# Patient Record
Sex: MALE | Race: WHITE | Employment: OTHER | URBAN - METROPOLITAN AREA
[De-identification: names, ages, dates, MRNs, and addresses within clinical notes are randomized per-mention and may not be internally consistent; named-entity substitution may affect disease eponyms.]

---

## 2020-09-01 ENCOUNTER — HOSPITAL ENCOUNTER (OUTPATIENT)
Age: 45
Discharge: HOME | DRG: 617 | End: 2020-09-03
Payer: COMMERCIAL

## 2020-09-01 ENCOUNTER — HOSPITAL ENCOUNTER (OUTPATIENT)
Dept: GENERAL RADIOLOGY | Age: 45
Discharge: HOME | DRG: 617 | End: 2020-09-03
Payer: COMMERCIAL

## 2020-09-01 PROCEDURE — 73630 X-RAY EXAM OF FOOT: CPT

## 2020-09-03 ENCOUNTER — APPOINTMENT (OUTPATIENT)
Dept: GENERAL RADIOLOGY | Age: 45
DRG: 617 | End: 2020-09-03
Payer: COMMERCIAL

## 2020-09-03 ENCOUNTER — HOSPITAL ENCOUNTER (INPATIENT)
Age: 45
LOS: 8 days | Discharge: HOME | DRG: 617 | End: 2020-09-11
Attending: EMERGENCY MEDICINE | Admitting: INTERNAL MEDICINE
Payer: COMMERCIAL

## 2020-09-03 PROBLEM — E08.621 DIABETIC FOOT ULCER ASSOCIATED WITH DIABETES MELLITUS DUE TO UNDERLYING CONDITION (HCC): Status: ACTIVE | Noted: 2020-09-03

## 2020-09-03 PROBLEM — L97.509 DIABETIC FOOT ULCER ASSOCIATED WITH DIABETES MELLITUS DUE TO UNDERLYING CONDITION (HCC): Status: ACTIVE | Noted: 2020-09-03

## 2020-09-03 PROBLEM — L03.116 CELLULITIS OF LEFT FOOT: Status: ACTIVE | Noted: 2020-09-03

## 2020-09-03 PROBLEM — I10 ESSENTIAL HYPERTENSION: Status: ACTIVE | Noted: 2020-09-03

## 2020-09-03 PROBLEM — E66.01 MORBID OBESITY WITH BMI OF 45.0-49.9, ADULT (HCC): Status: ACTIVE | Noted: 2020-09-03

## 2020-09-03 LAB
ALBUMIN SERPL-MCNC: 3.9 G/DL (ref 3.5–5.2)
ALP BLD-CCNC: 91 U/L (ref 40–129)
ALT SERPL-CCNC: 30 U/L (ref 0–40)
ANION GAP SERPL CALCULATED.3IONS-SCNC: 11 MMOL/L (ref 7–16)
AST SERPL-CCNC: 20 U/L (ref 0–39)
BASOPHILS ABSOLUTE: 0.13 E9/L (ref 0–0.2)
BASOPHILS RELATIVE PERCENT: 0.8 % (ref 0–2)
BETA-HYDROXYBUTYRATE: 0.61 MMOL/L (ref 0.02–0.27)
BILIRUB SERPL-MCNC: 0.5 MG/DL (ref 0–1.2)
BUN BLDV-MCNC: 13 MG/DL (ref 6–20)
CALCIUM SERPL-MCNC: 9.3 MG/DL (ref 8.6–10.2)
CHLORIDE BLD-SCNC: 99 MMOL/L (ref 98–107)
CO2: 25 MMOL/L (ref 22–29)
CREAT SERPL-MCNC: 0.9 MG/DL (ref 0.7–1.2)
EOSINOPHILS ABSOLUTE: 0.09 E9/L (ref 0.05–0.5)
EOSINOPHILS RELATIVE PERCENT: 0.6 % (ref 0–6)
GFR AFRICAN AMERICAN: >60
GFR NON-AFRICAN AMERICAN: >60 ML/MIN/1.73
GLUCOSE BLD-MCNC: 310 MG/DL (ref 74–99)
HCT VFR BLD CALC: 49 % (ref 37–54)
HEMOGLOBIN: 17 G/DL (ref 12.5–16.5)
IMMATURE GRANULOCYTES #: 0.1 E9/L
IMMATURE GRANULOCYTES %: 0.6 % (ref 0–5)
LACTIC ACID, SEPSIS: 2 MMOL/L (ref 0.5–1.9)
LYMPHOCYTES ABSOLUTE: 1.71 E9/L (ref 1.5–4)
LYMPHOCYTES RELATIVE PERCENT: 11 % (ref 20–42)
MCH RBC QN AUTO: 33.7 PG (ref 26–35)
MCHC RBC AUTO-ENTMCNC: 34.7 % (ref 32–34.5)
MCV RBC AUTO: 97.2 FL (ref 80–99.9)
METER GLUCOSE: 220 MG/DL (ref 74–99)
MONOCYTES ABSOLUTE: 1.24 E9/L (ref 0.1–0.95)
MONOCYTES RELATIVE PERCENT: 8 % (ref 2–12)
NEUTROPHILS ABSOLUTE: 12.26 E9/L (ref 1.8–7.3)
NEUTROPHILS RELATIVE PERCENT: 79 % (ref 43–80)
PDW BLD-RTO: 13.2 FL (ref 11.5–15)
PLATELET # BLD: 227 E9/L (ref 130–450)
PMV BLD AUTO: 11.9 FL (ref 7–12)
POTASSIUM SERPL-SCNC: 4.2 MMOL/L (ref 3.5–5)
RBC # BLD: 5.04 E12/L (ref 3.8–5.8)
SODIUM BLD-SCNC: 135 MMOL/L (ref 132–146)
TOTAL PROTEIN: 7.7 G/DL (ref 6.4–8.3)
WBC # BLD: 15.5 E9/L (ref 4.5–11.5)

## 2020-09-03 PROCEDURE — 73630 X-RAY EXAM OF FOOT: CPT

## 2020-09-03 PROCEDURE — 6360000002 HC RX W HCPCS: Performed by: EMERGENCY MEDICINE

## 2020-09-03 PROCEDURE — 87186 SC STD MICRODIL/AGAR DIL: CPT

## 2020-09-03 PROCEDURE — 87070 CULTURE OTHR SPECIMN AEROBIC: CPT

## 2020-09-03 PROCEDURE — 85025 COMPLETE CBC W/AUTO DIFF WBC: CPT

## 2020-09-03 PROCEDURE — 99223 1ST HOSP IP/OBS HIGH 75: CPT | Performed by: INTERNAL MEDICINE

## 2020-09-03 PROCEDURE — 82962 GLUCOSE BLOOD TEST: CPT

## 2020-09-03 PROCEDURE — 83605 ASSAY OF LACTIC ACID: CPT

## 2020-09-03 PROCEDURE — 1200000000 HC SEMI PRIVATE

## 2020-09-03 PROCEDURE — 6370000000 HC RX 637 (ALT 250 FOR IP): Performed by: INTERNAL MEDICINE

## 2020-09-03 PROCEDURE — 82010 KETONE BODYS QUAN: CPT

## 2020-09-03 PROCEDURE — 2580000003 HC RX 258: Performed by: EMERGENCY MEDICINE

## 2020-09-03 PROCEDURE — 99284 EMERGENCY DEPT VISIT MOD MDM: CPT

## 2020-09-03 PROCEDURE — 80053 COMPREHEN METABOLIC PANEL: CPT

## 2020-09-03 PROCEDURE — 87040 BLOOD CULTURE FOR BACTERIA: CPT

## 2020-09-03 PROCEDURE — 87147 CULTURE TYPE IMMUNOLOGIC: CPT

## 2020-09-03 PROCEDURE — 99283 EMERGENCY DEPT VISIT LOW MDM: CPT

## 2020-09-03 PROCEDURE — 2580000003 HC RX 258: Performed by: INTERNAL MEDICINE

## 2020-09-03 RX ORDER — ONDANSETRON 2 MG/ML
4 INJECTION INTRAMUSCULAR; INTRAVENOUS EVERY 6 HOURS PRN
Status: DISCONTINUED | OUTPATIENT
Start: 2020-09-03 | End: 2020-09-11 | Stop reason: HOSPADM

## 2020-09-03 RX ORDER — CETIRIZINE HYDROCHLORIDE 10 MG/1
10 TABLET ORAL DAILY
Status: DISCONTINUED | OUTPATIENT
Start: 2020-09-04 | End: 2020-09-11 | Stop reason: HOSPADM

## 2020-09-03 RX ORDER — ACETAMINOPHEN 650 MG/1
650 SUPPOSITORY RECTAL EVERY 6 HOURS PRN
Status: DISCONTINUED | OUTPATIENT
Start: 2020-09-03 | End: 2020-09-11 | Stop reason: HOSPADM

## 2020-09-03 RX ORDER — BUPROPION HYDROCHLORIDE 300 MG/1
300 TABLET ORAL EVERY MORNING
COMMUNITY
End: 2020-11-27 | Stop reason: SDUPTHER

## 2020-09-03 RX ORDER — PANTOPRAZOLE SODIUM 40 MG/1
40 TABLET, DELAYED RELEASE ORAL
Status: DISCONTINUED | OUTPATIENT
Start: 2020-09-04 | End: 2020-09-11 | Stop reason: HOSPADM

## 2020-09-03 RX ORDER — ACETAMINOPHEN 325 MG/1
650 TABLET ORAL EVERY 6 HOURS PRN
Status: DISCONTINUED | OUTPATIENT
Start: 2020-09-03 | End: 2020-09-11 | Stop reason: HOSPADM

## 2020-09-03 RX ORDER — ESCITALOPRAM OXALATE 10 MG/1
20 TABLET ORAL DAILY
Status: DISCONTINUED | OUTPATIENT
Start: 2020-09-04 | End: 2020-09-11 | Stop reason: HOSPADM

## 2020-09-03 RX ORDER — PROMETHAZINE HYDROCHLORIDE 25 MG/1
12.5 TABLET ORAL EVERY 6 HOURS PRN
Status: DISCONTINUED | OUTPATIENT
Start: 2020-09-03 | End: 2020-09-11 | Stop reason: HOSPADM

## 2020-09-03 RX ORDER — POLYETHYLENE GLYCOL 3350 17 G/17G
17 POWDER, FOR SOLUTION ORAL DAILY PRN
Status: DISCONTINUED | OUTPATIENT
Start: 2020-09-03 | End: 2020-09-11 | Stop reason: HOSPADM

## 2020-09-03 RX ORDER — ASPIRIN 81 MG/1
81 TABLET ORAL
COMMUNITY

## 2020-09-03 RX ORDER — NICOTINE POLACRILEX 4 MG
15 LOZENGE BUCCAL PRN
Status: DISCONTINUED | OUTPATIENT
Start: 2020-09-03 | End: 2020-09-11 | Stop reason: HOSPADM

## 2020-09-03 RX ORDER — DEXTROSE MONOHYDRATE 50 MG/ML
100 INJECTION, SOLUTION INTRAVENOUS PRN
Status: DISCONTINUED | OUTPATIENT
Start: 2020-09-03 | End: 2020-09-11 | Stop reason: HOSPADM

## 2020-09-03 RX ORDER — VITAMIN B COMPLEX
1 CAPSULE ORAL DAILY
Status: ON HOLD | COMMUNITY
End: 2020-09-11 | Stop reason: HOSPADM

## 2020-09-03 RX ORDER — INSULIN GLARGINE 100 [IU]/ML
15 INJECTION, SOLUTION SUBCUTANEOUS EVERY MORNING
Status: ON HOLD | COMMUNITY
End: 2020-09-11 | Stop reason: HOSPADM

## 2020-09-03 RX ORDER — DEXTROSE MONOHYDRATE 25 G/50ML
12.5 INJECTION, SOLUTION INTRAVENOUS PRN
Status: DISCONTINUED | OUTPATIENT
Start: 2020-09-03 | End: 2020-09-11 | Stop reason: HOSPADM

## 2020-09-03 RX ORDER — LISINOPRIL 10 MG/1
20 TABLET ORAL DAILY
COMMUNITY
End: 2020-10-30

## 2020-09-03 RX ORDER — SODIUM CHLORIDE 0.9 % (FLUSH) 0.9 %
10 SYRINGE (ML) INJECTION EVERY 12 HOURS SCHEDULED
Status: DISCONTINUED | OUTPATIENT
Start: 2020-09-03 | End: 2020-09-11 | Stop reason: HOSPADM

## 2020-09-03 RX ORDER — SODIUM CHLORIDE 0.9 % (FLUSH) 0.9 %
10 SYRINGE (ML) INJECTION PRN
Status: DISCONTINUED | OUTPATIENT
Start: 2020-09-03 | End: 2020-09-11 | Stop reason: HOSPADM

## 2020-09-03 RX ORDER — ASPIRIN 81 MG/1
81 TABLET ORAL 2 TIMES DAILY
Status: DISCONTINUED | OUTPATIENT
Start: 2020-09-03 | End: 2020-09-11 | Stop reason: HOSPADM

## 2020-09-03 RX ORDER — LISINOPRIL 20 MG/1
20 TABLET ORAL DAILY
Status: DISCONTINUED | OUTPATIENT
Start: 2020-09-04 | End: 2020-09-11 | Stop reason: HOSPADM

## 2020-09-03 RX ORDER — BUPROPION HYDROCHLORIDE 300 MG/1
300 TABLET ORAL EVERY MORNING
Status: DISCONTINUED | OUTPATIENT
Start: 2020-09-04 | End: 2020-09-11 | Stop reason: HOSPADM

## 2020-09-03 RX ORDER — 0.9 % SODIUM CHLORIDE 0.9 %
1000 INTRAVENOUS SOLUTION INTRAVENOUS ONCE
Status: COMPLETED | OUTPATIENT
Start: 2020-09-03 | End: 2020-09-03

## 2020-09-03 RX ORDER — OMEPRAZOLE 20 MG/1
20 CAPSULE, DELAYED RELEASE ORAL DAILY
COMMUNITY
End: 2021-10-29 | Stop reason: SDUPTHER

## 2020-09-03 RX ORDER — ERTUGLIFLOZIN 15 MG/1
15 TABLET, FILM COATED ORAL DAILY
Status: ON HOLD | COMMUNITY
End: 2020-09-11 | Stop reason: HOSPADM

## 2020-09-03 RX ORDER — M-VIT,TX,IRON,MINS/CALC/FOLIC 27MG-0.4MG
1 TABLET ORAL DAILY
Status: ON HOLD | COMMUNITY
End: 2020-09-11 | Stop reason: HOSPADM

## 2020-09-03 RX ORDER — ESCITALOPRAM OXALATE 20 MG/1
20 TABLET ORAL DAILY
COMMUNITY
End: 2020-11-27 | Stop reason: SDUPTHER

## 2020-09-03 RX ORDER — CETIRIZINE HYDROCHLORIDE 10 MG/1
10 TABLET ORAL DAILY
COMMUNITY

## 2020-09-03 RX ORDER — ARIPIPRAZOLE 2 MG/1
4 TABLET ORAL DAILY
Status: DISCONTINUED | OUTPATIENT
Start: 2020-09-04 | End: 2020-09-11 | Stop reason: HOSPADM

## 2020-09-03 RX ORDER — PRAVASTATIN SODIUM 40 MG
40 TABLET ORAL NIGHTLY
COMMUNITY
End: 2020-11-27 | Stop reason: SDUPTHER

## 2020-09-03 RX ORDER — ARIPIPRAZOLE 2 MG/1
4 TABLET ORAL DAILY
COMMUNITY
End: 2020-11-27 | Stop reason: SDUPTHER

## 2020-09-03 RX ORDER — PRAVASTATIN SODIUM 20 MG
40 TABLET ORAL NIGHTLY
Status: DISCONTINUED | OUTPATIENT
Start: 2020-09-03 | End: 2020-09-11 | Stop reason: HOSPADM

## 2020-09-03 RX ORDER — HYDROCODONE BITARTRATE AND ACETAMINOPHEN 7.5; 325 MG/1; MG/1
1 TABLET ORAL EVERY 6 HOURS PRN
Status: DISCONTINUED | OUTPATIENT
Start: 2020-09-03 | End: 2020-09-11 | Stop reason: HOSPADM

## 2020-09-03 RX ADMIN — INSULIN LISPRO 2 UNITS: 100 INJECTION, SOLUTION INTRAVENOUS; SUBCUTANEOUS at 23:37

## 2020-09-03 RX ADMIN — SODIUM CHLORIDE, PRESERVATIVE FREE 10 ML: 5 INJECTION INTRAVENOUS at 23:34

## 2020-09-03 RX ADMIN — CEFEPIME HYDROCHLORIDE 2 G: 2 INJECTION, POWDER, FOR SOLUTION INTRAVENOUS at 19:29

## 2020-09-03 RX ADMIN — ASPIRIN 81 MG: 81 TABLET, COATED ORAL at 23:34

## 2020-09-03 RX ADMIN — SODIUM CHLORIDE 1000 ML: 9 INJECTION, SOLUTION INTRAVENOUS at 19:58

## 2020-09-03 RX ADMIN — VANCOMYCIN HYDROCHLORIDE 3000 MG: 1 INJECTION, POWDER, LYOPHILIZED, FOR SOLUTION INTRAVENOUS at 23:36

## 2020-09-03 RX ADMIN — PRAVASTATIN SODIUM 40 MG: 20 TABLET ORAL at 23:38

## 2020-09-03 RX ADMIN — METOPROLOL TARTRATE 25 MG: 25 TABLET, FILM COATED ORAL at 23:34

## 2020-09-03 SDOH — HEALTH STABILITY: MENTAL HEALTH: HOW OFTEN DO YOU HAVE A DRINK CONTAINING ALCOHOL?: NEVER

## 2020-09-03 ASSESSMENT — PAIN SCALES - GENERAL
PAINLEVEL_OUTOF10: 0
PAINLEVEL_OUTOF10: 6

## 2020-09-03 ASSESSMENT — PAIN DESCRIPTION - PAIN TYPE: TYPE: ACUTE PAIN

## 2020-09-03 ASSESSMENT — PAIN DESCRIPTION - FREQUENCY: FREQUENCY: CONTINUOUS

## 2020-09-03 ASSESSMENT — PAIN DESCRIPTION - LOCATION: LOCATION: FOOT

## 2020-09-03 ASSESSMENT — PAIN DESCRIPTION - DESCRIPTORS: DESCRIPTORS: ACHING

## 2020-09-03 ASSESSMENT — PAIN DESCRIPTION - ORIENTATION: ORIENTATION: LEFT

## 2020-09-03 NOTE — ED NOTES
CHAR faxed and received per Freya Krause advised patient ready for transport upstairs.      Gualberto Mercado RN  09/03/20 0460

## 2020-09-03 NOTE — ED PROVIDER NOTES
smoked. He has never used smokeless tobacco. He reports that he does not drink alcohol or use drugs. Family History: family history is not on file. The patients home medications have been reviewed. Allergies: Milk-related compounds        ---------------------------------------------------PHYSICAL EXAM--------------------------------------    Constitutional/General: Alert and oriented x3, well appearing, non toxic in NAD; orbitally obese   head: Normocephalic and atraumatic  Eyes: PERRL, EOMI, conjunctive normal, sclera non icteric  Mouth: Oropharynx clear, handling secretions, no trismus, no asymmetry of the posterior oropharynx or uvular edema  Neck: Supple, full ROM, non tender to palpation in the midline, no stridor, no crepitus, no meningeal signs  Respiratory: Lungs clear to auscultation bilaterally, no wheezes, rales, or rhonchi. Not in respiratory distress  Cardiovascular:  Regular rate. Regular rhythm. No murmurs, gallops, or rubs. 2+ distal pulses  Chest: No chest wall tenderness  GI:  Abdomen Soft, Non tender, Non distended. +BS. No organomegaly, no palpable masses,  No rebound, guarding, or rigidity. Musculoskeletal: Moves all extremities x 4. Warm and well perfused, no clubbing or cyanosis. Patient has a 1.5 x 1.5 cm open diabetic wound to the lateral plantar surface of his left foot. There is surrounding erythema no purulent drainage. Is tender in the foot is moderately swollen throughout. Normal distal pulses. Normal neurovascular exam to the left lower extremity. Integument: skin warm and dry. No rashes. Lymphatic: no lymphadenopathy noted  Neurologic: GCS 15, no focal deficits, symmetric strength 5/5 in the upper and lower extremities bilaterally  Psychiatric: Normal Affect    -------------------------------------------------- RESULTS -------------------------------------------------  I have personally reviewed all laboratory and imaging results for this patient.  Results are listed below. LABS:  Results for orders placed or performed during the hospital encounter of 09/03/20   CBC Auto Differential   Result Value Ref Range    WBC 15.5 (H) 4.5 - 11.5 E9/L    RBC 5.04 3.80 - 5.80 E12/L    Hemoglobin 17.0 (H) 12.5 - 16.5 g/dL    Hematocrit 49.0 37.0 - 54.0 %    MCV 97.2 80.0 - 99.9 fL    MCH 33.7 26.0 - 35.0 pg    MCHC 34.7 (H) 32.0 - 34.5 %    RDW 13.2 11.5 - 15.0 fL    Platelets 334 180 - 136 E9/L    MPV 11.9 7.0 - 12.0 fL    Neutrophils % 79.0 43.0 - 80.0 %    Immature Granulocytes % 0.6 0.0 - 5.0 %    Lymphocytes % 11.0 (L) 20.0 - 42.0 %    Monocytes % 8.0 2.0 - 12.0 %    Eosinophils % 0.6 0.0 - 6.0 %    Basophils % 0.8 0.0 - 2.0 %    Neutrophils Absolute 12.26 (H) 1.80 - 7.30 E9/L    Immature Granulocytes # 0.10 E9/L    Lymphocytes Absolute 1.71 1.50 - 4.00 E9/L    Monocytes Absolute 1.24 (H) 0.10 - 0.95 E9/L    Eosinophils Absolute 0.09 0.05 - 0.50 E9/L    Basophils Absolute 0.13 0.00 - 0.20 E9/L   Lactate, Sepsis   Result Value Ref Range    Lactic Acid, Sepsis 2.0 (H) 0.5 - 1.9 mmol/L   Comprehensive Metabolic Panel   Result Value Ref Range    Sodium 135 132 - 146 mmol/L    Potassium 4.2 3.5 - 5.0 mmol/L    Chloride 99 98 - 107 mmol/L    CO2 25 22 - 29 mmol/L    Anion Gap 11 7 - 16 mmol/L    Glucose 310 (H) 74 - 99 mg/dL    BUN 13 6 - 20 mg/dL    CREATININE 0.9 0.7 - 1.2 mg/dL    GFR Non-African American >60 >=60 mL/min/1.73    GFR African American >60     Calcium 9.3 8.6 - 10.2 mg/dL    Total Protein 7.7 6.4 - 8.3 g/dL    Alb 3.9 3.5 - 5.2 g/dL    Total Bilirubin 0.5 0.0 - 1.2 mg/dL    Alkaline Phosphatase 91 40 - 129 U/L    ALT 30 0 - 40 U/L    AST 20 0 - 39 U/L   Beta-Hydroxybutyrate   Result Value Ref Range    Beta-Hydroxybutyrate 0.61 (H) 0.02 - 0.27 mmol/L       RADIOLOGY:  Interpreted by Radiologist.  XR FOOT LEFT (MIN 3 VIEWS)   Final Result      Nondisplaced fracture of the proximal phalanx of the fifth toe is once   again seen.        Small ulcer adjacent to the distal head of the fifth metatarsal is   once again noted. ------------------------- NURSING NOTES AND VITALS REVIEWED ---------------------------   The nursing notes within the ED encounter and vital signs as below have been reviewed by myself. BP (!) 159/86   Pulse 108   Temp 98.8 °F (37.1 °C) (Oral)   Resp 16   Ht 6' 5\" (1.956 m)   Wt (!) 400 lb (181.4 kg)   SpO2 96%   BMI 47.43 kg/m²   Oxygen Saturation Interpretation: Normal    The patients available past medical records and past encounters were reviewed. ------------------------------ ED COURSE/MEDICAL DECISION MAKING----------------------  Medications   cefepime (MAXIPIME) 2 g IVPB extended (mini-bag) (2 g Intravenous New Bag 9/3/20 1929)   vancomycin (VANCOCIN) 3,000 mg in dextrose 5 % 600 mL IVPB (has no administration in time range)         ED COURSE:       Medical Decision Making:    Patient is a pleasant 54 old gentleman is morbidly obese history of diabetes who presents with nonhealing diabetic foot wound has been there for the past week failed outpatient antibiotics concerns for worsening infection by his podiatrist and sent in for admission today. Patient will have blood cultures as well as wound culture will check basic labs and lactic acid. Of x-ray, as well. Differential diagnosis being diabetic foot wound nonhealing ulcer, abscess, DKA, hyperglycemia, osteomyelitis. This patient has remained hemodynamically stable during their ED course. Patient had blood cultures and wound culture sent here. He had a CBC showed a white count limit of 15.5 lactic was slightly elevated 2.0. Chemistry showed elevated glucose of 310 but no signs of DKA normal anion gap and normal CO2. He was given IV fluids. Hydroxybutyrate is borderline at 0.61. He is not on insulin at home. He can be further monitored for his blood sugars after IV fluids are given to determine whether any sliding scale insulin is necessary.   X-ray of the right foot shows nondisplaced chronic fracture of the proximal phalanx of the fifth toe and there is a small ulcer adjacent to the distal head of the fifth metatarsal.  No signs of osteomyelitis. Given the surrounding cellulitis swelling and failed outpatient therapy, patient was started on IV antibiotics. IV cefepime and Vanco was started. Spoke to Dr. Alveda Nyhan who admit the patient will consult ID in house. Re-Evaluations:             Re-evaluation. Patients symptoms show no change    Re-examination  9/3/20   5:58 PM EDT          Vital Signs:   Vitals:    09/03/20 1704 09/03/20 1819 09/03/20 1937   BP: (!) 157/97 (!) 168/90 (!) 159/86   Pulse: 118 113 108   Resp: 14 14 16   Temp: 98.9 °F (37.2 °C) 98.7 °F (37.1 °C) 98.8 °F (37.1 °C)   TempSrc: Oral Oral Oral   SpO2: 94% 97% 96%   Weight: (!) 400 lb (181.4 kg)     Height: 6' 5\" (1.956 m)             Consultations:           I spoke to Dr. Alveda Nyhan who will admit the patient to the hospitalist service. IV antibiotics will start be started here in the department. Podiatry be consulted for the floor. Counseling: The emergency provider has spoken with the patient and discussed todays results, in addition to providing specific details for the plan of care and counseling regarding the diagnosis and prognosis. Questions are answered at this time and they are agreeable with the plan.       --------------------------------- IMPRESSION AND DISPOSITION ---------------------------------    IMPRESSION  1. Diabetic ulcer of left midfoot associated with diabetes mellitus due to underlying condition, limited to breakdown of skin ([facility] [address]    2. Cellulitis of left foot        DISPOSITION  Disposition: Admit to med/surg floor  Patient condition is stable    NOTE: This report was transcribed using voice recognition software.  Every effort was made to ensure accuracy; however, inadvertent computerized transcription errors may be present        Desiree Rebollar MD  09/03/20 1955

## 2020-09-04 ENCOUNTER — ANESTHESIA EVENT (OUTPATIENT)
Dept: OPERATING ROOM | Age: 45
DRG: 617 | End: 2020-09-04
Payer: COMMERCIAL

## 2020-09-04 ENCOUNTER — ANESTHESIA (OUTPATIENT)
Dept: OPERATING ROOM | Age: 45
DRG: 617 | End: 2020-09-04
Payer: COMMERCIAL

## 2020-09-04 ENCOUNTER — APPOINTMENT (OUTPATIENT)
Dept: GENERAL RADIOLOGY | Age: 45
DRG: 617 | End: 2020-09-04
Payer: COMMERCIAL

## 2020-09-04 VITALS — OXYGEN SATURATION: 96 % | SYSTOLIC BLOOD PRESSURE: 111 MMHG | DIASTOLIC BLOOD PRESSURE: 60 MMHG

## 2020-09-04 PROBLEM — L03.032 CELLULITIS AND ABSCESS OF TOE OF LEFT FOOT: Status: ACTIVE | Noted: 2020-09-04

## 2020-09-04 PROBLEM — L02.612 CELLULITIS AND ABSCESS OF TOE OF LEFT FOOT: Status: ACTIVE | Noted: 2020-09-04

## 2020-09-04 LAB
ALBUMIN SERPL-MCNC: 3.4 G/DL (ref 3.5–5.2)
ALP BLD-CCNC: 77 U/L (ref 40–129)
ALT SERPL-CCNC: 24 U/L (ref 0–40)
ANION GAP SERPL CALCULATED.3IONS-SCNC: 10 MMOL/L (ref 7–16)
ANTISTREPTOLYSIN-O: 64 IU/ML (ref 0–200)
AST SERPL-CCNC: 16 U/L (ref 0–39)
BILIRUB SERPL-MCNC: 0.9 MG/DL (ref 0–1.2)
BUN BLDV-MCNC: 11 MG/DL (ref 6–20)
C-REACTIVE PROTEIN: 2.6 MG/DL (ref 0–0.4)
CALCIUM SERPL-MCNC: 8.8 MG/DL (ref 8.6–10.2)
CHLORIDE BLD-SCNC: 102 MMOL/L (ref 98–107)
CO2: 20 MMOL/L (ref 22–29)
CREAT SERPL-MCNC: 0.6 MG/DL (ref 0.7–1.2)
GFR AFRICAN AMERICAN: >60
GFR NON-AFRICAN AMERICAN: >60 ML/MIN/1.73
GLUCOSE BLD-MCNC: 198 MG/DL (ref 74–99)
HCT VFR BLD CALC: 46.7 % (ref 37–54)
HEMOGLOBIN: 15.9 G/DL (ref 12.5–16.5)
LACTIC ACID: 1.1 MMOL/L (ref 0.5–2.2)
MCH RBC QN AUTO: 33.4 PG (ref 26–35)
MCHC RBC AUTO-ENTMCNC: 34 % (ref 32–34.5)
MCV RBC AUTO: 98.1 FL (ref 80–99.9)
METER GLUCOSE: 168 MG/DL (ref 74–99)
METER GLUCOSE: 194 MG/DL (ref 74–99)
METER GLUCOSE: 249 MG/DL (ref 74–99)
METER GLUCOSE: 302 MG/DL (ref 74–99)
PDW BLD-RTO: 13.2 FL (ref 11.5–15)
PLATELET # BLD: 210 E9/L (ref 130–450)
PMV BLD AUTO: 12 FL (ref 7–12)
POTASSIUM REFLEX MAGNESIUM: 3.9 MMOL/L (ref 3.5–5)
RBC # BLD: 4.76 E12/L (ref 3.8–5.8)
SEDIMENTATION RATE, ERYTHROCYTE: 10 MM/HR (ref 0–15)
SODIUM BLD-SCNC: 132 MMOL/L (ref 132–146)
TOTAL PROTEIN: 7 G/DL (ref 6.4–8.3)
WBC # BLD: 16.4 E9/L (ref 4.5–11.5)

## 2020-09-04 PROCEDURE — 6370000000 HC RX 637 (ALT 250 FOR IP): Performed by: INTERNAL MEDICINE

## 2020-09-04 PROCEDURE — 88311 DECALCIFY TISSUE: CPT

## 2020-09-04 PROCEDURE — 86140 C-REACTIVE PROTEIN: CPT

## 2020-09-04 PROCEDURE — 7100000001 HC PACU RECOVERY - ADDTL 15 MIN: Performed by: PODIATRIST

## 2020-09-04 PROCEDURE — 0Y6N0ZF DETACHMENT AT LEFT FOOT, PARTIAL 5TH RAY, OPEN APPROACH: ICD-10-PCS | Performed by: PODIATRIST

## 2020-09-04 PROCEDURE — 87147 CULTURE TYPE IMMUNOLOGIC: CPT

## 2020-09-04 PROCEDURE — 2580000003 HC RX 258: Performed by: INTERNAL MEDICINE

## 2020-09-04 PROCEDURE — 3700000000 HC ANESTHESIA ATTENDED CARE: Performed by: PODIATRIST

## 2020-09-04 PROCEDURE — 88304 TISSUE EXAM BY PATHOLOGIST: CPT

## 2020-09-04 PROCEDURE — 10061 I&D ABSCESS COMP/MULTIPLE: CPT | Performed by: PODIATRIST

## 2020-09-04 PROCEDURE — 82962 GLUCOSE BLOOD TEST: CPT

## 2020-09-04 PROCEDURE — 99233 SBSQ HOSP IP/OBS HIGH 50: CPT | Performed by: INTERNAL MEDICINE

## 2020-09-04 PROCEDURE — 6360000002 HC RX W HCPCS: Performed by: NURSE ANESTHETIST, CERTIFIED REGISTERED

## 2020-09-04 PROCEDURE — 3600000012 HC SURGERY LEVEL 2 ADDTL 15MIN: Performed by: PODIATRIST

## 2020-09-04 PROCEDURE — 87102 FUNGUS ISOLATION CULTURE: CPT

## 2020-09-04 PROCEDURE — U0003 INFECTIOUS AGENT DETECTION BY NUCLEIC ACID (DNA OR RNA); SEVERE ACUTE RESPIRATORY SYNDROME CORONAVIRUS 2 (SARS-COV-2) (CORONAVIRUS DISEASE [COVID-19]), AMPLIFIED PROBE TECHNIQUE, MAKING USE OF HIGH THROUGHPUT TECHNOLOGIES AS DESCRIBED BY CMS-2020-01-R: HCPCS

## 2020-09-04 PROCEDURE — 85651 RBC SED RATE NONAUTOMATED: CPT

## 2020-09-04 PROCEDURE — 6360000002 HC RX W HCPCS: Performed by: PODIATRIST

## 2020-09-04 PROCEDURE — 87070 CULTURE OTHR SPECIMN AEROBIC: CPT

## 2020-09-04 PROCEDURE — 2500000003 HC RX 250 WO HCPCS: Performed by: NURSE ANESTHETIST, CERTIFIED REGISTERED

## 2020-09-04 PROCEDURE — 3700000001 HC ADD 15 MINUTES (ANESTHESIA): Performed by: PODIATRIST

## 2020-09-04 PROCEDURE — 99223 1ST HOSP IP/OBS HIGH 75: CPT | Performed by: PODIATRIST

## 2020-09-04 PROCEDURE — 28122 PARTIAL REMOVAL OF FOOT BONE: CPT | Performed by: PODIATRIST

## 2020-09-04 PROCEDURE — 87186 SC STD MICRODIL/AGAR DIL: CPT

## 2020-09-04 PROCEDURE — 2580000003 HC RX 258: Performed by: PODIATRIST

## 2020-09-04 PROCEDURE — 3600000002 HC SURGERY LEVEL 2 BASE: Performed by: PODIATRIST

## 2020-09-04 PROCEDURE — 0Q9P0ZX DRAINAGE OF LEFT METATARSAL, OPEN APPROACH, DIAGNOSTIC: ICD-10-PCS | Performed by: PODIATRIST

## 2020-09-04 PROCEDURE — 36415 COLL VENOUS BLD VENIPUNCTURE: CPT

## 2020-09-04 PROCEDURE — 86060 ANTISTREPTOLYSIN O TITER: CPT

## 2020-09-04 PROCEDURE — 2709999900 HC NON-CHARGEABLE SUPPLY: Performed by: PODIATRIST

## 2020-09-04 PROCEDURE — 87206 SMEAR FLUORESCENT/ACID STAI: CPT

## 2020-09-04 PROCEDURE — 6360000002 HC RX W HCPCS: Performed by: INTERNAL MEDICINE

## 2020-09-04 PROCEDURE — 87116 MYCOBACTERIA CULTURE: CPT

## 2020-09-04 PROCEDURE — 87077 CULTURE AEROBIC IDENTIFY: CPT

## 2020-09-04 PROCEDURE — 7100000000 HC PACU RECOVERY - FIRST 15 MIN: Performed by: PODIATRIST

## 2020-09-04 PROCEDURE — 2580000003 HC RX 258: Performed by: NURSE ANESTHETIST, CERTIFIED REGISTERED

## 2020-09-04 PROCEDURE — 3209999900 FLUORO FOR SURGICAL PROCEDURES

## 2020-09-04 PROCEDURE — 87205 SMEAR GRAM STAIN: CPT

## 2020-09-04 PROCEDURE — 87075 CULTR BACTERIA EXCEPT BLOOD: CPT

## 2020-09-04 PROCEDURE — 80053 COMPREHEN METABOLIC PANEL: CPT

## 2020-09-04 PROCEDURE — 1200000000 HC SEMI PRIVATE

## 2020-09-04 PROCEDURE — 83605 ASSAY OF LACTIC ACID: CPT

## 2020-09-04 PROCEDURE — 85027 COMPLETE CBC AUTOMATED: CPT

## 2020-09-04 PROCEDURE — 87015 SPECIMEN INFECT AGNT CONCNTJ: CPT

## 2020-09-04 RX ORDER — SODIUM CHLORIDE 0.9 % (FLUSH) 0.9 %
10 SYRINGE (ML) INJECTION PRN
Status: DISCONTINUED | OUTPATIENT
Start: 2020-09-04 | End: 2020-09-04 | Stop reason: HOSPADM

## 2020-09-04 RX ORDER — MEPERIDINE HYDROCHLORIDE 25 MG/ML
12.5 INJECTION INTRAMUSCULAR; INTRAVENOUS; SUBCUTANEOUS
Status: DISCONTINUED | OUTPATIENT
Start: 2020-09-04 | End: 2020-09-04 | Stop reason: HOSPADM

## 2020-09-04 RX ORDER — INSULIN GLARGINE 100 [IU]/ML
15 INJECTION, SOLUTION SUBCUTANEOUS NIGHTLY
Status: DISCONTINUED | OUTPATIENT
Start: 2020-09-04 | End: 2020-09-08

## 2020-09-04 RX ORDER — SODIUM CHLORIDE 0.9 % (FLUSH) 0.9 %
10 SYRINGE (ML) INJECTION EVERY 12 HOURS SCHEDULED
Status: DISCONTINUED | OUTPATIENT
Start: 2020-09-04 | End: 2020-09-11 | Stop reason: HOSPADM

## 2020-09-04 RX ORDER — PROPOFOL 10 MG/ML
INJECTION, EMULSION INTRAVENOUS PRN
Status: DISCONTINUED | OUTPATIENT
Start: 2020-09-04 | End: 2020-09-04 | Stop reason: SDUPTHER

## 2020-09-04 RX ORDER — LIDOCAINE HYDROCHLORIDE 20 MG/ML
INJECTION, SOLUTION EPIDURAL; INFILTRATION; INTRACAUDAL; PERINEURAL PRN
Status: DISCONTINUED | OUTPATIENT
Start: 2020-09-04 | End: 2020-09-04 | Stop reason: SDUPTHER

## 2020-09-04 RX ORDER — LIDOCAINE HYDROCHLORIDE 10 MG/ML
INJECTION, SOLUTION INFILTRATION; PERINEURAL PRN
Status: DISCONTINUED | OUTPATIENT
Start: 2020-09-04 | End: 2020-09-04 | Stop reason: ALTCHOICE

## 2020-09-04 RX ORDER — ONDANSETRON 2 MG/ML
INJECTION INTRAMUSCULAR; INTRAVENOUS PRN
Status: DISCONTINUED | OUTPATIENT
Start: 2020-09-04 | End: 2020-09-04 | Stop reason: SDUPTHER

## 2020-09-04 RX ORDER — PROMETHAZINE HYDROCHLORIDE 25 MG/ML
6.25 INJECTION, SOLUTION INTRAMUSCULAR; INTRAVENOUS PRN
Status: DISCONTINUED | OUTPATIENT
Start: 2020-09-04 | End: 2020-09-04 | Stop reason: HOSPADM

## 2020-09-04 RX ORDER — SODIUM CHLORIDE 0.9 % (FLUSH) 0.9 %
10 SYRINGE (ML) INJECTION EVERY 12 HOURS SCHEDULED
Status: DISCONTINUED | OUTPATIENT
Start: 2020-09-04 | End: 2020-09-04 | Stop reason: HOSPADM

## 2020-09-04 RX ORDER — GLYCOPYRROLATE 1 MG/5 ML
SYRINGE (ML) INTRAVENOUS PRN
Status: DISCONTINUED | OUTPATIENT
Start: 2020-09-04 | End: 2020-09-04 | Stop reason: SDUPTHER

## 2020-09-04 RX ORDER — DEXAMETHASONE SODIUM PHOSPHATE 4 MG/ML
INJECTION, SOLUTION INTRA-ARTICULAR; INTRALESIONAL; INTRAMUSCULAR; INTRAVENOUS; SOFT TISSUE PRN
Status: DISCONTINUED | OUTPATIENT
Start: 2020-09-04 | End: 2020-09-04 | Stop reason: SDUPTHER

## 2020-09-04 RX ORDER — FENTANYL CITRATE 50 UG/ML
50 INJECTION, SOLUTION INTRAMUSCULAR; INTRAVENOUS EVERY 5 MIN PRN
Status: DISCONTINUED | OUTPATIENT
Start: 2020-09-04 | End: 2020-09-04 | Stop reason: HOSPADM

## 2020-09-04 RX ORDER — SODIUM CHLORIDE 9 MG/ML
INJECTION, SOLUTION INTRAVENOUS EVERY 8 HOURS
Status: DISCONTINUED | OUTPATIENT
Start: 2020-09-04 | End: 2020-09-04

## 2020-09-04 RX ORDER — OXYCODONE HYDROCHLORIDE AND ACETAMINOPHEN 5; 325 MG/1; MG/1
1 TABLET ORAL
Status: DISCONTINUED | OUTPATIENT
Start: 2020-09-04 | End: 2020-09-04 | Stop reason: HOSPADM

## 2020-09-04 RX ORDER — SODIUM CHLORIDE 0.9 % (FLUSH) 0.9 %
10 SYRINGE (ML) INJECTION PRN
Status: DISCONTINUED | OUTPATIENT
Start: 2020-09-04 | End: 2020-09-11 | Stop reason: HOSPADM

## 2020-09-04 RX ORDER — SODIUM CHLORIDE 9 MG/ML
INJECTION, SOLUTION INTRAVENOUS EVERY 8 HOURS
Status: DISCONTINUED | OUTPATIENT
Start: 2020-09-04 | End: 2020-09-07

## 2020-09-04 RX ORDER — MIDAZOLAM HYDROCHLORIDE 1 MG/ML
INJECTION INTRAMUSCULAR; INTRAVENOUS PRN
Status: DISCONTINUED | OUTPATIENT
Start: 2020-09-04 | End: 2020-09-04 | Stop reason: SDUPTHER

## 2020-09-04 RX ORDER — SODIUM CHLORIDE 9 MG/ML
INJECTION, SOLUTION INTRAVENOUS CONTINUOUS PRN
Status: DISCONTINUED | OUTPATIENT
Start: 2020-09-04 | End: 2020-09-04 | Stop reason: SDUPTHER

## 2020-09-04 RX ORDER — FENTANYL CITRATE 50 UG/ML
INJECTION, SOLUTION INTRAMUSCULAR; INTRAVENOUS PRN
Status: DISCONTINUED | OUTPATIENT
Start: 2020-09-04 | End: 2020-09-04 | Stop reason: SDUPTHER

## 2020-09-04 RX ADMIN — ASPIRIN 81 MG: 81 TABLET, COATED ORAL at 08:21

## 2020-09-04 RX ADMIN — PANTOPRAZOLE SODIUM 40 MG: 40 TABLET, DELAYED RELEASE ORAL at 06:16

## 2020-09-04 RX ADMIN — FENTANYL CITRATE 100 MCG: 50 INJECTION, SOLUTION INTRAMUSCULAR; INTRAVENOUS at 18:10

## 2020-09-04 RX ADMIN — BUPROPION HYDROCHLORIDE 300 MG: 300 TABLET, FILM COATED, EXTENDED RELEASE ORAL at 08:21

## 2020-09-04 RX ADMIN — INSULIN LISPRO 2 UNITS: 100 INJECTION, SOLUTION INTRAVENOUS; SUBCUTANEOUS at 07:28

## 2020-09-04 RX ADMIN — PROPOFOL 300 MG: 10 INJECTION, EMULSION INTRAVENOUS at 18:10

## 2020-09-04 RX ADMIN — MIDAZOLAM 2 MG: 1 INJECTION INTRAMUSCULAR; INTRAVENOUS at 18:05

## 2020-09-04 RX ADMIN — CEFEPIME 2 G: 2 INJECTION, POWDER, FOR SOLUTION INTRAMUSCULAR; INTRAVENOUS at 20:14

## 2020-09-04 RX ADMIN — ARIPIPRAZOLE 4 MG: 2 TABLET ORAL at 08:20

## 2020-09-04 RX ADMIN — ASPIRIN 81 MG: 81 TABLET, COATED ORAL at 20:22

## 2020-09-04 RX ADMIN — LISINOPRIL 20 MG: 20 TABLET ORAL at 08:21

## 2020-09-04 RX ADMIN — ESCITALOPRAM OXALATE 20 MG: 10 TABLET ORAL at 08:21

## 2020-09-04 RX ADMIN — SODIUM CHLORIDE: 9 INJECTION, SOLUTION INTRAVENOUS at 18:05

## 2020-09-04 RX ADMIN — DEXAMETHASONE SODIUM PHOSPHATE 10 MG: 4 INJECTION, SOLUTION INTRAMUSCULAR; INTRAVENOUS at 18:15

## 2020-09-04 RX ADMIN — PRAVASTATIN SODIUM 40 MG: 20 TABLET ORAL at 20:22

## 2020-09-04 RX ADMIN — SODIUM CHLORIDE: 9 INJECTION, SOLUTION INTRAVENOUS at 16:03

## 2020-09-04 RX ADMIN — ENOXAPARIN SODIUM 40 MG: 40 INJECTION SUBCUTANEOUS at 20:23

## 2020-09-04 RX ADMIN — LIDOCAINE HYDROCHLORIDE 100 MG: 20 INJECTION, SOLUTION EPIDURAL; INFILTRATION; INTRACAUDAL; PERINEURAL at 18:10

## 2020-09-04 RX ADMIN — SODIUM CHLORIDE, PRESERVATIVE FREE 10 ML: 5 INJECTION INTRAVENOUS at 16:00

## 2020-09-04 RX ADMIN — INSULIN GLARGINE 15 UNITS: 100 INJECTION, SOLUTION SUBCUTANEOUS at 20:24

## 2020-09-04 RX ADMIN — ONDANSETRON 4 MG: 2 INJECTION INTRAMUSCULAR; INTRAVENOUS at 18:15

## 2020-09-04 RX ADMIN — VANCOMYCIN HYDROCHLORIDE 2500 MG: 10 INJECTION, POWDER, LYOPHILIZED, FOR SOLUTION INTRAVENOUS at 16:00

## 2020-09-04 RX ADMIN — FENTANYL CITRATE 50 MCG: 50 INJECTION, SOLUTION INTRAMUSCULAR; INTRAVENOUS at 18:50

## 2020-09-04 RX ADMIN — SODIUM CHLORIDE, PRESERVATIVE FREE 10 ML: 5 INJECTION INTRAVENOUS at 20:31

## 2020-09-04 RX ADMIN — CEFEPIME 2 G: 2 INJECTION, POWDER, FOR SOLUTION INTRAMUSCULAR; INTRAVENOUS at 11:41

## 2020-09-04 RX ADMIN — METOPROLOL TARTRATE 25 MG: 25 TABLET, FILM COATED ORAL at 20:22

## 2020-09-04 RX ADMIN — SODIUM CHLORIDE, PRESERVATIVE FREE 10 ML: 5 INJECTION INTRAVENOUS at 11:41

## 2020-09-04 RX ADMIN — SODIUM CHLORIDE, PRESERVATIVE FREE 10 ML: 5 INJECTION INTRAVENOUS at 08:21

## 2020-09-04 RX ADMIN — INSULIN LISPRO 4 UNITS: 100 INJECTION, SOLUTION INTRAVENOUS; SUBCUTANEOUS at 20:25

## 2020-09-04 RX ADMIN — Medication 0.2 MG: at 18:05

## 2020-09-04 RX ADMIN — METOPROLOL TARTRATE 25 MG: 25 TABLET, FILM COATED ORAL at 08:21

## 2020-09-04 RX ADMIN — SODIUM CHLORIDE, PRESERVATIVE FREE 10 ML: 5 INJECTION INTRAVENOUS at 20:32

## 2020-09-04 RX ADMIN — CETIRIZINE HYDROCHLORIDE 10 MG: 10 TABLET, FILM COATED ORAL at 08:21

## 2020-09-04 RX ADMIN — FENTANYL CITRATE 50 MCG: 50 INJECTION, SOLUTION INTRAMUSCULAR; INTRAVENOUS at 18:43

## 2020-09-04 RX ADMIN — SODIUM CHLORIDE: 9 INJECTION, SOLUTION INTRAVENOUS at 22:16

## 2020-09-04 ASSESSMENT — PULMONARY FUNCTION TESTS
PIF_VALUE: 13
PIF_VALUE: 3
PIF_VALUE: 13
PIF_VALUE: 4
PIF_VALUE: 3
PIF_VALUE: 2
PIF_VALUE: 4
PIF_VALUE: 12
PIF_VALUE: 4
PIF_VALUE: 8
PIF_VALUE: 4
PIF_VALUE: 13
PIF_VALUE: 3
PIF_VALUE: 4
PIF_VALUE: 5
PIF_VALUE: 12
PIF_VALUE: 4
PIF_VALUE: 12
PIF_VALUE: 4
PIF_VALUE: 4
PIF_VALUE: 12
PIF_VALUE: 12
PIF_VALUE: 22
PIF_VALUE: 13
PIF_VALUE: 13
PIF_VALUE: 4
PIF_VALUE: 22
PIF_VALUE: 13
PIF_VALUE: 14
PIF_VALUE: 4
PIF_VALUE: 13
PIF_VALUE: 6
PIF_VALUE: 11
PIF_VALUE: 13
PIF_VALUE: 4
PIF_VALUE: 15
PIF_VALUE: 13
PIF_VALUE: 4

## 2020-09-04 ASSESSMENT — PAIN DESCRIPTION - PAIN TYPE: TYPE: ACUTE PAIN

## 2020-09-04 ASSESSMENT — PAIN DESCRIPTION - LOCATION: LOCATION: FOOT

## 2020-09-04 ASSESSMENT — PAIN SCALES - GENERAL
PAINLEVEL_OUTOF10: 0

## 2020-09-04 ASSESSMENT — PAIN DESCRIPTION - ORIENTATION: ORIENTATION: LEFT

## 2020-09-04 ASSESSMENT — LIFESTYLE VARIABLES: SMOKING_STATUS: 0

## 2020-09-04 NOTE — OP NOTE
Surgeon Calleen Mall DPM  Preoperative Diagnosis #1 cellulitis with abscess left foot #2 osteomyelitis left fifth metatarsal head  Postoperative Diagnosis same  Procedure #1 incision and drainage deep to bone #2 partial resection fifth metatarsal head  Anesthesia General  Antibiotics 2 g Ancef preop  Hemostasis left ankle tourniquet 250 mmHg tourniquet time 25 minutes  Estimated blood loss 10 cc  Materials half-inch iodoform packing, 3-0 nylon  Injectables 10 cc 1% lidocaine plain  Pathology bone left fifth metatarsal head, deep tissue, deep tissue swabs aerobic and anaerobic  Complications none    Indication for procedure:  Patient seen as new referral from my colleague Dr. Geovany Cabral for left foot abscess with exposed bone fifth metatarsal head left foot. Had seen another podiatrist in [place] and had noticed worsening redness with drainage and pain over the past week. Does have history of uncontrolled diabetes. Worsening symptoms and history of redness entire left foot over the past week. Does have history of obesity. Understanding risk and benefits of surgery including not limited to continued infection, loss of limb and loss of life. Due to diabetes and uncontrolled sugar with foot abscess and likely bone infection patient is very high risk for further amputation. He is understanding wishing to pursue with surgical intervention. Description of operation:    Following satisfactory preoperative evaluation the patient was brought back in the operating room placed on the OR table in a supine position. General sedation administered by anesthesia team.  Following sedation well-padded pneumatic tourniquet applied left ankle. Foot prepped and draped in usual sterile and aseptic manner foot lower on the surgical field. Attention first directed dorsal lateral left foot where significant erythema and drainage was noted from the plantar left fifth metatarsal head wound.   Wound measured approximately

## 2020-09-04 NOTE — CONSULTS
Podiatry Consult Note:    HPI:  Pawel Romero is a 54 y.o. male consulted for diabetic ulceration and cellulitis left foot. [place] for his ulceration. Due to increased swelling, erythema, and pain at the ulcerative area he presented through the ER yesterday. Patient was admitted and placed on multiple IV antibiotics. Patient was in no acute distress on today's visit this morning. We did review patient's x-ray studies with him and lab studies. We did recommend surgical incision and drainage abscess left foot. Patient was in agreement with this course of treatment. Dry dressing was then applied to the ulcerative area left lower extremity. Past Medical History:   Past Medical History:   Diagnosis Date    Diabetes mellitus ([address]     Hyperlipidemia     Hypertension     Kidney stone        Past Surgical History: History reviewed. No pertinent surgical history. Family Medical History: History reviewed. No pertinent family history. Scheduled Medications:    ARIPiprazole  4 mg Oral Daily    aspirin  81 mg Oral BID    buPROPion  300 mg Oral QAM    cetirizine  10 mg Oral Daily    escitalopram  20 mg Oral Daily    lisinopril  20 mg Oral Daily    metoprolol tartrate  25 mg Oral BID    pantoprazole  40 mg Oral QAM AC    pravastatin  40 mg Oral Nightly    insulin lispro  0-12 Units Subcutaneous TID WC    insulin lispro  0-6 Units Subcutaneous Nightly    sodium chloride flush  10 mL Intravenous 2 times per day    enoxaparin  40 mg Subcutaneous Daily          PRN Medications:  HYDROcodone-acetaminophen, ondansetron, glucose, dextrose, glucagon (rDNA), dextrose, sodium chloride flush, acetaminophen **OR** acetaminophen, polyethylene glycol, promethazine **OR** ondansetron     Allergies: Milk-related compounds      Family History:   History reviewed. No pertinent family history.       Review of Systems:  See chart as per admitting

## 2020-09-04 NOTE — PATIENT CARE CONFERENCE
[facility] Quality Flow/Interdisciplinary Rounds Progress Note        Quality Flow Rounds held on September 4, 2020    Disciplines Attending:  Bedside Nurse, ,  and Nursing Unit Leadership    Yang Saenz was admitted on 9/3/2020  4:49 PM    Anticipated Discharge Date:  Expected Discharge Date: 09/07/20    Disposition:    Jose Score:  Jose Scale Score: 21    Readmission Risk              Risk of Unplanned Readmission:        11           Discussed patient goal for the day, patient clinical progression, and barriers to discharge.   The following Goal(s) of the Day/Commitment(s) have been identified:  Diagnostics - Report Results      Gwenevere Matter  September 4, 2020

## 2020-09-04 NOTE — PROGRESS NOTES
[facility] Progress Note    Admitting Date and Time: 9/3/2020  4:49 PM  Admit Dx: Cellulitis of left foot [L03.116]  Cellulitis of left foot [L03.116]    Subjective:  Patient is being followed for Cellulitis of left foot [L03.116]  Cellulitis of left foot [L03.116]   Pt feels fine, has no complaints    ROS: denies fever, chills, cp, sob, n/v, HA unless stated above.       insulin glargine  15 Units Subcutaneous Nightly    ARIPiprazole  4 mg Oral Daily    aspirin  81 mg Oral BID    buPROPion  300 mg Oral QAM    cetirizine  10 mg Oral Daily    escitalopram  20 mg Oral Daily    lisinopril  20 mg Oral Daily    metoprolol tartrate  25 mg Oral BID    pantoprazole  40 mg Oral QAM AC    pravastatin  40 mg Oral Nightly    insulin lispro  0-12 Units Subcutaneous TID WC    insulin lispro  0-6 Units Subcutaneous Nightly    sodium chloride flush  10 mL Intravenous 2 times per day    enoxaparin  40 mg Subcutaneous Daily     HYDROcodone-acetaminophen, 1 tablet, Q6H PRN  ondansetron, 4 mg, Q6H PRN  glucose, 15 g, PRN  dextrose, 12.5 g, PRN  glucagon (rDNA), 1 mg, PRN  dextrose, 100 mL/hr, PRN  sodium chloride flush, 10 mL, PRN  acetaminophen, 650 mg, Q6H PRN    Or  acetaminophen, 650 mg, Q6H PRN  polyethylene glycol, 17 g, Daily PRN  promethazine, 12.5 mg, Q6H PRN    Or  ondansetron, 4 mg, Q6H PRN         Objective:    BP (!) 151/77   Pulse 108   Temp 98.2 °F (36.8 °C) (Oral)   Resp 18   Ht 6' 5\" (1.956 m)   Wt (!) 431 lb (195.5 kg)   SpO2 100%   BMI 51.11 kg/m²     General Appearance: alert and oriented to person, place and time and in no acute distress  Skin: warm and dry  Head: normocephalic and atraumatic  Eyes: pupils equal, round, and reactive to light, extraocular eye movements intact, conjunctivae normal  Neck: neck supple and non tender without mass   Pulmonary/Chest: clear to auscultation bilaterally- no wheezes, rales or rhonchi, normal air movement, no respiratory distress  Cardiovascular: normal rate, normal S1 and S2 and no carotid bruits  Abdomen: soft, non-tender, non-distended, normal bowel sounds, no masses or organomegaly  Extremities: no cyanosis, no clubbing and left foot with redness, open wound with no drainage but looks infected on the plantar aspect of the left foot  Neurologic: no cranial nerve deficit and speech normal        Recent Labs     09/03/20  1719 09/04/20  0405    132   K 4.2 3.9   CL 99 102   CO2 25 20*   BUN 13 11   CREATININE 0.9 0.6*   GLUCOSE 310* 198*   CALCIUM 9.3 8.8       Recent Labs     09/03/20  1719 09/04/20  0405   WBC 15.5* 16.4*   RBC 5.04 4.76   HGB 17.0* 15.9   HCT 49.0 46.7   MCV 97.2 98.1   MCH 33.7 33.4   MCHC 34.7* 34.0   RDW 13.2 13.2    210   MPV 11.9 12.0         Assessment:    Principal Problem:    Cellulitis of left foot  Active Problems:    Diabetic foot ulcer associated with diabetes mellitus due to underlying condition ([facility] [address]    Essential hypertension    Morbid obesity with BMI of 45.0-49.9, adult ([facility] [address]  Resolved Problems:    * No resolved hospital problems. *      Plan:  1. Left foot cellulitis in the setting of diabetes, signs of gas producing bacteria, start the patient on Zosyn and vanco, consulted ID and podiatry   2. DM II, with foot ulcer, continue on home insulin, lantus and placed on SSI  3. HTN, continue lisinopril and metoprolol        NOTE: This report was transcribed using voice recognition software. Every effort was made to ensure accuracy; however, inadvertent computerized transcription errors may be present.   Electronically signed by Salomon Canavan, MD on 9/4/2020 at 9:29 AM

## 2020-09-04 NOTE — PROGRESS NOTES
Comprehensive Nutrition Assessment    Type and Reason for Visit:  Initial, Positive Nutrition Screen    Nutrition Recommendations/Plan: ADAT and add Jos/Ensure HP BID when able    Nutrition Assessment:  Pt w/ increased nutrient needs for wound healing of noted diabetic foot ulcer, pending plans for I&D, partial toe amputation. Will add ONS when able and monitor. Malnutrition Assessment:  Malnutrition Status:  No malnutrition    Context:  Acute Illness     Findings of the 6 clinical characteristics of malnutrition:  Energy Intake:  No significant decrease in energy intake  Weight Loss:  No significant weight loss     Body Fat Loss:  No significant body fat loss     Muscle Mass Loss:  No significant muscle mass loss    Fluid Accumulation:  No significant fluid accumulation     Strength:  Not Performed    Estimated Daily Nutrient Needs:  Energy (kcal):  7237-7314; Weight Used for Energy Requirements:  Current     Protein (g):  190-230; Weight Used for Protein Requirements:  Ideal(2-2.5)        Fluid (ml/day):  2000;  Weight Used for Fluid Requirements:  Current      Nutrition Related Findings:  pt alert, active BS, soft abd, +2 edema, fluids WDL      Wounds:  Diabetic Ulcer       Current Nutrition Therapies:    Diet NPO Effective Now Exceptions are: Sips with Meds    Anthropometric Measures:  · Height: 6' 5\" (195.6 cm)  · Current Body Weight: 431 lb (195.5 kg)   · Usual Body Weight: 440 lb (199.6 kg)(12/2019 @ [facility])     · [place] Body Weight: 208 lbs; % Ideal Body Weight 207.2 %   · BMI: 51.1  · BMI Categories: Obese Class 3 (BMI 40.0 or greater)       Nutrition Diagnosis:   · Increased nutrient needs related to increase demand for energy/nutrients as evidenced by wounds    Nutrition Interventions:   Food and/or Nutrient Delivery:  Continue NPO(ADAT and add ONS when medically appropriate)  Nutrition Education/Counseling:  No recommendation at this time   Coordination of Nutrition Care:  Continued Inpatient Monitoring    Goals:  nutrition progression       Nutrition Monitoring and Evaluation:   Food/Nutrient Intake Outcomes:  Diet Advancement/Tolerance  Physical Signs/Symptoms Outcomes:  Biochemical Data, Fluid Status or Edema, Nutrition Focused Physical Findings, Skin, Weight     Discharge Planning:     Too soon to determine     Electronically signed by Perethi Zimmerman MS, RD, LD on 9/4/20 at 1:16 PM EDT    Contact: [phone]

## 2020-09-04 NOTE — PROGRESS NOTES
Pharmacy Consultation Note  (Antibiotic Dosing and Monitoring)    Initial consult date: 2020  Consulting physician: Dr. Hayley Suggs  Drug(s): Vancomycin  Indication: Diabetic ulcer infection left foot with cellulitis and possible osteomyelitis    Ht Readings from Last 1 Encounters:   20 6' 5\" (1.956 m)     Wt Readings from Last 1 Encounters:   20 (!) 431 lb (195.5 kg)       Age/Gender IBW DW  Allergy Information   54 y.o. male 89.1 kg 195.5 kg  Milk-related compounds               Date  WBC BUN/CR Drug/Dose Time   Given Level(s)   (Time) Comments   9/3 15.5 13/0.9 Vancomycin 3 g IV x 1 dose 2336      16.4 11/0.6 Vancomycin 2500 mg IV Q12H                          Estimated Creatinine Clearance: 287 mL/min (A) (based on SCr of 0.6 mg/dL (L)).       Intake/Output Summary (Last 24 hours) at 2020 1357  Last data filed at 2020 1231  Gross per 24 hour   Intake 180 ml   Output --   Net 180 ml       Temp max: Temp (24hrs), Av.9 °F (37.2 °C), Min:98.2 °F (36.8 °C), Max:100 °F (37.8 °C)      Cultures:  available culture and sensitivity results were reviewed in EPIC  9/3 wound cx - Streptococcus agalactiae  9/3 blood cx - pending    Assessment:  · Patient is a 54year old male on vancomycin and cefepime for left foot diabetic ulcer infection with cellulitis and possible osteomyelitis  · Consulted by Dr. Tyler Mendez to dose/monitor vancomycin  · Goal trough level:  15-20 mcg/mL; goal AUC/EDIN: 400-600    Plan:  · Will continue vancomycin 2500 mg IV every 12 hours  · Will check trough level at steady state if vancomycin continues  · Pharmacist will follow and monitor/adjust dosing as necessary    Olena Russell, PharmD, BCCCP  2020  2:01 PM

## 2020-09-04 NOTE — CONSULTS
20  Rico Rough : 1975 Sex: male  Age: 54 y.o. Patient was referred by No primary care provider on file. CC:    Diabetic foot wound with redness left foot    HPI:   This pleasant 58-year-old male patient referred me for my colleague Dr. Shyanne Alberto for surgical opinion diabetic foot wound with redness left foot. Did see a another podiatrist in [place] where he is currently staying with his parents and did have radiographs taken there. States he has had a small wound on the bottom left foot for over 2 weeks has noticed increased redness over the past several days. Denies any fever while at home. Does have tenderness on top of her left foot on the bottom the left foot. Does have history of uncontrolled diabetes. Has been walking in regular shoes. Does work at [employer] where he does sit most of the day. Denies recent injury or trauma. Denies previous history of diabetic wounds. No additional pedal complaints at this time.     ROS:  Const: Denies constitutional symptoms  Musculo: Denies symptoms other than stated above  Skin: Denies symptoms other than stated above       Current Facility-Administered Medications:     ARIPiprazole (ABILIFY) tablet 4 mg, 4 mg, Oral, Daily, Melonie Erazo MD, 4 mg at 20 0820    aspirin EC tablet 81 mg, 81 mg, Oral, BID, Melonie Erazo MD, 81 mg at 20 1755    buPROPion (WELLBUTRIN XL) extended release tablet 300 mg, 300 mg, Oral, QAM, Melonie Erazo MD, 300 mg at 20 3909    cetirizine (ZYRTEC) tablet 10 mg, 10 mg, Oral, Daily, Melonie Erazo MD, 10 mg at 20 5054    escitalopram (LEXAPRO) tablet 20 mg, 20 mg, Oral, Daily, Melonie Erazo MD, 20 mg at 20 4699    lisinopril (PRINIVIL;ZESTRIL) tablet 20 mg, 20 mg, Oral, Daily, Melonie Erazo MD, 20 mg at 20 0236    metoprolol tartrate (LOPRESSOR) tablet 25 mg, 25 mg, Oral, BID, Melonie Erazo MD, 25 mg at 20 0821    pantoprazole (PROTONIX) 09/03/20 2030 09/04/20 0500 09/04/20 0800   BP: (!) 159/86 (!) 173/97  (!) 151/77   Pulse: 108 109  108   Resp: 16 18  18   Temp: 98.8 °F (37.1 °C) 100 °F (37.8 °C)  98.2 °F (36.8 °C)   TempSrc: Oral Oral  Oral   SpO2: 96% 98%  100%   Weight:   (!) 431 lb (195.5 kg)    Height:           Work History/Social History:     Focused Lower Extremity Physical Exam:    Neurovascular examination:    Dorsalis Pedis palpable bilateral.  Posterior tibialis palpable bilateral.    Capillary Refill Time:  Immediate return  Hair growth:  Symmetrical and bilateral   Skin:  Not atrophic  Edema: Edema dorsal left foot  Neurologic:  Light touch intact bilateral.      Musculoskeletal/ Orthopedic examination:    Equinis: Absent bilateral  Dorsiflexion, plantarflexion, inversion, eversion bilateral 5 out of 5 muscle strength  Wiggling toes  Negative Homans  Tenderness to palpation dorsal fifth metatarsal head. Tenderness to palpation plantar left fifth metatarsal head. Dermatology examination:    Erythema and edema dorsal left foot. Open wound plantar fifth metatarsal head left foot measuring approximate 1.2 cm x 1 cm x 0.5 cm. Wound does probe to bone. No tracking noted. Significant edema entire lateral left fifth metatarsal head. Assessment and Plan:  Uncontrolled diabetes mellitus  Diabetes mellitus with foot ulcer  Osteomyelitis left fifth metatarsal head  Cellulitis left foot with abscess        Patient seen referral from my colleague Dr. Jeremiah Olivares for surgical consultation. Radiographs reviewed. Does appear to have soft tissue swelling and soft tissue gas likely. Ulceration noted. Patient is high risk for further lower extremity wounds and amputation. Patient made 9 weightbearing immediately on nothing by mouth. Plan for surgery tonight. The reason for surgery is due to failed conservative treatment and/or conservative treatment is not a viable option.   It was discussed with patient that

## 2020-09-04 NOTE — ANESTHESIA PRE PROCEDURE
Department of Anesthesiology  Preprocedure Note       Name:  Theodore Galvan   Age:  54 y.o.  :  1975                                          MRN:  [id number]         Date:  2020      Surgeon: Ruthy Culver):  Kermit Watson DPM    Procedure: Procedure(s):  INCISION AND DRAINAGE LEFT FOOT  BONE WITH PARTIAL BONE RESECTION    Medications prior to admission:   Prior to Admission medications    Medication Sig Start Date End Date Taking?  Authorizing Provider   Ertugliflozin L-PyroglutamicAc (STEGLATRO) 15 MG TABS Take 15 mg by mouth daily   Yes Historical Provider, MD   aspirin 81 MG EC tablet Take 81 mg by mouth 2 times daily   Yes Historical Provider, MD   buPROPion (WELLBUTRIN XL) 300 MG extended release tablet Take 300 mg by mouth every morning   Yes Historical Provider, MD   ARIPiprazole (ABILIFY) 2 MG tablet Take 4 mg by mouth daily   Yes Historical Provider, MD   insulin glargine (BASAGLAR KWIKPEN) 100 UNIT/ML injection pen Inject 15 Units into the skin every morning   Yes Historical Provider, MD   metFORMIN (GLUCOPHAGE) 1000 MG tablet Take 1,000 mg by mouth 2 times daily (with meals)   Yes Historical Provider, MD   lisinopril (PRINIVIL;ZESTRIL) 10 MG tablet Take 20 mg by mouth daily   Yes Historical Provider, MD   pravastatin (PRAVACHOL) 40 MG tablet Take 40 mg by mouth nightly   Yes Historical Provider, MD   escitalopram (LEXAPRO) 20 MG tablet Take 20 mg by mouth daily   Yes Historical Provider, MD   metoprolol tartrate (LOPRESSOR) 25 MG tablet Take 25 mg by mouth 2 times daily   Yes Historical Provider, MD   Multiple Vitamins-Minerals (THERAPEUTIC MULTIVITAMIN-MINERALS) tablet Take 1 tablet by mouth daily   Yes Historical Provider, MD   cetirizine (ZYRTEC) 10 MG tablet Take 10 mg by mouth daily   Yes Historical Provider, MD   b complex vitamins capsule Take 1 capsule by mouth daily   Yes Historical Provider, MD   omeprazole (PRILOSEC) 20 MG delayed release capsule Take 20 mg by mouth daily   Yes Historical Provider, MD       Current medications:    Current Facility-Administered Medications   Medication Dose Route Frequency Provider Last Rate Last Dose    sodium chloride flush 0.9 % injection 10 mL  10 mL Intravenous 2 times per day Pamella Veronica DPM        sodium chloride flush 0.9 % injection 10 mL  10 mL Intravenous PRN Pamella Veronica DPM   10 mL at 09/04/20 1141    ceFAZolin (ANCEF) 3 g in dextrose 5 % 100 mL IVPB  3 g Intravenous See Admin Instructions Pamella Veronica DPM        insulin glargine (LANTUS) injection vial 15 Units  15 Units Subcutaneous Nightly Quentin Collado MD        cefepime (MAXIPIME) 2 g IVPB extended (mini-bag)  2 g Intravenous Q8H Marianela Kidd DO 12.5 mL/hr at 09/04/20 1141 2 g at 09/04/20 1141    And    0.9 % sodium chloride infusion   Intravenous Q8H Marianela Kidd, DO        vancomycin (VANCOCIN) 2,500 mg in dextrose 5 % 600 mL IVPB  2,500 mg Intravenous Q12H Marianela Kidd, DO        ARIPiprazole (ABILIFY) tablet 4 mg  4 mg Oral Daily Quentin Collado MD   4 mg at 09/04/20 0820    aspirin EC tablet 81 mg  81 mg Oral BID Quentin Collado MD   81 mg at 09/04/20 2827    buPROPion (WELLBUTRIN XL) extended release tablet 300 mg  300 mg Oral QAM Quentin Collado MD   300 mg at 09/04/20 4531    cetirizine (ZYRTEC) tablet 10 mg  10 mg Oral Daily Quentin Collado MD   10 mg at 09/04/20 1258    escitalopram (LEXAPRO) tablet 20 mg  20 mg Oral Daily Quentin Collado MD   20 mg at 09/04/20 8484    lisinopril (PRINIVIL;ZESTRIL) tablet 20 mg  20 mg Oral Daily Quentin Collado MD   20 mg at 09/04/20 9063    metoprolol tartrate (LOPRESSOR) tablet 25 mg  25 mg Oral BID Quentin Collado MD   25 mg at 09/04/20 0812    pantoprazole (PROTONIX) tablet 40 mg  40 mg Oral QAM AC Quentin Collado MD   40 mg at 09/04/20 0616    pravastatin (PRAVACHOL) tablet 40 mg  40 mg Oral Nightly Quentin Collado MD   40 mg at 09/03/20 4565    HYDROcodone-acetaminophen (NORCO) 7.5-325 MG per tablet 1 tablet  1 tablet Oral Q6H PRN Lynette Thacker MD        ondansetron [facility]) injection 4 mg  4 mg Intravenous Q6H PRN Lynette Thacker MD        insulin lispro (HUMALOG) injection vial 0-12 Units  0-12 Units Subcutaneous TID WC Lynette Thacker MD   2 Units at 09/04/20 8587    insulin lispro (HUMALOG) injection vial 0-6 Units  0-6 Units Subcutaneous Nightly Lynette Thacker MD   2 Units at 09/03/20 2337    glucose (GLUTOSE) 40 % oral gel 15 g  15 g Oral PRN Lynette Thacker MD        dextrose 50 % IV solution  12.5 g Intravenous PRN Lynette Thacker MD        glucagon (rDNA) injection 1 mg  1 mg Intramuscular PRN Lynette Thacker MD        dextrose 5 % solution  100 mL/hr Intravenous PRN Lynette Thacker MD        sodium chloride flush 0.9 % injection 10 mL  10 mL Intravenous 2 times per day Lynette Thacker MD   10 mL at 09/04/20 6878    sodium chloride flush 0.9 % injection 10 mL  10 mL Intravenous PRN Lynette Thacker MD        acetaminophen (TYLENOL) tablet 650 mg  650 mg Oral Q6H PRN Lynette Thacker MD        Or   Mahi Fox acetaminophen (TYLENOL) suppository 650 mg  650 mg Rectal Q6H PRN Lynette Thacker MD        polyethylene glycol [facility]) packet 17 g  17 g Oral Daily PRN Lynette Thacker MD        promethazine (PHENERGAN) tablet 12.5 mg  12.5 mg Oral Q6H PRN Lynette Thacker MD        Or    ondansetron [facility]) injection 4 mg  4 mg Intravenous Q6H PRN Lynette Thacker MD        enoxaparin (LOVENOX) injection 40 mg  40 mg Subcutaneous Daily Lynette Thacker MD           Allergies: Allergies   Allergen Reactions    Milk-Related Compounds      Stomach issues-lactose intolerant       Problem List:    Patient Active Problem List   Diagnosis Code    Cellulitis of left foot L03. 80    Diabetic foot ulcer associated with diabetes mellitus due to underlying condition ([facility] [address] R30.004, L97.509    Essential hypertension I10    Morbid obesity with BMI of 45.0-49.9, adult ([facility]) E66.01, Z68.42       Past Medical History:        Diagnosis Date    Diabetes mellitus ([facility] [address]     Hyperlipidemia     Hypertension     Kidney stone        Past Surgical History:  History reviewed. No pertinent surgical history. Social History:    Social History     Tobacco Use    Smoking status: Never Smoker    Smokeless tobacco: Never Used   Substance Use Topics    Alcohol use: Never     Frequency: Never                                Counseling given: Not Answered      Vital Signs (Current):   Vitals:    09/03/20 2030 09/04/20 0500 09/04/20 0800 09/04/20 1309   BP: (!) 173/97  (!) 151/77    Pulse: 109  108    Resp: 18  18    Temp: 100 °F (37.8 °C)  98.2 °F (36.8 °C)    TempSrc: Oral  Oral    SpO2: 98%  100%    Weight:  (!) 431 lb (195.5 kg)     Height:    6' 5\" (1.956 m)                                              BP Readings from Last 3 Encounters:   09/04/20 (!) 151/77       NPO Status: Time of last liquid consumption: 0920                        Time of last solid consumption: 0830                        Date of last liquid consumption: 09/04/20                        Date of last solid food consumption: 09/04/20    BMI:   Wt Readings from Last 3 Encounters:   09/04/20 (!) 431 lb (195.5 kg)     Body mass index is 51.11 kg/m².     CBC:   Lab Results   Component Value Date    WBC 16.4 09/04/2020    RBC 4.76 09/04/2020    HGB 15.9 09/04/2020    HCT 46.7 09/04/2020    MCV 98.1 09/04/2020    RDW 13.2 09/04/2020     09/04/2020       CMP:   Lab Results   Component Value Date     09/04/2020    K 3.9 09/04/2020     09/04/2020    CO2 20 09/04/2020    BUN 11 09/04/2020    CREATININE 0.6 09/04/2020    GFRAA >60 09/04/2020    LABGLOM >60 09/04/2020    GLUCOSE 198 09/04/2020    PROT 7.0 09/04/2020    CALCIUM 8.8 09/04/2020    BILITOT 0.9 09/04/2020    ALKPHOS 77 09/04/2020    AST 16 09/04/2020    ALT 24 09/04/2020       POC Tests: No results for input(s): POCGLU, POCNA, POCK, POCCL, POCBUN, POCHEMO, POCHCT in the last 72

## 2020-09-04 NOTE — CONSULTS
[address] [place] [address] [facility]    Consultation Note     Admit Date: 9/3/2020  4:49 PM    Reason for Consult:      diabetic foot ulcer/cellulitis         Attending Physician:  Helen Ramirez MD     Chief Complaint: Swelling left foot    HISTORY OF PRESENT ILLNESS:     The patient is a 54 y.o.  male not previously known to the Infectious Diseases service. The patient is a type II diabetic for the last 10 years. 2 weeks ago he developed a wound on the plantar surface of his left foot and there is increasing redness of the past several days. He was seen by podiatry and due to progressive swelling and erythema he was referred here for admission and possible operative intervention. Over the last week he noticed increasing swelling erythema and pain in his left foot. Also noted slight drainage from the ulcer. Denies any fever but did have slight chills but no nausea or vomiting. He was initially treated with doxycycline for 1 week by his podiatrist but the wound worsened on antibiotic therapy. Evaluation by podiatry prior to admission indicated concerned about possible soft tissue gas. The emergency room he did have significant leukocytosis and mild lactic acidosis. Past Medical History:          Diagnosis Date    Diabetes mellitus ([address]     Hyperlipidemia     Hypertension     Kidney stone      Past Surgical History:      History reviewed. No pertinent surgical history.   Current Medications:     Scheduled Meds:   sodium chloride flush  10 mL Intravenous 2 times per day    ceFAZolin (ANCEF) IVPB  3 g Intravenous See Admin Instructions    insulin glargine  15 Units Subcutaneous Nightly    piperacillin-tazobactam  3.375 g Intravenous Q8H    ARIPiprazole  4 mg Oral Daily    aspirin  81 mg Oral BID    buPROPion  300 mg Oral QAM    cetirizine  10 mg Oral Daily    escitalopram  20 mg Oral Daily    lisinopril  20 mg Oral Daily    metoprolol tartrate  25 mg Oral BID    pantoprazole 40 mg Oral QAM AC    pravastatin  40 mg Oral Nightly    insulin lispro  0-12 Units Subcutaneous TID WC    insulin lispro  0-6 Units Subcutaneous Nightly    sodium chloride flush  10 mL Intravenous 2 times per day    enoxaparin  40 mg Subcutaneous Daily     Continuous Infusions:   sodium chloride      dextrose       PRN Meds:sodium chloride flush, HYDROcodone-acetaminophen, ondansetron, glucose, dextrose, glucagon (rDNA), dextrose, sodium chloride flush, acetaminophen **OR** acetaminophen, polyethylene glycol, promethazine **OR** ondansetron    Allergies:  Milk-related compounds    Social History:     Social History     Socioeconomic History    Marital status: Single     Spouse name: None    Number of children: None    Years of education: None    Highest education level: None   Occupational History    None   Social Needs    Financial resource strain: None    Food insecurity     Worry: None     Inability: None    Transportation needs     Medical: None     Non-medical: None   Tobacco Use    Smoking status: Never Smoker    Smokeless tobacco: Never Used   Substance and Sexual Activity    Alcohol use: Never     Frequency: Never    Drug use: Never    Sexual activity: Yes     Partners: Female   Lifestyle    Physical activity     Days per week: None     Minutes per session: None    Stress: None   Relationships    Social connections     Talks on phone: None     Gets together: None     Attends [employer] service: None     Active member of club or organization: None     Attends meetings of clubs or organizations: None     Relationship status: None    Intimate partner violence     Fear of current or ex partner: None     Emotionally abused: None     Physically abused: None     Forced sexual activity: None   Other Topics Concern    None   Social History Narrative    None         Family History:     History reviewed. No pertinent family history. . Otherwise non-pertinent to the chief complaint.     REVIEW OF SYSTEMS:    CONSTITUTIONAL: Slight chills, no fevers or night sweats. No loss of weight. EYES:  No double vision or drainage from eyes, ears or throat. HEENT:  No neck stiffness. No dysphagia. No drainage from eyes, ears or throat  RESPIRATORY:  No cough, productive sputum or hemoptysis. CARDIOVASCULAR:  No chest pain, palpitations, orthopnea or dyspnea on exertion. GASTROINTESTINAL:  No nausea, vomiting, diarrhea or constipation or hematochezia   GENITOURINARY:  No frequency burning dysuria or hematuria. INTEGUMENT/BREAST:  No rash or breast masses. HEMATOLOGIC/LYMPHATIC:  No lymphadenopathy or blood dyscrasics. ALLERGIC/IMMUNOLOGIC:  No anaphylaxis. ENDOCRINE:  No polyuria or polydipsia or temperature intolerance. MUSCULOSKELETAL:  No myalgia or arthralgia. Full ROM. See HPI  NEUROLOGICAL:  No focal motor sensory deficit. BEHAVIOR/PSYCH:  No psychosis. PHYSICAL EXAM:      Vitals:    BP (!) 151/77   Pulse 108   Temp 98.2 °F (36.8 °C) (Oral)   Resp 18   Ht 6' 5\" (1.956 m)   Wt (!) 431 lb (195.5 kg)   SpO2 100%   BMI 51.11 kg/m²   Constitutional: The patient is awake, alert, and oriented. Skin: Warm and dry. No rashes were noted. No jaundice. HEENT: Eyes show round, and reactive pupils. Moist mucous membranes, no ulcerations, no thrush. Neck: Supple to movements. No lymphadenopathy. Chest: No use of accessory muscles to breathe. Symmetrical expansion. Auscultation reveals no wheezing, crackles, or rhonchi. Cardiovascular: Regular rate and rhythm. No murmurs appreciated. Abdomen: Positive bowel sounds to auscultation. Benign to palpation. No masses felt. No hepatosplenomegaly. Extremities: Marked erythema and edema of distal left foot with an open wound on the plantar aspect or the fifth metatarsal head proximally 1 x 1 cm. Per podiatry does probe to bone.   Neurological: No focal neurologic deficits        CBC+dif:  Recent Labs     09/03/20  1719 09/04/20  0405   WBC 15.5* 16.4*   HGB 17.0* 15.9   HCT 49.0 46.7   MCV 97.2 98.1    210   NEUTROABS 12.26*  --      Lab Results   Component Value Date    CRP 2.6 (H) 09/04/2020     No results found for: CRPHS  Lab Results   Component Value Date    SEDRATE 10 09/04/2020     Lab Results   Component Value Date    ALT 24 09/04/2020    AST 16 09/04/2020    ALKPHOS 77 09/04/2020    BILITOT 0.9 09/04/2020     Lab Results   Component Value Date     09/04/2020    K 3.9 09/04/2020     09/04/2020    CO2 20 09/04/2020    BUN 11 09/04/2020    CREATININE 0.6 09/04/2020    GFRAA >60 09/04/2020    LABGLOM >60 09/04/2020    GLUCOSE 198 09/04/2020    PROT 7.0 09/04/2020    LABALBU 3.4 09/04/2020    CALCIUM 8.8 09/04/2020    BILITOT 0.9 09/04/2020    ALKPHOS 77 09/04/2020    AST 16 09/04/2020    ALT 24 09/04/2020       No results found for: PROTIME, INR    No results found for: TSH    No results found for: NITRITE, COLORU, PHUR, LABCAST, WBCUA, RBCUA, MUCUS, TRICHOMONAS, YEAST, BACTERIA, CLARITYU, SPECGRAV, LEUKOCYTESUR, UROBILINOGEN, BILIRUBINUR, BLOODU, GLUCOSEU, AMORPHOUS    No results found for: KGU3OID, BEART, Q7HUOYRO, PHART, THGBART, NOB7SDK, PO2ART, BUF8FJH  Radiology:  XR FOOT LEFT (MIN 3 VIEWS)   Final Result      Nondisplaced fracture of the proximal phalanx of the fifth toe is once   again seen. Small ulcer adjacent to the distal head of the fifth metatarsal is   once again noted. MRI FOOT LEFT W CONTRAST    (Results Pending)       Microbiology:  Pending  No results for input(s): BC in the last 72 hours. No results for input(s): ORG in the last 72 hours. No results for input(s): Maremgidio Tho in the last 72 hours. No results for input(s): STREPNEUMAGU in the last 72 hours. No results for input(s): LP1UAG in the last 72 hours. Recent Labs     09/04/20  0405   ASO 64     No results for input(s): CULTRESP in the last 72 hours.     Problem list:    Principal Problem:    Cellulitis of left foot  Active Problems: Diabetic foot ulcer associated with diabetes mellitus due to underlying condition ([facility] [address]    Essential hypertension    Morbid obesity with BMI of 45.0-49.9, adult ([facility] [address]  Resolved Problems:    * No resolved hospital problems. *      Assessment:    · Diabetic ulcer infection left foot with cellulitis and possible osteomyelitis  · Type 2 diabetes mellitus  · Essential hypertension    Plan:      · Cefepime 2 g IV every 8 hours  · Vancomycin-pharmacy to dose  · Await cultures  · Await surgical intervention  · Baseline ESR, CRP noted  · Monitor labs  · Will follow with you    Thank you for having us see this patient in consultation. I will be discussing this case with the treating physicians. Pt seen and examined. I discussed and co-formulated the plan with advanced practice nurse. Labs, cultures, and radiographs reviewed. Face to Face encounter occurred. Changes made as necessary by me.      Stefanie Reagan MD  Infectious Disease          Electronically signed by Melisa Spurling, DO on 9/4/2020 at 10:17 AM

## 2020-09-04 NOTE — DISCHARGE INSTR - COC
Dressing/Treatment Other (comment);ABD; Ace wrap 20 0800   Wound Length (cm) 0.5 cm 20 0049   Wound Width (cm) 2.1 cm 20 0049   Wound Depth (cm) 0 cm 20 004   Wound Surface Area (cm^2) 1.05 cm^2 20 0049   Wound Volume (cm^3) 0 cm^3 20 004   Wound Assessment Red;Pink 20   Drainage Amount None 20   Sandhya-wound Assessment Red;Pink 20   Number of days: 0       Wound 20 Foot Anterior;Left;Plantar; Outer Open, draining (Active)   Dressing Status Clean;Dry; Intact 20   Dressing Changed Changed/New 20   Dressing/Treatment ABD; Ace wrap 20 08   Wound Length (cm) 1.5 cm 20 0049   Wound Width (cm) 1.3 cm 20 0049   Wound Depth (cm) 0.4 cm 20 0049   Wound Surface Area (cm^2) 1.95 cm^2 20 0049   Wound Volume (cm^3) 0.78 cm^3 20 0049   Wound Assessment Red;Pink;Drainage 20   Drainage Amount Small 20   Drainage Description Serosanguinous 20   Sandhya-wound Assessment Dry;Red;Pink 20   Number of days: 0        Elimination:  Continence: Bowel: {YES / ZI:64857}  Bladder: {YES / BV:49552}  Urinary Catheter: {Urinary Catheter:524514042}   Colostomy/Ileostomy/Ileal Conduit: {YES / TZ:97650}       Date of Last BM: ***  No intake or output data in the 24 hours ending 20 1146  No intake/output data recorded.     Safety Concerns:     [address] Safety Concerns:933961437}    Impairments/Disabilities:      [address] Impairments/Disabilities:093747083}    Nutrition Therapy:  Current Nutrition Therapy:   [address] Diet List:552365234}    Routes of Feeding: {CHP DME Other Feedings:386369014}  Liquids: {Slp liquid thickness:72773}  Daily Fluid Restriction: {CHP DME Yes amt example:652313460}  Last Modified Barium Swallow with Video (Video Swallowing Test): {Done Not Done YBG}    Treatments at the Time of Hospital Discharge:   Respiratory Treatments: ***  Oxygen Therapy:  {Therapy; copd oxygen:38980}  Ventilator:    {MH CC Vent OXZQ:505439046}    Rehab Therapies: {THERAPEUTIC INTERVENTION:9452241343}  Weight Bearing Status/Restrictions: 50[facility] CC Weight Bearin}  Other Medical Equipment (for information only, NOT a DME order):  {EQUIPMENT:655121340}  Other Treatments: ***    Patient's personal belongings (please select all that are sent with patient):  {CHP DME Belongings:623773755}    RN SIGNATURE:  {Esignature:367956146}    CASE MANAGEMENT/SOCIAL WORK SECTION    Inpatient Status Date: ***    Readmission Risk Assessment Score:  Readmission Risk              Risk of Unplanned Readmission:        11           Discharging to Facility/ Agency   Name:   Address:  Phone:  Fax:    Dialysis Facility (if applicable)   Name:  Address:  Dialysis Schedule:  Phone:  Fax:    / signature: {Esignature:642158056}    PHYSICIAN SECTION    Prognosis: {Prognosis:6334055548}    Condition at Discharge: [address] [address] [facility] Patient Condition:728645895}    Rehab Potential (if transferring to Rehab): {Prognosis:3355411428}    Recommended Labs or Other Treatments After Discharge: [facility]  ([address] [place] [address])  [address] [place] [address], [address] [place] [address]  Phone [phone]   Fax [phone]        Rhonda Ira. Orquidea Hammer MD, MD Marlen Rosales MD Hardin Dilling, MD Munir P. Vanessa Rosin, MD Santos Fleeting, RN, CNP      Richarda Nyhan, RN, CNS      MD Jose Brennan MD     STANDING ORDERS (ID Protocol)     Visiting nurses are to write the Primary Care Physician and their own call back number on all laboratory requisition forms. Abnormal lab values are called to the physician by the nurse and NOT by the laboratory. Fax all labs to the office in a timely manner, during office hours.  All faxes should include nurses name and call back number. Vascular Access Devices or VADs (TLC, PICC, Midline, etc) will be replaced as necessary. Draw all blood work from VADs, except for drug levels. If unable to access a VAD, insert a peripheral catheter temporarily. Contact the Primary Care Physician or [facility] office for surgical referral.  Use tPA (Александр Page) as per agency protocol to restore patency of VAD. Remove VAD upon completion of IV antibiotics, unless otherwise specified by the ordering physician. If VAD cannot be removed, schedule appointment at office for removal.  Notify ordering physician or office if patient requires admission to the hospital with reason for admission. Discontinue all blood work upon completion of IV antibiotics, unless otherwise specified by ordering physician. Notify ordering physician if the patient does not receive the scheduled antibiotic for 24 hours or more. The Pharmacy and [address] [place] [address] may adjust the timing of the infusion and blood work to accommodate the patients home care conditions. When PICC or VAD is to be removed, documentation of length of inserted PICC. PICC or VAD length is to correlate with inserted length and sent to physician at the time of removal.  Give the patient a list of antibiotics being administered with:  Drug name  Route  Frequency  Start/Stop date      ROUTINE LABS TO BE DRAWN/ORDERED:  Twice weekly (preferably every Monday & Thursday):  BUN Creatinine and liver panel  Complete Blood Count with differential (CBC with dif)  Once weekly:  C-Reactive Protein (not high sensitivity CRP)  Erythrocyte/Westergren Sedimentation Rate (WSR or ESR)  Total CPK for patients on Daptomycin (Cubicin®). Obtain CPK more often if the patient is experiencing muscle weakness or myalgias. Clinical Pharmacist is to adjust Vancomycin and Aminoglycosides unless otherwise indicated.   Draw Vancomycin trough 30 minutes before the third dose  After starting drug, or   After the dosing or interval is changed. If the trough level is between 5 and 20 continue dose as ordered. Draw troughs twice weekly thereafter until troughs are stable (i.e. until trough is between 5 and 20 mcg/ml for two consecutive laboratory values). Once stable check troughs once weekly or every third dose. Please do not call physician unless the trough is < 5 or >20. If the trough is <20 continue dosing as ordered. If the trough is >20 call the office for further orders. Do not hold the dose while waiting for the trough result. Amingoglycosides (e.g. Gentamicin, Tobramycin and Amikacin) peaks and troughs should be drawn twice weekly (preferably on Mondays and Thursdays) or every third dose. Aminoglycoside peaks are not to be drawn if patient on Once-Daily Dosing (ODD). Call physician or office if the trough is:  >1 for gentamicin,   >2 for tobramycin, or   >5 for amikacin  When clinically indicated obtain:  Urine culture. If the patient has a fever with purulent drainage from Pina or suprapubic catheter, or foul smelling urine. Do not irrigate a clogged Pina catheter. Replace it. Blood cultures and Wound Gram stain with culture & sensitivity. If the patient has a fever or increasing drainage or foul odor from a wound. Notify the treating physician in a timely manner  Stool specimen. If diarrhea occurs while on antibiotics, send stools for C. difficile and WBCs. When a drug is discontinued due to a low white blood cell count (WBCs) draw two consecutive CBC with differential and CMP. ALLERGIC OR ADVERSE REACTIONS TO MEDICATIONS  Mild reaction: (itching, with or without rash):  Administer Benadryl 50mg po x 1, then 25mg po q6h prn. Notify office or physician in a timely matter. Moderate reaction (itching with or without rash and/or wheezing, dyspnea, itchy throat):  Administer Benadryl 50 mg IV push x 1. Notify office or physician in a timely manner.   Severe reaction i.e. Anaphylaxis (wheezing or stidor, sudden rash,

## 2020-09-04 NOTE — CARE COORDINATION
Introduced my self and provided explanation of CM role to patient. Patient is awake, alert, and aware of current diagnosis and treatment plan including ID consult, Podiatry consult with plans for OR this PM (I&D, partial toe amp). Patient's home address is in [place], [place]. He is  and his wife is unfortunately ill/immunocompromised. She is staying at home in [place]. The patient as such has been staying with his mom in [place], [place]. He has no primary care physician but is interested in establishing a relationship with one. Information to self schedule will be placed in follow up section of discharge instructions. At baseline, patient is an independent ambulator with no hx of DME utilization nor HHC or SNF. He has been told that he will likely need to maintain a NWB status to his Left foot post op. It is also forecasted that he will have wound care needs along with IV atb needs at time of discharge. Patient does have insurance, [employer]. Per [employer] website provider search, providers for primary care, [facility], SNF, LTAC, etc.  are based in [place]. With lack of PCP and his insurance network, [facility] arrangements in [place] would likely not be a viable option. Inquired with patient if he would consider [place] based short term facility placement post discharge. He indicated he would as he agrees it would serve as solution for care provision as well as allow him to remain in network. Provider search for LTAC includes both [facility] and [facility]. Snf's listed include [facility], [facility], [facility], [facility] and, [facility]. Call placed to Danelle with [facility] who will review and respond after verifying network participation as well as non-necessity for intermediate/ICU rev codes which patient would not have since he is on [facility]. Await continued correspondence with Danelle. Will follow along with  and assist with discharge planning as necessary.    Explained ELOS of 4-5 days; patient voiced understanding and agreement. Ramón Bettencourt.  Fredi, MSN, RN  [facility] Case Management  [phone]

## 2020-09-04 NOTE — H&P
[facility] History and Physical        Chief Complaint:  Diabetic foot wound  History of Present Illness   The patient is a 54 y.o. male    dm2-- NOT taking home insulin  he is staying which is actually [facility]. He states that he is staying at his parents house instead of home with his wife here in [place], HTN,  morbidly obese diabetic. Diabetes uncertain control he doesn't check, rcently taking basaglar + 3 oral DM meds. He states he thinks he just had a superficial cut to the underside of his left foot couple  weeks ago  (he denies major trauma-- he denies fracture that was commented on by xray)    He states he has good circulation and intact sensation to feet- although diabetic 10 years. Over past several days/over past week worsening swelling   +sangenous drainage to the area   Patient denies any fevers but had some chills last night- worsening swelling and erythema in foot noticed   Started on doxy and followed up with a podiatrist near [facility]-- where he is staying. He is been on that for the past 5 days and followed up with a podiatrist again today who saw -- and told him to go to [facility]. Per ER report:  \"adelita did an in office x-ray and his podiatrist called here stating that she thought there was some gas in the tissue and concern for worsening infection. She felt the patient was failing outpatient therapy and required IV antibiotics and admission. \"     - hx taken from the patient  REVIEW OF SYSTEMS:  no fevers, chills, cp, sob, n/v, ha, vision/hearing changes, wt changes, hot/cold flashes, other open skin lesions, diarrhea, constipation, dysuria/hematuria unless noted in HPI. Complete ROS performed with the patient and is otherwise negative.     Past Medical History:      Diagnosis Date    Diabetes mellitus ([address]     Hyperlipidemia     Hypertension     Kidney stone        Past Surgical History: History reviewed. No pertinent surgical history. Home Medications:  Prior to Admission medications    Medication Sig Start Date End Date Taking? Authorizing Provider   Ertugliflozin L-PyroglutamicAc (STEGLATRO) 15 MG TABS Take 15 mg by mouth daily   Yes Historical Provider, MD   aspirin 81 MG EC tablet Take 81 mg by mouth 2 times daily   Yes Historical Provider, MD   buPROPion (WELLBUTRIN XL) 300 MG extended release tablet Take 300 mg by mouth every morning   Yes Historical Provider, MD   ARIPiprazole (ABILIFY) 2 MG tablet Take 4 mg by mouth daily   Yes Historical Provider, MD   insulin glargine (BASAGLAR KWIKPEN) 100 UNIT/ML injection pen Inject 15 Units into the skin every morning   Yes Historical Provider, MD   metFORMIN (GLUCOPHAGE) 1000 MG tablet Take 1,000 mg by mouth 2 times daily (with meals)   Yes Historical Provider, MD   lisinopril (PRINIVIL;ZESTRIL) 10 MG tablet Take 20 mg by mouth daily   Yes Historical Provider, MD   pravastatin (PRAVACHOL) 40 MG tablet Take 40 mg by mouth nightly   Yes Historical Provider, MD   escitalopram (LEXAPRO) 20 MG tablet Take 20 mg by mouth daily   Yes Historical Provider, MD   metoprolol tartrate (LOPRESSOR) 25 MG tablet Take 25 mg by mouth 2 times daily   Yes Historical Provider, MD   Multiple Vitamins-Minerals (THERAPEUTIC MULTIVITAMIN-MINERALS) tablet Take 1 tablet by mouth daily   Yes Historical Provider, MD   cetirizine (ZYRTEC) 10 MG tablet Take 10 mg by mouth daily   Yes Historical Provider, MD   b complex vitamins capsule Take 1 capsule by mouth daily   Yes Historical Provider, MD   omeprazole (PRILOSEC) 20 MG delayed release capsule Take 20 mg by mouth daily   Yes Historical Provider, MD       Allergies:  Milk-related compounds    Social History:   TOBACCO:   reports that he has never smoked. He has never used smokeless tobacco.  ETOH:   reports no history of alcohol use. Family History:   History reviewed. No pertinent family history.    Or deferred/otherwise considered non contributory to current admission  PHYSICAL EXAM:    VS: BP (!) 173/97   Pulse 109   Temp 100 °F (37.8 °C) (Oral)   Resp 18   Ht 6' 5\" (1.956 m)   Wt (!) 400 lb (181.4 kg)   SpO2 98%   BMI 47.43 kg/m²     General Appearance:     no acute distress. Psych:  HEENT:    A.O. As per HPI details  NC/AT, PERRL, no pallor no icterus, lips/ext mucous membrane grossly N    Neck:   Supple, trachea midline, no obvious JVD   Resp:     CTAB, No wheezes, No rhonchi   Chest wall:    No tenderness or deformity   Heart:    Regular rate and rhythm, S1 and S2 normal, no rub or gallop. Abdomen:     Soft, non-tender, bowel sounds active   morbidly obese   Genitalia & Rectal:    Deferred. Extremities x4:   Extremities normal, atraumatic, no cyanosis, no clubbing   Musculoskeletal:      NO active synovitis or swollen b/l wrists, 2-5 MCPs examined   Skin:   R foot dressing/wrapped.  Swollen, ulceration lateral to 5th MTP  1.5cm open +erythma    Lymph nodes:   Cervical nodes grossly normal   Neurologic:  .Grossly symmetric  strength in UEs and LEs with symmetric grossly intact to light touch sensation     LABS:  CBC:   Lab Results   Component Value Date    WBC 15.5 09/03/2020    RBC 5.04 09/03/2020    HGB 17.0 09/03/2020    HCT 49.0 09/03/2020     09/03/2020    MCV 97.2 09/03/2020     BMP:    Lab Results   Component Value Date     09/03/2020    K 4.2 09/03/2020    CL 99 09/03/2020    CO2 25 09/03/2020    BUN 13 09/03/2020    CREATININE 0.9 09/03/2020    GLUCOSE 310 09/03/2020    CALCIUM 9.3 09/03/2020     Hepatic Function Panel:    Lab Results   Component Value Date    ALKPHOS 91 09/03/2020    AST 20 09/03/2020    ALT 30 09/03/2020    PROT 7.7 09/03/2020    LABALBU 3.9 09/03/2020    BILITOT 0.5 09/03/2020     Magnesium:  No results found for: MG    PT/INR:  No results found for: PROTIME, INR  U/A: No results found for: NITRITE, [address], PHUR, [address], [facility], BACTERIA, SPECGRAV, BLOODU, GLUCOSEU  ABG:  No results found for: PHART, TUT4VVY, PO2ART, A1ZDXAHC, BVN3PYV, BEART  TSH:  No results found for: TSH  Cardiac Enzymes: No results found for: CKTOTAL, CKMB, CKMBINDEX, TROPONINI    Radiology: Xr Foot Left (min 3 Views)    Result Date: 9/3/2020  Comparison: September 1, 2020. 3 views of the left foot are obtained. FINDINGS: Nondisplaced fracture of the proximal phalanx of the fifth toe is once again seen. Small ulcer adjacent to the distal head of the fifth metatarsal is once again noted. There is diffuse bone demineralization. There are mild degenerative changes. Nondisplaced fracture of the proximal phalanx of the fifth toe is once again seen. Small ulcer adjacent to the distal head of the fifth metatarsal is once again noted. EKG: pending - will order     Assessment & Plan   ACTIVE hospital problems being addressed/reassessed for this admission:  Principal Problem:    Cellulitis of left foot  Active Problems:    Diabetic foot ulcer associated with diabetes mellitus due to underlying condition ([facility]    Essential hypertension    Morbid obesity with BMI of 45.0-49.9, adult ([facility]  Resolved Problems:    * No resolved hospital problems. *    Code status/DVT prophylaxis and PLAN --see orders   Note extensive time spent coordinating care between ER docs, ER and floor nurses, and transitioning care over to day providers  Plan of care/ clinical impressions/communication specifics detailed below:    54 y.o. male    dm2-- NOT taking home insulin  he is staying which is actually [facility]. He states that he is staying at his parents house instead of home with his wife here in [place], HTN,  morbidly obese diabetic. Diabetes uncertain control he doesn't check, rcently taking basaglar + 3 oral DM meds.    He states he thinks he just had a superficial cut to the underside of his left foot couple  weeks ago  (he denies major trauma-- he denies fracture that was commented on by xray)    He states he has good circulation and intact sensation to feet- although diabetic 10 years. Over past several days/over past week worsening swelling   +sangenous drainage to the area   Patient denies any fevers but had some chills last night- worsening swelling and erythema in foot noticed   Started on doxy and followed up with a podiatrist near [facility]-- where he is staying. He is been on that for the past 5 days and followed up with a podiatrist again today who saw -- and told him to go to [facility]. Per ER report:  \"podiatirst did an in office x-ray and his podiatrist called here stating that she thought there was some gas in the tissue and concern for worsening infection. She felt the patient was failing outpatient therapy and required IV antibiotics and admission. \"     Diabetic foot wound- intact sensation per his report, good cap refil- denies hx of PVD  ? fracture vs osteomyeitis  Xray:  Nondisplaced fracture of the proximal phalanx of the fifth toe is once again seen. Small ulcer adjacent to the distal head of the fifth metatarsal is once again noted. Defer to podiatry input  ID consult  sepsis  Inc white count limit of 15.5   lactic was slightly elevated 2.0.  dm2 NOT in dka-elevated glucose of 310    normal anion gap and normal CO2.    Hydroxybutyrate is borderline at 0.61  Insulin SS  Resume basaglar after good PO - post op    Morbidly obese- he denies hx of ЕКАТЕРИНА  Resume HTN meds  - likely need for IV abx      Merlin Mendenhall MD   Night Officer, overnight admitting doctor at [facility] call day time doctor   for questions after 7:30am    Covering for [address] Service  If Qs please call [phone]  Electronically signed by Kay Morrissey MD on 9/3/2020 at 10:07 PM

## 2020-09-05 LAB
BASOPHILS ABSOLUTE: 0.11 E9/L (ref 0–0.2)
BASOPHILS RELATIVE PERCENT: 0.7 % (ref 0–2)
EOSINOPHILS ABSOLUTE: 0 E9/L (ref 0.05–0.5)
EOSINOPHILS RELATIVE PERCENT: 0 % (ref 0–6)
GRAM STAIN ORDERABLE: NORMAL
GRAM STAIN ORDERABLE: NORMAL
HCT VFR BLD CALC: 49.4 % (ref 37–54)
HEMOGLOBIN: 16.7 G/DL (ref 12.5–16.5)
IMMATURE GRANULOCYTES #: 0.16 E9/L
IMMATURE GRANULOCYTES %: 1 % (ref 0–5)
LYMPHOCYTES ABSOLUTE: 1.16 E9/L (ref 1.5–4)
LYMPHOCYTES RELATIVE PERCENT: 7 % (ref 20–42)
MCH RBC QN AUTO: 33.7 PG (ref 26–35)
MCHC RBC AUTO-ENTMCNC: 33.8 % (ref 32–34.5)
MCV RBC AUTO: 99.8 FL (ref 80–99.9)
METER GLUCOSE: 264 MG/DL (ref 74–99)
METER GLUCOSE: 287 MG/DL (ref 74–99)
METER GLUCOSE: 291 MG/DL (ref 74–99)
METER GLUCOSE: 303 MG/DL (ref 74–99)
MONOCYTES ABSOLUTE: 0.44 E9/L (ref 0.1–0.95)
MONOCYTES RELATIVE PERCENT: 2.7 % (ref 2–12)
NEUTROPHILS ABSOLUTE: 14.61 E9/L (ref 1.8–7.3)
NEUTROPHILS RELATIVE PERCENT: 88.6 % (ref 43–80)
ORGANISM: ABNORMAL
PDW BLD-RTO: 13.2 FL (ref 11.5–15)
PLATELET # BLD: 222 E9/L (ref 130–450)
PMV BLD AUTO: 12 FL (ref 7–12)
RBC # BLD: 4.95 E12/L (ref 3.8–5.8)
SEDIMENTATION RATE, ERYTHROCYTE: 18 MM/HR (ref 0–15)
WBC # BLD: 16.5 E9/L (ref 4.5–11.5)
WOUND/ABSCESS: ABNORMAL

## 2020-09-05 PROCEDURE — 1200000000 HC SEMI PRIVATE

## 2020-09-05 PROCEDURE — 2580000003 HC RX 258: Performed by: INTERNAL MEDICINE

## 2020-09-05 PROCEDURE — 85651 RBC SED RATE NONAUTOMATED: CPT

## 2020-09-05 PROCEDURE — 6370000000 HC RX 637 (ALT 250 FOR IP): Performed by: INTERNAL MEDICINE

## 2020-09-05 PROCEDURE — 2580000003 HC RX 258: Performed by: PODIATRIST

## 2020-09-05 PROCEDURE — 6360000002 HC RX W HCPCS: Performed by: INTERNAL MEDICINE

## 2020-09-05 PROCEDURE — 99024 POSTOP FOLLOW-UP VISIT: CPT | Performed by: PODIATRIST

## 2020-09-05 PROCEDURE — 82962 GLUCOSE BLOOD TEST: CPT

## 2020-09-05 PROCEDURE — 85025 COMPLETE CBC W/AUTO DIFF WBC: CPT

## 2020-09-05 PROCEDURE — 6360000002 HC RX W HCPCS: Performed by: PODIATRIST

## 2020-09-05 PROCEDURE — 99233 SBSQ HOSP IP/OBS HIGH 50: CPT | Performed by: INTERNAL MEDICINE

## 2020-09-05 PROCEDURE — 36415 COLL VENOUS BLD VENIPUNCTURE: CPT

## 2020-09-05 RX ADMIN — CEFEPIME 2 G: 2 INJECTION, POWDER, FOR SOLUTION INTRAMUSCULAR; INTRAVENOUS at 02:46

## 2020-09-05 RX ADMIN — VANCOMYCIN HYDROCHLORIDE 2500 MG: 10 INJECTION, POWDER, LYOPHILIZED, FOR SOLUTION INTRAVENOUS at 04:07

## 2020-09-05 RX ADMIN — METOPROLOL TARTRATE 25 MG: 25 TABLET, FILM COATED ORAL at 21:13

## 2020-09-05 RX ADMIN — PRAVASTATIN SODIUM 40 MG: 20 TABLET ORAL at 21:14

## 2020-09-05 RX ADMIN — BUPROPION HYDROCHLORIDE 300 MG: 300 TABLET, FILM COATED, EXTENDED RELEASE ORAL at 08:10

## 2020-09-05 RX ADMIN — ARIPIPRAZOLE 4 MG: 2 TABLET ORAL at 08:09

## 2020-09-05 RX ADMIN — SODIUM CHLORIDE: 9 INJECTION, SOLUTION INTRAVENOUS at 15:52

## 2020-09-05 RX ADMIN — ENOXAPARIN SODIUM 40 MG: 40 INJECTION SUBCUTANEOUS at 08:10

## 2020-09-05 RX ADMIN — SODIUM CHLORIDE: 9 INJECTION, SOLUTION INTRAVENOUS at 22:45

## 2020-09-05 RX ADMIN — SODIUM CHLORIDE, PRESERVATIVE FREE 10 ML: 5 INJECTION INTRAVENOUS at 21:14

## 2020-09-05 RX ADMIN — INSULIN LISPRO 6 UNITS: 100 INJECTION, SOLUTION INTRAVENOUS; SUBCUTANEOUS at 16:51

## 2020-09-05 RX ADMIN — INSULIN LISPRO 4 UNITS: 100 INJECTION, SOLUTION INTRAVENOUS; SUBCUTANEOUS at 21:29

## 2020-09-05 RX ADMIN — CEFEPIME 2 G: 2 INJECTION, POWDER, FOR SOLUTION INTRAMUSCULAR; INTRAVENOUS at 11:52

## 2020-09-05 RX ADMIN — LISINOPRIL 20 MG: 20 TABLET ORAL at 08:09

## 2020-09-05 RX ADMIN — SODIUM CHLORIDE: 9 INJECTION, SOLUTION INTRAVENOUS at 06:15

## 2020-09-05 RX ADMIN — ASPIRIN 81 MG: 81 TABLET, COATED ORAL at 21:13

## 2020-09-05 RX ADMIN — ASPIRIN 81 MG: 81 TABLET, COATED ORAL at 08:10

## 2020-09-05 RX ADMIN — ACETAMINOPHEN 650 MG: 325 TABLET ORAL at 08:26

## 2020-09-05 RX ADMIN — SODIUM CHLORIDE, PRESERVATIVE FREE 10 ML: 5 INJECTION INTRAVENOUS at 08:14

## 2020-09-05 RX ADMIN — ESCITALOPRAM OXALATE 20 MG: 10 TABLET ORAL at 08:14

## 2020-09-05 RX ADMIN — INSULIN GLARGINE 15 UNITS: 100 INJECTION, SOLUTION SUBCUTANEOUS at 21:28

## 2020-09-05 RX ADMIN — CETIRIZINE HYDROCHLORIDE 10 MG: 10 TABLET, FILM COATED ORAL at 08:09

## 2020-09-05 RX ADMIN — PANTOPRAZOLE SODIUM 40 MG: 40 TABLET, DELAYED RELEASE ORAL at 06:11

## 2020-09-05 RX ADMIN — VANCOMYCIN HYDROCHLORIDE 2500 MG: 10 INJECTION, POWDER, LYOPHILIZED, FOR SOLUTION INTRAVENOUS at 16:50

## 2020-09-05 RX ADMIN — CEFEPIME 2 G: 2 INJECTION, POWDER, FOR SOLUTION INTRAMUSCULAR; INTRAVENOUS at 19:57

## 2020-09-05 RX ADMIN — METOPROLOL TARTRATE 25 MG: 25 TABLET, FILM COATED ORAL at 08:10

## 2020-09-05 RX ADMIN — SODIUM CHLORIDE, PRESERVATIVE FREE 10 ML: 5 INJECTION INTRAVENOUS at 11:52

## 2020-09-05 RX ADMIN — INSULIN LISPRO 6 UNITS: 100 INJECTION, SOLUTION INTRAVENOUS; SUBCUTANEOUS at 06:17

## 2020-09-05 RX ADMIN — INSULIN LISPRO 6 UNITS: 100 INJECTION, SOLUTION INTRAVENOUS; SUBCUTANEOUS at 11:57

## 2020-09-05 ASSESSMENT — PAIN DESCRIPTION - PAIN TYPE: TYPE: CHRONIC PAIN

## 2020-09-05 ASSESSMENT — PAIN SCALES - GENERAL
PAINLEVEL_OUTOF10: 6
PAINLEVEL_OUTOF10: 0
PAINLEVEL_OUTOF10: 2

## 2020-09-05 ASSESSMENT — PAIN DESCRIPTION - LOCATION: LOCATION: KNEE

## 2020-09-05 ASSESSMENT — PAIN DESCRIPTION - ONSET: ONSET: ON-GOING

## 2020-09-05 ASSESSMENT — PAIN DESCRIPTION - PROGRESSION: CLINICAL_PROGRESSION: NOT CHANGED

## 2020-09-05 ASSESSMENT — PAIN DESCRIPTION - DESCRIPTORS: DESCRIPTORS: ACHING;DISCOMFORT

## 2020-09-05 ASSESSMENT — PAIN DESCRIPTION - ORIENTATION: ORIENTATION: RIGHT;LEFT

## 2020-09-05 ASSESSMENT — PAIN DESCRIPTION - FREQUENCY: FREQUENCY: CONTINUOUS

## 2020-09-05 ASSESSMENT — PAIN - FUNCTIONAL ASSESSMENT: PAIN_FUNCTIONAL_ASSESSMENT: PREVENTS OR INTERFERES SOME ACTIVE ACTIVITIES AND ADLS

## 2020-09-05 NOTE — CARE COORDINATION
9/5/2020  Social Work Discharge Planning:DIPAK contacted [facility] with [facility] via text and left a voicemail to inquire if they have accepted Pt. Awaiting response. Electronically signed by PATRICIO Burr on 9/5/2020 at 12:55 PM    9/5/2020 Social Work Discharge Planning: DIPAK received a call from Alyssia with [facility], who informed that they are unable to consider taking Pt until they contact Pts ins. company[employer]- because they have questions regarding Pts ins and precert etc. They are unable to reach the ins. company until beginning of next week due to ins. is closed. DIPAK notified nurse Landy Justice.  Electronically signed by PATRICIO Burr on 9/5/2020 at 1:34 PM

## 2020-09-05 NOTE — PROGRESS NOTES
Social work was contacted per Dr. Alvarez Level request. Electronically signed by Joshua Burrows RN on 9/5/2020 at 12:34 PM

## 2020-09-05 NOTE — PROGRESS NOTES
Received call back to reinforce dressing with ABD and Ace that doctor will be in to see patient in the morning. Patient asked to sign waiver for bed alarm/keep bed low. Patient advised and educated on the risks. Will continue to monitor patient.

## 2020-09-05 NOTE — PROGRESS NOTES
[facility] Progress Note    Admitting Date and Time: 9/3/2020  4:49 PM  Admit Dx: Cellulitis of left foot [L03.116]  Cellulitis of left foot [L03.116]    Subjective:  Patient is being followed for Cellulitis of left foot [L03.116]  Cellulitis of left foot [L03.116]   Pt feels fine, has no complaints    ROS: denies fever, chills, cp, sob, n/v, HA unless stated above.       insulin glargine  15 Units Subcutaneous Nightly    cefepime  2 g Intravenous Q8H    vancomycin  2,500 mg Intravenous Q12H    sodium chloride flush  10 mL Intravenous 2 times per day    enoxaparin  40 mg Subcutaneous Daily    ARIPiprazole  4 mg Oral Daily    aspirin  81 mg Oral BID    buPROPion  300 mg Oral QAM    cetirizine  10 mg Oral Daily    escitalopram  20 mg Oral Daily    lisinopril  20 mg Oral Daily    metoprolol tartrate  25 mg Oral BID    pantoprazole  40 mg Oral QAM AC    pravastatin  40 mg Oral Nightly    insulin lispro  0-12 Units Subcutaneous TID WC    insulin lispro  0-6 Units Subcutaneous Nightly    sodium chloride flush  10 mL Intravenous 2 times per day     sodium chloride flush, 10 mL, PRN  HYDROcodone-acetaminophen, 1 tablet, Q6H PRN  ondansetron, 4 mg, Q6H PRN  glucose, 15 g, PRN  dextrose, 12.5 g, PRN  glucagon (rDNA), 1 mg, PRN  dextrose, 100 mL/hr, PRN  sodium chloride flush, 10 mL, PRN  acetaminophen, 650 mg, Q6H PRN    Or  acetaminophen, 650 mg, Q6H PRN  polyethylene glycol, 17 g, Daily PRN  promethazine, 12.5 mg, Q6H PRN    Or  ondansetron, 4 mg, Q6H PRN         Objective:    /79   Pulse 92   Temp 97.3 °F (36.3 °C) (Oral)   Resp 18   Ht 6' 5\" (1.956 m)   Wt (!) 432 lb 9.6 oz (196.2 kg)   SpO2 97%   BMI 51.30 kg/m²     General Appearance: alert and oriented to person, place and time and in no acute distress  Skin: warm and dry  Head: normocephalic and atraumatic  Eyes: pupils equal, round, and reactive to light, extraocular eye movements intact, conjunctivae normal  Neck: neck supple and non tender without mass   Pulmonary/Chest: clear to auscultation bilaterally- no wheezes, rales or rhonchi, normal air movement, no respiratory distress  Cardiovascular: normal rate, normal S1 and S2 and no carotid bruits  Abdomen: soft, non-tender, non-distended, normal bowel sounds, no masses or organomegaly  Extremities: no cyanosis, no clubbing and left foot with redness,in dressing  Neurologic: no cranial nerve deficit and speech normal        Recent Labs     09/03/20 1719 09/04/20  0405    132   K 4.2 3.9   CL 99 102   CO2 25 20*   BUN 13 11   CREATININE 0.9 0.6*   GLUCOSE 310* 198*   CALCIUM 9.3 8.8       Recent Labs     09/03/20 1719 09/04/20  0405 09/05/20  0311   WBC 15.5* 16.4* 16.5*   RBC 5.04 4.76 4.95   HGB 17.0* 15.9 16.7*   HCT 49.0 46.7 49.4   MCV 97.2 98.1 99.8   MCH 33.7 33.4 33.7   MCHC 34.7* 34.0 33.8   RDW 13.2 13.2 13.2    210 222   MPV 11.9 12.0 12.0         Assessment:    Principal Problem:    Cellulitis of left foot  Active Problems:    Diabetic foot ulcer associated with diabetes mellitus due to underlying condition ([facility] [address]    Essential hypertension    Morbid obesity with BMI of 45.0-49.9, adult (HCC)    Cellulitis and abscess of toe of left foot  Resolved Problems:    * No resolved hospital problems. *      Plan:  1. Left foot cellulitis in the setting of diabetes, signs of gas producing bacteria, start the patient on Zosyn and vanco, consulted ID and podiatry s/p I&D on 9/3/2020, ID switched abx to cefepime and vanc  Wound cultures growing strep agalactiae   2. DM II, with foot ulcer, continue on home insulin, lantus and placed on SSI  3. HTN, continue lisinopril and metoprolol        NOTE: This report was transcribed using voice recognition software. Every effort was made to ensure accuracy; however, inadvertent computerized transcription errors may be present.   Electronically signed by Linette Dunbar MD on 9/5/2020 at 9:50 AM

## 2020-09-05 NOTE — PROGRESS NOTES
thrush. Neck: Supple to movements. Chest: No use of accessory muscles to breathe. Symmetrical expansion. No wheezing, crackles or rhonchi. Cardiovascular: S1 and S2 are rhythmic and regular. No murmurs appreciated. Abdomen: Positive bowel sounds to auscultation. Benign to palpation. Morbid obesity  Extremities: LLE +2 edema. Left foot dressed. Toes pink, warm  Lines: peripheral    Laboratory and Tests Review:  Lab Results   Component Value Date    WBC 16.5 (H) 09/05/2020    WBC 16.4 (H) 09/04/2020    WBC 15.5 (H) 09/03/2020    HGB 16.7 (H) 09/05/2020    HCT 49.4 09/05/2020    MCV 99.8 09/05/2020     09/05/2020     Lab Results   Component Value Date    NEUTROABS 14.61 (H) 09/05/2020    NEUTROABS 12.26 (H) 09/03/2020     No results found for: [facility]  Lab Results   Component Value Date    ALT 24 09/04/2020    AST 16 09/04/2020    ALKPHOS 77 09/04/2020    BILITOT 0.9 09/04/2020     Lab Results   Component Value Date     09/04/2020    K 3.9 09/04/2020     09/04/2020    CO2 20 09/04/2020    BUN 11 09/04/2020    CREATININE 0.6 09/04/2020    CREATININE 0.9 09/03/2020    GFRAA >60 09/04/2020    LABGLOM >60 09/04/2020    GLUCOSE 198 09/04/2020    PROT 7.0 09/04/2020    LABALBU 3.4 09/04/2020    CALCIUM 8.8 09/04/2020    BILITOT 0.9 09/04/2020    ALKPHOS 77 09/04/2020    AST 16 09/04/2020    ALT 24 09/04/2020     Lab Results   Component Value Date    CRP 2.6 (H) 09/04/2020     Lab Results   Component Value Date    SEDRATE 18 (H) 09/05/2020    SEDRATE 10 09/04/2020     Radiology:      Microbiology:   9/3 wound cx Strep agalactiae  9/4 OR cx pending.  Gram stain with GNR, GPC  Blood cx no growth    ASSESSMENT:  · Diabetic ulcer infection left foot with cellulitis and possible osteomyelitis s/p I&D with partial resection 5th met head on 9/4  · Type 2 diabetes mellitus  · Essential hypertension     Plan:       · Cefepime 2 g IV every 8 hours  · Vancomycin-pharmacy dosing  · F/u cx  · Monitor labs  · Will follow with you    April [address]  11:06 AM  9/5/2020     Hopefully vancomycin can be DC'd by tomorrow as no MRSA has grown so far    Pt seen and examined. I discussed and co-formulated the plan with advanced practice nurse. Labs, cultures, and radiographs reviewed. Face to Face encounter occurred. Changes made as necessary by me.      Penny Milan MD  Infectious Disease

## 2020-09-05 NOTE — PROGRESS NOTES
Pharmacy Consultation Note  (Antibiotic Dosing and Monitoring)    Initial consult date: 2020  Consulting physician: Dr. Alma Whalen  Drug(s): Vancomycin  Indication: Diabetic ulcer infection left foot with cellulitis and possible osteomyelitis    Ht Readings from Last 1 Encounters:   20 6' 5\" (1.956 m)     Wt Readings from Last 1 Encounters:   20 (!) 432 lb 9.6 oz (196.2 kg)       Age/Gender IBW DW  Allergy Information   54 y.o. male 89.1 kg 195.5 kg  Milk-related compounds               Date  WBC BUN/CR Drug/Dose Time   Given Level(s)   (Time) Comments   9/3 15.5 13/0.9 Vancomycin 3 g IV x 1 dose 2336      16.4 11/0.6 Vancomycin 2500 mg IV Q12H 1600      16.5 --------- Vancomycin 2500 mg IV Q12H 0407                Estimated Creatinine Clearance: 287 mL/min (A) (based on SCr of 0.6 mg/dL (L)).       Intake/Output Summary (Last 24 hours) at 2020 0636  Last data filed at 2020 1850  Gross per 24 hour   Intake 580 ml   Output 20 ml   Net 560 ml       Temp max: Temp (24hrs), Av.6 °F (37 °C), Min:97.6 °F (36.4 °C), Max:99.2 °F (37.3 °C)      Cultures:  available culture and sensitivity results were reviewed in EPIC  9/3 wound cx - Streptococcus agalactiae  9/3 blood cx - no growth to date   surgical cx - pending    Assessment:  · Patient is a 54year old male on vancomycin and cefepime for left foot diabetic ulcer infection with cellulitis and possible osteomyelitis  · Consulted by Dr. Radha Everett to dose/monitor vancomycin  · Goal trough level:  15-20 mcg/mL; goal AUC/EDIN: 400-600    Plan:  · Will continue vancomycin 2500 mg IV every 12 hours  · Will check trough level at steady state if vancomycin continues  · Pharmacist will follow and monitor/adjust dosing as necessary    Neetu Staples, PharmD, BCCCP  2020  6:36 AM

## 2020-09-05 NOTE — PLAN OF CARE
Problem: Falls - Risk of:  Goal: Will remain free from falls  Description: Will remain free from falls  9/5/2020 0955 by Nicolasa Giang RN  Outcome: Met This Shift  9/5/2020 0112 by Marija Julio RN  Outcome: Met This Shift     Problem: Infection - Surgical Site:  Goal: Will show no infection signs and symptoms  Description: Will show no infection signs and symptoms  Outcome: Met This Shift

## 2020-09-05 NOTE — PROGRESS NOTES
Patient continues to refuse NWB order to left foot. Refusing to use walker or crutches- states hes only putting weight on his heel. Will continue to educate NWB. Patient also signed bed alarm waiver.   Electronically signed by John Mays RN on 9/5/2020 at 5:34 PM

## 2020-09-05 NOTE — PROGRESS NOTES
Crutches in room from [id number] (ER)  Electronically signed by Saundra Hodge RN on 9/5/2020 at 2:46 PM

## 2020-09-05 NOTE — PROGRESS NOTES
Patient ambulated to bathroom after being advised of NWB status to Left lower extremity. Patient did not ask to use restroom he got up on his own and ambulated refusing assistance. Advised patient again to not put weight to lower extremity patient states \"I will be fine\". Offered patient walker for next time and advised to please use call light when needing to use the restroom. Will continue to monitor patient.

## 2020-09-05 NOTE — PROGRESS NOTES
20  Peggy Curtis : 1975 Sex: male  Age: 54 y.o. Patient was referred by No primary care provider on file. CC:    Postop day of surgery 2020 left foot incision and drainage with partial resection fifth metatarsal head    HPI:   Postop day of surgery 2020 left foot incision and drainage with partial resection fifth metatarsal head    States he is pain-free today. Significant relief in pain left foot from yesterday. Tolerating antibiotics well. States he did walk several times to and from the bathroom yesterday and did have some bleeding on the left foot as he was walking with no assistance. Denies calf pain today. Denies any nausea vomiting fever chills shortness of breath no calf pain.     ROS:  Const: Denies constitutional symptoms  Musculo: Denies symptoms other than stated above  Skin: Denies symptoms other than stated above       Current Facility-Administered Medications:     insulin glargine (LANTUS) injection vial 15 Units, 15 Units, Subcutaneous, Nightly, Reymundo Darby MD, 15 Units at 20    cefepime (MAXIPIME) 2 g IVPB extended (mini-bag), 2 g, Intravenous, Q8H, Stopped at 20 0639 **AND** 0.9 % sodium chloride infusion, , Intravenous, Q8H, Titi Olvera DO, Last Rate: 12.5 mL/hr at 20 0615    vancomycin (VANCOCIN) 2,500 mg in dextrose 5 % 600 mL IVPB, 2,500 mg, Intravenous, Q12H, Titi Olvera DO, Last Rate: 200 mL/hr at 20 0407, 2,500 mg at 20 0407    sodium chloride flush 0.9 % injection 10 mL, 10 mL, Intravenous, 2 times per day, Rachel Rios DPM, 10 mL at 20    sodium chloride flush 0.9 % injection 10 mL, 10 mL, Intravenous, PRN, Rachel Rios DPM    enoxaparin (LOVENOX) injection 40 mg, 40 mg, Subcutaneous, Daily, Rachel Rios DPM, 40 mg at 20    ARIPiprazole (ABILIFY) tablet 4 mg, 4 mg, Oral, Daily, Reymundo Darby MD, 4 mg at 20 0820    aspirin EC tablet 81 mg, 81 mg, Oral, BID, Tenzin Lindsay MD, 81 mg at 09/04/20 2022    buPROPion (WELLBUTRIN XL) extended release tablet 300 mg, 300 mg, Oral, QAM, Tenzin Lindsay MD, 300 mg at 09/04/20 7215    cetirizine (ZYRTEC) tablet 10 mg, 10 mg, Oral, Daily, Tenzin Lindsay MD, 10 mg at 09/04/20 5374    escitalopram (LEXAPRO) tablet 20 mg, 20 mg, Oral, Daily, Tenzin Lindsay MD, 20 mg at 09/04/20 8920    lisinopril (PRINIVIL;ZESTRIL) tablet 20 mg, 20 mg, Oral, Daily, Tenzin Lindsay MD, 20 mg at 09/04/20 7799    metoprolol tartrate (LOPRESSOR) tablet 25 mg, 25 mg, Oral, BID, Tenzin Lindsay MD, 25 mg at 09/04/20 2022    pantoprazole (PROTONIX) tablet 40 mg, 40 mg, Oral, QAM AC, Tenzin Lindsay MD, 40 mg at 09/05/20 2570    pravastatin (PRAVACHOL) tablet 40 mg, 40 mg, Oral, Nightly, Tenzin Lindsay MD, 40 mg at 09/04/20 2022    HYDROcodone-acetaminophen (Félix Leyden) 7.5-325 MG per tablet 1 tablet, 1 tablet, Oral, Q6H PRN, Tenzin Lindsay MD    [facility]ron [facility]) injection 4 mg, 4 mg, Intravenous, Q6H PRN, Tenzin Lindsay MD    insulin lispro (HUMALOG) injection vial 0-12 Units, 0-12 Units, Subcutaneous, TID WC, Tenzin Lindsay MD, 6 Units at 09/05/20 0617    insulin lispro (HUMALOG) injection vial 0-6 Units, 0-6 Units, Subcutaneous, Nightly, Tenzin Lindsay MD, 4 Units at 09/04/20 2025    glucose (GLUTOSE) 40 % oral gel 15 g, 15 g, Oral, PRN, Tenzin Lindsay MD    dextrose 50 % IV solution, 12.5 g, Intravenous, PRN, Tenzin Lindsay MD    glucagon (rDNA) injection 1 mg, 1 mg, Intramuscular, PRN, Tenzin Lindsay MD    dextrose 5 % solution, 100 mL/hr, Intravenous, PRN, Tenzin Lindsay MD    sodium chloride flush 0.9 % injection 10 mL, 10 mL, Intravenous, 2 times per day, Tenzin Lindsay MD, 10 mL at 09/04/20 2032    sodium chloride flush 0.9 % injection 10 mL, 10 mL, Intravenous, PRN, Tenzin Lindsay MD    acetaminophen (TYLENOL) tablet 650 mg, 650 mg, Oral, Q6H PRN **OR** acetaminophen (TYLENOL) suppository 650 mg, 650 mg, Rectal, Q6H left foot incision and drainage with partial resection fifth metatarsal head    I did change bandage today. Did walk multiple times last night did have some bleeding to the bandage. No drainage today. Adaptic, multiple layers of ABD padding and Ace bandage with Kerlix applied today. Can bear weight just heel contact only with surgical shoe. Did place an order for crutches. Patient would like to go to rehab facility upon discharge. He does live currently with his family in [place] but would like to have rehab in [facility] as this is where his wife is living. Infectious disease on board with antibiotics appreciate input. Pending intraoperative cultures. We will continue to follow while in-house. Likely removal of packing tomorrow. No follow-ups on file. Seen By:  Lokesh Bernard DPM      Document was created using voice recognition software. Note was reviewed, however may contain grammatical errors.

## 2020-09-05 NOTE — ANESTHESIA POSTPROCEDURE EVALUATION
Department of Anesthesiology  Postprocedure Note    Patient: Corrina Camarillo  MRN: [id number]  YOB: 1975  Date of evaluation: 9/4/2020  Time:  9:54 PM     Procedure Summary     Date:  09/04/20 Room / Location:  [facility] 02 / 106 [facility]    Anesthesia Start:  5980 Anesthesia Stop:  1900    Procedure:  INCISION AND DRAINAGE LEFT FOOT  BONE WITH PARTIAL BONE RESECTION FIFTH METATARSAL (Left ) Diagnosis:  (-)    Surgeon:  Wayne Valencia DPM Responsible Provider:  Chely Adkins MD    Anesthesia Type:  MAC ASA Status:  3          Anesthesia Type: MAC    Mario Alberto Phase I: Mario Alberto Score: 8    Mario Alberto Phase II:      Last vitals: Reviewed and per EMR flowsheets.        Anesthesia Post Evaluation    Patient location during evaluation: PACU  Patient participation: complete - patient participated  Level of consciousness: awake and alert  Airway patency: patent  Nausea & Vomiting: no vomiting and no nausea  Complications: no  Cardiovascular status: blood pressure returned to baseline  Respiratory status: acceptable  Hydration status: euvolemic

## 2020-09-06 LAB
ANION GAP SERPL CALCULATED.3IONS-SCNC: 12 MMOL/L (ref 7–16)
BUN BLDV-MCNC: 21 MG/DL (ref 6–20)
CALCIUM SERPL-MCNC: 8.9 MG/DL (ref 8.6–10.2)
CHLORIDE BLD-SCNC: 105 MMOL/L (ref 98–107)
CO2: 18 MMOL/L (ref 22–29)
CREAT SERPL-MCNC: 0.8 MG/DL (ref 0.7–1.2)
GFR AFRICAN AMERICAN: >60
GFR NON-AFRICAN AMERICAN: >60 ML/MIN/1.73
GLUCOSE BLD-MCNC: 297 MG/DL (ref 74–99)
HBA1C MFR BLD: 9.4 % (ref 4–5.6)
METER GLUCOSE: 292 MG/DL (ref 74–99)
METER GLUCOSE: 307 MG/DL (ref 74–99)
METER GLUCOSE: 331 MG/DL (ref 74–99)
METER GLUCOSE: 340 MG/DL (ref 74–99)
POTASSIUM SERPL-SCNC: 4.2 MMOL/L (ref 3.5–5)
SODIUM BLD-SCNC: 135 MMOL/L (ref 132–146)
VANCOMYCIN TROUGH: 13.5 MCG/ML (ref 5–16)
WBC # BLD: 15.4 E9/L (ref 4.5–11.5)

## 2020-09-06 PROCEDURE — 80202 ASSAY OF VANCOMYCIN: CPT

## 2020-09-06 PROCEDURE — 2580000003 HC RX 258: Performed by: INTERNAL MEDICINE

## 2020-09-06 PROCEDURE — 82962 GLUCOSE BLOOD TEST: CPT

## 2020-09-06 PROCEDURE — 1200000000 HC SEMI PRIVATE

## 2020-09-06 PROCEDURE — 85048 AUTOMATED LEUKOCYTE COUNT: CPT

## 2020-09-06 PROCEDURE — 83036 HEMOGLOBIN GLYCOSYLATED A1C: CPT

## 2020-09-06 PROCEDURE — 6370000000 HC RX 637 (ALT 250 FOR IP): Performed by: INTERNAL MEDICINE

## 2020-09-06 PROCEDURE — 2580000003 HC RX 258: Performed by: PODIATRIST

## 2020-09-06 PROCEDURE — 6360000002 HC RX W HCPCS: Performed by: PODIATRIST

## 2020-09-06 PROCEDURE — 99233 SBSQ HOSP IP/OBS HIGH 50: CPT | Performed by: INTERNAL MEDICINE

## 2020-09-06 PROCEDURE — 36415 COLL VENOUS BLD VENIPUNCTURE: CPT

## 2020-09-06 PROCEDURE — 80048 BASIC METABOLIC PNL TOTAL CA: CPT

## 2020-09-06 PROCEDURE — 6360000002 HC RX W HCPCS: Performed by: INTERNAL MEDICINE

## 2020-09-06 RX ADMIN — SODIUM CHLORIDE: 9 INJECTION, SOLUTION INTRAVENOUS at 07:15

## 2020-09-06 RX ADMIN — INSULIN GLARGINE 15 UNITS: 100 INJECTION, SOLUTION SUBCUTANEOUS at 21:15

## 2020-09-06 RX ADMIN — SODIUM CHLORIDE: 9 INJECTION, SOLUTION INTRAVENOUS at 23:40

## 2020-09-06 RX ADMIN — INSULIN LISPRO 8 UNITS: 100 INJECTION, SOLUTION INTRAVENOUS; SUBCUTANEOUS at 16:14

## 2020-09-06 RX ADMIN — ASPIRIN 81 MG: 81 TABLET, COATED ORAL at 08:49

## 2020-09-06 RX ADMIN — PANTOPRAZOLE SODIUM 40 MG: 40 TABLET, DELAYED RELEASE ORAL at 06:48

## 2020-09-06 RX ADMIN — ACETAMINOPHEN 650 MG: 325 TABLET ORAL at 08:49

## 2020-09-06 RX ADMIN — SODIUM CHLORIDE, PRESERVATIVE FREE 10 ML: 5 INJECTION INTRAVENOUS at 21:21

## 2020-09-06 RX ADMIN — SODIUM CHLORIDE: 9 INJECTION, SOLUTION INTRAVENOUS at 14:52

## 2020-09-06 RX ADMIN — INSULIN LISPRO 6 UNITS: 100 INJECTION, SOLUTION INTRAVENOUS; SUBCUTANEOUS at 06:51

## 2020-09-06 RX ADMIN — INSULIN LISPRO 4 UNITS: 100 INJECTION, SOLUTION INTRAVENOUS; SUBCUTANEOUS at 21:15

## 2020-09-06 RX ADMIN — ACETAMINOPHEN 650 MG: 325 TABLET ORAL at 19:22

## 2020-09-06 RX ADMIN — CETIRIZINE HYDROCHLORIDE 10 MG: 10 TABLET, FILM COATED ORAL at 08:49

## 2020-09-06 RX ADMIN — CEFEPIME 2 G: 2 INJECTION, POWDER, FOR SOLUTION INTRAMUSCULAR; INTRAVENOUS at 19:22

## 2020-09-06 RX ADMIN — PRAVASTATIN SODIUM 40 MG: 20 TABLET ORAL at 21:18

## 2020-09-06 RX ADMIN — ESCITALOPRAM OXALATE 20 MG: 10 TABLET ORAL at 08:49

## 2020-09-06 RX ADMIN — BUPROPION HYDROCHLORIDE 300 MG: 300 TABLET, FILM COATED, EXTENDED RELEASE ORAL at 08:49

## 2020-09-06 RX ADMIN — ENOXAPARIN SODIUM 40 MG: 40 INJECTION SUBCUTANEOUS at 08:49

## 2020-09-06 RX ADMIN — HYDROCODONE BITARTRATE AND ACETAMINOPHEN 1 TABLET: 7.5; 325 TABLET ORAL at 21:24

## 2020-09-06 RX ADMIN — METOPROLOL TARTRATE 25 MG: 25 TABLET, FILM COATED ORAL at 08:49

## 2020-09-06 RX ADMIN — LISINOPRIL 20 MG: 20 TABLET ORAL at 08:49

## 2020-09-06 RX ADMIN — CEFEPIME 2 G: 2 INJECTION, POWDER, FOR SOLUTION INTRAMUSCULAR; INTRAVENOUS at 03:39

## 2020-09-06 RX ADMIN — CEFEPIME 2 G: 2 INJECTION, POWDER, FOR SOLUTION INTRAMUSCULAR; INTRAVENOUS at 11:59

## 2020-09-06 RX ADMIN — ARIPIPRAZOLE 4 MG: 2 TABLET ORAL at 08:49

## 2020-09-06 RX ADMIN — ASPIRIN 81 MG: 81 TABLET, COATED ORAL at 21:18

## 2020-09-06 RX ADMIN — SODIUM CHLORIDE, PRESERVATIVE FREE 10 ML: 5 INJECTION INTRAVENOUS at 08:50

## 2020-09-06 RX ADMIN — VANCOMYCIN HYDROCHLORIDE 2500 MG: 10 INJECTION, POWDER, LYOPHILIZED, FOR SOLUTION INTRAVENOUS at 04:24

## 2020-09-06 RX ADMIN — INSULIN LISPRO 8 UNITS: 100 INJECTION, SOLUTION INTRAVENOUS; SUBCUTANEOUS at 11:59

## 2020-09-06 RX ADMIN — METOPROLOL TARTRATE 25 MG: 25 TABLET, FILM COATED ORAL at 21:18

## 2020-09-06 ASSESSMENT — PAIN SCALES - GENERAL
PAINLEVEL_OUTOF10: 5
PAINLEVEL_OUTOF10: 4
PAINLEVEL_OUTOF10: 4
PAINLEVEL_OUTOF10: 7
PAINLEVEL_OUTOF10: 4
PAINLEVEL_OUTOF10: 0
PAINLEVEL_OUTOF10: 4

## 2020-09-06 ASSESSMENT — PAIN DESCRIPTION - PAIN TYPE
TYPE: SURGICAL PAIN
TYPE: CHRONIC PAIN
TYPE: SURGICAL PAIN

## 2020-09-06 ASSESSMENT — PAIN DESCRIPTION - FREQUENCY: FREQUENCY: INTERMITTENT

## 2020-09-06 ASSESSMENT — PAIN DESCRIPTION - PROGRESSION
CLINICAL_PROGRESSION: NOT CHANGED
CLINICAL_PROGRESSION: NOT CHANGED

## 2020-09-06 ASSESSMENT — PAIN DESCRIPTION - DESCRIPTORS: DESCRIPTORS: ACHING;DISCOMFORT

## 2020-09-06 ASSESSMENT — PAIN DESCRIPTION - ORIENTATION
ORIENTATION: LEFT
ORIENTATION: LEFT;RIGHT

## 2020-09-06 ASSESSMENT — PAIN DESCRIPTION - LOCATION
LOCATION: FOOT
LOCATION: KNEE

## 2020-09-06 ASSESSMENT — PAIN DESCRIPTION - ONSET: ONSET: GRADUAL

## 2020-09-06 NOTE — PLAN OF CARE
Problem: Falls - Risk of:  Goal: Will remain free from falls  Description: Will remain free from falls  Outcome: Ongoing  Goal: Absence of physical injury  Description: Absence of physical injury  Outcome: Ongoing     Problem: Infection - Surgical Site:  Goal: Will show no infection signs and symptoms  Description: Will show no infection signs and symptoms  Outcome: Ongoing     Problem: Serum Glucose Level - Abnormal:  Goal: Ability to maintain appropriate glucose levels will improve  Description: Ability to maintain appropriate glucose levels will improve  Outcome: Ongoing     Problem: Sensory Perception - Impaired:  Goal: Ability to maintain a stable neurologic state will improve  Description: Ability to maintain a stable neurologic state will improve  Outcome: Ongoing

## 2020-09-06 NOTE — PROGRESS NOTES
[address] [place] [address] [facility]  [facility]  Progress Note    SUBJECTIVE:  Chief Complaint   Patient presents with    Other     left foot wound per poditrist needs admitted      Patient is tolerating medications. No reported adverse drug reactions. No nausea, vomiting, diarrhea. No fevers overnight. Occasional pain left foot    Review of systems:  As stated above in the chief complaint, otherwise negative. Medications:  Scheduled Meds:   insulin glargine  15 Units Subcutaneous Nightly    cefepime  2 g Intravenous Q8H    vancomycin  2,500 mg Intravenous Q12H    sodium chloride flush  10 mL Intravenous 2 times per day    enoxaparin  40 mg Subcutaneous Daily    ARIPiprazole  4 mg Oral Daily    aspirin  81 mg Oral BID    buPROPion  300 mg Oral QAM    cetirizine  10 mg Oral Daily    escitalopram  20 mg Oral Daily    lisinopril  20 mg Oral Daily    metoprolol tartrate  25 mg Oral BID    pantoprazole  40 mg Oral QAM AC    pravastatin  40 mg Oral Nightly    insulin lispro  0-12 Units Subcutaneous TID WC    insulin lispro  0-6 Units Subcutaneous Nightly    sodium chloride flush  10 mL Intravenous 2 times per day     Continuous Infusions:   sodium chloride 12.5 mL/hr at 20 0715    dextrose       PRN Meds:sodium chloride flush, HYDROcodone-acetaminophen, ondansetron, glucose, dextrose, glucagon (rDNA), dextrose, sodium chloride flush, acetaminophen **OR** acetaminophen, polyethylene glycol, promethazine **OR** ondansetron    OBJECTIVE:  /72   Pulse 84   Temp 98.4 °F (36.9 °C) (Oral)   Resp 18   Ht 6' 5\" (1.956 m)   Wt (!) 432 lb 9.6 oz (196.2 kg)   SpO2 98%   BMI 51.30 kg/m²   Temp  Av.1 °F (36.7 °C)  Min: 97.9 °F (36.6 °C)  Max: 98.4 °F (36.9 °C)  Constitutional: The patient is awake, alert, and oriented. Sitting up in chair in room  Skin: Warm and dry. No rashes were noted. HEENT: Round and reactive pupils. Moist mucous membranes. No ulcerations or thrush.   Neck: Supple to movements. Chest: No use of accessory muscles to breathe. Symmetrical expansion. No wheezing, crackles or rhonchi. Cardiovascular: S1 and S2 are rhythmic and regular. No murmurs appreciated. Abdomen: Positive bowel sounds to auscultation. Benign to palpation. Morbid obesity  Extremities: LLE +2 edema. Left foot dressed. Toes pink, warm, capillary refill <3 sec  Lines: peripheral    Laboratory and Tests Review:  Lab Results   Component Value Date    WBC 16.5 (H) 09/05/2020    WBC 16.4 (H) 09/04/2020    WBC 15.5 (H) 09/03/2020    HGB 16.7 (H) 09/05/2020    HCT 49.4 09/05/2020    MCV 99.8 09/05/2020     09/05/2020     Lab Results   Component Value Date    NEUTROABS 14.61 (H) 09/05/2020    NEUTROABS 12.26 (H) 09/03/2020     No results found for: CRP  Lab Results   Component Value Date    ALT 24 09/04/2020    AST 16 09/04/2020    ALKPHOS 77 09/04/2020    BILITOT 0.9 09/04/2020     Lab Results   Component Value Date     09/06/2020    K 4.2 09/06/2020    K 3.9 09/04/2020     09/06/2020    CO2 18 09/06/2020    BUN 21 09/06/2020    CREATININE 0.8 09/06/2020    CREATININE 0.6 09/04/2020    CREATININE 0.9 09/03/2020    GFRAA >60 09/06/2020    LABGLOM >60 09/06/2020    GLUCOSE 297 09/06/2020    PROT 7.0 09/04/2020    LABALBU 3.4 09/04/2020    CALCIUM 8.9 09/06/2020    BILITOT 0.9 09/04/2020    ALKPHOS 77 09/04/2020    AST 16 09/04/2020    ALT 24 09/04/2020     Lab Results   Component Value Date    CRP 2.6 (H) 09/04/2020     Lab Results   Component Value Date    SEDRATE 18 (H) 09/05/2020    SEDRATE 10 09/04/2020     Radiology:      Microbiology:   9/3 wound cx Strep agalactiae  9/4 OR cx GPOs.  Gram stain with GNR, GPC  Blood cx no growth    ASSESSMENT:  · Diabetic ulcer infection left foot with cellulitis and possible osteomyelitis s/p I&D with partial resection 5th met head on 9/4  · Type 2 diabetes mellitus  · Essential hypertension     Plan:       · Cefepime 2 g IV every 8 hours  · No MRSA - stop vanco   · F/u cx  · Monitor labs  · Will follow with you    April Luis A Bergeron CNP  10:15 AM  9/6/2020       Pt seen and examined. I discussed and co-formulated the plan with advanced practice nurse. Labs, cultures, and radiographs reviewed. Face to Face encounter occurred. Changes made as necessary by me.      Alondra Ramirez MD  Infectious Disease

## 2020-09-06 NOTE — PROGRESS NOTES
Pharmacy Consultation Note  (Antibiotic Dosing and Monitoring)    Initial consult date: 2020  Consulting physician: Dr. Simi Watson  Drug(s): Vancomycin  Indication: Diabetic ulcer infection left foot with cellulitis and possible osteomyelitis    Ht Readings from Last 1 Encounters:   20 6' 5\" (1.956 m)     Wt Readings from Last 1 Encounters:   20 (!) 432 lb 9.6 oz (196.2 kg)       Age/Gender IBW DW  Allergy Information   54 y.o. male 89.1 kg 195.5 kg  Milk-related compounds               Date  WBC BUN/CR Drug/Dose Time   Given Level(s)   (Time) Comments   9/3 15.5 13/0.9 Vancomycin 3 g IV x 1 dose 2336      16.4 11/0.6 Vancomycin 2500 mg IV Q12H 1600      16.5 --------- Vancomycin 2500 mg IV Q12H 0407  1650      ------- 21/0.8 Vancomycin 2500 mg IV Q12H 0424 Trough: 13.5 mcg/mL (0340)      Estimated Creatinine Clearance: 215 mL/min (based on SCr of 0.8 mg/dL).       Intake/Output Summary (Last 24 hours) at 2020 0820  Last data filed at 2020 0813  Gross per 24 hour   Intake 1800 ml   Output --   Net 1800 ml       Temp max: Temp (24hrs), Av °F (36.7 °C), Min:97.9 °F (36.6 °C), Max:98.1 °F (36.7 °C)      Cultures:  available culture and sensitivity results were reviewed in EPIC  9/3 wound cx - Streptococcus agalactiae  9/3 blood cx - no growth to date   surgical cx - mixed Gram positive organisms    Assessment:  · Patient is a 54year old male on vancomycin and cefepime for left foot diabetic ulcer infection with cellulitis and possible osteomyelitis  · Consulted by Dr. Karin Montgomery to dose/monitor vancomycin  · Trough level from 0340 on  was 13.5 mcg/mL; estimated AUC/EDNI was 456  · Goal trough level:  15-20 mcg/mL; goal AUC/EDIN: 400-600    Plan:  · Will continue vancomycin 2500 mg IV every 12 hours  · Will monitor renal function and recheck trough level if needed  · Pharmacist will follow and monitor/adjust dosing as necessary    Anuja Bejarano PharmD, BCCCP  2020  8:20 AM    Addendum:  Note vancomycin discontinued. Clinical pharmacy will sign-off. Please re-consult if we can be of further assistance.     Kel Castelan PharmD, [facility] Hospital[facility]  9/6/2020  10:28 AM

## 2020-09-06 NOTE — PROGRESS NOTES
neck supple and non tender without mass   Pulmonary/Chest: clear to auscultation bilaterally- no wheezes, rales or rhonchi, normal air movement, no respiratory distress  Cardiovascular: normal rate, normal S1 and S2 and no carotid bruits  Abdomen: soft, non-tender, non-distended, normal bowel sounds, no masses or organomegaly  Extremities: no cyanosis, no clubbing and left foot with redness,in dressing  Neurologic: no cranial nerve deficit and speech normal        Recent Labs     09/03/20 1719 09/04/20 0405 09/06/20  0340    132 135   K 4.2 3.9 4.2   CL 99 102 105   CO2 25 20* 18*   BUN 13 11 21*   CREATININE 0.9 0.6* 0.8   GLUCOSE 310* 198* 297*   CALCIUM 9.3 8.8 8.9       Recent Labs     09/03/20 1719 09/04/20 0405 09/05/20  0311   WBC 15.5* 16.4* 16.5*   RBC 5.04 4.76 4.95   HGB 17.0* 15.9 16.7*   HCT 49.0 46.7 49.4   MCV 97.2 98.1 99.8   MCH 33.7 33.4 33.7   MCHC 34.7* 34.0 33.8   RDW 13.2 13.2 13.2    210 222   MPV 11.9 12.0 12.0         Assessment:    Principal Problem:    Cellulitis of left foot  Active Problems:    Diabetic foot ulcer associated with diabetes mellitus due to underlying condition ([facility] [address]    Essential hypertension    Morbid obesity with BMI of 45.0-49.9, adult (HCC)    Cellulitis and abscess of toe of left foot  Resolved Problems:    * No resolved hospital problems. *      Plan:  1. Left foot cellulitis in the setting of diabetes, signs of gas producing bacteria, start the patient on Zosyn and vanco, consulted ID and podiatry s/p I&D on 9/3/2020, ID switched abx to cefepime and vanc  Wound cultures growing strep agalactiae can stop vanc. 2.  DM II, with foot ulcer, continue on home insulin, lantus and placed on SSI  3. HTN, continue lisinopril and metoprolol  4. Dispo, LTAC pending insurance         NOTE: This report was transcribed using voice recognition software.  Every effort was made to ensure accuracy; however, inadvertent computerized transcription errors may be present.   Electronically signed by Edis Triana MD on 9/6/2020 at 8:08 AM

## 2020-09-07 LAB
ANAEROBIC CULTURE: ABNORMAL
CULTURE SURGICAL: ABNORMAL
METER GLUCOSE: 213 MG/DL (ref 74–99)
METER GLUCOSE: 250 MG/DL (ref 74–99)
METER GLUCOSE: 251 MG/DL (ref 74–99)
METER GLUCOSE: 291 MG/DL (ref 74–99)
ORGANISM: ABNORMAL
WBC # BLD: 10.7 E9/L (ref 4.5–11.5)

## 2020-09-07 PROCEDURE — 36415 COLL VENOUS BLD VENIPUNCTURE: CPT

## 2020-09-07 PROCEDURE — 2580000003 HC RX 258: Performed by: INTERNAL MEDICINE

## 2020-09-07 PROCEDURE — 6360000002 HC RX W HCPCS: Performed by: PODIATRIST

## 2020-09-07 PROCEDURE — 6360000002 HC RX W HCPCS: Performed by: INTERNAL MEDICINE

## 2020-09-07 PROCEDURE — 82962 GLUCOSE BLOOD TEST: CPT

## 2020-09-07 PROCEDURE — 99024 POSTOP FOLLOW-UP VISIT: CPT | Performed by: PODIATRIST

## 2020-09-07 PROCEDURE — 6370000000 HC RX 637 (ALT 250 FOR IP): Performed by: INTERNAL MEDICINE

## 2020-09-07 PROCEDURE — C1751 CATH, INF, PER/CENT/MIDLINE: HCPCS

## 2020-09-07 PROCEDURE — 76937 US GUIDE VASCULAR ACCESS: CPT

## 2020-09-07 PROCEDURE — 2500000003 HC RX 250 WO HCPCS: Performed by: INTERNAL MEDICINE

## 2020-09-07 PROCEDURE — 85048 AUTOMATED LEUKOCYTE COUNT: CPT

## 2020-09-07 PROCEDURE — 1200000000 HC SEMI PRIVATE

## 2020-09-07 PROCEDURE — 99233 SBSQ HOSP IP/OBS HIGH 50: CPT | Performed by: INTERNAL MEDICINE

## 2020-09-07 PROCEDURE — 36569 INSJ PICC 5 YR+ W/O IMAGING: CPT

## 2020-09-07 PROCEDURE — 02HV33Z INSERTION OF INFUSION DEVICE INTO SUPERIOR VENA CAVA, PERCUTANEOUS APPROACH: ICD-10-PCS | Performed by: INTERNAL MEDICINE

## 2020-09-07 PROCEDURE — 2580000003 HC RX 258: Performed by: PODIATRIST

## 2020-09-07 RX ORDER — HEPARIN SODIUM (PORCINE) LOCK FLUSH IV SOLN 100 UNIT/ML 100 UNIT/ML
3 SOLUTION INTRAVENOUS EVERY 12 HOURS SCHEDULED
Status: DISCONTINUED | OUTPATIENT
Start: 2020-09-07 | End: 2020-09-11 | Stop reason: HOSPADM

## 2020-09-07 RX ORDER — HEPARIN SODIUM (PORCINE) LOCK FLUSH IV SOLN 100 UNIT/ML 100 UNIT/ML
3 SOLUTION INTRAVENOUS PRN
Status: DISCONTINUED | OUTPATIENT
Start: 2020-09-07 | End: 2020-09-11 | Stop reason: HOSPADM

## 2020-09-07 RX ORDER — LIDOCAINE HYDROCHLORIDE 10 MG/ML
5 INJECTION, SOLUTION EPIDURAL; INFILTRATION; INTRACAUDAL; PERINEURAL ONCE
Status: COMPLETED | OUTPATIENT
Start: 2020-09-07 | End: 2020-09-07

## 2020-09-07 RX ORDER — SODIUM CHLORIDE 0.9 % (FLUSH) 0.9 %
10 SYRINGE (ML) INJECTION PRN
Status: DISCONTINUED | OUTPATIENT
Start: 2020-09-07 | End: 2020-09-11 | Stop reason: HOSPADM

## 2020-09-07 RX ADMIN — METOPROLOL TARTRATE 25 MG: 25 TABLET, FILM COATED ORAL at 20:17

## 2020-09-07 RX ADMIN — HYDROCODONE BITARTRATE AND ACETAMINOPHEN 1 TABLET: 7.5; 325 TABLET ORAL at 20:22

## 2020-09-07 RX ADMIN — CEFEPIME 2 G: 2 INJECTION, POWDER, FOR SOLUTION INTRAMUSCULAR; INTRAVENOUS at 04:30

## 2020-09-07 RX ADMIN — ENOXAPARIN SODIUM 40 MG: 40 INJECTION SUBCUTANEOUS at 07:50

## 2020-09-07 RX ADMIN — BUPROPION HYDROCHLORIDE 300 MG: 300 TABLET, FILM COATED, EXTENDED RELEASE ORAL at 07:49

## 2020-09-07 RX ADMIN — SODIUM CHLORIDE: 9 INJECTION, SOLUTION INTRAVENOUS at 08:57

## 2020-09-07 RX ADMIN — AMPICILLIN SODIUM 2 G: 2 INJECTION, POWDER, FOR SOLUTION INTRAMUSCULAR; INTRAVENOUS at 20:16

## 2020-09-07 RX ADMIN — PANTOPRAZOLE SODIUM 40 MG: 40 TABLET, DELAYED RELEASE ORAL at 06:41

## 2020-09-07 RX ADMIN — CEFTRIAXONE 2 G: 2 INJECTION, POWDER, FOR SOLUTION INTRAMUSCULAR; INTRAVENOUS at 12:27

## 2020-09-07 RX ADMIN — INSULIN LISPRO 6 UNITS: 100 INJECTION, SOLUTION INTRAVENOUS; SUBCUTANEOUS at 12:08

## 2020-09-07 RX ADMIN — INSULIN LISPRO 3 UNITS: 100 INJECTION, SOLUTION INTRAVENOUS; SUBCUTANEOUS at 20:17

## 2020-09-07 RX ADMIN — SODIUM CHLORIDE, PRESERVATIVE FREE 10 ML: 5 INJECTION INTRAVENOUS at 07:50

## 2020-09-07 RX ADMIN — SODIUM CHLORIDE, PRESERVATIVE FREE 10 ML: 5 INJECTION INTRAVENOUS at 20:50

## 2020-09-07 RX ADMIN — ESCITALOPRAM OXALATE 20 MG: 10 TABLET ORAL at 07:50

## 2020-09-07 RX ADMIN — LIDOCAINE HYDROCHLORIDE 0.5 ML: 10 INJECTION, SOLUTION EPIDURAL; INFILTRATION; INTRACAUDAL; PERINEURAL at 17:40

## 2020-09-07 RX ADMIN — INSULIN LISPRO 4 UNITS: 100 INJECTION, SOLUTION INTRAVENOUS; SUBCUTANEOUS at 07:55

## 2020-09-07 RX ADMIN — LISINOPRIL 20 MG: 20 TABLET ORAL at 07:50

## 2020-09-07 RX ADMIN — INSULIN LISPRO 6 UNITS: 100 INJECTION, SOLUTION INTRAVENOUS; SUBCUTANEOUS at 16:38

## 2020-09-07 RX ADMIN — AMPICILLIN SODIUM 2 G: 2 INJECTION, POWDER, FOR SOLUTION INTRAMUSCULAR; INTRAVENOUS at 16:40

## 2020-09-07 RX ADMIN — METOPROLOL TARTRATE 25 MG: 25 TABLET, FILM COATED ORAL at 07:50

## 2020-09-07 RX ADMIN — ARIPIPRAZOLE 4 MG: 2 TABLET ORAL at 07:49

## 2020-09-07 RX ADMIN — SODIUM CHLORIDE, PRESERVATIVE FREE 10 ML: 5 INJECTION INTRAVENOUS at 20:17

## 2020-09-07 RX ADMIN — CETIRIZINE HYDROCHLORIDE 10 MG: 10 TABLET, FILM COATED ORAL at 07:51

## 2020-09-07 RX ADMIN — INSULIN GLARGINE 15 UNITS: 100 INJECTION, SOLUTION SUBCUTANEOUS at 20:17

## 2020-09-07 RX ADMIN — PRAVASTATIN SODIUM 40 MG: 20 TABLET ORAL at 21:05

## 2020-09-07 RX ADMIN — ASPIRIN 81 MG: 81 TABLET, COATED ORAL at 07:50

## 2020-09-07 RX ADMIN — AMPICILLIN SODIUM 2 G: 2 INJECTION, POWDER, FOR SOLUTION INTRAMUSCULAR; INTRAVENOUS at 12:37

## 2020-09-07 RX ADMIN — ASPIRIN 81 MG: 81 TABLET, COATED ORAL at 20:17

## 2020-09-07 RX ADMIN — SODIUM CHLORIDE, PRESERVATIVE FREE 300 UNITS: 5 INJECTION INTRAVENOUS at 20:50

## 2020-09-07 ASSESSMENT — PAIN SCALES - GENERAL
PAINLEVEL_OUTOF10: 0
PAINLEVEL_OUTOF10: 0
PAINLEVEL_OUTOF10: 4

## 2020-09-07 ASSESSMENT — PAIN DESCRIPTION - PAIN TYPE: TYPE: ACUTE PAIN

## 2020-09-07 ASSESSMENT — PAIN DESCRIPTION - LOCATION: LOCATION: FOOT

## 2020-09-07 ASSESSMENT — PAIN DESCRIPTION - FREQUENCY: FREQUENCY: CONTINUOUS

## 2020-09-07 ASSESSMENT — PAIN DESCRIPTION - ONSET: ONSET: ON-GOING

## 2020-09-07 ASSESSMENT — PAIN DESCRIPTION - PROGRESSION: CLINICAL_PROGRESSION: NOT CHANGED

## 2020-09-07 ASSESSMENT — PAIN - FUNCTIONAL ASSESSMENT: PAIN_FUNCTIONAL_ASSESSMENT: PREVENTS OR INTERFERES SOME ACTIVE ACTIVITIES AND ADLS

## 2020-09-07 ASSESSMENT — PAIN DESCRIPTION - ORIENTATION: ORIENTATION: LEFT

## 2020-09-07 ASSESSMENT — PAIN DESCRIPTION - DESCRIPTORS: DESCRIPTORS: ACHING;CONSTANT

## 2020-09-07 NOTE — PROGRESS NOTES
[address] [place] [address] [facility]  [facility]  Progress Note      Chief Complaint   Patient presents with    Other     left foot wound per poditrist needs admitted        SUBJECTIVE:      Patient is tolerating medications. No reported adverse drug reactions. No problems overnight. Review of systems:    As stated above in the chief complaint, otherwise negative. Medications:    Scheduled Meds:   insulin glargine  15 Units Subcutaneous Nightly    cefepime  2 g Intravenous Q8H    sodium chloride flush  10 mL Intravenous 2 times per day    enoxaparin  40 mg Subcutaneous Daily    ARIPiprazole  4 mg Oral Daily    aspirin  81 mg Oral BID    buPROPion  300 mg Oral QAM    cetirizine  10 mg Oral Daily    escitalopram  20 mg Oral Daily    lisinopril  20 mg Oral Daily    metoprolol tartrate  25 mg Oral BID    pantoprazole  40 mg Oral QAM AC    pravastatin  40 mg Oral Nightly    insulin lispro  0-12 Units Subcutaneous TID WC    insulin lispro  0-6 Units Subcutaneous Nightly    sodium chloride flush  10 mL Intravenous 2 times per day     Continuous Infusions:   sodium chloride 12.5 mL/hr at 09/07/20 0857    dextrose       PRN Meds:sodium chloride flush, HYDROcodone-acetaminophen, ondansetron, glucose, dextrose, glucagon (rDNA), dextrose, sodium chloride flush, acetaminophen **OR** acetaminophen, polyethylene glycol, promethazine **OR** ondansetron  Prior to Admission medications    Medication Sig Start Date End Date Taking?  Authorizing Provider   Ertugliflozin L-PyroglutamicAc (STEGLATRO) 15 MG TABS Take 15 mg by mouth daily   Yes Historical Provider, MD   aspirin 81 MG EC tablet Take 81 mg by mouth 2 times daily   Yes Historical Provider, MD   buPROPion (WELLBUTRIN XL) 300 MG extended release tablet Take 300 mg by mouth every morning   Yes Historical Provider, MD   ARIPiprazole (ABILIFY) 2 MG tablet Take 4 mg by mouth daily   Yes Historical Provider, MD   insulin glargine [facility]) 100 UNIT/ML injection pen Inject 15 Units into the skin every morning   Yes Historical Provider, MD   metFORMIN (GLUCOPHAGE) 1000 MG tablet Take 1,000 mg by mouth 2 times daily (with meals)   Yes Historical Provider, MD   lisinopril (PRINIVIL;ZESTRIL) 10 MG tablet Take 20 mg by mouth daily   Yes Historical Provider, MD   pravastatin (PRAVACHOL) 40 MG tablet Take 40 mg by mouth nightly   Yes Historical Provider, MD   escitalopram (LEXAPRO) 20 MG tablet Take 20 mg by mouth daily   Yes Historical Provider, MD   metoprolol tartrate (LOPRESSOR) 25 MG tablet Take 25 mg by mouth 2 times daily   Yes Historical Provider, MD   Multiple Vitamins-Minerals (THERAPEUTIC MULTIVITAMIN-MINERALS) tablet Take 1 tablet by mouth daily   Yes Historical Provider, MD   cetirizine (ZYRTEC) 10 MG tablet Take 10 mg by mouth daily   Yes Historical Provider, MD   b complex vitamins capsule Take 1 capsule by mouth daily   Yes Historical Provider, MD   omeprazole (PRILOSEC) 20 MG delayed release capsule Take 20 mg by mouth daily   Yes Historical Provider, MD       OBJECTIVE:  /77   Pulse 104   Temp 97.8 °F (36.6 °C) (Axillary)   Resp 18   Ht 6' 5\" (1.956 m)   Wt (!) 428 lb (194.1 kg)   SpO2 96%   BMI 50.75 kg/m²   Temp  Av.9 °F (36.6 °C)  Min: 97.7 °F (36.5 °C)  Max: 98.2 °F (36.8 °C)  General appearance: Resting in bed in no apparent distress. Skin: Warm and dry. No rashes were noted. HEENT: Round and reactive pupils. Moist mucous membranes. No ulcerations or thrush. Neck: Supple to movements. Chest: No use of accessory muscles to breathe. Symmetrical expansion. No wheezing, crackles or rhonchi. Cardiovascular: Reguar rate and rhythm. No murmurs gallops, or rubs appreciated. Abdomen: Bowel sounds present, nontender, nondistended, no masses or hepatosplenomegaly. Extremities: Dressing left foot clean and dry  Lines: peripheral    I/O last 3 completed shifts:   In: 1440 [P.O.:1440]  Out: -       Laboratory and Tests Review:      Lab Results   Component Value Date    WBC 15.4 (H) 09/06/2020    WBC 16.5 (H) 09/05/2020    WBC 16.4 (H) 09/04/2020    HGB 16.7 (H) 09/05/2020    HCT 49.4 09/05/2020    MCV 99.8 09/05/2020     09/05/2020     Lab Results   Component Value Date    NEUTROABS 14.61 (H) 09/05/2020    NEUTROABS 12.26 (H) 09/03/2020     No results found for: CRPHS  Lab Results   Component Value Date    ALT 24 09/04/2020    AST 16 09/04/2020    ALKPHOS 77 09/04/2020    BILITOT 0.9 09/04/2020     Lab Results   Component Value Date     09/06/2020    K 4.2 09/06/2020    K 3.9 09/04/2020     09/06/2020    CO2 18 09/06/2020    BUN 21 09/06/2020    CREATININE 0.8 09/06/2020    CREATININE 0.6 09/04/2020    CREATININE 0.9 09/03/2020    GFRAA >60 09/06/2020    LABGLOM >60 09/06/2020    GLUCOSE 297 09/06/2020    PROT 7.0 09/04/2020    LABALBU 3.4 09/04/2020    CALCIUM 8.9 09/06/2020    BILITOT 0.9 09/04/2020    ALKPHOS 77 09/04/2020    AST 16 09/04/2020    ALT 24 09/04/2020     Lab Results   Component Value Date    CRP 2.6 (H) 09/04/2020     Lab Results   Component Value Date    SEDRATE 18 (H) 09/05/2020    SEDRATE 10 09/04/2020       Radiology:    Marco Good FOR SURGICAL PROCEDURES   Final Result   Fluoroscopic assistance was provided during surgical intervention. Radiation exposure as listed above. Please refer to the procedure report for specific procedure details. XR FOOT LEFT (MIN 3 VIEWS)   Final Result      Nondisplaced fracture of the proximal phalanx of the fifth toe is once   again seen. Small ulcer adjacent to the distal head of the fifth metatarsal is   once again noted.        MRI FOOT LEFT W CONTRAST    (Results Pending)       Microbiology:   Lab Results   Component Value Date    BC 24 Hours no growth 09/03/2020    ORG Enterococcus faecalis 09/04/2020    ORG Enterococcus faecalis 09/04/2020    ORG Strep agalactiae (Beta Strep Group B) 09/04/2020     Lab Results   Component Value Date BLOODCULT2 24 Hours no growth 09/03/2020    ORG Enterococcus faecalis 09/04/2020    ORG Enterococcus faecalis 09/04/2020    ORG Strep agalactiae (Beta Strep Group B) 09/04/2020     WOUND/ABSCESS   Date Value Ref Range Status   09/03/2020 Moderate growth  Final     No results found for: RESPSMEAR  No results found for: MPNEUMO, CLAMYDCU, LABLEGI, AFBCX, FUNGSM, LABFUNG  No results found for: CULTRESP  No results found for: CXCATHTIP  No results found for: BFCS  Culture Surgical   Date Value Ref Range Status   09/04/2020   Final    Moderate growth  Refer to previous culture (CXSUR: Tissue- Left 5th Metatarsal collected  on 9-4-20 at [id number]) for susceptibility results     09/04/2020 (A)  Preliminary    Growth present, evaluating for:  Mixed Gram positive organisms     09/04/2020   Preliminary    Heavy growth  Identification and sensitivity to follow     09/04/2020 Moderate growth  Sensitivity to follow    Preliminary   Susceptibility     Enterococcus faecalis (1)     Antibiotic  Interpretation  EDIN  Status     ampicillin  Sensitive  <=^2  mcg/mL      doxycycline  Resistant  >=^16  mcg/mL      gentamicin-syn  Sensitive  SYN-S  mcg/mL      vancomycin  Sensitive  <=^0.5  mcg/mL      Strep agalactiae (beta strep group b) (2)     Antibiotic  Interpretation  EDIN  Status     ampicillin  Sensitive  <=^0.25  mcg/mL      benzylpenicillin  Sensitive  ^0. 12  mcg/mL      cefotaxime  Sensitive  <=^0.12  mcg/mL      cefTRIAXone  Sensitive  <=^0.12  mcg/mL      clindamycin  Resistant  >=^1  mcg/mL      vancomycin  Sensitive  ^0.5  mcg/mL          Problem list:    Principal Problem:    Cellulitis of left foot  Active Problems:    Diabetic foot ulcer associated with diabetes mellitus due to underlying condition ([facility] [address]    Essential hypertension    Morbid obesity with BMI of 45.0-49.9, adult (HCC)    Cellulitis and abscess of toe of left foot  Resolved Problems:    * No resolved hospital problems.  *      ASSESSMENT:    · Diabetic ulcer infection left foot with cellulitis and osteomyelitis s/p I&D with partial resection 5th met head on 9/4-enterococcus and group B strep; enterococcus is sensitive to ampicillin  · Type 2 diabetes mellitus  · Essential hypertension    PLAN:    · Discontinue cefepime  · Ceftriaxone 2 g IV daily x6 weeks  · Ampicillin 2 g IV every 4 hours  · Insert PICC line  · Monitor labs  · Will follow with you         Veronika Payne    9:16 AM  9/7/2020    Pt seen and examined. I discussed and co-formulated the plan with Dr Isiah Tobias. Labs, cultures, and radiographs reviewed. Face to Face encounter occurred. Changes made as necessary by me.      Meghan Vazquez MD  Infectious Disease

## 2020-09-07 NOTE — PROCEDURES
picc    Catheter insertion date 9/7/2020     Product Number:  REF [facility] [id number]   Lot No: [id number]   Gauge: 18   Lumen: SINGLE, 4 fr   R BRACHIAL   Vein Diameter : 0.48CM   Upper arm circumference (10CM ABOVE AC): 39CM   Catheter Length : 50CM   Internal Length: 48CM   External Catheter Length: 2CM   Ultrasound Used:YES  VPS [employer] [place] confirms PICC tip is placed in the lower 1/3 of the SVC or at the Cavoatrial junction. Floor nurse notified PICC is okay to use.    : MARISSA Harding-BC

## 2020-09-07 NOTE — PROGRESS NOTES
[facility] Progress Note    Admitting Date and Time: 9/3/2020  4:49 PM  Admit Dx: Cellulitis of left foot [L03.116]  Cellulitis of left foot [L03.116]    Subjective:  Patient is being followed for Cellulitis of left foot [L03.116]  Cellulitis of left foot [L03.116]   Pt feels fine, has no complaints    ROS: denies fever, chills, cp, sob, n/v, HA unless stated above.       insulin glargine  15 Units Subcutaneous Nightly    cefepime  2 g Intravenous Q8H    sodium chloride flush  10 mL Intravenous 2 times per day    enoxaparin  40 mg Subcutaneous Daily    ARIPiprazole  4 mg Oral Daily    aspirin  81 mg Oral BID    buPROPion  300 mg Oral QAM    cetirizine  10 mg Oral Daily    escitalopram  20 mg Oral Daily    lisinopril  20 mg Oral Daily    metoprolol tartrate  25 mg Oral BID    pantoprazole  40 mg Oral QAM AC    pravastatin  40 mg Oral Nightly    insulin lispro  0-12 Units Subcutaneous TID WC    insulin lispro  0-6 Units Subcutaneous Nightly    sodium chloride flush  10 mL Intravenous 2 times per day     sodium chloride flush, 10 mL, PRN  HYDROcodone-acetaminophen, 1 tablet, Q6H PRN  ondansetron, 4 mg, Q6H PRN  glucose, 15 g, PRN  dextrose, 12.5 g, PRN  glucagon (rDNA), 1 mg, PRN  dextrose, 100 mL/hr, PRN  sodium chloride flush, 10 mL, PRN  acetaminophen, 650 mg, Q6H PRN    Or  acetaminophen, 650 mg, Q6H PRN  polyethylene glycol, 17 g, Daily PRN  promethazine, 12.5 mg, Q6H PRN    Or  ondansetron, 4 mg, Q6H PRN         Objective:    /77   Pulse 104   Temp 97.8 °F (36.6 °C) (Axillary)   Resp 18   Ht 6' 5\" (1.956 m)   Wt (!) 428 lb (194.1 kg)   SpO2 96%   BMI 50.75 kg/m²     General Appearance: alert and oriented to person, place and time and in no acute distress  Skin: warm and dry  Head: normocephalic and atraumatic  Eyes: pupils equal, round, and reactive to light, extraocular eye movements intact, conjunctivae normal  Neck: neck supple and non tender without mass Pulmonary/Chest: clear to auscultation bilaterally- no wheezes, rales or rhonchi, normal air movement, no respiratory distress  Cardiovascular: normal rate, normal S1 and S2 and no carotid bruits  Abdomen: soft, non-tender, non-distended, normal bowel sounds, no masses or organomegaly  Extremities: no cyanosis, no clubbing and left foot with redness,in dressing  Neurologic: no cranial nerve deficit and speech normal        Recent Labs     09/06/20  0340      K 4.2      CO2 18*   BUN 21*   CREATININE 0.8   GLUCOSE 297*   CALCIUM 8.9       Recent Labs     09/05/20  0311 09/06/20  1221   WBC 16.5* 15.4*   RBC 4.95  --    HGB 16.7*  --    HCT 49.4  --    MCV 99.8  --    MCH 33.7  --    MCHC 33.8  --    RDW 13.2  --      --    MPV 12.0  --          Assessment:    Principal Problem:    Cellulitis of left foot  Active Problems:    Diabetic foot ulcer associated with diabetes mellitus due to underlying condition (HCC)    Essential hypertension    Morbid obesity with BMI of 45.0-49.9, adult (HCC)    Cellulitis and abscess of toe of left foot  Resolved Problems:    * No resolved hospital problems. *      Plan:  1. Left foot cellulitis in the setting of diabetes, signs of gas producing bacteria, start the patient on Zosyn and vanco, consulted ID and podiatry s/p I&D on 9/3/2020, ID switched abx to cefepime and vanc  Wound cultures growing strep agalactiae and enterococcus faecalis stop vanc zosyn, started on ceftriaxone and ampcillin. 2.  DM II, with foot ulcer, continue on home insulin, lantus and placed on SSI  3. HTN, continue lisinopril and metoprolol  4. Dispo, LTAC pending insurance         NOTE: This report was transcribed using voice recognition software. Every effort was made to ensure accuracy; however, inadvertent computerized transcription errors may be present.   Electronically signed by Nunu Morrison MD on 9/7/2020 at 9:09 AM

## 2020-09-07 NOTE — PROGRESS NOTES
20  Israel Thompson : 1975 Sex: male  Age: 54 y.o. Patient was referred by No primary care provider on file. CC:    Postop day of surgery 2020 left foot incision and drainage with partial resection fifth metatarsal head    HPI:   Postop day of surgery 2020 left foot incision and drainage with partial resection fifth metatarsal head    Seen with resident today. Laying in bed. Has been walking more but states he has been trying to keep the pressure on his heel with a surgical shoe. Denies any overnight pain. Pleased with overall progression. Denies nausea vomiting fever chills shortness of breath. Tolerating antibiotics well.     ROS:  Const: Denies constitutional symptoms  Musculo: Denies symptoms other than stated above  Skin: Denies symptoms other than stated above       Current Facility-Administered Medications:     cefTRIAXone (ROCEPHIN) 2 g in sterile water 20 mL IV syringe, 2 g, Intravenous, Q24H, Vanessa Days, DO    lidocaine PF 1 % injection 5 mL, 5 mL, Intradermal, Once, Vanessa Days, DO    sodium chloride flush 0.9 % injection 10 mL, 10 mL, Intravenous, PRN, Vanessa Days, DO    heparin flush 100 UNIT/ML injection 300 Units, 3 mL, Intravenous, 2 times per day, Vanessa Days, DO    heparin flush 100 UNIT/ML injection 300 Units, 3 mL, Intracatheter, PRN, Vanessa Days, DO    ampicillin 2 g ivpb mini bag, 2 g, Intravenous, 6 times per day, Vanessa Days, DO    insulin glargine (LANTUS) injection vial 15 Units, 15 Units, Subcutaneous, Nightly, Lynette Thacker MD, 15 Units at 20    sodium chloride flush 0.9 % injection 10 mL, 10 mL, Intravenous, 2 times per day, Elsa De Oliveira DPM, 10 mL at 20 0750    sodium chloride flush 0.9 % injection 10 mL, 10 mL, Intravenous, PRN, Nam Ramos DPM, 10 mL at 20 1152    enoxaparin (LOVENOX) injection 40 mg, 40 mg, Subcutaneous, Daily, Elvis Ramos DPM, 40 mg at 20 0750    ARIPiprazole Plan:  Uncontrolled diabetes mellitus  Diabetes mellitus with foot ulcer  Osteomyelitis left fifth metatarsal head  Cellulitis left foot with abscess  Noncompliance        Postop day of surgery 9/4/2020 left foot incision and drainage with partial resection fifth metatarsal head      Packing was changed. Patient progressing well overall. Infectious disease on board likely IV PICC line. Appreciate their input. Cultures enterococcus and group B strep. Pending final bone culture. Packing was changed today. Bandage change with Adaptic, 4 x 4, ABD pad and Kerlix Ace bandage. Heel contact weightbearing only with surgical shoe. Patient remains high risk for further loss of limb if nonhealing. He is understanding of this. Importance of heel contact weightbearing only. Progressing well from foot and ankle standpoint. White blood cell count reviewed. 10.7. We will continue to follow. Okay to discharge to LTAC from foot and ankle standpoint once infectious disease and primary care team on board for placement. No follow-ups on file. Seen By:  Rose Cook DPM      Document was created using voice recognition software. Note was reviewed, however may contain grammatical errors.

## 2020-09-08 LAB
BLOOD CULTURE, ROUTINE: NORMAL
CULTURE, BLOOD 2: NORMAL
METER GLUCOSE: 244 MG/DL (ref 74–99)
METER GLUCOSE: 250 MG/DL (ref 74–99)
METER GLUCOSE: 284 MG/DL (ref 74–99)
METER GLUCOSE: 296 MG/DL (ref 74–99)

## 2020-09-08 PROCEDURE — 97165 OT EVAL LOW COMPLEX 30 MIN: CPT

## 2020-09-08 PROCEDURE — 1200000000 HC SEMI PRIVATE

## 2020-09-08 PROCEDURE — 6360000002 HC RX W HCPCS: Performed by: INTERNAL MEDICINE

## 2020-09-08 PROCEDURE — 6370000000 HC RX 637 (ALT 250 FOR IP): Performed by: INTERNAL MEDICINE

## 2020-09-08 PROCEDURE — 99232 SBSQ HOSP IP/OBS MODERATE 35: CPT | Performed by: INTERNAL MEDICINE

## 2020-09-08 PROCEDURE — 6360000002 HC RX W HCPCS: Performed by: PODIATRIST

## 2020-09-08 PROCEDURE — 2580000003 HC RX 258: Performed by: PODIATRIST

## 2020-09-08 PROCEDURE — 97161 PT EVAL LOW COMPLEX 20 MIN: CPT

## 2020-09-08 PROCEDURE — 2580000003 HC RX 258: Performed by: INTERNAL MEDICINE

## 2020-09-08 PROCEDURE — 82962 GLUCOSE BLOOD TEST: CPT

## 2020-09-08 RX ORDER — INSULIN GLARGINE 100 [IU]/ML
20 INJECTION, SOLUTION SUBCUTANEOUS NIGHTLY
Status: DISCONTINUED | OUTPATIENT
Start: 2020-09-08 | End: 2020-09-11 | Stop reason: HOSPADM

## 2020-09-08 RX ADMIN — AMPICILLIN SODIUM 2 G: 2 INJECTION, POWDER, FOR SOLUTION INTRAMUSCULAR; INTRAVENOUS at 16:24

## 2020-09-08 RX ADMIN — SODIUM CHLORIDE, PRESERVATIVE FREE 300 UNITS: 5 INJECTION INTRAVENOUS at 04:36

## 2020-09-08 RX ADMIN — SODIUM CHLORIDE, PRESERVATIVE FREE 300 UNITS: 5 INJECTION INTRAVENOUS at 07:54

## 2020-09-08 RX ADMIN — SODIUM CHLORIDE, PRESERVATIVE FREE 10 ML: 5 INJECTION INTRAVENOUS at 12:34

## 2020-09-08 RX ADMIN — HYDROCODONE BITARTRATE AND ACETAMINOPHEN 1 TABLET: 7.5; 325 TABLET ORAL at 21:14

## 2020-09-08 RX ADMIN — AMPICILLIN SODIUM 2 G: 2 INJECTION, POWDER, FOR SOLUTION INTRAMUSCULAR; INTRAVENOUS at 07:53

## 2020-09-08 RX ADMIN — ASPIRIN 81 MG: 81 TABLET, COATED ORAL at 21:05

## 2020-09-08 RX ADMIN — AMPICILLIN SODIUM 2 G: 2 INJECTION, POWDER, FOR SOLUTION INTRAMUSCULAR; INTRAVENOUS at 12:34

## 2020-09-08 RX ADMIN — AMPICILLIN SODIUM 2 G: 2 INJECTION, POWDER, FOR SOLUTION INTRAMUSCULAR; INTRAVENOUS at 04:02

## 2020-09-08 RX ADMIN — PRAVASTATIN SODIUM 40 MG: 20 TABLET ORAL at 21:05

## 2020-09-08 RX ADMIN — CETIRIZINE HYDROCHLORIDE 10 MG: 10 TABLET, FILM COATED ORAL at 07:54

## 2020-09-08 RX ADMIN — CEFTRIAXONE 2 G: 2 INJECTION, POWDER, FOR SOLUTION INTRAMUSCULAR; INTRAVENOUS at 12:34

## 2020-09-08 RX ADMIN — SODIUM CHLORIDE, PRESERVATIVE FREE 300 UNITS: 5 INJECTION INTRAVENOUS at 21:06

## 2020-09-08 RX ADMIN — SODIUM CHLORIDE, PRESERVATIVE FREE 10 ML: 5 INJECTION INTRAVENOUS at 07:55

## 2020-09-08 RX ADMIN — LISINOPRIL 20 MG: 20 TABLET ORAL at 07:54

## 2020-09-08 RX ADMIN — SODIUM CHLORIDE, PRESERVATIVE FREE 10 ML: 5 INJECTION INTRAVENOUS at 04:36

## 2020-09-08 RX ADMIN — INSULIN LISPRO 5 UNITS: 100 INJECTION, SOLUTION INTRAVENOUS; SUBCUTANEOUS at 21:20

## 2020-09-08 RX ADMIN — SODIUM CHLORIDE, PRESERVATIVE FREE 10 ML: 5 INJECTION INTRAVENOUS at 16:24

## 2020-09-08 RX ADMIN — AMPICILLIN SODIUM 2 G: 2 INJECTION, POWDER, FOR SOLUTION INTRAMUSCULAR; INTRAVENOUS at 00:08

## 2020-09-08 RX ADMIN — INSULIN GLARGINE 20 UNITS: 100 INJECTION, SOLUTION SUBCUTANEOUS at 21:20

## 2020-09-08 RX ADMIN — ARIPIPRAZOLE 4 MG: 2 TABLET ORAL at 07:55

## 2020-09-08 RX ADMIN — METOPROLOL TARTRATE 25 MG: 25 TABLET, FILM COATED ORAL at 21:24

## 2020-09-08 RX ADMIN — BUPROPION HYDROCHLORIDE 300 MG: 300 TABLET, FILM COATED, EXTENDED RELEASE ORAL at 07:54

## 2020-09-08 RX ADMIN — INSULIN LISPRO 9 UNITS: 100 INJECTION, SOLUTION INTRAVENOUS; SUBCUTANEOUS at 11:14

## 2020-09-08 RX ADMIN — INSULIN LISPRO 4 UNITS: 100 INJECTION, SOLUTION INTRAVENOUS; SUBCUTANEOUS at 07:05

## 2020-09-08 RX ADMIN — PANTOPRAZOLE SODIUM 40 MG: 40 TABLET, DELAYED RELEASE ORAL at 06:02

## 2020-09-08 RX ADMIN — INSULIN LISPRO 9 UNITS: 100 INJECTION, SOLUTION INTRAVENOUS; SUBCUTANEOUS at 16:16

## 2020-09-08 RX ADMIN — AMPICILLIN SODIUM 2 G: 2 INJECTION, POWDER, FOR SOLUTION INTRAMUSCULAR; INTRAVENOUS at 21:05

## 2020-09-08 RX ADMIN — ESCITALOPRAM OXALATE 20 MG: 10 TABLET ORAL at 07:54

## 2020-09-08 RX ADMIN — ENOXAPARIN SODIUM 40 MG: 40 INJECTION SUBCUTANEOUS at 07:54

## 2020-09-08 RX ADMIN — METOPROLOL TARTRATE 25 MG: 25 TABLET, FILM COATED ORAL at 07:54

## 2020-09-08 RX ADMIN — ASPIRIN 81 MG: 81 TABLET, COATED ORAL at 07:54

## 2020-09-08 ASSESSMENT — PAIN SCALES - GENERAL
PAINLEVEL_OUTOF10: 4
PAINLEVEL_OUTOF10: 4

## 2020-09-08 NOTE — PROGRESS NOTES
10 mL, 10 mL, Intravenous, PRN, DEEJAY FryeM, 10 mL at 09/08/20 0436    enoxaparin (LOVENOX) injection 40 mg, 40 mg, Subcutaneous, Daily, Elvis Ramos DPM, 40 mg at 09/08/20 0754    ARIPiprazole (ABILIFY) tablet 4 mg, 4 mg, Oral, Daily, Teresa Flores MD, 4 mg at 09/08/20 0755    aspirin EC tablet 81 mg, 81 mg, Oral, BID, Teresa Flores MD, 81 mg at 09/08/20 0754    buPROPion (WELLBUTRIN XL) extended release tablet 300 mg, 300 mg, Oral, QAM, Teresa Flores MD, 300 mg at 09/08/20 0754    cetirizine (ZYRTEC) tablet 10 mg, 10 mg, Oral, Daily, Teresa Flores MD, 10 mg at 09/08/20 0754    escitalopram (LEXAPRO) tablet 20 mg, 20 mg, Oral, Daily, Teresa Flores MD, 20 mg at 09/08/20 0754    lisinopril (PRINIVIL;ZESTRIL) tablet 20 mg, 20 mg, Oral, Daily, Teresa Flores MD, 20 mg at 09/08/20 0754    metoprolol tartrate (LOPRESSOR) tablet 25 mg, 25 mg, Oral, BID, Teresa Flores MD, 25 mg at 09/08/20 0754    pantoprazole (PROTONIX) tablet 40 mg, 40 mg, Oral, QAM AC, Teresa Flores MD, 40 mg at 09/08/20 0602    pravastatin (PRAVACHOL) tablet 40 mg, 40 mg, Oral, Nightly, Teresa Flores MD, 40 mg at 09/07/20 2105    HYDROcodone-acetaminophen (NORCO) 7.5-325 MG per tablet 1 tablet, 1 tablet, Oral, Q6H PRN, Teresa Flores MD, 1 tablet at 09/07/20 2022    ondansetron (ZOFRAN) injection 4 mg, 4 mg, Intravenous, Q6H PRN, Teresa Flores MD    glucose (GLUTOSE) 40 % oral gel 15 g, 15 g, Oral, PRN, Teresa Flores MD    dextrose 50 % IV solution, 12.5 g, Intravenous, PRN, Teresa Flores MD    glucagon (rDNA) injection 1 mg, 1 mg, Intramuscular, PRN, Teresa Flores MD    dextrose 5 % solution, 100 mL/hr, Intravenous, PRN, Teresa Flores MD    sodium chloride flush 0.9 % injection 10 mL, 10 mL, Intravenous, 2 times per day, Teresa Flores MD, 10 mL at 09/07/20 2017    sodium chloride flush 0.9 % injection 10 mL, 10 mL, Intravenous, PRN, Teresa Flores MD    acetaminophen (TYLENOL) tablet 650 mg, 650 mg, Oral, Q6H PRN, 650 mg at 09/06/20 1922 **OR** acetaminophen (TYLENOL) suppository 650 mg, 650 mg, Rectal, Q6H PRN, Concha Barbosa MD    polyethylene glycol [facility]) packet 17 g, 17 g, Oral, Daily PRN, Concha Barbosa MD    promethazine (PHENERGAN) tablet 12.5 mg, 12.5 mg, Oral, Q6H PRN **OR** ondansetron (ZOFRAN) injection 4 mg, 4 mg, Intravenous, Q6H PRN, Concha Barbosa MD  Allergies   Allergen Reactions    Milk-Related Compounds      Stomach issues-lactose intolerant       Past Medical History:   Diagnosis Date    Diabetes mellitus ([facility] [address]     Hyperlipidemia     Hypertension     Kidney stone            Vitals:    09/07/20 2000 09/08/20 0025 09/08/20 0502 09/08/20 0747   BP: 136/87 119/68 130/79 (!) 141/90   Pulse: 93 84 91 108   Resp: 18 18 18 18   Temp: 98.4 °F (36.9 °C) 98.1 °F (36.7 °C) 98.1 °F (36.7 °C) 97.8 °F (36.6 °C)   TempSrc: Oral Oral Oral Oral   SpO2: 95%   94%   Weight:   (!) 427 lb 2 oz (193.7 kg)    Height:           Focused Lower Extremity Physical Exam:    Neurovascular examination:    Dorsalis Pedis palpable bilateral.  Posterior tibialis palpable bilateral.    Capillary Refill Time:  Immediate return  Hair growth:  Symmetrical and bilateral   Skin:  Not atrophic  Edema: Edema dorsal left foot  Neurologic:  Light touch intact bilateral.      Musculoskeletal/ Orthopedic examination:    Equinis: Absent bilateral  Dorsiflexion, plantarflexion, inversion, eversion bilateral 5 out of 5 muscle strength  Wiggling toes  Negative Homans  No pain to palpation dorsal left foot  No pain overlying incisions left foot    Dermatology examination:      Superficial clean fissure present to lateral R 5th metatarsal head  Significant overall decrease in erythema and edema. No drainage. Sutures intact with packing dorsal left foot. Packing change. No drainage. Plantar wound present measuring approximately 1.5 cm x 1.5 cm x 1 cm. No exposed bone.                     Assessment and Plan:  Uncontrolled diabetes mellitus  Diabetes mellitus with foot ulcer  Osteomyelitis left fifth metatarsal head  Cellulitis left foot with abscess  Noncompliance        Postop day of surgery 9/4/2020 left foot incision and drainage with partial resection fifth metatarsal head      Patient progressing well overall. Infectious disease on board likely IV PICC line. Appreciate their input. Cultures enterococcus and group B strep. Pending final bone culture. Dressing was changed today. Bandage change with Adaptic, 4 x 4, ABD pad and Kerlix Ace bandage. Heel contact weightbearing only with surgical shoe. Betadine/DSD applied to R foot fissure. Tubi- applied to BLE   Patient remains high risk for further loss of limb if nonhealing. He is understanding of this. Importance of heel contact weightbearing only. Progressing well from foot and ankle standpoint. White blood cell count trending down -10.7 (yesterday). New CBC+Diff ordered prior to discharge planning  We will continue to follow. Okay to discharge to LTAC from foot and ankle standpoint once infectious disease and primary care team on board for placement. No follow-ups on file.       Seen By:  Michel Lawrence    Discussed with Dr. Aurelio Johnson

## 2020-09-08 NOTE — PROGRESS NOTES
[address] [place] [address] [facility]  Progress Note      Chief Complaint   Patient presents with    Other     left foot wound per poditrist needs admitted        SUBJECTIVE:      Patient is tolerating medications. No reported adverse drug reactions. No problems overnight. Review of systems:    As stated above in the chief complaint, otherwise negative. Medications:    Scheduled Meds:   insulin glargine  20 Units Subcutaneous Nightly    insulin lispro  0-18 Units Subcutaneous TID WC    insulin lispro  0-9 Units Subcutaneous Nightly    cefTRIAXone (ROCEPHIN) IV  2 g Intravenous Q24H    heparin flush  3 mL Intravenous 2 times per day    ampicillin IV  2 g Intravenous 6 times per day    sodium chloride flush  10 mL Intravenous 2 times per day    enoxaparin  40 mg Subcutaneous Daily    ARIPiprazole  4 mg Oral Daily    aspirin  81 mg Oral BID    buPROPion  300 mg Oral QAM    cetirizine  10 mg Oral Daily    escitalopram  20 mg Oral Daily    lisinopril  20 mg Oral Daily    metoprolol tartrate  25 mg Oral BID    pantoprazole  40 mg Oral QAM AC    pravastatin  40 mg Oral Nightly    sodium chloride flush  10 mL Intravenous 2 times per day     Continuous Infusions:   dextrose       PRN Meds:sodium chloride flush, heparin flush, sodium chloride flush, HYDROcodone-acetaminophen, ondansetron, glucose, dextrose, glucagon (rDNA), dextrose, sodium chloride flush, acetaminophen **OR** acetaminophen, polyethylene glycol, promethazine **OR** ondansetron  Prior to Admission medications    Medication Sig Start Date End Date Taking? Authorizing Provider   ampicillin (OMNIPEN) infusion Infuse 2,000 mg intravenously every 4 hours Compound per protocol.  9/8/20  Yes Carlo Romero DO   cefTRIAXone (ROCEPHIN) infusion Infuse 2,000 mg intravenously every 24 hours Compound per protocol 9/8/20  Yes Carlo Romero DO   Ertugliflozin L-PyroglutamicAc (STEGLATRO) 15 MG TABS Take 15 mg by mouth daily the distal head of the fifth metatarsal is   once again noted.        MRI FOOT LEFT W CONTRAST    (Results Pending)       Microbiology:   Lab Results   Component Value Date    BC 24 Hours no growth 09/03/2020    ORG Anaerobic gram negative geoff 09/04/2020    ORG Enterococcus faecalis 09/04/2020    ORG Anaerobic gram negative geoff 09/04/2020    ORG Enterococcus faecalis 09/04/2020    ORG Strep agalactiae (Beta Strep Group B) 09/04/2020    ORG Candida albicans 09/04/2020    ORG Gram negative geoff 09/04/2020     Lab Results   Component Value Date    BLOODCULT2 24 Hours no growth 09/03/2020    ORG Anaerobic gram negative geoff 09/04/2020    ORG Enterococcus faecalis 09/04/2020    ORG Anaerobic gram negative geoff 09/04/2020    ORG Enterococcus faecalis 09/04/2020    ORG Strep agalactiae (Beta Strep Group B) 09/04/2020    ORG Candida albicans 09/04/2020    ORG Gram negative geoff 09/04/2020     WOUND/ABSCESS   Date Value Ref Range Status   09/03/2020 Moderate growth  Final     No results found for: RESPSMEAR  No results found for: MPNEUMO, CLAMYDCU, LABLEGI, AFBCX, FUNGSM, LABFUNG  No results found for: CULTRESP  No results found for: CXCATHTIP  No results found for: BFCS  Culture Surgical   Date Value Ref Range Status   09/04/2020   Final    Moderate growth  Refer to previous culture (CXSUR: Tissue- Left 5th Metatarsal collected  on 9-4-20 at [address] [place] [address]) for susceptibility results     09/04/2020 (A)  Preliminary    Growth present, evaluating for:  Mixed Gram positive organisms     09/04/2020   Preliminary    Heavy growth  Identification and sensitivity to follow     09/04/2020 Moderate growth  Sensitivity to follow    Preliminary   09/04/2020 Light growth  Preliminary   09/04/2020   Preliminary    Rare growth  Identification and sensitivity to follow       No results found for: LABURIN  No results found for: [address] [place] [address]    Problem list:    Principal Problem:    Cellulitis of left foot  Active Problems:    Diabetic foot ulcer associated with diabetes mellitus due to underlying condition ([address]    Essential hypertension    Morbid obesity with BMI of 45.0-49.9, adult (HCC)    Cellulitis and abscess of toe of left foot  Resolved Problems:    * No resolved hospital problems.  *      ASSESSMENT:    · Diabetic ulcer infection left foot with cellulitis and osteomyelitis s/p I&D with partial resection 5th met head on 9/4-enterococcus and group B strep; enterococcus is sensitive to ampicillin  · Type 2 diabetes mellitus  · Essential hypertension       PLAN:    · Ceftriaxone 2 g IV daily x 6 weeks  · Ampicillin 2 g IV every 4 hours x 6 weeks  · Okay to discharge from ID perspective  · Await surgical pathology      Titi Bullard    3:11 PM  9/8/2020 Patient

## 2020-09-08 NOTE — PATIENT CARE CONFERENCE
[facility] Quality Flow/Interdisciplinary Rounds Progress Note        Quality Flow Rounds held on September 8, 2020    Disciplines Attending:  Bedside Nurse, ,  and Nursing Unit Leadership    Israel Thompson was admitted on 9/3/2020  4:49 PM    Anticipated Discharge Date:  Expected Discharge Date: 09/07/20    Disposition:    Jose Score:  Jose Scale Score: 20    Readmission Risk              Risk of Unplanned Readmission:        14           Discussed patient goal for the day, patient clinical progression, and barriers to discharge.   The following Goal(s) of the Day/Commitment(s) have been identified:  Discharge - [facility]  September 8, 2020

## 2020-09-08 NOTE — CARE COORDINATION
Met with patient at bedside. Reviewed current plan of care which includes dressing changes and 2 IV antibiotics (one daily, one q 4 hrs). Explained that [facility] provider search in [place] was unsuccessful per his insurance plan. He also lacks a PCP. He states returning to his home in [place] is not an option. He also does not believe he got get to an infusion center daily should his atb regimen be modified to daily. This leaves LTAC or SNF as options. Danelle from [facility] has been following and indicates that no ICU/Intermediate bed days are necessary. She will have facility submit auth request today. Covid test is pending from 9/4/20. Await response from Danelle/[facility]. 9/8/20 - With concerns that [facility] would not issue auth for LTAC LOC, a referral was made to [facility] with Sania Nelson who will review and prepare case for submission should it be necessary. Continue to await outcome from Anil Cruz on [facility] - RESIDENT DRUG TREATMENT (WOMEN) request as above. Kasia Salgado.  Fredi, MSN, RN  [facility] Case Management  [phone]

## 2020-09-08 NOTE — PROGRESS NOTES
[facility] Progress Note    Admitting Date and Time: 9/3/2020  4:49 PM  Admit Dx: Cellulitis of left foot [L03.116]  Cellulitis of left foot [L03.116]    Subjective:  Patient is being followed for Cellulitis of left foot [L03.116]  Cellulitis of left foot [L03.116]   Pt feels fine, has no complaints    ROS: denies fever, chills, cp, sob, n/v, HA unless stated above.       cefTRIAXone (ROCEPHIN) IV  2 g Intravenous Q24H    heparin flush  3 mL Intravenous 2 times per day    ampicillin IV  2 g Intravenous 6 times per day    insulin glargine  15 Units Subcutaneous Nightly    sodium chloride flush  10 mL Intravenous 2 times per day    enoxaparin  40 mg Subcutaneous Daily    ARIPiprazole  4 mg Oral Daily    aspirin  81 mg Oral BID    buPROPion  300 mg Oral QAM    cetirizine  10 mg Oral Daily    escitalopram  20 mg Oral Daily    lisinopril  20 mg Oral Daily    metoprolol tartrate  25 mg Oral BID    pantoprazole  40 mg Oral QAM AC    pravastatin  40 mg Oral Nightly    insulin lispro  0-12 Units Subcutaneous TID WC    insulin lispro  0-6 Units Subcutaneous Nightly    sodium chloride flush  10 mL Intravenous 2 times per day     sodium chloride flush, 10 mL, PRN  heparin flush, 3 mL, PRN  sodium chloride flush, 10 mL, PRN  HYDROcodone-acetaminophen, 1 tablet, Q6H PRN  ondansetron, 4 mg, Q6H PRN  glucose, 15 g, PRN  dextrose, 12.5 g, PRN  glucagon (rDNA), 1 mg, PRN  dextrose, 100 mL/hr, PRN  sodium chloride flush, 10 mL, PRN  acetaminophen, 650 mg, Q6H PRN    Or  acetaminophen, 650 mg, Q6H PRN  polyethylene glycol, 17 g, Daily PRN  promethazine, 12.5 mg, Q6H PRN    Or  ondansetron, 4 mg, Q6H PRN         Objective:    BP (!) 141/90   Pulse 108   Temp 97.8 °F (36.6 °C) (Oral)   Resp 18   Ht 6' 5\" (1.956 m)   Wt (!) 427 lb 2 oz (193.7 kg)   SpO2 94%   BMI 50.65 kg/m²     General Appearance: alert and oriented to person, place and time and in no acute distress  Skin: warm and dry  Head: normocephalic and atraumatic  Eyes: pupils equal, round, and reactive to light, extraocular eye movements intact, conjunctivae normal  Neck: neck supple and non tender without mass   Pulmonary/Chest: clear to auscultation bilaterally- no wheezes, rales or rhonchi, normal air movement, no respiratory distress  Cardiovascular: normal rate, normal S1 and S2 and no carotid bruits  Abdomen: soft, non-tender, non-distended, normal bowel sounds, no masses or organomegaly  Extremities: no cyanosis, no clubbing and left foot with redness,in dressing  Neurologic: no cranial nerve deficit and speech normal        Recent Labs     09/06/20  0340      K 4.2      CO2 18*   BUN 21*   CREATININE 0.8   GLUCOSE 297*   CALCIUM 8.9       Recent Labs     09/06/20  1221 09/07/20  0916   WBC 15.4* 10.7         Assessment:    Principal Problem:    Cellulitis of left foot  Active Problems:    Diabetic foot ulcer associated with diabetes mellitus due to underlying condition ([facility] [address]    Essential hypertension    Morbid obesity with BMI of 45.0-49.9, adult (HCC)    Cellulitis and abscess of toe of left foot  Resolved Problems:    * No resolved hospital problems. *      Plan:  1. Left foot cellulitis in the setting of diabetes, signs of gas producing bacteria, start the patient on Zosyn and vanco, consulted ID and podiatry s/p I&D on 9/3/2020, ID switched abx to cefepime and vanc  Wound cultures growing strep agalactiae and enterococcus faecalis stop vanc zosyn, started on ceftriaxone and ampcillin. 2.  DM II, with foot ulcer, continue on home insulin, increase lantus to 20 units and SSI to high dose. 3. HTN, continue lisinopril and metoprolol  4. Dispo, insurance denied LTAC placement for the patient, will attempt skilled nursing facility.   Patient has a social situation, he has been staying away from his wife who has cancer due to [place] and staying with his parents NP, he would not be able to stay with his parents after this discharge as his insurance would not cover the home care. For this matter he needs to go to a facility, will attempt SNF        NOTE: This report was transcribed using voice recognition software. Every effort was made to ensure accuracy; however, inadvertent computerized transcription errors may be present.   Electronically signed by Joanne Barillas MD on 9/8/2020 at 9:35 AM

## 2020-09-09 LAB
BASOPHILS ABSOLUTE: 0.13 E9/L (ref 0–0.2)
BASOPHILS RELATIVE PERCENT: 1.4 % (ref 0–2)
EOSINOPHILS ABSOLUTE: 0.2 E9/L (ref 0.05–0.5)
EOSINOPHILS RELATIVE PERCENT: 2.2 % (ref 0–6)
HCT VFR BLD CALC: 45.8 % (ref 37–54)
HEMOGLOBIN: 15.3 G/DL (ref 12.5–16.5)
IMMATURE GRANULOCYTES #: 0.14 E9/L
IMMATURE GRANULOCYTES %: 1.6 % (ref 0–5)
LYMPHOCYTES ABSOLUTE: 2.1 E9/L (ref 1.5–4)
LYMPHOCYTES RELATIVE PERCENT: 23.3 % (ref 20–42)
MCH RBC QN AUTO: 33 PG (ref 26–35)
MCHC RBC AUTO-ENTMCNC: 33.4 % (ref 32–34.5)
MCV RBC AUTO: 98.9 FL (ref 80–99.9)
METER GLUCOSE: 228 MG/DL (ref 74–99)
METER GLUCOSE: 263 MG/DL (ref 74–99)
METER GLUCOSE: 266 MG/DL (ref 74–99)
METER GLUCOSE: 288 MG/DL (ref 74–99)
MONOCYTES ABSOLUTE: 0.75 E9/L (ref 0.1–0.95)
MONOCYTES RELATIVE PERCENT: 8.3 % (ref 2–12)
NEUTROPHILS ABSOLUTE: 5.7 E9/L (ref 1.8–7.3)
NEUTROPHILS RELATIVE PERCENT: 63.2 % (ref 43–80)
PDW BLD-RTO: 13.1 FL (ref 11.5–15)
PLATELET # BLD: 231 E9/L (ref 130–450)
PMV BLD AUTO: 11.3 FL (ref 7–12)
RBC # BLD: 4.63 E12/L (ref 3.8–5.8)
SARS-COV-2: NOT DETECTED
SOURCE: NORMAL
WBC # BLD: 9 E9/L (ref 4.5–11.5)

## 2020-09-09 PROCEDURE — 6360000002 HC RX W HCPCS: Performed by: INTERNAL MEDICINE

## 2020-09-09 PROCEDURE — 99232 SBSQ HOSP IP/OBS MODERATE 35: CPT | Performed by: INTERNAL MEDICINE

## 2020-09-09 PROCEDURE — 2580000003 HC RX 258: Performed by: INTERNAL MEDICINE

## 2020-09-09 PROCEDURE — 85025 COMPLETE CBC W/AUTO DIFF WBC: CPT

## 2020-09-09 PROCEDURE — 6360000002 HC RX W HCPCS: Performed by: PODIATRIST

## 2020-09-09 PROCEDURE — 99024 POSTOP FOLLOW-UP VISIT: CPT | Performed by: PODIATRIST

## 2020-09-09 PROCEDURE — 1200000000 HC SEMI PRIVATE

## 2020-09-09 PROCEDURE — 2580000003 HC RX 258: Performed by: PODIATRIST

## 2020-09-09 PROCEDURE — 6370000000 HC RX 637 (ALT 250 FOR IP): Performed by: INTERNAL MEDICINE

## 2020-09-09 PROCEDURE — 36415 COLL VENOUS BLD VENIPUNCTURE: CPT

## 2020-09-09 PROCEDURE — 82962 GLUCOSE BLOOD TEST: CPT

## 2020-09-09 RX ORDER — FLUCONAZOLE 200 MG/1
400 TABLET ORAL DAILY
Qty: 360 TABLET | Refills: 0 | DISCHARGE
Start: 2020-09-09 | End: 2020-09-09

## 2020-09-09 RX ORDER — FLUCONAZOLE 200 MG/1
400 TABLET ORAL DAILY
Qty: 84 TABLET | Refills: 0 | DISCHARGE
Start: 2020-09-09 | End: 2020-10-21

## 2020-09-09 RX ADMIN — HYDROCODONE BITARTRATE AND ACETAMINOPHEN 1 TABLET: 7.5; 325 TABLET ORAL at 20:13

## 2020-09-09 RX ADMIN — BUPROPION HYDROCHLORIDE 300 MG: 300 TABLET, FILM COATED, EXTENDED RELEASE ORAL at 08:03

## 2020-09-09 RX ADMIN — PRAVASTATIN SODIUM 40 MG: 20 TABLET ORAL at 20:13

## 2020-09-09 RX ADMIN — AMPICILLIN SODIUM 2 G: 2 INJECTION, POWDER, FOR SOLUTION INTRAMUSCULAR; INTRAVENOUS at 22:27

## 2020-09-09 RX ADMIN — AMPICILLIN SODIUM 2 G: 2 INJECTION, POWDER, FOR SOLUTION INTRAMUSCULAR; INTRAVENOUS at 02:19

## 2020-09-09 RX ADMIN — AMPICILLIN SODIUM 2 G: 2 INJECTION, POWDER, FOR SOLUTION INTRAMUSCULAR; INTRAVENOUS at 14:06

## 2020-09-09 RX ADMIN — METOPROLOL TARTRATE 25 MG: 25 TABLET, FILM COATED ORAL at 08:03

## 2020-09-09 RX ADMIN — AMPICILLIN SODIUM 2 G: 2 INJECTION, POWDER, FOR SOLUTION INTRAMUSCULAR; INTRAVENOUS at 10:04

## 2020-09-09 RX ADMIN — INSULIN LISPRO 5 UNITS: 100 INJECTION, SOLUTION INTRAVENOUS; SUBCUTANEOUS at 20:17

## 2020-09-09 RX ADMIN — LISINOPRIL 20 MG: 20 TABLET ORAL at 08:03

## 2020-09-09 RX ADMIN — METOPROLOL TARTRATE 25 MG: 25 TABLET, FILM COATED ORAL at 20:13

## 2020-09-09 RX ADMIN — FLUCONAZOLE 400 MG: 150 TABLET ORAL at 10:54

## 2020-09-09 RX ADMIN — SODIUM CHLORIDE, PRESERVATIVE FREE 10 ML: 5 INJECTION INTRAVENOUS at 20:13

## 2020-09-09 RX ADMIN — CEFTRIAXONE 2 G: 2 INJECTION, POWDER, FOR SOLUTION INTRAMUSCULAR; INTRAVENOUS at 12:55

## 2020-09-09 RX ADMIN — ASPIRIN 81 MG: 81 TABLET, COATED ORAL at 20:13

## 2020-09-09 RX ADMIN — AMPICILLIN SODIUM 2 G: 2 INJECTION, POWDER, FOR SOLUTION INTRAMUSCULAR; INTRAVENOUS at 06:19

## 2020-09-09 RX ADMIN — ESCITALOPRAM OXALATE 20 MG: 10 TABLET ORAL at 08:03

## 2020-09-09 RX ADMIN — PANTOPRAZOLE SODIUM 40 MG: 40 TABLET, DELAYED RELEASE ORAL at 06:19

## 2020-09-09 RX ADMIN — INSULIN LISPRO 9 UNITS: 100 INJECTION, SOLUTION INTRAVENOUS; SUBCUTANEOUS at 10:54

## 2020-09-09 RX ADMIN — SODIUM CHLORIDE, PRESERVATIVE FREE 10 ML: 5 INJECTION INTRAVENOUS at 22:27

## 2020-09-09 RX ADMIN — SODIUM CHLORIDE, PRESERVATIVE FREE 10 ML: 5 INJECTION INTRAVENOUS at 08:08

## 2020-09-09 RX ADMIN — ASPIRIN 81 MG: 81 TABLET, COATED ORAL at 08:03

## 2020-09-09 RX ADMIN — INSULIN LISPRO 6 UNITS: 100 INJECTION, SOLUTION INTRAVENOUS; SUBCUTANEOUS at 06:19

## 2020-09-09 RX ADMIN — ARIPIPRAZOLE 4 MG: 2 TABLET ORAL at 08:03

## 2020-09-09 RX ADMIN — SODIUM CHLORIDE, PRESERVATIVE FREE 300 UNITS: 5 INJECTION INTRAVENOUS at 10:08

## 2020-09-09 RX ADMIN — SODIUM CHLORIDE, PRESERVATIVE FREE 300 UNITS: 5 INJECTION INTRAVENOUS at 20:14

## 2020-09-09 RX ADMIN — INSULIN LISPRO 9 UNITS: 100 INJECTION, SOLUTION INTRAVENOUS; SUBCUTANEOUS at 16:00

## 2020-09-09 RX ADMIN — INSULIN GLARGINE 20 UNITS: 100 INJECTION, SOLUTION SUBCUTANEOUS at 20:16

## 2020-09-09 RX ADMIN — SODIUM CHLORIDE, PRESERVATIVE FREE 10 ML: 5 INJECTION INTRAVENOUS at 14:06

## 2020-09-09 RX ADMIN — ENOXAPARIN SODIUM 40 MG: 40 INJECTION SUBCUTANEOUS at 08:04

## 2020-09-09 RX ADMIN — CETIRIZINE HYDROCHLORIDE 10 MG: 10 TABLET, FILM COATED ORAL at 08:03

## 2020-09-09 RX ADMIN — SODIUM CHLORIDE, PRESERVATIVE FREE 10 ML: 5 INJECTION INTRAVENOUS at 12:56

## 2020-09-09 RX ADMIN — AMPICILLIN SODIUM 2 G: 2 INJECTION, POWDER, FOR SOLUTION INTRAMUSCULAR; INTRAVENOUS at 18:01

## 2020-09-09 ASSESSMENT — PAIN SCALES - GENERAL
PAINLEVEL_OUTOF10: 7
PAINLEVEL_OUTOF10: 0

## 2020-09-09 ASSESSMENT — PAIN DESCRIPTION - LOCATION: LOCATION: FOOT

## 2020-09-09 ASSESSMENT — PAIN DESCRIPTION - ORIENTATION: ORIENTATION: LEFT

## 2020-09-09 ASSESSMENT — PAIN - FUNCTIONAL ASSESSMENT: PAIN_FUNCTIONAL_ASSESSMENT: PREVENTS OR INTERFERES SOME ACTIVE ACTIVITIES AND ADLS

## 2020-09-09 ASSESSMENT — PAIN DESCRIPTION - PAIN TYPE: TYPE: SURGICAL PAIN

## 2020-09-09 ASSESSMENT — PAIN DESCRIPTION - DESCRIPTORS: DESCRIPTORS: ACHING;DISCOMFORT

## 2020-09-09 NOTE — PROGRESS NOTES
[facility] Progress Note    Admitting Date and Time: 9/3/2020  4:49 PM  Admit Dx: Cellulitis of left foot [L03.116]  Cellulitis of left foot [L03.116]    Subjective:  Patient is being followed for Cellulitis of left foot [L03.116]  Cellulitis of left foot [L03.116]   Pt feels fine, has no complaints    ROS: denies fever, chills, cp, sob, n/v, HA unless stated above.       insulin glargine  20 Units Subcutaneous Nightly    insulin lispro  0-18 Units Subcutaneous TID WC    insulin lispro  0-9 Units Subcutaneous Nightly    cefTRIAXone (ROCEPHIN) IV  2 g Intravenous Q24H    heparin flush  3 mL Intravenous 2 times per day    ampicillin IV  2 g Intravenous 6 times per day    sodium chloride flush  10 mL Intravenous 2 times per day    enoxaparin  40 mg Subcutaneous Daily    ARIPiprazole  4 mg Oral Daily    aspirin  81 mg Oral BID    buPROPion  300 mg Oral QAM    cetirizine  10 mg Oral Daily    escitalopram  20 mg Oral Daily    lisinopril  20 mg Oral Daily    metoprolol tartrate  25 mg Oral BID    pantoprazole  40 mg Oral QAM AC    pravastatin  40 mg Oral Nightly    sodium chloride flush  10 mL Intravenous 2 times per day     sodium chloride flush, 10 mL, PRN  heparin flush, 3 mL, PRN  sodium chloride flush, 10 mL, PRN  HYDROcodone-acetaminophen, 1 tablet, Q6H PRN  ondansetron, 4 mg, Q6H PRN  glucose, 15 g, PRN  dextrose, 12.5 g, PRN  glucagon (rDNA), 1 mg, PRN  dextrose, 100 mL/hr, PRN  sodium chloride flush, 10 mL, PRN  acetaminophen, 650 mg, Q6H PRN    Or  acetaminophen, 650 mg, Q6H PRN  polyethylene glycol, 17 g, Daily PRN  promethazine, 12.5 mg, Q6H PRN    Or  ondansetron, 4 mg, Q6H PRN         Objective:    BP (!) 140/97   Pulse 105   Temp 98.1 °F (36.7 °C) (Oral)   Resp 18   Ht 6' 5\" (1.956 m)   Wt (!) 427 lb (193.7 kg)   SpO2 94%   BMI 50.63 kg/m²     General Appearance: alert and oriented to person, place and time and in no acute distress  Skin: warm and dry  Head: normocephalic and atraumatic  Eyes: pupils equal, round, and reactive to light, extraocular eye movements intact, conjunctivae normal  Neck: neck supple and non tender without mass   Pulmonary/Chest: clear to auscultation bilaterally- no wheezes, rales or rhonchi, normal air movement, no respiratory distress  Cardiovascular: normal rate, normal S1 and S2 and no carotid bruits  Abdomen: soft, non-tender, non-distended, normal bowel sounds, no masses or organomegaly  Extremities: no cyanosis, no clubbing and left foot with redness,in dressing  Neurologic: no cranial nerve deficit and speech normal        No results for input(s): NA, K, CL, CO2, BUN, CREATININE, GLUCOSE, CALCIUM in the last 72 hours. Recent Labs     09/06/20  1221 09/07/20  0916 09/09/20  0610   WBC 15.4* 10.7 9.0   RBC  --   --  4.63   HGB  --   --  15.3   HCT  --   --  45.8   MCV  --   --  98.9   MCH  --   --  33.0   MCHC  --   --  33.4   RDW  --   --  13.1   PLT  --   --  231   MPV  --   --  11.3         Assessment:    Principal Problem:    Cellulitis of left foot  Active Problems:    Diabetic foot ulcer associated with diabetes mellitus due to underlying condition ([facility] [address]    Essential hypertension    Morbid obesity with BMI of 45.0-49.9, adult (HCC)    Cellulitis and abscess of toe of left foot  Resolved Problems:    * No resolved hospital problems. *      Plan:  1. Left foot cellulitis in the setting of diabetes, signs of gas producing bacteria, start the patient on Zosyn and vanco, consulted ID and podiatry s/p I&D on 9/3/2020, ID switched abx to cefepime and vanc  Wound cultures growing strep agalactiae and enterococcus faecalis stop vanc zosyn, started on ceftriaxone and ampcillin. 2.  DM II, with foot ulcer, continue on home insulin, increase lantus to 20 units and SSI to high dose. 3. HTN, continue lisinopril and metoprolol  4.   Dispo,   Patient has a social situation, he has been staying away from his wife who has cancer due to [place] and staying with his parents NP, he would not be able to stay with his parents after this discharge as his insurance would not cover the home care. For this matter he needs to go to a facility, pending pre-CERT for LTAC, if that was denied then will attempt SNF        NOTE: This report was transcribed using voice recognition software. Every effort was made to ensure accuracy; however, inadvertent computerized transcription errors may be present.   Electronically signed by Raudel Delgado MD on 9/9/2020 at 9:49 AM

## 2020-09-09 NOTE — PROGRESS NOTES
[address] [place] [address] [facility]  Progress Note      Chief Complaint   Patient presents with    Other     left foot wound per poditrist needs admitted        SUBJECTIVE:      Patient is tolerating medications. No reported adverse drug reactions. No problems overnight. Review of systems:    As stated above in the chief complaint, otherwise negative. Medications:    Scheduled Meds:   insulin glargine  20 Units Subcutaneous Nightly    insulin lispro  0-18 Units Subcutaneous TID WC    insulin lispro  0-9 Units Subcutaneous Nightly    cefTRIAXone (ROCEPHIN) IV  2 g Intravenous Q24H    heparin flush  3 mL Intravenous 2 times per day    ampicillin IV  2 g Intravenous 6 times per day    sodium chloride flush  10 mL Intravenous 2 times per day    enoxaparin  40 mg Subcutaneous Daily    ARIPiprazole  4 mg Oral Daily    aspirin  81 mg Oral BID    buPROPion  300 mg Oral QAM    cetirizine  10 mg Oral Daily    escitalopram  20 mg Oral Daily    lisinopril  20 mg Oral Daily    metoprolol tartrate  25 mg Oral BID    pantoprazole  40 mg Oral QAM AC    pravastatin  40 mg Oral Nightly    sodium chloride flush  10 mL Intravenous 2 times per day     Continuous Infusions:   dextrose       PRN Meds:sodium chloride flush, heparin flush, sodium chloride flush, HYDROcodone-acetaminophen, ondansetron, glucose, dextrose, glucagon (rDNA), dextrose, sodium chloride flush, acetaminophen **OR** acetaminophen, polyethylene glycol, promethazine **OR** ondansetron  Prior to Admission medications    Medication Sig Start Date End Date Taking? Authorizing Provider   ampicillin (OMNIPEN) infusion Infuse 2,000 mg intravenously every 4 hours Compound per protocol.  9/8/20  Yes Marianela Kidd DO   cefTRIAXone (ROCEPHIN) infusion Infuse 2,000 mg intravenously every 24 hours Compound per protocol 9/8/20  Yes Marianela Kidd, DO   Ertugliflozin L-PyroglutamicAc (STEGLATRO) 15 MG TABS Take 15 mg by mouth daily Yes Historical Provider, MD   aspirin 81 MG EC tablet Take 81 mg by mouth 2 times daily   Yes Historical Provider, MD   buPROPion (WELLBUTRIN XL) 300 MG extended release tablet Take 300 mg by mouth every morning   Yes Historical Provider, MD   ARIPiprazole (ABILIFY) 2 MG tablet Take 4 mg by mouth daily   Yes Historical Provider, MD   insulin glargine (BASAGLAR KWIKPEN) 100 UNIT/ML injection pen Inject 15 Units into the skin every morning   Yes Historical Provider, MD   metFORMIN (GLUCOPHAGE) 1000 MG tablet Take 1,000 mg by mouth 2 times daily (with meals)   Yes Historical Provider, MD   lisinopril (PRINIVIL;ZESTRIL) 10 MG tablet Take 20 mg by mouth daily   Yes Historical Provider, MD   pravastatin (PRAVACHOL) 40 MG tablet Take 40 mg by mouth nightly   Yes Historical Provider, MD   escitalopram (LEXAPRO) 20 MG tablet Take 20 mg by mouth daily   Yes Historical Provider, MD   metoprolol tartrate (LOPRESSOR) 25 MG tablet Take 25 mg by mouth 2 times daily   Yes Historical Provider, MD   Multiple Vitamins-Minerals (THERAPEUTIC MULTIVITAMIN-MINERALS) tablet Take 1 tablet by mouth daily   Yes Historical Provider, MD   cetirizine (ZYRTEC) 10 MG tablet Take 10 mg by mouth daily   Yes Historical Provider, MD   b complex vitamins capsule Take 1 capsule by mouth daily   Yes Historical Provider, MD   omeprazole (PRILOSEC) 20 MG delayed release capsule Take 20 mg by mouth daily   Yes Historical Provider, MD       OBJECTIVE:  BP (!) 140/97   Pulse 105   Temp 98.1 °F (36.7 °C) (Oral)   Resp 18   Ht 6' 5\" (1.956 m)   Wt (!) 427 lb (193.7 kg)   SpO2 94%   BMI 50.63 kg/m²   Temp  Av.2 °F (36.8 °C)  Min: 97.7 °F (36.5 °C)  Max: 98.6 °F (37 °C)  General appearance: Resting in bed in no apparent distress. Skin: Warm and dry. No rashes were noted. HEENT: Round and reactive pupils. Moist mucous membranes. No ulcerations or thrush. Neck: Supple to movements. Chest: No use of accessory muscles to breathe.  Symmetrical expansion. No wheezing, crackles or rhonchi. Cardiovascular: Reguar rate and rhythm. No murmurs gallops, or rubs appreciated. Abdomen: Bowel sounds present, nontender, nondistended, no masses or hepatosplenomegaly. Extremities: Dressing left foot clean and dry. Lines: PICC line right arm    I/O last 3 completed shifts: In: 200 [IV Piggyback:200]  Out: -       Laboratory and Tests Review:      Lab Results   Component Value Date    WBC 9.0 09/09/2020    WBC 10.7 09/07/2020    WBC 15.4 (H) 09/06/2020    HGB 15.3 09/09/2020    HCT 45.8 09/09/2020    MCV 98.9 09/09/2020     09/09/2020     Lab Results   Component Value Date    NEUTROABS 5.70 09/09/2020    NEUTROABS 14.61 (H) 09/05/2020    NEUTROABS 12.26 (H) 09/03/2020     No results found for: CRPHS  Lab Results   Component Value Date    ALT 24 09/04/2020    AST 16 09/04/2020    ALKPHOS 77 09/04/2020    BILITOT 0.9 09/04/2020     Lab Results   Component Value Date     09/06/2020    K 4.2 09/06/2020    K 3.9 09/04/2020     09/06/2020    CO2 18 09/06/2020    BUN 21 09/06/2020    CREATININE 0.8 09/06/2020    CREATININE 0.6 09/04/2020    CREATININE 0.9 09/03/2020    GFRAA >60 09/06/2020    LABGLOM >60 09/06/2020    GLUCOSE 297 09/06/2020    PROT 7.0 09/04/2020    LABALBU 3.4 09/04/2020    CALCIUM 8.9 09/06/2020    BILITOT 0.9 09/04/2020    ALKPHOS 77 09/04/2020    AST 16 09/04/2020    ALT 24 09/04/2020     Lab Results   Component Value Date    CRP 2.6 (H) 09/04/2020     Lab Results   Component Value Date    SEDRATE 18 (H) 09/05/2020    SEDRATE 10 09/04/2020       Radiology:    [facility] FOR SURGICAL PROCEDURES   Final Result   Fluoroscopic assistance was provided during surgical intervention. Radiation exposure as listed above. Please refer to the procedure report for specific procedure details. XR FOOT LEFT (MIN 3 VIEWS)   Final Result      Nondisplaced fracture of the proximal phalanx of the fifth toe is once   again seen.        Small ulcer adjacent to the distal head of the fifth metatarsal is   once again noted.        MRI FOOT LEFT W CONTRAST    (Results Pending)       Microbiology:   Lab Results   Component Value Date    BC 5 Days no growth 09/03/2020    ORG Anaerobic gram negative geoff 09/04/2020    ORG Enterococcus faecalis 09/04/2020    ORG Anaerobic gram negative geoff 09/04/2020    ORG Gram negative geoff 09/04/2020    ORG Enterococcus faecalis 09/04/2020    ORG Strep agalactiae (Beta Strep Group B) 09/04/2020    ORG Candida albicans 09/04/2020    ORG Citrobacter freundii 09/04/2020     Lab Results   Component Value Date    BLOODCULT2 5 Days no growth 09/03/2020    ORG Anaerobic gram negative geoff 09/04/2020    ORG Enterococcus faecalis 09/04/2020    ORG Anaerobic gram negative geoff 09/04/2020    ORG Gram negative geoff 09/04/2020    ORG Enterococcus faecalis 09/04/2020    ORG Strep agalactiae (Beta Strep Group B) 09/04/2020    ORG Candida albicans 09/04/2020    ORG Citrobacter freundii 09/04/2020     WOUND/ABSCESS   Date Value Ref Range Status   09/03/2020 Moderate growth  Final     No results found for: RESPSMEAR  No results found for: MPNEUMO, CLAMYDCU, LABLEGI, AFBCX, FUNGSM, LABFUNG  No results found for: CULTRESP  No results found for: CXCATHTIP  No results found for: BFCS  Culture Surgical   Date Value Ref Range Status   09/04/2020   Final    Moderate growth  Refer to previous culture (CXSUR: Tissue- Left 5th Metatarsal collected  on 9-4-20 at [id number]) for susceptibility results     09/04/2020 (A)  Preliminary    Growth present, evaluating for:  Mixed Gram positive organisms  Mixed russ also isolated, including:  Coagulase negative Staph species  Alpha hemolytic Strep species     09/04/2020   Preliminary    Rare growth  Identification and sensitivity to follow     09/04/2020   Preliminary    Heavy growth  Identification and sensitivity to follow     09/04/2020 Moderate growth  Sensitivity to follow    Preliminary   09/04/2020 Light growth Preliminary   09/04/2020   Preliminary    Rare growth  Identification and sensitivity to follow       No results found for: LABURIN  No results found for: [facility]    Problem list:    Principal Problem:    Cellulitis of left foot  Active Problems:    Diabetic foot ulcer associated with diabetes mellitus due to underlying condition ([address]    Essential hypertension    Morbid obesity with BMI of 45.0-49.9, adult ([address]    Cellulitis and abscess of toe of left foot  Resolved Problems:    * No resolved hospital problems.  *      ASSESSMENT:    · Diabetic ulcer infection left foot with cellulitis and osteomyelitis s/p I&D with partial resection 5th met head on 9/4-enterococcus and group B strep; enterococcus is sensitive to ampicillin  · Fungal osteomyelitis-Candida albicans  · Type 2 diabetes mellitus  · Essential hypertension       PLAN:    · Ceftriaxone 2 g IV daily x 6 weeks  · Ampicillin 2 g IV every 4 hours x 6 weeks  · Okay to discharge from ID perspective  · Await surgical pathology  · Fluconazole 400 mg daily x 6 weeks      FIONA Montes DO    9:47 AM  9/9/2020

## 2020-09-09 NOTE — PATIENT CARE CONFERENCE
[facility] Quality Flow/Interdisciplinary Rounds Progress Note        Quality Flow Rounds held on September 9, 2020    Disciplines Attending:  Bedside Nurse, ,  and Nursing Unit Leadership    Pawel Romero was admitted on 9/3/2020  4:49 PM    Anticipated Discharge Date:  Expected Discharge Date: 09/07/20    Disposition:    Jose Score:  Jose Scale Score: 20    Readmission Risk              Risk of Unplanned Readmission:        13           Discussed patient goal for the day, patient clinical progression, and barriers to discharge.   The following Goal(s) of the Day/Commitment(s) have been identified:  Discharge - Obtain Order      Armida Mccann  September 9, 2020

## 2020-09-09 NOTE — PROGRESS NOTES
Subjective:  Patient is seen s/p left foot incision and drainage with partial resection fifth metatarsal head (DOS 9/4). Patient overall relates that he is doing well. He is anticipating his discharge. Says he has been compliant with remaining minimally weightbearing to the left extremity and directing all of his weight to the heel. Patient denies n/v/f/c/sob/cp. Vitals:    BP (!) 140/97   Pulse 105   Temp 98.1 °F (36.7 °C) (Oral)   Resp 18   Ht 6' 5\" (1.956 m)   Wt (!) 427 lb (193.7 kg)   SpO2 94%   BMI 50.63 kg/m²     Focused Lower Extremity Physical Exam:  Vitals:    09/09/20 0752   BP: (!) 140/97   Pulse: 105   Resp: 18   Temp: 98.1 °F (36.7 °C)   SpO2: 94%      Dressing to left foot removed- moderate strikethrough appreciated to multiple layers. Incision was flushed with copious amounts of normal sterile saline-no purulence appreciated  Incision was coapted with sutures. There is residual ulcer remaining sub-lateral forefoot  Overall erythema and edema has reduced considerably over the last several days postoperatively  No pain with compression of the calf or popliteal fossa bilateral lower extremity  +1 pitting edema bilaterally  Patient is able to wiggle digits on both feet  CFT's are intact    Wound Care Documentation:  Wound 09/04/20 Toe (Comment  which one) Anterior; Left Left second toe. open/reddened (Active)   Dressing Status Clean;Dry; Intact 09/09/20 0814   Dressing Changed Other (Comment) 09/09/20 0814   Dressing/Treatment Ace wrap 09/07/20 2000   Wound Length (cm) 1 cm 09/04/20 0049   Wound Width (cm) 1.1 cm 09/04/20 0049   Wound Depth (cm) 0 cm 09/04/20 0049   Wound Surface Area (cm^2) 1.1 cm^2 09/04/20 0049   Wound Volume (cm^3) 0 cm^3 09/04/20 0049   Wound Assessment Other (Comment) 09/09/20 0814   Drainage Amount None 09/07/20 2000   Odor None 09/07/20 0750   Sandhya-wound Assessment Other (Comment) 09/09/20 0814   Number of days: 5       Wound 09/04/20 Foot Anterior; Left Top of foot, open (Active)   Dressing Status Clean;Dry; Intact 09/09/20 0814   Dressing Changed Other (Comment) 09/09/20 0814   Dressing/Treatment Ace wrap 09/09/20 0814   Wound Length (cm) 0.6 cm 09/04/20 0049   Wound Width (cm) 2.4 cm 09/04/20 0049   Wound Depth (cm) 0 cm 09/04/20 0049   Wound Surface Area (cm^2) 1.44 cm^2 09/04/20 0049   Wound Volume (cm^3) 0 cm^3 09/04/20 0049   Wound Assessment Other (Comment) 09/09/20 0814   Drainage Amount None 09/07/20 2000   Sandhya-wound Assessment Other (Comment) 09/09/20 0814   Number of days: 5       Wound 09/04/20 Foot Anterior;Left;Lateral;Outer Left outer, open (Active)   Dressing Status Clean;Dry; Intact 09/09/20 0814   Dressing Changed Changed/New 09/04/20 0800   Dressing/Treatment Ace wrap 09/07/20 2000   Wound Length (cm) 0.5 cm 09/04/20 0049   Wound Width (cm) 2.1 cm 09/04/20 0049   Wound Depth (cm) 0 cm 09/04/20 0049   Wound Surface Area (cm^2) 1.05 cm^2 09/04/20 0049   Wound Volume (cm^3) 0 cm^3 09/04/20 0049   Wound Assessment Other (Comment) 09/09/20 0814   Drainage Amount None 09/07/20 2000   Sandhya-wound Assessment Other (Comment) 09/09/20 0814   Number of days: 5       Wound 09/04/20 Foot Anterior;Left;Plantar; Outer Open, draining (Active)   Dressing Status Clean;Dry; Intact 09/09/20 0814   Dressing Changed Changed/New 09/04/20 0800   Dressing/Treatment Ace wrap 09/09/20 0814   Wound Length (cm) 1.5 cm 09/04/20 0049   Wound Width (cm) 1.3 cm 09/04/20 0049   Wound Depth (cm) 0.4 cm 09/04/20 0049   Wound Surface Area (cm^2) 1.95 cm^2 09/04/20 0049   Wound Volume (cm^3) 0.78 cm^3 09/04/20 0049   Wound Assessment Other (Comment) 09/09/20 0814   Drainage Amount None 09/07/20 2000   Drainage Description Serosanguinous 09/04/20 0800   Sandhya-wound Assessment Other (Comment) 09/09/20 0814   Number of days: 5       Wound 09/08/20 Foot Right; Outer cracked, callous (Active)   Dressing Status Clean;Dry; Intact 09/09/20 0814   Dressing Changed Changed/New 09/08/20 0900 Dressing/Treatment Dry dressing 09/09/20 0814   Wound Cleansed Other (Comment); Rinsed/Irrigated with saline 09/08/20 0900   Dressing Change Due 09/09/20 09/09/20 0814   Wound Assessment Other (Comment) 09/09/20 0814   Drainage Amount Scant 09/08/20 0900   Drainage Description Sanguinous 09/08/20 0900   Sandhya-wound Assessment Other (Comment) 09/09/20 0814   Number of days: 1       Laboratory:    CBC:   Lab Results   Component Value Date    WBC 9.0 09/09/2020    RBC 4.63 09/09/2020    HGB 15.3 09/09/2020    HCT 45.8 09/09/2020    MCV 98.9 09/09/2020    MCH 33.0 09/09/2020    MCHC 33.4 09/09/2020    RDW 13.1 09/09/2020     09/09/2020    MPV 11.3 09/09/2020     CBC with Differential:    Lab Results   Component Value Date    WBC 9.0 09/09/2020    RBC 4.63 09/09/2020    HGB 15.3 09/09/2020    HCT 45.8 09/09/2020     09/09/2020    MCV 98.9 09/09/2020    MCH 33.0 09/09/2020    MCHC 33.4 09/09/2020    RDW 13.1 09/09/2020    LYMPHOPCT 23.3 09/09/2020    MONOPCT 8.3 09/09/2020    BASOPCT 1.4 09/09/2020    MONOSABS 0.75 09/09/2020    LYMPHSABS 2.10 09/09/2020    EOSABS 0.20 09/09/2020    BASOSABS 0.13 09/09/2020     WBC:    Lab Results   Component Value Date    WBC 9.0 09/09/2020     Hemoglobin/Hematocrit:    Lab Results   Component Value Date    HGB 15.3 09/09/2020    HCT 45.8 09/09/2020     CMP:    Lab Results   Component Value Date     09/06/2020    K 4.2 09/06/2020    K 3.9 09/04/2020     09/06/2020    CO2 18 09/06/2020    BUN 21 09/06/2020    CREATININE 0.8 09/06/2020    GFRAA >60 09/06/2020    LABGLOM >60 09/06/2020    GLUCOSE 297 09/06/2020    PROT 7.0 09/04/2020    LABALBU 3.4 09/04/2020    CALCIUM 8.9 09/06/2020    BILITOT 0.9 09/04/2020    ALKPHOS 77 09/04/2020    AST 16 09/04/2020    ALT 24 09/04/2020     BMP:    Lab Results   Component Value Date     09/06/2020    K 4.2 09/06/2020    K 3.9 09/04/2020     09/06/2020    CO2 18 09/06/2020    BUN 21 09/06/2020    LABALBU 3.4 and lateral to the distal fifth metatarsal shaft. No underlying bony changes or periosteal reaction. There is a deformity involving the head of the fifth proximal phalanx with oblique fracture through the medial head with intra-articular extension. Minimal displacement. However this looks subacute with partial healing. 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 Mild bone spurs on the proximal shaft fourth and fifth metatarsals. Minimal midfoot spurring. Average plantar arch. Mild dorsal swelling over the distal foot. Mild edema of the lower leg extending into the foot. No ankle joint effusion. 1. Fracture through the head of the fifth proximal phalanx with intra-articular extension and minimal displacement. This looks partly healed. 2. Soft tissue swelling in the lateral foot with a 10 mm gas pocket inferior and lateral to the distal fifth metatarsal shaft. Ulceration without evidence for osteomyelitis. Fluoro For Surgical Procedures    Result Date: 2020   Patient MRN: [id number] : 1975 Age:  54 years Gender: Male Order Date: 2020 6:24 PM Exam: FLUORO FOR SURGICAL PROCEDURES Number of Images: 4 views Indication:  Fluoroscopic assistance during left with surgical intervention Comparison: Left foot images September 3, 2020 at 1745 hours TECHNIQUE: An image indicating radiation dosing, and 3 fluoroscopic images were provided for review. FINDINGS: The radiation total exposure time was 5.2 seconds, and the cumulative radiation dose was 0.1607 mGy in [facility]. Please refer to the procedure report for specific details.  and fluoroscopic images show soft tissue disruption/debridement along. The lateral aspect of the distal fifth metatarsal, and one image shows an instrument identifying the mid fifth metatarsal location. A subsequent image shows amputation of the distal fifth metatarsal.     Fluoroscopic assistance was provided during surgical intervention. Radiation exposure as listed above.

## 2020-09-10 LAB
CULTURE SURGICAL: ABNORMAL
METER GLUCOSE: 234 MG/DL (ref 74–99)
METER GLUCOSE: 255 MG/DL (ref 74–99)
METER GLUCOSE: 284 MG/DL (ref 74–99)
METER GLUCOSE: 337 MG/DL (ref 74–99)
ORGANISM: ABNORMAL

## 2020-09-10 PROCEDURE — 82962 GLUCOSE BLOOD TEST: CPT

## 2020-09-10 PROCEDURE — 6370000000 HC RX 637 (ALT 250 FOR IP): Performed by: INTERNAL MEDICINE

## 2020-09-10 PROCEDURE — 2580000003 HC RX 258: Performed by: PODIATRIST

## 2020-09-10 PROCEDURE — 6360000002 HC RX W HCPCS: Performed by: INTERNAL MEDICINE

## 2020-09-10 PROCEDURE — 6360000002 HC RX W HCPCS: Performed by: PODIATRIST

## 2020-09-10 PROCEDURE — 1200000000 HC SEMI PRIVATE

## 2020-09-10 PROCEDURE — 99233 SBSQ HOSP IP/OBS HIGH 50: CPT | Performed by: INTERNAL MEDICINE

## 2020-09-10 PROCEDURE — 2580000003 HC RX 258: Performed by: INTERNAL MEDICINE

## 2020-09-10 RX ORDER — SODIUM CHLORIDE 9 MG/ML
INJECTION, SOLUTION INTRAVENOUS EVERY 8 HOURS
Status: DISCONTINUED | OUTPATIENT
Start: 2020-09-10 | End: 2020-09-11 | Stop reason: HOSPADM

## 2020-09-10 RX ADMIN — SODIUM CHLORIDE, PRESERVATIVE FREE 10 ML: 5 INJECTION INTRAVENOUS at 09:09

## 2020-09-10 RX ADMIN — INSULIN LISPRO 9 UNITS: 100 INJECTION, SOLUTION INTRAVENOUS; SUBCUTANEOUS at 07:22

## 2020-09-10 RX ADMIN — INSULIN LISPRO 3 UNITS: 100 INJECTION, SOLUTION INTRAVENOUS; SUBCUTANEOUS at 20:39

## 2020-09-10 RX ADMIN — METOPROLOL TARTRATE 25 MG: 25 TABLET, FILM COATED ORAL at 09:09

## 2020-09-10 RX ADMIN — FLUCONAZOLE 400 MG: 150 TABLET ORAL at 09:09

## 2020-09-10 RX ADMIN — ASPIRIN 81 MG: 81 TABLET, COATED ORAL at 20:39

## 2020-09-10 RX ADMIN — METOPROLOL TARTRATE 25 MG: 25 TABLET, FILM COATED ORAL at 20:39

## 2020-09-10 RX ADMIN — INSULIN LISPRO 9 UNITS: 100 INJECTION, SOLUTION INTRAVENOUS; SUBCUTANEOUS at 16:29

## 2020-09-10 RX ADMIN — CETIRIZINE HYDROCHLORIDE 10 MG: 10 TABLET, FILM COATED ORAL at 09:09

## 2020-09-10 RX ADMIN — PIPERACILLIN AND TAZOBACTAM 3.38 G: 3; .375 INJECTION, POWDER, LYOPHILIZED, FOR SOLUTION INTRAVENOUS at 10:45

## 2020-09-10 RX ADMIN — INSULIN GLARGINE 20 UNITS: 100 INJECTION, SOLUTION SUBCUTANEOUS at 20:40

## 2020-09-10 RX ADMIN — AMPICILLIN SODIUM 2 G: 2 INJECTION, POWDER, FOR SOLUTION INTRAMUSCULAR; INTRAVENOUS at 06:07

## 2020-09-10 RX ADMIN — PRAVASTATIN SODIUM 40 MG: 20 TABLET ORAL at 20:39

## 2020-09-10 RX ADMIN — SODIUM CHLORIDE: 9 INJECTION, SOLUTION INTRAVENOUS at 22:57

## 2020-09-10 RX ADMIN — BUPROPION HYDROCHLORIDE 300 MG: 300 TABLET, FILM COATED, EXTENDED RELEASE ORAL at 09:09

## 2020-09-10 RX ADMIN — PIPERACILLIN AND TAZOBACTAM 3.38 G: 3; .375 INJECTION, POWDER, LYOPHILIZED, FOR SOLUTION INTRAVENOUS at 19:04

## 2020-09-10 RX ADMIN — INSULIN LISPRO 12 UNITS: 100 INJECTION, SOLUTION INTRAVENOUS; SUBCUTANEOUS at 12:11

## 2020-09-10 RX ADMIN — ESCITALOPRAM OXALATE 20 MG: 10 TABLET ORAL at 09:09

## 2020-09-10 RX ADMIN — SODIUM CHLORIDE, PRESERVATIVE FREE 300 UNITS: 5 INJECTION INTRAVENOUS at 09:10

## 2020-09-10 RX ADMIN — ASPIRIN 81 MG: 81 TABLET, COATED ORAL at 09:09

## 2020-09-10 RX ADMIN — SODIUM CHLORIDE, PRESERVATIVE FREE 10 ML: 5 INJECTION INTRAVENOUS at 06:08

## 2020-09-10 RX ADMIN — SODIUM CHLORIDE, PRESERVATIVE FREE 10 ML: 5 INJECTION INTRAVENOUS at 02:01

## 2020-09-10 RX ADMIN — AMPICILLIN SODIUM 2 G: 2 INJECTION, POWDER, FOR SOLUTION INTRAMUSCULAR; INTRAVENOUS at 02:00

## 2020-09-10 RX ADMIN — LISINOPRIL 20 MG: 20 TABLET ORAL at 09:09

## 2020-09-10 RX ADMIN — ARIPIPRAZOLE 4 MG: 2 TABLET ORAL at 09:09

## 2020-09-10 RX ADMIN — HYDROCODONE BITARTRATE AND ACETAMINOPHEN 1 TABLET: 7.5; 325 TABLET ORAL at 20:39

## 2020-09-10 RX ADMIN — PANTOPRAZOLE SODIUM 40 MG: 40 TABLET, DELAYED RELEASE ORAL at 06:07

## 2020-09-10 RX ADMIN — ENOXAPARIN SODIUM 40 MG: 40 INJECTION SUBCUTANEOUS at 09:09

## 2020-09-10 RX ADMIN — SODIUM CHLORIDE: 9 INJECTION, SOLUTION INTRAVENOUS at 13:54

## 2020-09-10 ASSESSMENT — PAIN - FUNCTIONAL ASSESSMENT: PAIN_FUNCTIONAL_ASSESSMENT: PREVENTS OR INTERFERES SOME ACTIVE ACTIVITIES AND ADLS

## 2020-09-10 ASSESSMENT — PAIN DESCRIPTION - DESCRIPTORS: DESCRIPTORS: ACHING;DISCOMFORT;SORE

## 2020-09-10 ASSESSMENT — PAIN DESCRIPTION - LOCATION: LOCATION: FOOT

## 2020-09-10 ASSESSMENT — PAIN DESCRIPTION - ORIENTATION: ORIENTATION: LEFT

## 2020-09-10 ASSESSMENT — PAIN DESCRIPTION - PAIN TYPE: TYPE: SURGICAL PAIN

## 2020-09-10 ASSESSMENT — PAIN DESCRIPTION - PROGRESSION: CLINICAL_PROGRESSION: GRADUALLY WORSENING

## 2020-09-10 ASSESSMENT — PAIN SCALES - GENERAL: PAINLEVEL_OUTOF10: 7

## 2020-09-10 ASSESSMENT — PAIN DESCRIPTION - ONSET: ONSET: GRADUAL

## 2020-09-10 ASSESSMENT — PAIN DESCRIPTION - FREQUENCY: FREQUENCY: CONTINUOUS

## 2020-09-10 NOTE — PROGRESS NOTES
[address] [place] [address] [facility]  LAISHAIDA  Progress Note      Chief Complaint   Patient presents with    Other     left foot wound per poditrist needs admitted        SUBJECTIVE:      Patient is tolerating medications. No reported adverse drug reactions. No problems overnight. Review of systems:    As stated above in the chief complaint, otherwise negative. Medications:    Scheduled Meds:   piperacillin-tazobactam  3.375 g Intravenous Q8H    fluconazole  400 mg Oral Daily    insulin glargine  20 Units Subcutaneous Nightly    insulin lispro  0-18 Units Subcutaneous TID WC    insulin lispro  0-9 Units Subcutaneous Nightly    heparin flush  3 mL Intravenous 2 times per day    sodium chloride flush  10 mL Intravenous 2 times per day    enoxaparin  40 mg Subcutaneous Daily    ARIPiprazole  4 mg Oral Daily    aspirin  81 mg Oral BID    buPROPion  300 mg Oral QAM    cetirizine  10 mg Oral Daily    escitalopram  20 mg Oral Daily    lisinopril  20 mg Oral Daily    metoprolol tartrate  25 mg Oral BID    pantoprazole  40 mg Oral QAM AC    pravastatin  40 mg Oral Nightly    sodium chloride flush  10 mL Intravenous 2 times per day     Continuous Infusions:   dextrose       PRN Meds:sodium chloride flush, heparin flush, sodium chloride flush, HYDROcodone-acetaminophen, ondansetron, glucose, dextrose, glucagon (rDNA), dextrose, sodium chloride flush, acetaminophen **OR** acetaminophen, polyethylene glycol, promethazine **OR** ondansetron  Prior to Admission medications    Medication Sig Start Date End Date Taking? Authorizing Provider   fluconazole (DIFLUCAN) 200 MG tablet Take 2 tablets by mouth daily 9/9/20 10/21/20 Yes Gricelda Avila,    ampicillin (OMNIPEN) infusion Infuse 2,000 mg intravenously every 4 hours Compound per protocol.  9/8/20  Yes Gricelda Avila, DO   cefTRIAXone (ROCEPHIN) infusion Infuse 2,000 mg intravenously every 24 hours Compound per protocol 9/8/20  Yes Natalia ERIC Donato,    Ertugliflozin L-PyroglutamicAc (STEGLATRO) 15 MG TABS Take 15 mg by mouth daily   Yes Historical Provider, MD   aspirin 81 MG EC tablet Take 81 mg by mouth 2 times daily   Yes Historical Provider, MD   buPROPion (WELLBUTRIN XL) 300 MG extended release tablet Take 300 mg by mouth every morning   Yes Historical Provider, MD   ARIPiprazole (ABILIFY) 2 MG tablet Take 4 mg by mouth daily   Yes Historical Provider, MD   insulin glargine (BASAGLAR KWIKPEN) 100 UNIT/ML injection pen Inject 15 Units into the skin every morning   Yes Historical Provider, MD   metFORMIN (GLUCOPHAGE) 1000 MG tablet Take 1,000 mg by mouth 2 times daily (with meals)   Yes Historical Provider, MD   lisinopril (PRINIVIL;ZESTRIL) 10 MG tablet Take 20 mg by mouth daily   Yes Historical Provider, MD   pravastatin (PRAVACHOL) 40 MG tablet Take 40 mg by mouth nightly   Yes Historical Provider, MD   escitalopram (LEXAPRO) 20 MG tablet Take 20 mg by mouth daily   Yes Historical Provider, MD   metoprolol tartrate (LOPRESSOR) 25 MG tablet Take 25 mg by mouth 2 times daily   Yes Historical Provider, MD   Multiple Vitamins-Minerals (THERAPEUTIC MULTIVITAMIN-MINERALS) tablet Take 1 tablet by mouth daily   Yes Historical Provider, MD   cetirizine (ZYRTEC) 10 MG tablet Take 10 mg by mouth daily   Yes Historical Provider, MD   b complex vitamins capsule Take 1 capsule by mouth daily   Yes Historical Provider, MD   omeprazole (PRILOSEC) 20 MG delayed release capsule Take 20 mg by mouth daily   Yes Historical Provider, MD       OBJECTIVE:  BP (!) 147/69   Pulse 101   Temp 98.7 °F (37.1 °C) (Oral)   Resp 18   Ht 6' 5\" (1.956 m)   Wt (!) 424 lb 9.6 oz (192.6 kg)   SpO2 96%   BMI 50.35 kg/m²   Temp  Av.5 °F (36.9 °C)  Min: 98 °F (36.7 °C)  Max: 98.9 °F (37.2 °C)  General appearance: Resting in bed in no apparent distress. Skin: Warm and dry. No rashes were noted. HEENT: Round and reactive pupils. Moist mucous membranes.   No ulcerations or thrush. Neck: Supple to movements. Chest: No use of accessory muscles to breathe. Symmetrical expansion. No wheezing, crackles or rhonchi. Cardiovascular: Reguar rate and rhythm. No murmurs gallops, or rubs appreciated. Abdomen: Bowel sounds present, nontender, nondistended, no masses or hepatosplenomegaly. Extremities: Dressing left foot clean and dry  Lines: peripheral    I/O last 3 completed shifts: In: 480 [P.O.:480]  Out: -       Laboratory and Tests Review:      Lab Results   Component Value Date    WBC 9.0 09/09/2020    WBC 10.7 09/07/2020    WBC 15.4 (H) 09/06/2020    HGB 15.3 09/09/2020    HCT 45.8 09/09/2020    MCV 98.9 09/09/2020     09/09/2020     Lab Results   Component Value Date    NEUTROABS 5.70 09/09/2020    NEUTROABS 14.61 (H) 09/05/2020    NEUTROABS 12.26 (H) 09/03/2020     [facility]  [facility] Results   Component Value Date    ALT 24 09/04/2020    AST 16 09/04/2020    ALKPHOS 77 09/04/2020    BILITOT 0.9 09/04/2020     Lab Results   Component Value Date     09/06/2020    K 4.2 09/06/2020    K 3.9 09/04/2020     09/06/2020    CO2 18 09/06/2020    BUN 21 09/06/2020    CREATININE 0.8 09/06/2020    CREATININE 0.6 09/04/2020    CREATININE 0.9 09/03/2020    GFRAA >60 09/06/2020    LABGLOM >60 09/06/2020    GLUCOSE 297 09/06/2020    PROT 7.0 09/04/2020    LABALBU 3.4 09/04/2020    CALCIUM 8.9 09/06/2020    BILITOT 0.9 09/04/2020    ALKPHOS 77 09/04/2020    AST 16 09/04/2020    ALT 24 09/04/2020     Lab Results   Component Value Date    CRP 2.6 (H) 09/04/2020     Lab Results   Component Value Date    SEDRATE 18 (H) 09/05/2020    SEDRATE 10 09/04/2020       Radiology:    [facility] FOR SURGICAL PROCEDURES   Final Result   Fluoroscopic assistance was provided during surgical intervention. Radiation exposure as listed above. Please refer to the procedure report for specific procedure details.       XR FOOT LEFT (MIN 3 VIEWS)   Final Result      Nondisplaced fracture of the proximal phalanx of the fifth toe is once   again seen. Small ulcer adjacent to the distal head of the fifth metatarsal is   once again noted.        MRI FOOT LEFT W CONTRAST    (Results Pending)       Microbiology:   Lab Results   Component Value Date    BC 5 Days no growth 09/03/2020    ORG Anaerobic gram negative geoff 09/04/2020    ORG Enterococcus faecalis 09/04/2020    ORG Anaerobic gram negative geoff 09/04/2020    ORG Raoultella planticola 09/04/2020    ORG Enterococcus faecalis 09/04/2020    ORG Strep agalactiae (Beta Strep Group B) 09/04/2020    ORG Candida albicans 09/04/2020    ORG Citrobacter freundii 09/04/2020     Lab Results   Component Value Date    BLOODCULT2 5 Days no growth 09/03/2020    ORG Anaerobic gram negative geoff 09/04/2020    ORG Enterococcus faecalis 09/04/2020    ORG Anaerobic gram negative geoff 09/04/2020    ORG Raoultella planticola 09/04/2020    ORG Enterococcus faecalis 09/04/2020    ORG Strep agalactiae (Beta Strep Group B) 09/04/2020    ORG Candida albicans 09/04/2020    ORG Citrobacter freundii 09/04/2020     WOUND/ABSCESS   Date Value Ref Range Status   09/03/2020 Moderate growth  Final     No results found for: RESPSMEAR      Component Value Date/Time    FUNGSM No Fungal elements seen 09/04/2020 1839     No results found for: CULTRESP  No results found for: CXCATHTIP  No results found for: BFCS  Culture Surgical   Date Value Ref Range Status   09/04/2020   Final    Moderate growth  Refer to previous culture (CXSUR: Tissue- Left 5th Metatarsal collected  on 9-4-20 at 1824) for susceptibility results     09/04/2020 (A)  Final    Mixed russ also isolated, including:  Coagulase negative Staph species  Alpha hemolytic Strep species     09/04/2020 Rare growth  Final   09/04/2020 Heavy growth  Final   09/04/2020 Moderate growth  Final   09/04/2020 Light growth  Final   09/04/2020 Rare growth  Final     9/10/2020  6:36 AM - Lorenzo, Mhy Incoming Results From [facility] Specimen Information:  Tissue          Component  Collected  Lab    Culture Surgical Abnormal    09/04/2020  6:24 PM  [facility]  [address] [facility]    Mixed russ also isolated, including:   Coagulase negative Staph species   Alpha hemolytic Strep species    Organism Abnormal    09/04/2020  6:24 PM  [address] [facility]    Enterococcus faecalis    Culture Surgical  09/04/2020  6:24 PM  [address] - [id number]  [address] [place] [facility]    Heavy growth    Organism Abnormal    09/04/2020  6:24 PM   [facility]    Strep agalactiae (Beta Strep Group B)    Culture Surgical  09/04/2020  6:24 PM  [address] [facility]    Moderate growth    Organism Abnormal    09/04/2020  6:24 PM  [address] - [id number]  [address] [place] Lab    Candida albicans    Culture Surgical  09/04/2020  6:24 PM  [address] [facility]    Light growth    Organism Abnormal    09/04/2020  6:24 PM  [address]  [address] [facility] Lab    Citrobacter freundii    Culture Surgical  09/04/2020  6:24 PM  [address] [facility]    Rare growth    Organism Abnormal    09/04/2020  6:24 PM  [address] - [place]    Culture Surgical  09/04/2020  6:24 PM  [address] [facility]    Rare growth    Testing Performed By     Lab - Abbreviation  Name  Director  Address  Valid Date Range    09-[place] - [address] [facility]  [address] [facility]  Rosalinda Momin MD  [address] [place] [address].    Seema Wilkes [id number]  12/03/19 1502-Present    Narrative   Performed by: [address] [place] [address] Lab   Source: TISSU       Site: LEFT 5TH METATARSAL               Susceptibility     Enterococcus faecalis (1)     Antibiotic  Interpretation  EDIN  Status     ampicillin  Sensitive  <=^2  mcg/mL      doxycycline  Resistant  >=^16  mcg/mL      gentamicin-syn  Sensitive  SYN-S  mcg/mL      vancomycin  Sensitive  <=^0.5  mcg/mL      Strep agalactiae (beta strep group b) (2) Antibiotic  Interpretation  EDIN  Status     ampicillin  Sensitive  <=^0.25  mcg/mL      benzylpenicillin  Sensitive  ^0. 12  mcg/mL      cefotaxime  Sensitive  <=^0.12  mcg/mL      cefTRIAXone  Sensitive  <=^0.12  mcg/mL      clindamycin  Resistant  >=^1  mcg/mL      vancomycin  Sensitive  ^0.5  mcg/mL      Citrobacter freundii (5)     Antibiotic  Interpretation  EDIN  Status     ceFAZolin  Resistant  ^16  mcg/mL      cefepime  Sensitive  <=^0.12  mcg/mL      cefTRIAXone  Resistant  ^32  mcg/mL      ertapenem  Sensitive  <=^0.12  mcg/mL      gentamicin  Sensitive  <=^1  mcg/mL      levofloxacin  Sensitive  ^1  mcg/mL      piperacillin-tazobactam  Sensitive  <=^4  mcg/mL      trimethoprim-sulfamethoxazole  Sensitive  <=^20  mcg/mL      Raoultella planticola (7)     Antibiotic  Interpretation  EDIN  Status     ampicillin  Resistant  ^16  mcg/mL      ceFAZolin  Sensitive  <=^4  mcg/mL      cefepime  Sensitive  <=^0.12  mcg/mL      cefTRIAXone  Sensitive  <=^0.25  mcg/mL      gentamicin  Sensitive  <=^1  mcg/mL      levofloxacin  Sensitive  <=^0.12  mcg/mL      piperacillin-tazobactam  Sensitive  <=^4  mcg/mL      trimethoprim-sulfamethoxazole  Sensitive  <=^20  mcg/mL              Problem list:    Principal Problem:    Cellulitis of left foot  Active Problems:    Diabetic foot ulcer associated with diabetes mellitus due to underlying condition ([facility] [address]    Essential hypertension    Morbid obesity with BMI of 45.0-49.9, adult (HCC)    Cellulitis and abscess of toe of left foot  Resolved Problems:    * No resolved hospital problems.  *      ASSESSMENT:    · Diabetic ulcer infection left foot with cellulitis and osteomyelitis s/p I&D with partial resection 5th met head on 9/4-enterococcus, Citrobacter, rauotella planticola, candida albicans and group B strep; enterococcus is sensitive to ampicillin  · Type 2 diabetes mellitus  · Essential hypertension    PLAN:    · Stop ceftriaxone  · Stop ampicillin  · Zosyn 3.375 g IV every 8 hours x6 weeks  · Okay to discharge from ID perspective  · Surgical pathology still pending  · Will need office follow-up in 2-3 weeks with Dr. Taryn Edgar  · Fluconazole 400 mg daily x 6 weeks            Sherrie Velasco    9:48 AM  9/10/2020

## 2020-09-10 NOTE — PLAN OF CARE
Problem: Falls - Risk of:  Goal: Will remain free from falls  Description: Will remain free from falls  Outcome: Met This Shift  Goal: Absence of physical injury  Description: Absence of physical injury  Outcome: Met This Shift     Problem: Infection - Surgical Site:  Goal: Will show no infection signs and symptoms  Description: Will show no infection signs and symptoms  Outcome: Met This Shift     Problem: Sensory Perception - Impaired:  Goal: Ability to maintain a stable neurologic state will improve  Description: Ability to maintain a stable neurologic state will improve  Outcome: Met This Shift     Problem: Pain:  Goal: Pain level will decrease  Description: Pain level will decrease  Outcome: Met This Shift  Goal: Control of acute pain  Description: Control of acute pain  Outcome: Met This Shift  Goal: Control of chronic pain  Description: Control of chronic pain  Outcome: Met This Shift

## 2020-09-10 NOTE — PATIENT CARE CONFERENCE
P Quality Flow/Interdisciplinary Rounds Progress Note        Quality Flow Rounds held on September 10, 2020    Disciplines Attending:  Bedside Nurse, ,  and Nursing Unit Leadership    Sonali Turner was admitted on 9/3/2020  4:49 PM    Anticipated Discharge Date:  Expected Discharge Date: 09/10/20    Disposition:    Jsoe Score:  Jose Scale Score: 20    Readmission Risk              Risk of Unplanned Readmission:        13           Discussed patient goal for the day, patient clinical progression, and barriers to discharge.   The following Goal(s) of the Day/Commitment(s) have been identified:  MI planning    [facility]  September 10, 2020

## 2020-09-10 NOTE — CARE COORDINATION
Per conversation with Danelle with [facility], they are still awaiting precert determination from pt's insurance. IF denied by insurance, [facility] with  SNF advised they can accept pt & start precert. Await [facility] determination RE precert first.  Leydi Ramirez.  Fredi, MSN, RN  [facility] Case Management  [phone]

## 2020-09-10 NOTE — PROGRESS NOTES
and updated by nursing    Time spent is 35 min    Electronically signed by Daria Flor DO on 9/10/2020 at 7:52 PM

## 2020-09-10 NOTE — PROGRESS NOTES
Subjective:  Patient is seen s/p left foot incision and drainage with partial resection fifth metatarsal head (DOS 9/4). Patient overall relates that he is doing well. He is anticipating his discharge. Says he has been compliant with remaining minimally weightbearing to the left extremity and directing all of his weight to the heel. Patient denies n/v/f/c/sob/cp. Vitals:    BP (!) 147/69   Pulse 101   Temp 98.7 °F (37.1 °C) (Oral)   Resp 18   Ht 6' 5\" (1.956 m)   Wt (!) 424 lb 9.6 oz (192.6 kg)   SpO2 96%   BMI 50.35 kg/m²     Focused Lower Extremity Physical Exam:  Vitals:    09/10/20 0805   BP: (!) 147/69   Pulse: 101   Resp: 18   Temp: 98.7 °F (37.1 °C)   SpO2:       Dressing to left foot removed- moderate strikethrough appreciated to multiple layers. Incision was flushed with copious amounts of normal sterile saline-no purulence appreciated  Incision was coapted with sutures. There is residual ulcer remaining sub-lateral forefoot  Overall erythema and edema continue to reduce considerably over the last several days postoperatively  No pain with compression of the calf or popliteal fossa bilateral lower extremity  +1 pitting edema bilaterally  Patient is able to wiggle digits on both feet  CFT's are intact    Wound Care Documentation:  Wound 09/04/20 Toe (Comment  which one) Anterior; Left Left second toe. open/reddened (Active)   Dressing Status Clean;Dry; Intact 09/09/20 0814   Dressing Changed Other (Comment) 09/09/20 0814   Dressing/Treatment Ace wrap 09/07/20 2000   Wound Length (cm) 1 cm 09/04/20 0049   Wound Width (cm) 1.1 cm 09/04/20 0049   Wound Depth (cm) 0 cm 09/04/20 0049   Wound Surface Area (cm^2) 1.1 cm^2 09/04/20 0049   Wound Volume (cm^3) 0 cm^3 09/04/20 0049   Wound Assessment Other (Comment) 09/09/20 0814   Drainage Amount None 09/07/20 2000   Odor None 09/07/20 0750   Sandhya-wound Assessment Other (Comment) 09/09/20 0814   Number of days: 5       Wound 09/04/20 Foot Anterior; Left Top of foot, open (Active)   Dressing Status Clean;Dry; Intact 09/09/20 0814   Dressing Changed Other (Comment) 09/09/20 0814   Dressing/Treatment Ace wrap 09/09/20 0814   Wound Length (cm) 0.6 cm 09/04/20 0049   Wound Width (cm) 2.4 cm 09/04/20 0049   Wound Depth (cm) 0 cm 09/04/20 0049   Wound Surface Area (cm^2) 1.44 cm^2 09/04/20 0049   Wound Volume (cm^3) 0 cm^3 09/04/20 0049   Wound Assessment Other (Comment) 09/09/20 0814   Drainage Amount None 09/07/20 2000   Sandhya-wound Assessment Other (Comment) 09/09/20 0814   Number of days: 5       Wound 09/04/20 Foot Anterior;Left;Lateral;Outer Left outer, open (Active)   Dressing Status Clean;Dry; Intact 09/09/20 0814   Dressing Changed Changed/New 09/04/20 0800   Dressing/Treatment Ace wrap 09/07/20 2000   Wound Length (cm) 0.5 cm 09/04/20 0049   Wound Width (cm) 2.1 cm 09/04/20 0049   Wound Depth (cm) 0 cm 09/04/20 0049   Wound Surface Area (cm^2) 1.05 cm^2 09/04/20 0049   Wound Volume (cm^3) 0 cm^3 09/04/20 0049   Wound Assessment Other (Comment) 09/09/20 0814   Drainage Amount None 09/07/20 2000   Sandhya-wound Assessment Other (Comment) 09/09/20 0814   Number of days: 5       Wound 09/04/20 Foot Anterior;Left;Plantar; Outer Open, draining (Active)   Dressing Status Clean;Dry; Intact 09/09/20 0814   Dressing Changed Changed/New 09/04/20 0800   Dressing/Treatment Ace wrap 09/09/20 0814   Wound Length (cm) 1.5 cm 09/04/20 0049   Wound Width (cm) 1.3 cm 09/04/20 0049   Wound Depth (cm) 0.4 cm 09/04/20 0049   Wound Surface Area (cm^2) 1.95 cm^2 09/04/20 0049   Wound Volume (cm^3) 0.78 cm^3 09/04/20 0049   Wound Assessment Other (Comment) 09/09/20 0814   Drainage Amount None 09/07/20 2000   Drainage Description Serosanguinous 09/04/20 0800   Sandhya-wound Assessment Other (Comment) 09/09/20 0814   Number of days: 5       Wound 09/08/20 Foot Right; Outer cracked, callous (Active)   Dressing Status Clean;Dry; Intact 09/09/20 0814   Dressing Changed Changed/New 09/08/20 0900 Dressing/Treatment Dry dressing 09/09/20 0814   Wound Cleansed Other (Comment); Rinsed/Irrigated with saline 09/08/20 0900   Dressing Change Due 09/09/20 09/09/20 0814   Wound Assessment Other (Comment) 09/09/20 0814   Drainage Amount Scant 09/08/20 0900   Drainage Description Sanguinous 09/08/20 0900   Sandhya-wound Assessment Other (Comment) 09/09/20 0814   Number of days: 1       Laboratory:    CBC:   Lab Results   Component Value Date    WBC 9.0 09/09/2020    RBC 4.63 09/09/2020    HGB 15.3 09/09/2020    HCT 45.8 09/09/2020    MCV 98.9 09/09/2020    MCH 33.0 09/09/2020    MCHC 33.4 09/09/2020    RDW 13.1 09/09/2020     09/09/2020    MPV 11.3 09/09/2020     CBC with Differential:    Lab Results   Component Value Date    WBC 9.0 09/09/2020    RBC 4.63 09/09/2020    HGB 15.3 09/09/2020    HCT 45.8 09/09/2020     09/09/2020    MCV 98.9 09/09/2020    MCH 33.0 09/09/2020    MCHC 33.4 09/09/2020    RDW 13.1 09/09/2020    LYMPHOPCT 23.3 09/09/2020    MONOPCT 8.3 09/09/2020    BASOPCT 1.4 09/09/2020    MONOSABS 0.75 09/09/2020    LYMPHSABS 2.10 09/09/2020    EOSABS 0.20 09/09/2020    BASOSABS 0.13 09/09/2020     WBC:    Lab Results   Component Value Date    WBC 9.0 09/09/2020     Hemoglobin/Hematocrit:    Lab Results   Component Value Date    HGB 15.3 09/09/2020    HCT 45.8 09/09/2020     CMP:    Lab Results   Component Value Date     09/06/2020    K 4.2 09/06/2020    K 3.9 09/04/2020     09/06/2020    CO2 18 09/06/2020    BUN 21 09/06/2020    CREATININE 0.8 09/06/2020    GFRAA >60 09/06/2020    LABGLOM >60 09/06/2020    GLUCOSE 297 09/06/2020    PROT 7.0 09/04/2020    LABALBU 3.4 09/04/2020    CALCIUM 8.9 09/06/2020    BILITOT 0.9 09/04/2020    ALKPHOS 77 09/04/2020    AST 16 09/04/2020    ALT 24 09/04/2020     BMP:    Lab Results   Component Value Date     09/06/2020    K 4.2 09/06/2020    K 3.9 09/04/2020     09/06/2020    CO2 18 09/06/2020    BUN 21 09/06/2020    LABALBU 3.4 2020    CREATININE 0.8 2020    CALCIUM 8.9 2020    GFRAA >60 2020    LABGLOM >60 2020    GLUCOSE 297 2020     BUN/Creatinine:    Lab Results   Component Value Date    BUN 21 2020    CREATININE 0.8 2020     Uric Acid:  No results found for: URICACID  PT/INR:  No results found for: PROTIME, INR  Warfarin PT/INR:  No results found for: PROTIME, INR, WARFARIN  PTT:  No results found for: APTT, PTT[APTT}  U/A:  No results found for: NITRU, COLORU, PHUR, LABCAST, WBCUA, RBCUA, MUCUS, TRICHOMONAS, YEAST, BACTERIA, CLARITYU, SPECGRAV, LEUKOCYTESUR, UROBILINOGEN, BILIRUBINUR, BLOODU, GLUCOSEU, KETUA, AMORPHOUS  HgBA1c:    Lab Results   Component Value Date    LABA1C 9.4 2020     Urine Toxicology:  No results found for: LABBARB, LABBENZ, COCAINESCRN, LABOPIA, LABPHEN  24 Hour Urine for Protein:  No results found for: UTV, HOURSC, URINEVOLUME  24 Hour Urine for Creatinine Clearance:  No results found for: AZJOBDK57, HOURSC, UTV   Xr Foot Left (min 3 Views)    Result Date: 9/3/2020  Comparison: 2020. 3 views of the left foot are obtained. FINDINGS: Nondisplaced fracture of the proximal phalanx of the fifth toe is once again seen. Small ulcer adjacent to the distal head of the fifth metatarsal is once again noted. There is diffuse bone demineralization. There are mild degenerative changes. Nondisplaced fracture of the proximal phalanx of the fifth toe is once again seen. Small ulcer adjacent to the distal head of the fifth metatarsal is once again noted. Xr Foot Left (min 3 Views)    Result Date: 2020  Patient MRN:  [id number] : 1975 Age: 54 years Gender: Male Order Date:  2020 11:53 AM EXAM: XR FOOT LEFT (MIN 3 VIEWS) INDICATION: R52 Pain Please give the patient a CD pain COMPARISON: None FINDINGS:  3 views.  AP, oblique, lateral. Soft tissue swelling along the lateral foot with a 10 mm ovoid lucency or possible gas pocket located inferior and lateral to the distal fifth metatarsal shaft. No underlying bony changes or periosteal reaction. There is a deformity involving the head of the fifth proximal phalanx with oblique fracture through the medial head with intra-articular extension. Minimal displacement. However this looks subacute with partial healing. 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 Mild bone spurs on the proximal shaft fourth and fifth metatarsals. Minimal midfoot spurring. Average plantar arch. Mild dorsal swelling over the distal foot. Mild edema of the lower leg extending into the foot. No ankle joint effusion. 1. Fracture through the head of the fifth proximal phalanx with intra-articular extension and minimal displacement. This looks partly healed. 2. Soft tissue swelling in the lateral foot with a 10 mm gas pocket inferior and lateral to the distal fifth metatarsal shaft. Ulceration without evidence for osteomyelitis. Fluoro For Surgical Procedures    Result Date: 2020   Patient MRN: [id number] : 1975 Age:  54 years Gender: Male Order Date: 2020 6:24 PM Exam: FLUORO FOR SURGICAL PROCEDURES Number of Images: 4 views Indication:  Fluoroscopic assistance during left with surgical intervention Comparison: Left foot images September 3, 2020 at 1745 hours TECHNIQUE: An image indicating radiation dosing, and 3 fluoroscopic images were provided for review. FINDINGS: The radiation total exposure time was 5.2 seconds, and the cumulative radiation dose was 0.1607 mGy in [facility]. Please refer to the procedure report for specific details.  and fluoroscopic images show soft tissue disruption/debridement along. The lateral aspect of the distal fifth metatarsal, and one image shows an instrument identifying the mid fifth metatarsal location. A subsequent image shows amputation of the distal fifth metatarsal.     Fluoroscopic assistance was provided during surgical intervention. Radiation exposure as listed above.

## 2020-09-11 VITALS
HEIGHT: 77 IN | DIASTOLIC BLOOD PRESSURE: 83 MMHG | WEIGHT: 315 LBS | TEMPERATURE: 97.9 F | OXYGEN SATURATION: 93 % | BODY MASS INDEX: 37.19 KG/M2 | HEART RATE: 90 BPM | RESPIRATION RATE: 16 BRPM | SYSTOLIC BLOOD PRESSURE: 150 MMHG

## 2020-09-11 LAB
ANION GAP SERPL CALCULATED.3IONS-SCNC: 6 MMOL/L (ref 7–16)
BUN BLDV-MCNC: 15 MG/DL (ref 6–20)
CALCIUM SERPL-MCNC: 8.8 MG/DL (ref 8.6–10.2)
CHLORIDE BLD-SCNC: 99 MMOL/L (ref 98–107)
CO2: 27 MMOL/L (ref 22–29)
CREAT SERPL-MCNC: 0.7 MG/DL (ref 0.7–1.2)
GFR AFRICAN AMERICAN: >60
GFR NON-AFRICAN AMERICAN: >60 ML/MIN/1.73
GLUCOSE BLD-MCNC: 270 MG/DL (ref 74–99)
METER GLUCOSE: 254 MG/DL (ref 74–99)
METER GLUCOSE: 335 MG/DL (ref 74–99)
METER GLUCOSE: 348 MG/DL (ref 74–99)
POTASSIUM SERPL-SCNC: 4.1 MMOL/L (ref 3.5–5)
SODIUM BLD-SCNC: 132 MMOL/L (ref 132–146)

## 2020-09-11 PROCEDURE — 99239 HOSP IP/OBS DSCHRG MGMT >30: CPT | Performed by: INTERNAL MEDICINE

## 2020-09-11 PROCEDURE — 6370000000 HC RX 637 (ALT 250 FOR IP): Performed by: INTERNAL MEDICINE

## 2020-09-11 PROCEDURE — 6360000002 HC RX W HCPCS: Performed by: INTERNAL MEDICINE

## 2020-09-11 PROCEDURE — 80048 BASIC METABOLIC PNL TOTAL CA: CPT

## 2020-09-11 PROCEDURE — 6360000002 HC RX W HCPCS: Performed by: PODIATRIST

## 2020-09-11 PROCEDURE — 2580000003 HC RX 258: Performed by: INTERNAL MEDICINE

## 2020-09-11 PROCEDURE — 2580000003 HC RX 258: Performed by: PODIATRIST

## 2020-09-11 PROCEDURE — 99024 POSTOP FOLLOW-UP VISIT: CPT | Performed by: PODIATRIST

## 2020-09-11 PROCEDURE — 97530 THERAPEUTIC ACTIVITIES: CPT

## 2020-09-11 PROCEDURE — 36415 COLL VENOUS BLD VENIPUNCTURE: CPT

## 2020-09-11 PROCEDURE — 82962 GLUCOSE BLOOD TEST: CPT

## 2020-09-11 RX ORDER — INSULIN GLARGINE 100 [IU]/ML
20 INJECTION, SOLUTION SUBCUTANEOUS NIGHTLY
Qty: 1 VIAL | Refills: 3 | DISCHARGE
Start: 2020-09-11 | End: 2020-11-27 | Stop reason: SDUPTHER

## 2020-09-11 RX ORDER — HYDROCODONE BITARTRATE AND ACETAMINOPHEN 7.5; 325 MG/1; MG/1
1 TABLET ORAL EVERY 6 HOURS PRN
Qty: 6 TABLET | Refills: 0 | Status: SHIPPED | DISCHARGE
Start: 2020-09-11 | End: 2020-09-14

## 2020-09-11 RX ADMIN — FLUCONAZOLE 400 MG: 150 TABLET ORAL at 08:46

## 2020-09-11 RX ADMIN — METOPROLOL TARTRATE 25 MG: 25 TABLET, FILM COATED ORAL at 08:46

## 2020-09-11 RX ADMIN — ENOXAPARIN SODIUM 40 MG: 40 INJECTION SUBCUTANEOUS at 08:46

## 2020-09-11 RX ADMIN — CETIRIZINE HYDROCHLORIDE 10 MG: 10 TABLET, FILM COATED ORAL at 08:45

## 2020-09-11 RX ADMIN — BUPROPION HYDROCHLORIDE 300 MG: 300 TABLET, FILM COATED, EXTENDED RELEASE ORAL at 08:45

## 2020-09-11 RX ADMIN — PANTOPRAZOLE SODIUM 40 MG: 40 TABLET, DELAYED RELEASE ORAL at 06:09

## 2020-09-11 RX ADMIN — PIPERACILLIN AND TAZOBACTAM 3.38 G: 3; .375 INJECTION, POWDER, LYOPHILIZED, FOR SOLUTION INTRAVENOUS at 10:27

## 2020-09-11 RX ADMIN — INSULIN LISPRO 12 UNITS: 100 INJECTION, SOLUTION INTRAVENOUS; SUBCUTANEOUS at 11:12

## 2020-09-11 RX ADMIN — SODIUM CHLORIDE, PRESERVATIVE FREE 300 UNITS: 5 INJECTION INTRAVENOUS at 08:48

## 2020-09-11 RX ADMIN — ASPIRIN 81 MG: 81 TABLET, COATED ORAL at 08:46

## 2020-09-11 RX ADMIN — PIPERACILLIN AND TAZOBACTAM 3.38 G: 3; .375 INJECTION, POWDER, LYOPHILIZED, FOR SOLUTION INTRAVENOUS at 02:45

## 2020-09-11 RX ADMIN — INSULIN LISPRO 12 UNITS: 100 INJECTION, SOLUTION INTRAVENOUS; SUBCUTANEOUS at 16:23

## 2020-09-11 RX ADMIN — INSULIN LISPRO 9 UNITS: 100 INJECTION, SOLUTION INTRAVENOUS; SUBCUTANEOUS at 07:22

## 2020-09-11 RX ADMIN — ARIPIPRAZOLE 4 MG: 2 TABLET ORAL at 08:46

## 2020-09-11 RX ADMIN — SODIUM CHLORIDE: 9 INJECTION, SOLUTION INTRAVENOUS at 14:30

## 2020-09-11 RX ADMIN — SODIUM CHLORIDE, PRESERVATIVE FREE 10 ML: 5 INJECTION INTRAVENOUS at 08:52

## 2020-09-11 RX ADMIN — SODIUM CHLORIDE, PRESERVATIVE FREE 10 ML: 5 INJECTION INTRAVENOUS at 08:51

## 2020-09-11 RX ADMIN — SODIUM CHLORIDE: 9 INJECTION, SOLUTION INTRAVENOUS at 06:32

## 2020-09-11 RX ADMIN — ESCITALOPRAM OXALATE 20 MG: 10 TABLET ORAL at 08:45

## 2020-09-11 RX ADMIN — SODIUM CHLORIDE, PRESERVATIVE FREE 10 ML: 5 INJECTION INTRAVENOUS at 10:27

## 2020-09-11 RX ADMIN — LISINOPRIL 20 MG: 20 TABLET ORAL at 08:45

## 2020-09-11 NOTE — PROGRESS NOTES
Physical Therapy    Facility/Department: [address] MED SURG  Treatment note    NAME: Daksha Burrows  : 1975  MRN: [id number]    Date of Service: 2020               Patient Diagnosis(es): The primary encounter diagnosis was Diabetic ulcer of left midfoot associated with diabetes mellitus due to underlying condition, limited to breakdown of skin ([facility] [address] Diagnoses of Cellulitis of left foot and Diabetic ulcer of toe of left foot associated with diabetes mellitus due to underlying condition, with necrosis of bone ([facility] [address] were also pertinent to this visit. has a past medical history of Diabetes mellitus ([facility] [address] Hyperlipidemia, Hypertension, and Kidney stone. has a past surgical history that includes Foot surgery (Left, 2020). Evaluating Therapist: Claudell Junes, PT     Referring Provider:        Laura Burr MD          Room #: 501    DIAGNOSIS: L foot cellulitis s/p I&D and partial 5th metatarsal head 2020  PRECAUTIONS:  Falls, L foot  - heel contact only with surgical shoe, 30% PWB as ordered by the physician     Social:  Pt lives with  Mother  in a  2  floor plan   Prior to admission pt walked with  No AD, independent      Initial Evaluation  Date: 2020  Treatment  2020    Short Term/ Long Term   Goals   Was pt agreeable to Eval/treatment? Yes  yes    Does pt have pain? Denies  No complaints    Bed Mobility  Rolling:  NT   Supine to sit:  NT   Sit to supine:  NT   Scooting: independent  Rolling: Independent  Supine to sit: Independent  Scooting: Independent to EOB Independent    Transfers Sit to stand: independent   Stand to sit: independent   Stand pivot:  S/I  Sit to stand: Independent  Stand to sit: Independent  Stand Pivot: NT Independent    Ambulation     30  feet with  ww  with  Supervision  80 feet with IV pole, 80 feet using [employer] for support Supervision, WB L heel maintained throughout.  50 feet with  ww or AAD  with  Independent , L LE WB through heel        Stair negotiation:

## 2020-09-11 NOTE — PROGRESS NOTES
Occupational Therapy  OT BEDSIDE TREATMENT NOTE      Date:2020  Patient Name: Garo Greer  MRN: [id number]  : 1975  Room: [address] [place], [place] [address]     Referring Provider: Stephany Levin MD      Evaluating OT: Lindsay Springer OTR/L [id number]     AM-PAC Daily Activity Raw Score:      Recommended Adaptive Equipment: TBD     Diagnosis: L foot cellulitis. Surgery:  L foot I and D   Pertinent Medical History: HTN, DM   Precautions:  Falls, heel 30% weight bearing L foot with post op shoe per order on 20     Home Living: Pt lives with wife, who is currently battling cancer, and children. Pt reports he has been staying with his mother recently in a 2 story home with ramp on entry. B/B 2nd floor, full bath available on 1st floor. Bathroom setup: walk in shower with seat, standard commode      Prior Level of Function: Independent with ADLs, Independent with IADLs; completed functional mobility no AD. Driving: Yes     Pain Level: No c/o pain     Cognition: A&O:                 Functional Assessment:    Initial Eval Status  Date: 20 Treatment session: 20 Short Term Goals  Treatment frequency: 1-3x/wk PRN x1-3 wks      Feeding Independent       Grooming Set up   Independent   UB Dressing Set up   Independent   LB Dressing SBA  Management of post op shoe Min A  To manage L post op shoe and R sock  Mod I    Bathing Min A   Mod I   Toileting Supervision   Mod I   Bed Mobility  Supine to sit: NT out of bed on entry       Functional Transfers STS: Independent STS: Independent from arm chair      Functional Mobility Supervision with [employer]  Household distance  Min cues to maintain heel weight bearing Sup using ww household plus distance  Mod I during ADLs   Balance Sitting: fair plus     Standing: fair no AD Sitting: Independent  Standing: Sup      Activity Tolerance fair Fair+  standing melida x6-7 min with fair plus balance during self care tasks                   B UE were WF: during tasks.     Comments: Upon arrival pt was sitting in arm chair. At end of session pt was transferred to back to chair with alarm on, all lines and tubes intact and call light within reach. Treatment: Instructed pt on activity pacing to prevent falls. Good carry over of safety training per pt discussed during previous eval.     Education: Activity pacing to maximize safety during ADLs. · Pt has made good progress towards set goals. Plan of Care:   [x]? ADL retraining/AE recommendations specific to diagnosis of L foot wound with I and D  [x]? Energy Conservation Techniques/Strategies                                  []? Neuromuscular Re-Education      [x]? Functional Transfer Training                 [x]? Functional Mobility Training                   []? Cognitive Re-Training                           []? Splinting/Positioning Needs                               [x]? Therapeutic Activity   [x]? Therapeutic Exercise   []? Visual/Perceptual  [x]? Delirium Prevention/Treatment   [x]? Positioning to Improve Functional Bow, Safety, and Skin Integrity   [x]? Patient and/or Family Education to Increase Safety and Functional Bow   []?  Other:        Treatment Charges: Mins Units   Ther Ex  [id number]     Manual Therapy [id number]     Thera Activities [id number] 1   ADL/Home Mgt 02024 5 0   Neuro Re-ed [id number]     Group Therapy      Orthotic manage/training  17999     Non-Billable Time     Total Timed Treatment 14 1     Time In: 8:28  Time Out: 8:42    Maria De Jesus INGRAM/JEANETH [id number]

## 2020-09-11 NOTE — PROGRESS NOTES
Comprehensive Nutrition Assessment    Type and Reason for Visit:  Reassess    Nutrition Recommendations/Plan: Continue current diet and ONS    Nutrition Assessment:  Pt w/ ongoing increased nutrient needs for wound healing, now s/p I&D, partial resection 5th metatarsal head 2/2 OM. Will continue current ONS and monitor. Malnutrition Assessment:  Malnutrition Status:  No malnutrition    Context:  Acute Illness     Findings of the 6 clinical characteristics of malnutrition:  Energy Intake:  No significant decrease in energy intake  Weight Loss:  No significant weight loss     Body Fat Loss:  No significant body fat loss     Muscle Mass Loss:  No significant muscle mass loss    Fluid Accumulation:  No significant fluid accumulation     Strength:  Not Performed    Estimated Daily Nutrient Needs:  Energy (kcal):  9735-2581; Weight Used for Energy Requirements:  Current     Protein (g):  190-230; Weight Used for Protein Requirements:  Ideal(2-2.5)        Fluid (ml/day):  2000;  Weight Used for Fluid Requirements:  Current      Nutrition Related Findings:  pt alert, active BS, soft abd, +2-3 edema, +I/Os      Wounds:  Multiple, Diabetic Ulcer, Surgical Wound       Current Nutrition Therapies:    DIET GENERAL; Carb Control: 5 carb choices (75 gms)/meal  Dietary Nutrition Supplements: Wound Healing Oral Supplement    Anthropometric Measures:  · Height: 6' 5\" (195.6 cm)  · Current Body Weight: 436 lb (197.8 kg)   · Admission Body Weight: 431 lb (195.5 kg)(9/4 bed scale)    · Usual Body Weight: 440 lb (199.6 kg)(12/2019 @ [facility])     · [place] Body Weight: 208 lbs; % Ideal Body Weight 209.6 %   · BMI: 51.7  · BMI Categories: Obese Class 3 (BMI 40.0 or greater)       Nutrition Diagnosis:   · Increased nutrient needs related to increase demand for energy/nutrients as evidenced by wounds    Nutrition Interventions:   Food and/or Nutrient Delivery:  Continue Current Diet, Continue Oral Nutrition Supplement  Nutrition Education/Counseling:  No recommendation at this time   Coordination of Nutrition Care:  Continued Inpatient Monitoring    Goals:  new goal: pt to consume >75% meals/ONS       Nutrition Monitoring and Evaluation:   Food/Nutrient Intake Outcomes:  Food and Nutrient Intake, Supplement Intake  Physical Signs/Symptoms Outcomes:  Biochemical Data, Fluid Status or Edema, Nutrition Focused Physical Findings, Skin, Weight     Discharge Planning:    Continue Oral Nutrition Supplement, Continue current diet     Electronically signed by Garima Conrad MS, RD, LD on 9/11/20 at 10:13 AM EDT    Contact: [phone]

## 2020-09-11 NOTE — PATIENT CARE CONFERENCE
[facility] Quality Flow/Interdisciplinary Rounds Progress Note        Quality Flow Rounds held on September 11, 2020    Disciplines Attending:  Bedside Nurse, ,  and Nursing Unit Leadership    Rico Abel was admitted on 9/3/2020  4:49 PM    Anticipated Discharge Date:  Expected Discharge Date: 09/10/20    Disposition:    Jose Score:  Jose Scale Score: 20    Readmission Risk              Risk of Unplanned Readmission:        13           Discussed patient goal for the day, patient clinical progression, and barriers to discharge.   The following Goal(s) of the Day/Commitment(s) have been identified:  Discharge - Obtain Order- planning/await austen Ng  September 11, 2020

## 2020-09-11 NOTE — PROGRESS NOTES
[address] [place] [address] [facility]  LAISHAIDA  Progress Note      Chief Complaint   Patient presents with    Other     left foot wound per poditrist needs admitted        SUBJECTIVE:      Patient is tolerating medications. No reported adverse drug reactions. No problems overnight. Review of systems:    As stated above in the chief complaint, otherwise negative. Medications:    Scheduled Meds:   piperacillin-tazobactam  3.375 g Intravenous Q8H    fluconazole  400 mg Oral Daily    insulin glargine  20 Units Subcutaneous Nightly    insulin lispro  0-18 Units Subcutaneous TID WC    insulin lispro  0-9 Units Subcutaneous Nightly    heparin flush  3 mL Intravenous 2 times per day    sodium chloride flush  10 mL Intravenous 2 times per day    enoxaparin  40 mg Subcutaneous Daily    ARIPiprazole  4 mg Oral Daily    aspirin  81 mg Oral BID    buPROPion  300 mg Oral QAM    cetirizine  10 mg Oral Daily    escitalopram  20 mg Oral Daily    lisinopril  20 mg Oral Daily    metoprolol tartrate  25 mg Oral BID    pantoprazole  40 mg Oral QAM AC    pravastatin  40 mg Oral Nightly    sodium chloride flush  10 mL Intravenous 2 times per day     Continuous Infusions:   sodium chloride 12.5 mL/hr at 09/11/20 2875    dextrose       PRN Meds:sodium chloride flush, heparin flush, sodium chloride flush, HYDROcodone-acetaminophen, ondansetron, glucose, dextrose, glucagon (rDNA), dextrose, sodium chloride flush, acetaminophen **OR** acetaminophen, polyethylene glycol, promethazine **OR** ondansetron  Prior to Admission medications    Medication Sig Start Date End Date Taking? Authorizing Provider   fluconazole (DIFLUCAN) 200 MG tablet Take 2 tablets by mouth daily 9/9/20 10/21/20 Yes Flakita Pilling, DO   ampicillin (OMNIPEN) infusion Infuse 2,000 mg intravenously every 4 hours Compound per protocol.  9/8/20  Yes Flakita Pilling, DO   cefTRIAXone (ROCEPHIN) infusion Infuse 2,000 mg intravenously every 24 hours Compound per protocol 20  Yes Coye Goltz, DO   Ertugliflozin L-PyroglutamicAc (STEGLATRO) 15 MG TABS Take 15 mg by mouth daily   Yes Historical Provider, MD   aspirin 81 MG EC tablet Take 81 mg by mouth 2 times daily   Yes Historical Provider, MD   buPROPion (WELLBUTRIN XL) 300 MG extended release tablet Take 300 mg by mouth every morning   Yes Historical Provider, MD   ARIPiprazole (ABILIFY) 2 MG tablet Take 4 mg by mouth daily   Yes Historical Provider, MD   insulin glargine (BASAGLAR KWIKPEN) 100 UNIT/ML injection pen Inject 15 Units into the skin every morning   Yes Historical Provider, MD   metFORMIN (GLUCOPHAGE) 1000 MG tablet Take 1,000 mg by mouth 2 times daily (with meals)   Yes Historical Provider, MD   lisinopril (PRINIVIL;ZESTRIL) 10 MG tablet Take 20 mg by mouth daily   Yes Historical Provider, MD   pravastatin (PRAVACHOL) 40 MG tablet Take 40 mg by mouth nightly   Yes Historical Provider, MD   escitalopram (LEXAPRO) 20 MG tablet Take 20 mg by mouth daily   Yes Historical Provider, MD   metoprolol tartrate (LOPRESSOR) 25 MG tablet Take 25 mg by mouth 2 times daily   Yes Historical Provider, MD   Multiple Vitamins-Minerals (THERAPEUTIC MULTIVITAMIN-MINERALS) tablet Take 1 tablet by mouth daily   Yes Historical Provider, MD   cetirizine (ZYRTEC) 10 MG tablet Take 10 mg by mouth daily   Yes Historical Provider, MD   b complex vitamins capsule Take 1 capsule by mouth daily   Yes Historical Provider, MD   omeprazole (PRILOSEC) 20 MG delayed release capsule Take 20 mg by mouth daily   Yes Historical Provider, MD       OBJECTIVE:  BP (!) 150/83   Pulse 90   Temp 97.9 °F (36.6 °C) (Oral)   Resp 16   Ht 6' 5\" (1.956 m)   Wt (!) 436 lb 3.2 oz (197.9 kg)   SpO2 93%   BMI 51.73 kg/m²   Temp  Av °F (36.7 °C)  Min: 96.4 °F (35.8 °C)  Max: 98.8 °F (37.1 °C)  General appearance: Resting in bed in no apparent distress. Skin: Warm and dry. No rashes were noted.    HEENT: Round and reactive pupils. Moist mucous membranes. No ulcerations or thrush. Neck: Supple to movements. Chest: No use of accessory muscles to breathe. Symmetrical expansion. No wheezing, crackles or rhonchi. Cardiovascular: Reguar rate and rhythm. No murmurs gallops, or rubs appreciated. Abdomen: Bowel sounds present, nontender, nondistended, no masses or hepatosplenomegaly. Extremities: Dressing left leg clean and dry  Lines: PICC line right arm, site clean and dry    I/O last 3 completed shifts: In: 480 [P.O.:480]  Out: -       Laboratory and Tests Review:      Lab Results   Component Value Date    WBC 9.0 09/09/2020    WBC 10.7 09/07/2020    WBC 15.4 (H) 09/06/2020    HGB 15.3 09/09/2020    HCT 45.8 09/09/2020    MCV 98.9 09/09/2020     09/09/2020     Lab Results   Component Value Date    NEUTROABS 5.70 09/09/2020    NEUTROABS 14.61 (H) 09/05/2020    NEUTROABS 12.26 (H) 09/03/2020     No results found for: [facility]  Lab Results   Component Value Date    ALT 24 09/04/2020    AST 16 09/04/2020    ALKPHOS 77 09/04/2020    BILITOT 0.9 09/04/2020     Lab Results   Component Value Date     09/11/2020    K 4.1 09/11/2020    K 3.9 09/04/2020    CL 99 09/11/2020    CO2 27 09/11/2020    BUN 15 09/11/2020    CREATININE 0.7 09/11/2020    CREATININE 0.8 09/06/2020    CREATININE 0.6 09/04/2020    GFRAA >60 09/11/2020    LABGLOM >60 09/11/2020    GLUCOSE 270 09/11/2020    PROT 7.0 09/04/2020    LABALBU 3.4 09/04/2020    CALCIUM 8.8 09/11/2020    BILITOT 0.9 09/04/2020    ALKPHOS 77 09/04/2020    AST 16 09/04/2020    ALT 24 09/04/2020     Lab Results   Component Value Date    CRP 2.6 (H) 09/04/2020     Lab Results   Component Value Date    SEDRATE 18 (H) 09/05/2020    SEDRATE 10 09/04/2020       Radiology:    Bhumika Dias FOR SURGICAL PROCEDURES   Final Result   Fluoroscopic assistance was provided during surgical intervention. Radiation exposure as listed above.       Please refer to the procedure report for specific procedure Final     Surgical pathology:    Surgical Pathology [[id number]]      Collected: 09/04/20 0000     Updated: 09/11/20 0908     Narrative:      [address]           [place]        [address] [facility]     [address]            [address]                                                   [address] [facility]   [place], [address], [address] [place] [address]               FINAL SURGICAL PATHOLOGY REPORT     NAME:           Susie Greene         Date of       09/04/2020                                            Collection:   Medical Record   [id number]              Date of       09/08/2020   Number:                                  Receipt:   Age:  53 Y        Sex:  M                Date          09/11/2020 08:08                                            Reported:   Date Of Birth:   1975   Financial        [id number]             Admitting     Jose Ronquillo   Number:                                  Physician:   Patient          [id number]   [id number]            Ordering      JORGE HERNANDEZ   Location:                                Physician:     Accession Number:  [id number]             Diagnosis:   5th metatarsal bone, left foot, excision: Mild acute/chronic   osteomyelitis.                                      Rico Flores M.D.   Maria E Shafer                                   (Electronic Signature)         Specimen Submitted:     BONE FRAGMENT(S), OTHER THAN PATHOLOGIC FRACTURE, LEFT FIFTH METATARSAL        Preoperative Dx:   Cellulitis left foot, ulceration 5th metatarsal with   osteomyelitis left foot. Microscopic Evaluation: Was performed.      Gross Description:   Submitted in 1 part in formalin labeled \"[place], left 5th metatarsal\" is an oval-shaped portion of tan-yellow bone that measures 2.5 x 2.2 x 1 cm. One end is covered by a smooth white   articular cartilage. Sectioning reveals gordillo yellow cancellous bone. Sectioning reveals gordillo yellow cancellous bone. Rep section is   submitted after decalcification. Block labeled A1.  (WELLINGTON:ALEXEI)     CODES:    00294; 61020;                                  Department of Pathology          Problem list:    Principal Problem:    Cellulitis of left foot  Active Problems:    Diabetic foot ulcer associated with diabetes mellitus due to underlying condition ([facility] [address]    Essential hypertension    Morbid obesity with BMI of 45.0-49.9, adult (HCC)    Cellulitis and abscess of toe of left foot  Resolved Problems:    * No resolved hospital problems.  *      ASSESSMENT:    · Diabetic ulcer infection left foot with cellulitis and osteomyelitis s/p I&D with partial resection 5th met head on 9/4-enterococcus, Citrobacter, rauotella planticola, candida albicans and group B strep; enterococcus is sensitive to ampicillin  · Type 2 diabetes mellitus  · Essential hypertension  · Osteomyelitis confirmed on biopsy       PLAN:    · Continue Zosyn 3.375 g IV every 8 hours x 6 weeks from 9/10  · Continue fluconazole x 6 weeks from 9/9  · Will need office follow-up in 2-3 weeks with Dr. Declan Stringer    9:17 AM  9/11/2020

## 2020-09-11 NOTE — CARE COORDINATION
Received notification from Frank Gimenez at [facility] that patient has been authorized to transfer to facility. She has contacted patient and communicated same information. Patient has called his sister who will arrive here at 5 PM to transport patient. Nursing and [facility] made aware of  time. Leydi Ramirez.  Fredi, MSN, RN  [facility] Case Management  [phone]

## 2020-09-11 NOTE — CARE COORDINATION
Updated patient at bedside - Auth request by Ruthie Chow to [facility] remains pending as per liaison Danelle. Uncertain of outcome as well as time frame to hear same. Have requested updated PT/OT notes from therapy services and have asked Taras Cook with [facility] to initiate auth request once pt/ot notes are available. Will proceed to destination that receives [facility]. Patient voiced understanding and agreement to plan. Justyn Plasencia.  Fredi, MSN, RN  [facility] Case Management  [phone]

## 2020-09-11 NOTE — PROGRESS NOTES
20  Leung Semen : 1975 Sex: male  Age: 54 y.o. Patient was referred by No primary care provider on file. CC:    Postop day of surgery 2020 left foot incision and drainage with partial resection fifth metatarsal head    HPI:   Postop day of surgery 2020 left foot incision and drainage with partial resection fifth metatarsal head    Seen bedside sitting up today. Surgical boot on. Denies any calf pain denies any left foot pain. Tolerating antibiotics well. Denies nausea vomiting fever chills shortness breath. Pending long-term acute care rehab placement per his insurance. No additional pedal complaints today.     ROS:  Const: Denies constitutional symptoms  Musculo: Denies symptoms other than stated above  Skin: Denies symptoms other than stated above       Current Facility-Administered Medications:     piperacillin-tazobactam (ZOSYN) 3.375 g in dextrose 5 % 50 mL IVPB extended infusion (mini-bag), 3.375 g, Intravenous, Q8H, Last Rate: 12.5 mL/hr at 20 1027, 3.375 g at 20 1027 **AND** 0.9 % sodium chloride infusion, , Intravenous, Q8H, Tyrell Contreras DO, Last Rate: 12.5 mL/hr at 20 8395    fluconazole (DIFLUCAN) tablet 400 mg, 400 mg, Oral, Daily, Tyrell Contreras DO, 400 mg at 20 0846    insulin glargine (LANTUS) injection vial 20 Units, 20 Units, Subcutaneous, Nightly, Elsa Oleary MD, 20 Units at 09/10/20 2040    insulin lispro (HUMALOG) injection vial 0-18 Units, 0-18 Units, Subcutaneous, TID WC, Elsa Oleary MD, 12 Units at 20 1112    insulin lispro (HUMALOG) injection vial 0-9 Units, 0-9 Units, Subcutaneous, Nightly, Elsa Oleary MD, 3 Units at 09/10/20 2039    sodium chloride flush 0.9 % injection 10 mL, 10 mL, Intravenous, PRN, Tyrell Contreras DO, 10 mL at 20 0851    heparin flush 100 UNIT/ML injection 300 Units, 3 mL, Intravenous, 2 times per day, Tyrell Contreras DO, 300 Units at 09/10/20 0910    heparin flush 100 UNIT/ML MD Nagi    sodium chloride flush 0.9 % injection 10 mL, 10 mL, Intravenous, 2 times per day, Claude Rhea, MD, 10 mL at 09/11/20 3059    sodium chloride flush 0.9 % injection 10 mL, 10 mL, Intravenous, PRN, Claude Rhea, MD    acetaminophen (TYLENOL) tablet 650 mg, 650 mg, Oral, Q6H PRN, 650 mg at 09/06/20 1922 **OR** acetaminophen (TYLENOL) suppository 650 mg, 650 mg, Rectal, Q6H PRN, Claude Rhea, MD    polyethylene glycol [facility]) packet 17 g, 17 g, Oral, Daily PRN, Claude Rhea, MD    promethazine (PHENERGAN) tablet 12.5 mg, 12.5 mg, Oral, Q6H PRN **OR** ondansetron (ZOFRAN) injection 4 mg, 4 mg, Intravenous, Q6H PRN, Claude Rhea, MD  Allergies   Allergen Reactions    Milk-Related Compounds      Stomach issues-lactose intolerant       Past Medical History:   Diagnosis Date    Diabetes mellitus ([facility] [address]     Hyperlipidemia     Hypertension     Kidney stone            Vitals:    09/11/20 0022 09/11/20 0130 09/11/20 0745 09/11/20 1003   BP: 128/70  (!) 150/83    Pulse: 85  90    Resp: 18  16    Temp: 98.8 °F (37.1 °C)  97.9 °F (36.6 °C)    TempSrc: Oral  Oral    SpO2:   93%    Weight:  (!) 436 lb 3.2 oz (197.9 kg)     Height:    6' 5\" (1.956 m)       Work History/Social History:     Focused Lower Extremity Physical Exam:    Neurovascular examination:    Dorsalis Pedis palpable bilateral.  Posterior tibialis palpable bilateral.    Capillary Refill Time:  Immediate return  Hair growth:  Symmetrical and bilateral   Skin:  Not atrophic  Edema: Edema dorsal left foot-significant improvement  Neurologic:  Light touch intact bilateral.      Musculoskeletal/ Orthopedic examination:    Equinis: Absent bilateral  Dorsiflexion, plantarflexion, inversion, eversion bilateral 5 out of 5 muscle strength  Wiggling toes  Negative Homans  No pain to palpation dorsal left foot  No pain overlying incisions left foot    Dermatology examination:    Significant decrease in overall edema and erythema left foot.  Sutures intact with packing dorsal left foot. No drainage noted. Plantar wound present measuring approximately 0.5 cm x 0.5 cm x 0.2 cm. No drainage noted. No significant erythema today. Assessment and Plan:  Uncontrolled diabetes mellitus  Diabetes mellitus with foot ulcer  Osteomyelitis left fifth metatarsal head  Cellulitis left foot with abscess  Noncompliance        Postop day of surgery 9/4/2020 left foot incision and drainage with partial resection fifth metatarsal head      Continues to improve. White blood cell count trending down. Bone cultures and pathology reviewed. Positive for osteomyelitis. Infectious disease on board with IV antibiotics. Appreciate their input upon discharge. Importance of heel contact weightbearing only. I still did recommend walker. It every day bandage change with Adaptic, 4 x 4, ABD padding, Kerlix Ace bandage. Surgical shoe. Okay to be discharged from foot and ankle standpoint. I will follow-up in office this week. No follow-ups on file. Seen By:  Eneida Reveles DPM      Document was created using voice recognition software. Note was reviewed, however may contain grammatical errors.

## 2020-09-12 NOTE — DISCHARGE SUMMARY
[facility] EMERGENCY SERVICE Physician Discharge Summary       Rose Cook, KEL  [address] [address]  [phone]    Schedule an appointment as soon as possible for a visit      Nell Welch MD  [address] [place] [address]  [phone]    Schedule an appointment as soon as possible for a visit        Activity level: as melida    Diet: No diet orders on file    Dispo:LTAC    Condition at discharge: fair          Patient ID:  Yang Saenz  [id number]  54 y.o.  1975    Admit date: 9/3/2020    Discharge date and time:  9/11/2020  8:33 PM    Admission Diagnoses: Principal Problem:    Cellulitis of left foot  Active Problems:    Diabetic foot ulcer associated with diabetes mellitus due to underlying condition ([facility] [address]    Essential hypertension    Morbid obesity with BMI of 45.0-49.9, adult ([address]    Cellulitis and abscess of toe of left foot  Resolved Problems:    * No resolved hospital problems. *      Discharge Diagnoses: Principal Problem:    Cellulitis of left foot  Active Problems:    Diabetic foot ulcer associated with diabetes mellitus due to underlying condition ([facility] [address]    Essential hypertension    Morbid obesity with BMI of 45.0-49.9, adult (HCC)    Cellulitis and abscess of toe of left foot  Resolved Problems:    * No resolved hospital problems. *    Diabetic ulcer infection with left foot cellulitis and osteomyelitis s/p partial resection 5th metatarsal head  Dm type 2 uncontrolled  Acidosis  htn  Hyperlipidemia        Consults:  IP CONSULT TO INTERNAL MEDICINE  IP CONSULT TO INFECTIOUS DISEASES  IP CONSULT TO SOCIAL WORK  IP CONSULT TO PODIATRY  IP CONSULT TO PHARMACY  IP CONSULT TO IV TEAM    Procedures: Procedure #1 incision and drainage deep to bone #2 partial resection fifth metatarsal head    Hospital Course: Patient was admitted with Cellulitis of left foot [L03.116]  Cellulitis of left foot [L03.116].  Patient is a 54 y.o. male dm2-- NOT taking home insulin     Dm2, HTN,  morbidly obese diabetic.      Diabetes uncertain control he doesn't check, rcently taking basaglar + 3 oral DM meds.  Sebastian Puentes states he thinks he just had a superficial cut to the underside of his left foot couple  weeks ago  (he denies major trauma-- he denies fracture that was commented on by xray)     He states he has good circulation and intact sensation to feet- although diabetic 10 years.     Over past several days/over past week worsening swelling   +sangenous drainage to the area   Patient denies any fevers but had some chills last night- worsening swelling and erythema in foot noticed   Started on doxy and followed up with a podiatrist near [facility]-- where he is staying. Sebastian Puentes is been on that for the past 5 days and followed up with a podiatrist again today who saw -- and told him to go to [facility].     Per ER report:  \"adelita did an in office x-ray and his podiatrist called here stating that she thought there was some gas in the tissue and concern for worsening infection.  She felt the patient was failing outpatient therapy and required IV antibiotics and admission. \"     Pt seen and examined by consultants. Pt had procedure as above and was improving with abx. On day of discharge, pt denied fevers, chills,n/v. Discharge planning d/w pt. Time given for questions and all questions answered.      Discharge Exam:  Vitals:    09/11/20 0022 09/11/20 0130 09/11/20 0745 09/11/20 1003   BP: 128/70  (!) 150/83    Pulse: 85  90    Resp: 18  16    Temp: 98.8 °F (37.1 °C)  97.9 °F (36.6 °C)    TempSrc: Oral  Oral    SpO2:   93%    Weight:  (!) 436 lb 3.2 oz (197.9 kg)     Height:    6' 5\" (1.956 m)       Skin: warm and dry, no rash or erythema  Pulmonary/Chest: clear to auscultation bilaterally- no wheezes, rales or rhonchi, normal air movement, no respiratory distress  Cardiovascular: rhythm reg at rate of 88  Abdomen: soft, non-tender, LANTUS  Inject 20 Units into the skin nightly  Replaces:  Basaglar KwikPen 100 UNIT/ML injection pen     * insulin lispro 100 UNIT/ML injection vial  Commonly known as:  HUMALOG  Inject 0-18 Units into the skin 3 times daily (with meals)     * insulin lispro 100 UNIT/ML injection vial  Commonly known as:  HUMALOG  Inject 0-9 Units into the skin nightly     piperacillin-tazobactam  infusion  Commonly known as:  ZOSYN  Infuse 3.375 g intravenously every 8 hours Compound per protocol. * This list has 2 medication(s) that are the same as other medications prescribed for you. Read the directions carefully, and ask your doctor or other care provider to review them with you.             CONTINUE taking these medications    ARIPiprazole 2 MG tablet  Commonly known as:  ABILIFY     aspirin 81 MG EC tablet     buPROPion 300 MG extended release tablet  Commonly known as:  WELLBUTRIN XL     cetirizine 10 MG tablet  Commonly known as:  ZYRTEC     escitalopram 20 MG tablet  Commonly known as:  LEXAPRO     lisinopril 10 MG tablet  Commonly known as:  PRINIVIL;ZESTRIL     metoprolol tartrate 25 MG tablet  Commonly known as:  LOPRESSOR     omeprazole 20 MG delayed release capsule  Commonly known as:  PRILOSEC     pravastatin 40 MG tablet  Commonly known as:  PRAVACHOL        STOP taking these medications    b complex vitamins capsule     Basaglar KwikPen 100 UNIT/ML injection pen  Generic drug:  insulin glargine  Replaced by:  insulin glargine 100 UNIT/ML injection vial     metFORMIN 1000 MG tablet  Commonly known as:  GLUCOPHAGE     Steglatro 15 MG Tabs  Generic drug:  Ertugliflozin L-PyroglutamicAc     therapeutic multivitamin-minerals tablet           Where to Get Your Medications      You can get these medications from any pharmacy    Bring a paper prescription for each of these medications  · HYDROcodone-acetaminophen 7.5-325 MG per tablet     Information about where to get these medications is not yet available    Ask

## 2020-09-16 ENCOUNTER — OFFICE VISIT (OUTPATIENT)
Dept: PODIATRY | Age: 45
End: 2020-09-16

## 2020-09-16 VITALS — WEIGHT: 315 LBS | RESPIRATION RATE: 16 BRPM | BODY MASS INDEX: 37.19 KG/M2 | HEIGHT: 77 IN

## 2020-09-16 PROCEDURE — 99024 POSTOP FOLLOW-UP VISIT: CPT | Performed by: PODIATRIST

## 2020-09-16 NOTE — PROGRESS NOTES
Patient is here today for post op appt. #1. Surgery on 20 of incision and drainage to left 5th metatarsal. Pt has no pain. He states the post op shoe he was given is uncomfortable and he chooses not to wear it. He not wearing any form of shoes at todays visit. He is currenlty admitted to [facility] : 1975 Sex: male  Age: 54 y.o. Patient was referred by Annamaria Cole MD    CC:   Postop incision and drainage left foot with fifth metatarsal resection and bone biopsy date of surgery 2020      HPI:   Postop incision and drainage left foot with fifth metatarsal resection and bone biopsy date of surgery 2020    Has been staying at [address] [place] [address]. Does continue long-term IV antibiotics with PICC line. States he has been walking some more. Unable to wear the surgical shoe as it does bother him weightbearing. Denies any calf pain. Denies any pain left foot. Denies nausea vomiting fever chills shortness of breath. Pleased with overall progression.     ROS:  Const: Denies constitutional symptoms  Musculo: Denies symptoms other than stated above  Skin: Denies symptoms other than stated above      Current Outpatient Medications:     piperacillin-tazobactam (ZOSYN) infusion, Infuse 3.375 g intravenously every 8 hours Compound per protocol., Disp: , Rfl:     enoxaparin (LOVENOX) 40 MG/0.4ML injection, Inject 0.4 mLs into the skin daily, Disp:  , Rfl: 3    insulin glargine (LANTUS) 100 UNIT/ML injection vial, Inject 20 Units into the skin nightly, Disp: 1 vial, Rfl: 3    insulin lispro (HUMALOG) 100 UNIT/ML injection vial, Inject 0-18 Units into the skin 3 times daily (with meals), Disp: 1 vial, Rfl: 3    insulin lispro (HUMALOG) 100 UNIT/ML injection vial, Inject 0-9 Units into the skin nightly, Disp: 1 vial, Rfl: 3    fluconazole (DIFLUCAN) 200 MG tablet, Take 2 tablets by mouth daily, Disp: 84 tablet, Rfl: 0    aspirin 81 MG EC tablet, Take 81 mg by mouth 2 times daily, Disp: , Rfl:     buPROPion (WELLBUTRIN XL) 300 MG extended release tablet, Take 300 mg by mouth every morning, Disp: , Rfl:     ARIPiprazole (ABILIFY) 2 MG tablet, Take 4 mg by mouth daily, Disp: , Rfl:     lisinopril (PRINIVIL;ZESTRIL) 10 MG tablet, Take 20 mg by mouth daily, Disp: , Rfl:     pravastatin (PRAVACHOL) 40 MG tablet, Take 40 mg by mouth nightly, Disp: , Rfl:     escitalopram (LEXAPRO) 20 MG tablet, Take 20 mg by mouth daily, Disp: , Rfl:     metoprolol tartrate (LOPRESSOR) 25 MG tablet, Take 25 mg by mouth 2 times daily, Disp: , Rfl:     cetirizine (ZYRTEC) 10 MG tablet, Take 10 mg by mouth daily, Disp: , Rfl:     omeprazole (PRILOSEC) 20 MG delayed release capsule, Take 20 mg by mouth daily, Disp: , Rfl:   Allergies   Allergen Reactions    Milk-Related Compounds      Stomach issues-lactose intolerant       Past Medical History:   Diagnosis Date    Diabetes mellitus ([facility] [address]     Hyperlipidemia     Hypertension     Kidney stone        Vitals:    09/16/20 1412   Resp: 16          Work History/Social History:     Focused Lower Extremity Physical Exam:      Neurovascular examination:    Dorsalis Pedis palpable bilateral.  Posterior tibialis non-palpable bilateral.    Capillary Refill Time:  Immediate return  Hair growth:  Symmetrical and bilateral   Skin:  Not atrophic  Edema: Mild edema bilateral feet. Mild edema bilateral ankles. Neurologic:  Light touch diminished bilateral.  Warm to coolness bilateral distal toes  Decreased epicritic sensation     Musculoskeletal/ Orthopedic examination:    Equinis: present bilateral  Dorsiflexion, plantarflexion, inversion, eversion bilateral 5 out of 5 muscle strength  Wiggling toes  Negative Homans  No pain to palpation left foot    Dermatology examination:    Mild edema dorsal left foot. No erythema. No drainage. Retention sutures intact.   There is a plantar wound and a dorsal wound left fifth metatarsal each measuring approximately 1 cm x 1 cm x 0.5 cm.            Assessment and Plan:  Denia was seen today for post-op check. Diagnoses and all orders for this visit:    Uncontrolled type 2 diabetes mellitus with hyperglycemia ([address]    Cellulitis and abscess of toe of left foot    Diabetic ulcer of toe of left foot associated with diabetes mellitus due to underlying condition, with necrosis of bone ([facility] [address]    Acute osteomyelitis of metatarsal bone of left foot ([facility] [address]        Postop incision and drainage left foot with fifth metatarsal resection and bone biopsy date of surgery 9/4/2020    Pathology reviewed positive for osteomyelitis fifth metatarsal  IV antibiotics with patient staying at [facility].  Appreciate infectious disease input. Remains high risk for further lower extremity amputation if nonhealing. Importance of heel contact weightbearing only. I did stress importance of nonweightbearing to forefoot. I did change bandage today. Adaptic, 4 x 4, ABD padding, Kerlix Ace bandage. Once daily bandage change. I will likely remove sutures next week. If any continued open wounds I would consider wound VAC at this time especially if patient is staying at [facility]. I will follow-up 1 week. Return in about 1 week (around 9/23/2020). Seen By:  Gaston Mcginnis DPM      Document was created using voice recognition software. Note was reviewed however may contain grammatical errors.

## 2020-09-23 ENCOUNTER — OFFICE VISIT (OUTPATIENT)
Dept: PODIATRY | Age: 45
End: 2020-09-23
Payer: COMMERCIAL

## 2020-09-23 VITALS — WEIGHT: 315 LBS | BODY MASS INDEX: 37.19 KG/M2 | RESPIRATION RATE: 16 BRPM | HEIGHT: 77 IN

## 2020-09-23 PROCEDURE — 11042 DBRDMT SUBQ TIS 1ST 20SQCM/<: CPT | Performed by: PODIATRIST

## 2020-09-23 NOTE — PROGRESS NOTES
Patient is here today for post op appt. #2. Surgery on 20 of incision and drainage to left 5th metatarsal. Pt has no pain or complaints today. At today's appointment pt is not wearing any form of shoes or using the crutches he was provided previously. He currently has a picc line in place and is admitted to [address] [place] [address] today. Dressing was damp upon removal. Pt stated he may have gotten it wet in the shower today. [facility] can be sent to them for them to take over majority of his care. They are requesting this to help limit pts exposure and risks of bringing anything back to the facility. Jim Faster : 1975 Sex: male  Age: 54 y.o. Patient was referred by Jeffery Vargas MD    CC:   Postop incision and drainage left foot with fifth metatarsal resection and bone biopsy date of surgery 2020      HPI:   Postop incision and drainage left foot with fifth metatarsal resection and bone biopsy date of surgery 2020    [facility] and does have IV antibiotics. No calf pain today. States he has been doing more walking. Has had bandage intact left foot. Does have surgical shoe on. Denies any nausea vomiting fever chills shortness of breath. Patient states that Andrea Staley would like to transition to just following the wound in the hospital and not transferring him. He would like to go home at this time. No additional pedal complaints.     ROS:  Const: Denies constitutional symptoms  Musculo: Denies symptoms other than stated above  Skin: Denies symptoms other than stated above      Current Outpatient Medications:     piperacillin-tazobactam (ZOSYN) infusion, Infuse 3.375 g intravenously every 8 hours Compound per protocol., Disp: , Rfl:     enoxaparin (LOVENOX) 40 MG/0.4ML injection, Inject 0.4 mLs into the skin daily, Disp:  , Rfl: 3    insulin glargine (LANTUS) 100 UNIT/ML injection vial, Inject 20 Units into the skin Musculoskeletal/ Orthopedic examination:    Equinis: present bilateral  Dorsiflexion, plantarflexion, inversion, eversion bilateral 5 out of 5 muscle strength  Wiggling toes  Negative Homans  No pain to palpation left foot    Dermatology examination:    Mild edema dorsal left foot. No erythema. No drainage. Retention sutures intact. There is a plantar wound and a dorsal wound left fifth metatarsal each measuring approximately 1 cm x 1 cm x 0.5 cm. There is mild wound dehiscence lateral left foot measuring approximately 1 cm x 1 cm x 0.5 cm. Wound is 50% granular 50% fibrotic deep through subcutaneous tissue. Assessment and Plan:  René Jimenez was seen today for post-op check. Diagnoses and all orders for this visit:    Uncontrolled type 2 diabetes mellitus with hyperglycemia ([address]    Cellulitis and abscess of toe of left foot    Acute osteomyelitis of metatarsal bone of left foot (HCC)    Diabetic ulcer of toe of left foot associated with diabetes mellitus due to underlying condition, with necrosis of bone ([address]    Dehiscence of operative wound, initial encounter        Postop incision and drainage left foot with fifth metatarsal resection and bone biopsy date of surgery 9/4/2020    Excisional debridement performed with sharp sterile #15 blade deep through subcutaneous tissue entire wound dehiscence lateral left foot. Pre and post-wound measurements taken. Wound does not probe to bone. Sutures were removed. I did write an order for wound VAC at this time. Patient is currently on a PICC line with IV antibiotics. He is at [facility].  He has been walking more and does have mild wound dehiscence on the outside left foot. No drainage noted today. Did recommend 125 mmHg of black foam wound VAC to be applied. I did recommend nonweightbearing left lower leg at this time. Can use heel contact to pivot weight-bear only.     I did write orders once again today for Michelle Lopez their input. Infectious disease on board appreciate input. I will follow-up 1 week. Return in about 1 week (around 9/30/2020). Seen By:  Ladonna Garza DPM      Document was created using voice recognition software. Note was reviewed however may contain grammatical errors.

## 2020-09-30 ENCOUNTER — OFFICE VISIT (OUTPATIENT)
Dept: PODIATRY | Age: 45
End: 2020-09-30

## 2020-09-30 VITALS — RESPIRATION RATE: 16 BRPM | BODY MASS INDEX: 37.19 KG/M2 | HEIGHT: 77 IN | WEIGHT: 315 LBS

## 2020-09-30 PROCEDURE — 99024 POSTOP FOLLOW-UP VISIT: CPT | Performed by: PODIATRIST

## 2020-09-30 NOTE — LETTER
9/30/2020        I saw Adam Radha follow-up incision and drainage left foot date of surgery 9/4/2020. Infectious disease on board with IV antibiotics appreciate their input. I did change bandage today. We will continue wound VAC with dressing change Monday, Wednesday and Friday. Continue black foam 125 mmHg continuous pressure. Heel contact weightbearing only. I will follow-up in the office 1 week. Call anytime with any questions or concerns.         Sincerely,  Eneida Reveles DPM      [address]  [address] [place], [facility] [address]  Phone: [phone]  Fax: [phone]

## 2020-09-30 NOTE — PROGRESS NOTES
Patient is here today for post op appt. #3. Surgery on 20 of incision and drainage to left 5th metatarsal. Pt has no pain or complaints today. Wound Vac was placed by [facility] and removed today by them. He is still admitted at [facility] he said they will be transferring him to [facility] soon. [facility] : 1975 Sex: male  Age: 54 y.o. Patient was referred by Jeremiah Zeng MD    CC:   Postop incision and drainage left foot with fifth metatarsal resection and bone biopsy date of surgery 2020      HPI:   Postop incision and drainage left foot with fifth metatarsal resection and bone biopsy date of surgery 2020    Still currently at [facility].  Tolerating IV antibiotics well. No calf pain. No foot or ankle pain. Pleased with overall progression. Denies nausea vomiting fever chills shortness of breath. States he has been walking more but does have surgical shoe and trying to put pressure on his heel. Tolerating wound VAC this week. No additional pedal complaints.     ROS:  Const: Denies constitutional symptoms  Musculo: Denies symptoms other than stated above  Skin: Denies symptoms other than stated above      Current Outpatient Medications:     piperacillin-tazobactam (ZOSYN) infusion, Infuse 3.375 g intravenously every 8 hours Compound per protocol., Disp: , Rfl:     enoxaparin (LOVENOX) 40 MG/0.4ML injection, Inject 0.4 mLs into the skin daily, Disp:  , Rfl: 3    insulin glargine (LANTUS) 100 UNIT/ML injection vial, Inject 20 Units into the skin nightly, Disp: 1 vial, Rfl: 3    insulin lispro (HUMALOG) 100 UNIT/ML injection vial, Inject 0-18 Units into the skin 3 times daily (with meals), Disp: 1 vial, Rfl: 3    insulin lispro (HUMALOG) 100 UNIT/ML injection vial, Inject 0-9 Units into the skin nightly, Disp: 1 vial, Rfl: 3    fluconazole (DIFLUCAN) 200 MG tablet, Take 2 tablets by mouth daily, Disp: 84 tablet, Rfl: 0    aspirin 81 MG EC tablet, Take 81 mg by mouth 2 times daily, Disp: , Rfl:     buPROPion (WELLBUTRIN XL) 300 MG extended release tablet, Take 300 mg by mouth every morning, Disp: , Rfl:     ARIPiprazole (ABILIFY) 2 MG tablet, Take 4 mg by mouth daily, Disp: , Rfl:     lisinopril (PRINIVIL;ZESTRIL) 10 MG tablet, Take 20 mg by mouth daily, Disp: , Rfl:     pravastatin (PRAVACHOL) 40 MG tablet, Take 40 mg by mouth nightly, Disp: , Rfl:     escitalopram (LEXAPRO) 20 MG tablet, Take 20 mg by mouth daily, Disp: , Rfl:     metoprolol tartrate (LOPRESSOR) 25 MG tablet, Take 25 mg by mouth 2 times daily, Disp: , Rfl:     cetirizine (ZYRTEC) 10 MG tablet, Take 10 mg by mouth daily, Disp: , Rfl:     omeprazole (PRILOSEC) 20 MG delayed release capsule, Take 20 mg by mouth daily, Disp: , Rfl:   Allergies   Allergen Reactions    Milk-Related Compounds      Stomach issues-lactose intolerant       Past Medical History:   Diagnosis Date    Diabetes mellitus ([facility] [address]     Hyperlipidemia     Hypertension     Kidney stone        Vitals:    09/30/20 1321   Resp: 16          Work History/Social History:     Focused Lower Extremity Physical Exam:      Neurovascular examination:    Dorsalis Pedis palpable bilateral.  Posterior tibialis non-palpable bilateral.    Capillary Refill Time:  Immediate return  Hair growth:  Symmetrical and bilateral   Skin:  Not atrophic  Edema: Mild edema bilateral feet. Mild edema bilateral ankles. Neurologic:  Light touch diminished bilateral.  Warm to coolness bilateral distal toes  Decreased epicritic sensation     Musculoskeletal/ Orthopedic examination:    Equinis: present bilateral  Dorsiflexion, plantarflexion, inversion, eversion bilateral 5 out of 5 muscle strength  Wiggling toes  Negative Homans  No pain to palpation left foot    Dermatology examination:     There is a plantar wound and a dorsal wound left fifth metatarsal each measuring approximately 1 cm x 1 cm x 0.5 cm. Wound does not track or probe to bone.   Significant decrease in overall edema and erythema. Assessment and Plan:  Doug Harris was seen today for post-op check. Diagnoses and all orders for this visit:    Uncontrolled type 2 diabetes mellitus with hyperglycemia ([facility] [address]    Cellulitis and abscess of toe of left foot    Acute osteomyelitis of metatarsal bone of left foot ([facility] [address]    Noncompliance        Postop incision and drainage left foot with fifth metatarsal resection and bone biopsy date of surgery 9/4/2020    Wound has improved this week. Tolerating wound VAC well. I did write new orders for [facility].  Continue Monday, Wednesday and Friday wound VAC changes with black foam wound VAC left foot. Wound VAC to be set at 125 mmHg continuous pressure. The change bandage today with 4 x 4, Igor Kerlix Ace bandage. I still did stress importance of heel contact weightbearing. Infectious disease on board with IV antibiotics. Tolerating PICC line well. Appreciate infectious disease input. It does sound like patient is going to be transferred over the next 2 days to [facility] rehab for IV antibiotics. I will continue to follow weekly. Return in about 1 week (around 10/7/2020). Seen By:  Lulú Norwood DPM      Document was created using voice recognition software. Note was reviewed however may contain grammatical errors.

## 2020-10-08 ENCOUNTER — OFFICE VISIT (OUTPATIENT)
Dept: PODIATRY | Age: 45
End: 2020-10-08

## 2020-10-08 VITALS — WEIGHT: 315 LBS | BODY MASS INDEX: 37.19 KG/M2 | RESPIRATION RATE: 16 BRPM | HEIGHT: 77 IN

## 2020-10-08 PROCEDURE — 99024 POSTOP FOLLOW-UP VISIT: CPT | Performed by: PODIATRIST

## 2020-10-08 NOTE — PROGRESS NOTES
Patient is here today for post op appt. #3. Surgery on 20 of incision and drainage to left 5th metatarsal. Pt has no pain or complaints today. Wound Vac was removed before appt today. He has been transferred to [facility]. Picc line still in place. He is not wearing any shoes today    Rubio Latisha : 1975 Sex: male  Age: 54 y.o. Patient was referred by Iraida Jenkins DO    CC:   Postop incision and drainage left foot with fifth metatarsal resection and bone biopsy date of surgery 2020      HPI:   Postop incision and drainage left foot with fifth metatarsal resection and bone biopsy date of surgery 2020    Has been transferred to [address] [place] [address] outpatient rehab. Tolerating IV antibiotics well. Does have infectious disease follow-up tomorrow. Denies any foot or ankle pain. Denies nausea vomiting fever chills shortness of breath. Tolerating wound VAC well. Presents today wearing regular socks no wound VAC in place with no crutches or walker. Would like to go home this weekend. No additional pedal complaints.     ROS:  Const: Denies constitutional symptoms  Musculo: Denies symptoms other than stated above  Skin: Denies symptoms other than stated above      Current Outpatient Medications:     piperacillin-tazobactam (ZOSYN) infusion, Infuse 3.375 g intravenously every 8 hours Compound per protocol., Disp: , Rfl:     enoxaparin (LOVENOX) 40 MG/0.4ML injection, Inject 0.4 mLs into the skin daily, Disp:  , Rfl: 3    insulin glargine (LANTUS) 100 UNIT/ML injection vial, Inject 20 Units into the skin nightly, Disp: 1 vial, Rfl: 3    insulin lispro (HUMALOG) 100 UNIT/ML injection vial, Inject 0-18 Units into the skin 3 times daily (with meals), Disp: 1 vial, Rfl: 3    insulin lispro (HUMALOG) 100 UNIT/ML injection vial, Inject 0-9 Units into the skin nightly, Disp: 1 vial, Rfl: 3    fluconazole (DIFLUCAN) 200 MG tablet, Take 2 tablets by mouth daily, Disp: 84 tablet, Rfl: 0    aspirin 81 MG EC tablet, Take 81 mg by mouth 2 times daily, Disp: , Rfl:     buPROPion (WELLBUTRIN XL) 300 MG extended release tablet, Take 300 mg by mouth every morning, Disp: , Rfl:     ARIPiprazole (ABILIFY) 2 MG tablet, Take 4 mg by mouth daily, Disp: , Rfl:     lisinopril (PRINIVIL;ZESTRIL) 10 MG tablet, Take 20 mg by mouth daily, Disp: , Rfl:     pravastatin (PRAVACHOL) 40 MG tablet, Take 40 mg by mouth nightly, Disp: , Rfl:     escitalopram (LEXAPRO) 20 MG tablet, Take 20 mg by mouth daily, Disp: , Rfl:     metoprolol tartrate (LOPRESSOR) 25 MG tablet, Take 25 mg by mouth 2 times daily, Disp: , Rfl:     cetirizine (ZYRTEC) 10 MG tablet, Take 10 mg by mouth daily, Disp: , Rfl:     omeprazole (PRILOSEC) 20 MG delayed release capsule, Take 20 mg by mouth daily, Disp: , Rfl:   Allergies   Allergen Reactions    Milk-Related Compounds      Stomach issues-lactose intolerant       Past Medical History:   Diagnosis Date    Diabetes mellitus ([facility] [address]     Hyperlipidemia     Hypertension     Kidney stone        Vitals:    10/08/20 1441   Resp: 16          Work History/Social History:     Focused Lower Extremity Physical Exam:      Neurovascular examination:    Dorsalis Pedis palpable bilateral.  Posterior tibialis non-palpable bilateral.    Capillary Refill Time:  Immediate return  Hair growth:  Symmetrical and bilateral   Skin:  Not atrophic  Edema: Mild edema bilateral feet. Mild edema bilateral ankles. Neurologic:  Light touch diminished bilateral.  Warm to coolness bilateral distal toes  Decreased epicritic sensation     Musculoskeletal/ Orthopedic examination:    Equinis: present bilateral  Dorsiflexion, plantarflexion, inversion, eversion bilateral 5 out of 5 muscle strength  Wiggling toes  Negative Homans  No pain to palpation left foot    Dermatology examination:    Dorsal lateral left foot wound measuring approximately 1 cm x 0.5 cm x 0.5 cm. Plantar wound is well-healed. Wound does not track or probe to bone.

## 2020-10-12 LAB
FUNGUS (MYCOLOGY) CULTURE: NORMAL
FUNGUS STAIN: NORMAL

## 2020-10-27 ENCOUNTER — OFFICE VISIT (OUTPATIENT)
Dept: PODIATRY | Age: 45
End: 2020-10-27

## 2020-10-27 VITALS — RESPIRATION RATE: 16 BRPM | HEIGHT: 77 IN | BODY MASS INDEX: 37.19 KG/M2 | WEIGHT: 315 LBS

## 2020-10-27 LAB
AFB CULTURE (MYCOBACTERIA): NORMAL
AFB SMEAR: NORMAL

## 2020-10-27 PROCEDURE — 99024 POSTOP FOLLOW-UP VISIT: CPT | Performed by: PODIATRIST

## 2020-10-27 NOTE — PROGRESS NOTES
Patient is here today for post op appt. #5. Surgery on 20 of incision and drainage to left 5th metatarsal. Pt has no pain or complaints today. He has been released from [address] [place] [address] and is at home at this time. Pramod Poe : 1975 Sex: male  Age: 54 y.o. Patient was referred by Jesus Alcazar DO    CC:   Postop incision and drainage left foot with fifth metatarsal resection and bone biopsy date of surgery 2020      HPI:   Postop incision and drainage left foot with fifth metatarsal resection and bone biopsy date of surgery 2020    Has been transferred home. Tolerating oral antibiotics well. Discontinue Diflucan as well as Augmentin orally. Has been walking regular shoe. No open wounds today. Has been wearing regular socks and regular shoes. No calf pain no new wounds. Very pleased overall progression. No calf pain.           ROS:  Const: Denies constitutional symptoms  Musculo: Denies symptoms other than stated above  Skin: Denies symptoms other than stated above      Current Outpatient Medications:     enoxaparin (LOVENOX) 40 MG/0.4ML injection, Inject 0.4 mLs into the skin daily, Disp:  , Rfl: 3    insulin glargine (LANTUS) 100 UNIT/ML injection vial, Inject 20 Units into the skin nightly, Disp: 1 vial, Rfl: 3    insulin lispro (HUMALOG) 100 UNIT/ML injection vial, Inject 0-18 Units into the skin 3 times daily (with meals), Disp: 1 vial, Rfl: 3    insulin lispro (HUMALOG) 100 UNIT/ML injection vial, Inject 0-9 Units into the skin nightly, Disp: 1 vial, Rfl: 3    aspirin 81 MG EC tablet, Take 81 mg by mouth 2 times daily, Disp: , Rfl:     buPROPion (WELLBUTRIN XL) 300 MG extended release tablet, Take 300 mg by mouth every morning, Disp: , Rfl:     ARIPiprazole (ABILIFY) 2 MG tablet, Take 4 mg by mouth daily, Disp: , Rfl:     lisinopril (PRINIVIL;ZESTRIL) 10 MG tablet, Take 20 mg by mouth daily, Disp: , Rfl:     pravastatin (PRAVACHOL) 40 MG tablet, Take 40 mg by mouth 9/4/2020    Wound has healed. Skin is well coapted left foot. Patient progressing well. Infectious disease has changed from PICC line to oral antibiotics. Tolerating oral Augmentin and Diflucan well. Importance of follow-up with infectious disease. Importance of following up with primary care physician. Patient has been returned to home currently living with his parents in [place] [address]. Importance of avoiding barefoot. Continue wide well supported walking shoes. Any new skin lesions or abrasions come in immediately. Follow-up 3 weeks to monitor overall progression. Return in about 3 weeks (around 11/17/2020). Seen By:  Jefry Galdamez DPM      Document was created using voice recognition software. Note was reviewed however may contain grammatical errors.

## 2020-10-30 ENCOUNTER — HOSPITAL ENCOUNTER (OUTPATIENT)
Age: 45
Discharge: HOME | End: 2020-11-01

## 2020-10-30 ENCOUNTER — OFFICE VISIT (OUTPATIENT)
Dept: FAMILY MEDICINE CLINIC | Age: 45
End: 2020-10-30

## 2020-10-30 VITALS
OXYGEN SATURATION: 98 % | SYSTOLIC BLOOD PRESSURE: 126 MMHG | DIASTOLIC BLOOD PRESSURE: 80 MMHG | HEART RATE: 83 BPM | TEMPERATURE: 96.9 F | HEIGHT: 77 IN | RESPIRATION RATE: 16 BRPM | BODY MASS INDEX: 37.19 KG/M2 | WEIGHT: 315 LBS

## 2020-10-30 LAB
CREATININE URINE: 36 MG/DL (ref 40–278)
MICROALBUMIN UR-MCNC: 27.3 MG/L
MICROALBUMIN/CREAT UR-RTO: 75.8 (ref 0–30)

## 2020-10-30 PROCEDURE — 99213 OFFICE O/P EST LOW 20 MIN: CPT | Performed by: STUDENT IN AN ORGANIZED HEALTH CARE EDUCATION/TRAINING PROGRAM

## 2020-10-30 PROCEDURE — 82570 ASSAY OF URINE CREATININE: CPT

## 2020-10-30 PROCEDURE — 82044 UR ALBUMIN SEMIQUANTITATIVE: CPT

## 2020-10-30 RX ORDER — INSULIN GLARGINE 100 [IU]/ML
35 INJECTION, SOLUTION SUBCUTANEOUS NIGHTLY
COMMUNITY
End: 2020-11-16

## 2020-10-30 RX ORDER — LISINOPRIL 20 MG/1
1 TABLET ORAL DAILY
COMMUNITY
Start: 2020-07-30 | End: 2020-11-27 | Stop reason: SDUPTHER

## 2020-10-30 ASSESSMENT — PATIENT HEALTH QUESTIONNAIRE - PHQ9
SUM OF ALL RESPONSES TO PHQ QUESTIONS 1-9: 0
1. LITTLE INTEREST OR PLEASURE IN DOING THINGS: 0
SUM OF ALL RESPONSES TO PHQ9 QUESTIONS 1 & 2: 0
2. FEELING DOWN, DEPRESSED OR HOPELESS: 0

## 2020-10-30 ASSESSMENT — ENCOUNTER SYMPTOMS
WHEEZING: 0
SHORTNESS OF BREATH: 0
BLOOD IN STOOL: 0

## 2020-10-30 NOTE — PROGRESS NOTES
Patient:  Patti Mix 54 y.o. male     Date of Service: 10/30/20      Chiefcomplaint:   Chief Complaint   Patient presents with    Post-Op Check     9/4 Left foot surgery (Dr. Vin Aly)    Diabetes     Restarted Metformin, could not afford insulin    Blurred Vision     L eye       History of Present Illness     Hx foot ulcer  Postop incision and drainage left foot with fifth metatarsal resection and bone biopsy   Finished IV abx. Tolerating oral antibiotics well. · Amoxicillin and Diflucan suppression x 6-8 weeks from 10/9  Dr. Vin Aly for podiatry  ID also following    DM - 9.4%. on humalog tid. lantus 20units nightly. Aspirin    Cardio  Tachy in the past - in [place] - cardio - no palp, chest pain. Pravastatin  Ace 10mg    metoprolol    Psych  Lexapro, abilify, wellbutrin - pcp was prescribing. Dr. Patrick Márquez in [place]  Also Dr. Inocencio Camara clinic for psych previously. Not following now    MS - was seeing someone at [address] [place] [address] - needs new physician here    Tetanus 3 years ago    Review of Systems:   Review of Systems   Constitutional: Negative for chills and fever. HENT: Negative for congestion. Respiratory: Negative for shortness of breath and wheezing. Cardiovascular: Negative for chest pain and palpitations. Gastrointestinal: Negative for blood in stool. Endocrine: Negative for polyuria. Skin: Negative for rash. Neurological: Negative for light-headedness.      Pertinent Medical, Family, Surgical, Social History:  Past Medical History:   Diagnosis Date    Diabetes mellitus ([address]     Hyperlipidemia     Hypertension     Kidney stone      Past Surgical History:   Procedure Laterality Date    FOOT SURGERY Left 9/4/2020    INCISION AND DRAINAGE LEFT FOOT  BONE WITH PARTIAL BONE RESECTION FIFTH METATARSAL performed by Joel Bosch DPM at [facility] OR     Physical Exam   Vitals: /80 (Site: Left Upper Arm, Position: Sitting, Cuff Size: Large Adult)   Pulse 83   Temp 96.9 °F (36.1 °C) (Temporal)   Resp 16   Ht 6' 5\" (1.956 m)   Wt (!) 442 lb 9.6 oz (200.8 kg)   SpO2 98%   BMI 52.48 kg/m²   General Appearance: Alert, oriented, no acute distress  HEENT: No scleral icterus. No visible discharge from eyes  Neck: Not rigid. No visible masses  Chest wall/Lung: Clear to auscultation bilaterally,  respirations unlabored. No ronchi/wheezing/rales  Heart: RRR, no murmur  Abdomen: Soft, nontender  Extremities:  No edema  Skin: No rashes. No jaundice  Neuro: Alert and oriented        Psych: Appropriate mood and appropriate affect    Assessment and Plan     1. Diabetic ulcer of toe of left foot associated with diabetes mellitus due to underlying condition, with necrosis of bone ([address]  Following with ID and podiatry. On orals. No current issues. 2. Uncontrolled type 2 diabetes mellitus with hyperglycemia (HCC)  Glipizide vs trulicity vs lantus. Patient left before getting poct glucose so we could further differentiate therapy going forward. Patient advised to call with sugars from the weekend. Continue statin and ace. Check protein. - Microalbumin / creatinine urine ratio; Future  - LIPID PANEL; Future    3. Multiple sclerosis [facility])  Referral to local neurologist. Recommended to see eye doctor as well. - Nela Recinos MD, Neurology, [facility] - [facility]      Return to Office: No follow-ups on file. Rama Mcintosh,        This document may have been prepared at least partially through the use of voice recognition software. Although effort is taken to assure the accuracy of this document, it is possible that grammatical, syntax,  or spelling errors may occur.

## 2020-10-30 NOTE — PATIENT INSTRUCTIONS
See eye doctor  [address], [facility]  [phone]    Referred to neurology    Check lipids

## 2020-11-02 PROBLEM — L03.116 CELLULITIS OF LEFT FOOT: Status: RESOLVED | Noted: 2020-09-03 | Resolved: 2020-11-02

## 2020-11-02 PROBLEM — L02.612 CELLULITIS AND ABSCESS OF TOE OF LEFT FOOT: Status: RESOLVED | Noted: 2020-09-04 | Resolved: 2020-11-02

## 2020-11-02 PROBLEM — L03.032 CELLULITIS AND ABSCESS OF TOE OF LEFT FOOT: Status: RESOLVED | Noted: 2020-09-04 | Resolved: 2020-11-02

## 2020-11-27 NOTE — TELEPHONE ENCOUNTER
Last Appointment   10/30/2020  Next Appointment      - patient states he is to return in 3 mths, last note states 4 wks. Please advise. - Patient states that he went back on Insulin due to the blood sugars was not improving just on Metformin. They was still in the 300's. Since going back to Lantus and Humalog average is between 180-250.    -Do you want patient to continue the Metformin as well? If yes, patient will need notified either by phone or my chart. And refill sent to pharmacy. - Patient needs refills on all medications.  Med's pending drs approval.

## 2020-11-30 RX ORDER — INSULIN GLARGINE 100 [IU]/ML
20 INJECTION, SOLUTION SUBCUTANEOUS NIGHTLY
Qty: 2 VIAL | Refills: 3 | Status: SHIPPED
Start: 2020-11-30 | End: 2021-01-11

## 2020-11-30 RX ORDER — LISINOPRIL 20 MG/1
20 TABLET ORAL DAILY
Qty: 30 TABLET | Refills: 3 | Status: SHIPPED
Start: 2020-11-30 | End: 2021-05-27 | Stop reason: SDUPTHER

## 2020-11-30 RX ORDER — PRAVASTATIN SODIUM 40 MG
40 TABLET ORAL NIGHTLY
Qty: 30 TABLET | Refills: 3 | Status: SHIPPED
Start: 2020-11-30 | End: 2021-03-29 | Stop reason: SDUPTHER

## 2020-11-30 RX ORDER — ESCITALOPRAM OXALATE 20 MG/1
20 TABLET ORAL DAILY
Qty: 30 TABLET | Refills: 3 | Status: SHIPPED
Start: 2020-11-30 | End: 2021-03-29 | Stop reason: SDUPTHER

## 2020-11-30 RX ORDER — BUPROPION HYDROCHLORIDE 300 MG/1
300 TABLET ORAL EVERY MORNING
Qty: 30 TABLET | Refills: 3 | Status: SHIPPED
Start: 2020-11-30 | End: 2021-03-29 | Stop reason: SDUPTHER

## 2020-11-30 RX ORDER — ARIPIPRAZOLE 2 MG/1
4 TABLET ORAL DAILY
Qty: 30 TABLET | Refills: 3 | Status: SHIPPED
Start: 2020-11-30 | End: 2021-06-01 | Stop reason: SDUPTHER

## 2020-11-30 NOTE — TELEPHONE ENCOUNTER
Patient states the Humalog is not covered by his insurance. However, Novalog is.  Can you change to Novalog

## 2020-11-30 NOTE — TELEPHONE ENCOUNTER
Patient notified, he will restart Metformin, appointment scheduled. He will call or send my chart message with blood sugars.

## 2020-12-01 RX ORDER — INSULIN ASPART 100 [IU]/ML
INJECTION, SOLUTION INTRAVENOUS; SUBCUTANEOUS
Qty: 5 PEN | Refills: 3 | Status: SHIPPED
Start: 2020-12-01 | End: 2021-03-11 | Stop reason: SDUPTHER

## 2020-12-01 NOTE — TELEPHONE ENCOUNTER
Patient needs to check his sugars 3 times a day including once before breakfast and give us this data so we know how much novolog he needs. Right now he is just to start with lantus only until we know more about his sugars throughout the day.

## 2020-12-01 NOTE — TELEPHONE ENCOUNTER
Per patient blood sugar has been running between 150-200 (before breakfast) w/Novolog. He uses 20U Lantus nightly, and is no longer taking Metformin. I advised him Metformin should be continued with other medications and he stated he will pick that up today. He feels Novolog has been working well for him and does not want to take a step back by stopping it.

## 2020-12-01 NOTE — TELEPHONE ENCOUNTER
Patient informed to log/track how much Novolog he is using daily so we can start giving a fixed dose with meal and decrease use of the sliding scale. Asked that he call next week with that information & he agreed to do so.

## 2020-12-29 ENCOUNTER — HOSPITAL ENCOUNTER (OUTPATIENT)
Age: 45
Discharge: HOME | End: 2020-12-29
Payer: COMMERCIAL

## 2020-12-29 LAB
ALBUMIN SERPL-MCNC: 4.1 G/DL (ref 3.5–5.2)
ALP BLD-CCNC: 108 U/L (ref 40–129)
ALT SERPL-CCNC: 31 U/L (ref 0–40)
ANION GAP SERPL CALCULATED.3IONS-SCNC: 10 MMOL/L (ref 7–16)
AST SERPL-CCNC: 20 U/L (ref 0–39)
BILIRUB SERPL-MCNC: 0.4 MG/DL (ref 0–1.2)
BUN BLDV-MCNC: 14 MG/DL (ref 6–20)
C-REACTIVE PROTEIN: 0.6 MG/DL (ref 0–0.4)
CALCIUM SERPL-MCNC: 9.2 MG/DL (ref 8.6–10.2)
CHLORIDE BLD-SCNC: 97 MMOL/L (ref 98–107)
CHOLESTEROL, TOTAL: 159 MG/DL (ref 0–199)
CO2: 26 MMOL/L (ref 22–29)
CREAT SERPL-MCNC: 0.7 MG/DL (ref 0.7–1.2)
GFR AFRICAN AMERICAN: >60
GFR NON-AFRICAN AMERICAN: >60 ML/MIN/1.73
GLUCOSE BLD-MCNC: 419 MG/DL (ref 74–99)
HCT VFR BLD CALC: 51.8 % (ref 37–54)
HDLC SERPL-MCNC: 41 MG/DL
HEMOGLOBIN: 17.4 G/DL (ref 12.5–16.5)
LDL CHOLESTEROL CALCULATED: 80 MG/DL (ref 0–99)
MCH RBC QN AUTO: 32.3 PG (ref 26–35)
MCHC RBC AUTO-ENTMCNC: 33.6 % (ref 32–34.5)
MCV RBC AUTO: 96.1 FL (ref 80–99.9)
PDW BLD-RTO: 12.6 FL (ref 11.5–15)
PLATELET # BLD: 200 E9/L (ref 130–450)
PMV BLD AUTO: 12.7 FL (ref 7–12)
POTASSIUM SERPL-SCNC: 4.2 MMOL/L (ref 3.5–5)
RBC # BLD: 5.39 E12/L (ref 3.8–5.8)
SEDIMENTATION RATE, ERYTHROCYTE: 7 MM/HR (ref 0–15)
SODIUM BLD-SCNC: 133 MMOL/L (ref 132–146)
TOTAL PROTEIN: 7.5 G/DL (ref 6.4–8.3)
TRIGL SERPL-MCNC: 188 MG/DL (ref 0–149)
VLDLC SERPL CALC-MCNC: 38 MG/DL
WBC # BLD: 10 E9/L (ref 4.5–11.5)

## 2020-12-29 PROCEDURE — 80061 LIPID PANEL: CPT

## 2020-12-29 PROCEDURE — 36415 COLL VENOUS BLD VENIPUNCTURE: CPT

## 2020-12-29 PROCEDURE — 85651 RBC SED RATE NONAUTOMATED: CPT

## 2020-12-29 PROCEDURE — 85027 COMPLETE CBC AUTOMATED: CPT

## 2020-12-29 PROCEDURE — 80053 COMPREHEN METABOLIC PANEL: CPT

## 2020-12-29 PROCEDURE — 86140 C-REACTIVE PROTEIN: CPT

## 2021-01-11 ENCOUNTER — OFFICE VISIT (OUTPATIENT)
Dept: FAMILY MEDICINE CLINIC | Age: 46
End: 2021-01-11
Payer: COMMERCIAL

## 2021-01-11 VITALS
DIASTOLIC BLOOD PRESSURE: 86 MMHG | WEIGHT: 315 LBS | SYSTOLIC BLOOD PRESSURE: 138 MMHG | HEART RATE: 74 BPM | TEMPERATURE: 96.9 F | RESPIRATION RATE: 16 BRPM | BODY MASS INDEX: 37.19 KG/M2 | HEIGHT: 77 IN | OXYGEN SATURATION: 98 %

## 2021-01-11 DIAGNOSIS — E11.65 UNCONTROLLED TYPE 2 DIABETES MELLITUS WITH HYPERGLYCEMIA (HCC): Primary | ICD-10-CM

## 2021-01-11 LAB — HBA1C MFR BLD: 11.9 %

## 2021-01-11 PROCEDURE — 99213 OFFICE O/P EST LOW 20 MIN: CPT | Performed by: STUDENT IN AN ORGANIZED HEALTH CARE EDUCATION/TRAINING PROGRAM

## 2021-01-11 PROCEDURE — 83036 HEMOGLOBIN GLYCOSYLATED A1C: CPT | Performed by: STUDENT IN AN ORGANIZED HEALTH CARE EDUCATION/TRAINING PROGRAM

## 2021-01-11 RX ORDER — INSULIN GLARGINE 100 [IU]/ML
24 INJECTION, SOLUTION SUBCUTANEOUS NIGHTLY
Qty: 2 VIAL | Refills: 3
Start: 2021-01-11 | End: 2021-01-13

## 2021-01-11 RX ORDER — ERTUGLIFLOZIN 15 MG/1
1 TABLET, FILM COATED ORAL
COMMUNITY
End: 2021-01-11

## 2021-01-11 RX ORDER — ERTUGLIFLOZIN 15 MG/1
15 TABLET, FILM COATED ORAL DAILY
Qty: 30 TABLET | Refills: 3
Start: 2021-01-11 | End: 2021-06-01 | Stop reason: SINTOL

## 2021-01-11 ASSESSMENT — PATIENT HEALTH QUESTIONNAIRE - PHQ9
2. FEELING DOWN, DEPRESSED OR HOPELESS: 0
SUM OF ALL RESPONSES TO PHQ QUESTIONS 1-9: 0
1. LITTLE INTEREST OR PLEASURE IN DOING THINGS: 0
SUM OF ALL RESPONSES TO PHQ9 QUESTIONS 1 & 2: 0

## 2021-01-11 ASSESSMENT — ENCOUNTER SYMPTOMS
BLOOD IN STOOL: 0
WHEEZING: 0
SHORTNESS OF BREATH: 0

## 2021-01-11 NOTE — PROGRESS NOTES
Patient:  Ryder Smith 54 y.o. male     Date of Service: 10/30/20      Chiefcomplaint:   Chief Complaint   Patient presents with    Diabetes     1/9/2021 restarted Steglatro, patient forgot about med.  Hypertension      History of Present Illness     DM - 9.4% previously. Worse today. on humalog tid 10-12 units with sliding scale. lantus 20units nightly. Aspirin  steglatro  Metformin 1000mg bid. Not seeing an eye doctor currently. He has brought a glucose log with him today. His sugars are consistently 300-400. He states that he feels great. No n/v, abd pain, visual changes. Review of Systems:   Review of Systems   Constitutional: Negative for chills and fever. HENT: Negative for congestion. Respiratory: Negative for shortness of breath and wheezing. Cardiovascular: Negative for chest pain and palpitations. Gastrointestinal: Negative for blood in stool. Endocrine: Negative for polyuria. Skin: Negative for rash. Neurological: Negative for light-headedness. Pertinent Medical, Family, Surgical, Social History:  Past Medical History:   Diagnosis Date    Cellulitis of left foot 9/3/2020    Diabetes mellitus ([address]     Hyperlipidemia     Hypertension     Kidney stone      Past Surgical History:   Procedure Laterality Date    FOOT SURGERY Left 9/4/2020    INCISION AND DRAINAGE LEFT FOOT  BONE WITH PARTIAL BONE RESECTION FIFTH METATARSAL performed by Carole Sloan DPM at [facility] OR     Hx foot ulcer  left foot with fifth metatarsal resection and bone biopsy   Required iv abx including diflucan. Dr. Nida Luis for podiatry    Cardio  Pravastatin  Ace 10mg    Metoprolol - had tachycardia in the past    Psych  Lexapro, abilify, wellbutrin - pcp was prescribing. Dr. Cristina Poag in [place]  Also Dr. Nils cordoba for psych previously. Not following now    MS - was seeing someone at [address] [place] [address] - needs new physician here.  Hasn't followed up with previous referral    Physical Exam   Vitals: /86   Pulse 74   Temp 96.9 °F (36.1 °C) (Temporal)   Resp 16   Ht 6' 5\" (1.956 m)   Wt (!) 449 lb (203.7 kg)   SpO2 98%   BMI 53.24 kg/m²   General Appearance: Alert, oriented, no acute distress  HEENT: No scleral icterus. No visible discharge from eyes  Neck: Not rigid. No visible masses  Chest wall/Lung: Clear to auscultation bilaterally,  respirations unlabored. No ronchi/wheezing/rales  Heart: RRR, no murmur  Abdomen: Soft, nontender  Extremities:  No edema  Skin: No rashes. No jaundice  Neuro: Alert and oriented        Psych: Appropriate mood and appropriate affect    Assessment and Plan   1. Uncontrolled type 2 diabetes mellitus with hyperglycemia (HCC)  Increase lantus to 24 units. Glucose elevated all day. Will continue to titrate after this initial increase in lantus. Was not compliant with previous efforts to titrate sugars  Recommend improvement in diet, specifically pasta and simple carbs. Continue steglatro. Continue statin and ace. Continue metformin. Close fu. Return to Office: No follow-ups on file. Laine Bardales, DO       This document may have been prepared at least partially through the use of voice recognition software. Although effort is taken to assure the accuracy of this document, it is possible that grammatical, syntax,  or spelling errors may occur.

## 2021-01-11 NOTE — PATIENT INSTRUCTIONS
Increase lantus to 24 units nightly. Continue checking sugars 3 times a day. Anticipate additional increases in insulin. Sliding Scale  For blood glucose >150, inject additional insulin as below ; 0-7  Units into the skin up to 3 times daily (with meals).      Corrective Algorithm/ Sliding Scale  Glucose: Dose:  If <139              No additional insulin  140-199  1 Units extra  200-249  2 Units  250-299  3 Units  300-349  4 Units  350-400  5 Units  Above 400        7 Units

## 2021-01-11 NOTE — PROGRESS NOTES
S: 54 y.o. male with   Chief Complaint   Patient presents with    Diabetes     1/9/2021 restarted Steglatro, patient forgot about med.  Hypertension       DM2, HTN - GLC poorly controlled >300 GLC. O: VS:  height is 6' 5\" (1.956 m) and weight is 449 lb (203.7 kg) (abnormal). His temporal temperature is 96.9 °F (36.1 °C). His blood pressure is 138/86 and his pulse is 74. His respiration is 16 and oxygen saturation is 98%. BP Readings from Last 3 Encounters:   01/11/21 138/86   10/30/20 126/80   10/09/20 (!) 157/98     See resident note      Impression/Plan:   1) Dm2 - increase insulin  2) HTN - controlled      Health Maintenance Due   Topic Date Due    Hepatitis C screen  1975    Diabetic retinal exam  03/14/1984    HIV screen  03/14/1989    Hepatitis B vaccine (1 of 3 - Risk 3-dose series) 03/14/1993    DTaP/Tdap/Td vaccine (1 - Tdap) 03/14/1993         Attending Physician Statement  I have discussed the case, including pertinent history and exam findings with the resident. I agree with the documented assessment and plan.       Aspen Lima MD

## 2021-01-11 NOTE — PROGRESS NOTES
Home Blood sugar Log (patient brought from home)  1/5: am 433; noon 281; dinner 334  1/6: am 490; noon 424; dinner 426  1/7: am 412; noon 500; dinner 432  1/8: am 406; noon 432; dinner 416  1/9: am 361; dinner 426  1/10: am 304; noon 352; dinner 390  1/11: am 326; noon 362

## 2021-01-12 ENCOUNTER — TELEPHONE (OUTPATIENT)
Dept: FAMILY MEDICINE CLINIC | Age: 46
End: 2021-01-12

## 2021-01-12 NOTE — TELEPHONE ENCOUNTER
----- Message from Joel Lawler DO sent at 1/11/2021  3:59 PM EST -----  Regarding: Call for insulin titration  Please fu this week with patient to get sugar logs for insulin titration.

## 2021-01-13 DIAGNOSIS — E11.65 UNCONTROLLED TYPE 2 DIABETES MELLITUS WITH HYPERGLYCEMIA (HCC): Primary | ICD-10-CM

## 2021-01-13 RX ORDER — INSULIN GLARGINE 100 [IU]/ML
28 INJECTION, SOLUTION SUBCUTANEOUS NIGHTLY
Qty: 2 VIAL | Refills: 3
Start: 2021-01-13 | End: 2021-01-29

## 2021-01-19 ENCOUNTER — OFFICE VISIT (OUTPATIENT)
Dept: PODIATRY | Age: 46
End: 2021-01-19
Payer: COMMERCIAL

## 2021-01-19 DIAGNOSIS — L03.032 CELLULITIS AND ABSCESS OF TOE OF LEFT FOOT: ICD-10-CM

## 2021-01-19 DIAGNOSIS — S91.302A OPEN WOUND OF LEFT FOOT, INITIAL ENCOUNTER: ICD-10-CM

## 2021-01-19 DIAGNOSIS — M86.172 ACUTE OSTEOMYELITIS OF METATARSAL BONE OF LEFT FOOT (HCC): Primary | ICD-10-CM

## 2021-01-19 DIAGNOSIS — L02.612 CELLULITIS AND ABSCESS OF TOE OF LEFT FOOT: ICD-10-CM

## 2021-01-19 PROCEDURE — 99213 OFFICE O/P EST LOW 20 MIN: CPT | Performed by: PODIATRIST

## 2021-01-19 RX ORDER — GUAIFENESIN/PSEUDOEPHEDRNE HCL 600MG-60MG
1 TABLET, EXTENDED RELEASE 12 HR ORAL 2 TIMES DAILY
Qty: 1 TUBE | Refills: 2 | Status: SHIPPED
Start: 2021-01-19 | End: 2021-06-01

## 2021-01-19 NOTE — PROGRESS NOTES
Patient in office with c/o open area side of left foot. Patients states he is unsure of what happened to foot but he noticed  when getting into shower that his foot had opened. Maurice Benedict : 1975 Sex: male  Age: 54 y.o. Patient was referred by Rina Judge DO    CC:   Postop incision and drainage left foot with fifth metatarsal resection and bone biopsy date of surgery 2020    New injury open wound left foot    HPI:   Postop incision and drainage left foot with fifth metatarsal resection and bone biopsy date of surgery 2020    Denice Stratton presents today new injury open wound left foot. States he was getting out of shower this past week and did notice a wound with some bleeding on the outside left foot. Denies any pain. No calf pain. Has been walking in regular shoes. Denies any drainage but has had some bleeding. Does continue a Band-Aid once daily. Denies nausea vomiting fever chills shortness of breath. ROS:  Const: Denies constitutional symptoms  Musculo: Denies symptoms other than stated above  Skin: Denies symptoms other than stated above      Current Outpatient Medications:     Wound Dressings ([facility] MANUKA HONEY WOUND) GEL, Apply 1 Tube topically 2 times daily, Disp: 1 Tube, Rfl: 2    insulin glargine (LANTUS) 100 UNIT/ML injection vial, Inject 28 Units into the skin nightly, Disp: 2 vial, Rfl: 3    Ertugliflozin L-PyroglutamicAc (STEGLATRO) 15 MG TABS, Take 15 mg by mouth daily, Disp: 30 tablet, Rfl: 3    insulin aspart (NOVOLOG FLEXPEN) 100 UNIT/ML injection pen, 3 times daily before meals according to sliding scale.  (Patient taking differently: Inject into the skin 3 times daily (before meals) 10-12 units 3 times daily before meals according to sliding scale.), Disp: 5 pen, Rfl: 3    lisinopril (PRINIVIL;ZESTRIL) 20 MG tablet, Take 1 tablet by mouth daily, Disp: 30 tablet, Rfl: 3    metFORMIN (GLUCOPHAGE) 1000 MG tablet, Take 1 tablet by mouth 2 times daily, Disp: 60 tablet, Rfl: 3    buPROPion (WELLBUTRIN XL) 300 MG extended release tablet, Take 1 tablet by mouth every morning, Disp: 30 tablet, Rfl: 3    ARIPiprazole (ABILIFY) 2 MG tablet, Take 2 tablets by mouth daily, Disp: 30 tablet, Rfl: 3    pravastatin (PRAVACHOL) 40 MG tablet, Take 1 tablet by mouth nightly, Disp: 30 tablet, Rfl: 3    escitalopram (LEXAPRO) 20 MG tablet, Take 1 tablet by mouth daily, Disp: 30 tablet, Rfl: 3    metoprolol tartrate (LOPRESSOR) 25 MG tablet, Take 1 tablet by mouth 2 times daily, Disp: 60 tablet, Rfl: 3    aspirin 81 MG EC tablet, Take 81 mg by mouth 2 times daily, Disp: , Rfl:     cetirizine (ZYRTEC) 10 MG tablet, Take 10 mg by mouth daily, Disp: , Rfl:     omeprazole (PRILOSEC) 20 MG delayed release capsule, Take 20 mg by mouth daily, Disp: , Rfl:   Allergies   Allergen Reactions    Milk-Related Compounds      Stomach issues-lactose intolerant       Past Medical History:   Diagnosis Date    Cellulitis of left foot 9/3/2020    Diabetes mellitus ([facility]     Hyperlipidemia     Hypertension     Kidney stone        There were no vitals filed for this visit. Work History/Social History:     Focused Lower Extremity Physical Exam:      Neurovascular examination:    Dorsalis Pedis palpable bilateral.  Posterior tibialis non-palpable bilateral.    Capillary Refill Time:  Immediate return  Hair growth:  Symmetrical and bilateral   Skin:  Not atrophic  Edema: Mild edema bilateral feet. Mild edema bilateral ankles.   Neurologic:  Light touch diminished bilateral.  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 Wound 1% granular. No surrounding erythema or edema. Assessment and Plan:  Cole Delong was seen today for diabetes and wound check. Diagnoses and all orders for this visit:    Acute osteomyelitis of metatarsal bone of left foot (HCC)  -     XR FOOT LEFT (MIN 3 VIEWS); Future    Cellulitis and abscess of toe of left foot  -     XR FOOT LEFT (MIN 3 VIEWS); Future    Open wound of left foot, initial encounter    Other orders  -     Wound Dressings ([facility]UKA HONEY WOUND) GEL; Apply 1 Tube topically 2 times daily        Postop incision and drainage left foot with fifth metatarsal resection and bone biopsy date of surgery 9/4/2020      New wound on the outside left foot. I did review radiographs postsurgical changes consistent with partial amputation fifth metatarsal left foot. Offloading padding dispensed. Continue Medihoney once daily with Band-Aid. Avoid barefoot. Any increased redness drainage go to emergency room. Does have previous history of partial amputation fifth metatarsal has progressed very well. Follow-up 2 weeks. Return in about 2 weeks (around 2/2/2021). Seen By:  Drew Charlton DPM      Document was created using voice recognition software. Note was reviewed however may contain grammatical errors.

## 2021-01-28 ENCOUNTER — PATIENT MESSAGE (OUTPATIENT)
Dept: FAMILY MEDICINE CLINIC | Age: 46
End: 2021-01-28

## 2021-01-28 DIAGNOSIS — E11.65 UNCONTROLLED TYPE 2 DIABETES MELLITUS WITH HYPERGLYCEMIA (HCC): Primary | ICD-10-CM

## 2021-01-29 NOTE — TELEPHONE ENCOUNTER
From: Kaci Godoy  Sent: 1/28/2021 3:33 PM EST  To: [facility] Clinical Staff  Subject: RE: Prescription Question    Can you prescribe Basaglar? I need it.    ----- Message -----  From: Aleksandar Garcia MA  Sent: 1/28/21, 2:14 PM  To: Kaci Godoy  Subject: RE: Prescription Question    Patmarcial Murillo,     Are you completely out of the Lantus? Also, have you tried or been unable to try [address]? It works the same as Lantus, but may not be as expensive for you. If you are out of medication I will check to see if we have any samples in the office. ----- Message -----   Tyson Meier   Sent:1/28/2021 11:29 AM EST   To:Lul Coe DO   Subject:Prescription Question    My Lantus solo star is not covered by my insurance. I am out of the samples you gave me. My numbers are down at the 28 you recommended. Please help.

## 2021-02-02 ENCOUNTER — OFFICE VISIT (OUTPATIENT)
Dept: PODIATRY | Age: 46
End: 2021-02-02
Payer: COMMERCIAL

## 2021-02-02 VITALS — BODY MASS INDEX: 37.19 KG/M2 | WEIGHT: 315 LBS | HEIGHT: 77 IN

## 2021-02-02 DIAGNOSIS — E11.65 UNCONTROLLED TYPE 2 DIABETES MELLITUS WITH HYPERGLYCEMIA (HCC): ICD-10-CM

## 2021-02-02 DIAGNOSIS — M86.172 ACUTE OSTEOMYELITIS OF METATARSAL BONE OF LEFT FOOT (HCC): Primary | ICD-10-CM

## 2021-02-02 DIAGNOSIS — S91.302D OPEN WOUND OF LEFT FOOT, SUBSEQUENT ENCOUNTER: ICD-10-CM

## 2021-02-02 PROCEDURE — 99213 OFFICE O/P EST LOW 20 MIN: CPT | Performed by: PODIATRIST

## 2021-02-02 NOTE — PROGRESS NOTES
Patient in office for wound check left foot. Jackelyn Paulson : 1975 Sex: male  Age: 54 y.o. Patient was referred by Lima Castaneda DO    CC:   Postop incision and drainage left foot with fifth metatarsal resection and bone biopsy date of surgery 2020    Open wound left foot    HPI:   Postop incision and drainage left foot with fifth metatarsal resection and bone biopsy date of surgery 2020    2-week follow-up open wound left foot  Has been applying Neosporin and a Band-Aid once daily. Does present with regular shoes. States he has been wearing mostly slippers at home. Denies any drainage. States he feels the wound has improved since last visit. Denies nausea vomiting fever chills shortness of breath. No additional pedal complaints. ROS:  Const: Denies constitutional symptoms  Musculo: Denies symptoms other than stated above  Skin: Denies symptoms other than stated above      Current Outpatient Medications:     NOVOFINE PLUS 32G X 4 MM MISC, USE EVERY DAY UNDER THE SKIN, Disp: , Rfl:     insulin glargine (LANTUS;BASAGLAR) 100 UNIT/ML injection pen, Inject 28 Units into the skin nightly, Disp: 5 pen, Rfl: 1    Wound Dressings ([facility]UKA HONEY WOUND) GEL, Apply 1 Tube topically 2 times daily, Disp: 1 Tube, Rfl: 2    Ertugliflozin L-PyroglutamicAc (STEGLATRO) 15 MG TABS, Take 15 mg by mouth daily, Disp: 30 tablet, Rfl: 3    insulin aspart (NOVOLOG FLEXPEN) 100 UNIT/ML injection pen, 3 times daily before meals according to sliding scale.  (Patient taking differently: Inject into the skin 3 times daily (before meals) 10-12 units 3 times daily before meals according to sliding scale.), Disp: 5 pen, Rfl: 3    lisinopril (PRINIVIL;ZESTRIL) 20 MG tablet, Take 1 tablet by mouth daily, Disp: 30 tablet, Rfl: 3    metFORMIN (GLUCOPHAGE) 1000 MG tablet, Take 1 tablet by mouth 2 times daily, Disp: 60 tablet, Rfl: 3    buPROPion (WELLBUTRIN XL) 300 MG extended release tablet, Take 1 tablet by mouth every morning, Disp: 30 tablet, Rfl: 3    ARIPiprazole (ABILIFY) 2 MG tablet, Take 2 tablets by mouth daily, Disp: 30 tablet, Rfl: 3    pravastatin (PRAVACHOL) 40 MG tablet, Take 1 tablet by mouth nightly, Disp: 30 tablet, Rfl: 3    escitalopram (LEXAPRO) 20 MG tablet, Take 1 tablet by mouth daily, Disp: 30 tablet, Rfl: 3    metoprolol tartrate (LOPRESSOR) 25 MG tablet, Take 1 tablet by mouth 2 times daily, Disp: 60 tablet, Rfl: 3    aspirin 81 MG EC tablet, Take 81 mg by mouth 2 times daily, Disp: , Rfl:     cetirizine (ZYRTEC) 10 MG tablet, Take 10 mg by mouth daily, Disp: , Rfl:     omeprazole (PRILOSEC) 20 MG delayed release capsule, Take 20 mg by mouth daily, Disp: , Rfl:   Allergies   Allergen Reactions    Milk-Related Compounds      Stomach issues-lactose intolerant       Past Medical History:   Diagnosis Date    Cellulitis of left foot 9/3/2020    Diabetes mellitus ([facility] [address]     Hyperlipidemia     Hypertension     Kidney stone        There were no vitals filed for this visit. Work History/Social History:     Focused Lower Extremity Physical Exam:      Neurovascular examination:    Dorsalis Pedis palpable bilateral.  Posterior tibialis non-palpable bilateral.    Capillary Refill Time:  Immediate return  Hair growth:  Symmetrical and bilateral   Skin:  Not atrophic  Edema: Mild edema bilateral feet. Mild edema bilateral ankles. Neurologic:  Light touch diminished bilateral.  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   Wound 100%

## 2021-03-11 DIAGNOSIS — E11.65 UNCONTROLLED TYPE 2 DIABETES MELLITUS WITH HYPERGLYCEMIA (HCC): ICD-10-CM

## 2021-03-11 RX ORDER — INSULIN ASPART 100 [IU]/ML
10-12 INJECTION, SOLUTION INTRAVENOUS; SUBCUTANEOUS
Qty: 5 PEN | Refills: 3 | Status: SHIPPED
Start: 2021-03-11 | End: 2021-08-23

## 2021-03-11 NOTE — TELEPHONE ENCOUNTER
Last Appointment   1/11/2021  Next Appointment      Patient needs refill on novolog.  Med pending drs approval.

## 2021-03-29 DIAGNOSIS — F32.A DEPRESSION, UNSPECIFIED DEPRESSION TYPE: ICD-10-CM

## 2021-03-29 DIAGNOSIS — E78.5 HYPERLIPIDEMIA, UNSPECIFIED HYPERLIPIDEMIA TYPE: ICD-10-CM

## 2021-03-29 DIAGNOSIS — I10 ESSENTIAL HYPERTENSION: ICD-10-CM

## 2021-03-29 RX ORDER — BUPROPION HYDROCHLORIDE 300 MG/1
300 TABLET ORAL EVERY MORNING
Qty: 30 TABLET | Refills: 3 | Status: SHIPPED
Start: 2021-03-29 | End: 2021-07-26

## 2021-03-29 RX ORDER — PRAVASTATIN SODIUM 40 MG
40 TABLET ORAL NIGHTLY
Qty: 30 TABLET | Refills: 3 | Status: SHIPPED
Start: 2021-03-29 | End: 2021-07-26

## 2021-03-29 RX ORDER — ESCITALOPRAM OXALATE 20 MG/1
20 TABLET ORAL DAILY
Qty: 30 TABLET | Refills: 3 | Status: SHIPPED
Start: 2021-03-29 | End: 2021-07-26

## 2021-03-29 NOTE — TELEPHONE ENCOUNTER
Requested Prescriptions     Pending Prescriptions Disp Refills    escitalopram (LEXAPRO) 20 MG tablet 30 tablet 3     Sig: Take 1 tablet by mouth daily    pravastatin (PRAVACHOL) 40 MG tablet 30 tablet 3     Sig: Take 1 tablet by mouth nightly    buPROPion (WELLBUTRIN XL) 300 MG extended release tablet 30 tablet 3     Sig: Take 1 tablet by mouth every morning    metoprolol tartrate (LOPRESSOR) 25 MG tablet 60 tablet 3     Sig: Take 1 tablet by mouth 2 times daily

## 2021-04-27 ENCOUNTER — OFFICE VISIT (OUTPATIENT)
Dept: PODIATRY | Age: 46
End: 2021-04-27
Payer: COMMERCIAL

## 2021-04-27 VITALS — HEIGHT: 77 IN | BODY MASS INDEX: 37.19 KG/M2 | WEIGHT: 315 LBS

## 2021-04-27 DIAGNOSIS — L03.116 CELLULITIS OF LEFT FOOT: ICD-10-CM

## 2021-04-27 DIAGNOSIS — S91.302A OPEN WOUND OF LEFT FOOT, INITIAL ENCOUNTER: ICD-10-CM

## 2021-04-27 DIAGNOSIS — M86.172 ACUTE OSTEOMYELITIS OF METATARSAL BONE OF LEFT FOOT (HCC): ICD-10-CM

## 2021-04-27 DIAGNOSIS — S91.302A OPEN WOUND OF LEFT FOOT, INITIAL ENCOUNTER: Primary | ICD-10-CM

## 2021-04-27 DIAGNOSIS — E11.65 UNCONTROLLED TYPE 2 DIABETES MELLITUS WITH HYPERGLYCEMIA (HCC): ICD-10-CM

## 2021-04-27 PROCEDURE — 99214 OFFICE O/P EST MOD 30 MIN: CPT | Performed by: PODIATRIST

## 2021-04-27 RX ORDER — DOXYCYCLINE HYCLATE 100 MG/1
100 CAPSULE ORAL 2 TIMES DAILY
Qty: 14 CAPSULE | Refills: 0 | Status: SHIPPED | OUTPATIENT
Start: 2021-04-27 | End: 2021-05-04

## 2021-04-27 NOTE — PROGRESS NOTES
Patient in office with c/o open area to left foot. States it has been present for about 2 weeks. Also c/o wound to right great toe from patient cutting toenails. Devon Clay : 1975 Sex: male  Age: 51 y.o. Patient was referred by Elmer Martines DO    CC:   Postop incision and drainage left foot with fifth metatarsal resection and bone biopsy date of surgery 2020  New wound left foot      HPI:   Postop incision and drainage left foot with fifth metatarsal resection and bone biopsy date of surgery 2020  Candy Noble presents today wound left foot. Has had a new wound on the bottom left foot for the past several weeks. Denies injury or trauma. Not currently on antibiotics. Denies nausea vomiting fever chills shortness of breath. States he was wearing crocs and does think the crocs are what caused a new wound. Denies calf pain. No additional pedal complaints.           ROS:  Const: Denies constitutional symptoms  Musculo: Denies symptoms other than stated above  Skin: Denies symptoms other than stated above      Current Outpatient Medications:     doxycycline hyclate (VIBRAMYCIN) 100 MG capsule, Take 1 capsule by mouth 2 times daily for 7 days, Disp: 14 capsule, Rfl: 0    escitalopram (LEXAPRO) 20 MG tablet, Take 1 tablet by mouth daily, Disp: 30 tablet, Rfl: 3    pravastatin (PRAVACHOL) 40 MG tablet, Take 1 tablet by mouth nightly, Disp: 30 tablet, Rfl: 3    buPROPion (WELLBUTRIN XL) 300 MG extended release tablet, Take 1 tablet by mouth every morning, Disp: 30 tablet, Rfl: 3    metoprolol tartrate (LOPRESSOR) 25 MG tablet, Take 1 tablet by mouth 2 times daily, Disp: 60 tablet, Rfl: 3    insulin aspart (NOVOLOG FLEXPEN) 100 UNIT/ML injection pen, Inject 10-12 Units into the skin 3 times daily (before meals) 10-12 units 3 times daily before meals according to sliding scale., Disp: 5 pen, Rfl: 3    NOVOFINE PLUS 32G X 4 MM MISC, USE EVERY DAY UNDER THE SKIN, Disp: , Rfl:     insulin glargine (LANTUS;BASAGLAR) 100 UNIT/ML injection pen, Inject 28 Units into the skin nightly, Disp: 5 pen, Rfl: 1    Wound Dressings ([employer] HONEY WOUND) GEL, Apply 1 Tube topically 2 times daily, Disp: 1 Tube, Rfl: 2    Ertugliflozin L-PyroglutamicAc (STEGLATRO) 15 MG TABS, Take 15 mg by mouth daily, Disp: 30 tablet, Rfl: 3    lisinopril (PRINIVIL;ZESTRIL) 20 MG tablet, Take 1 tablet by mouth daily, Disp: 30 tablet, Rfl: 3    metFORMIN (GLUCOPHAGE) 1000 MG tablet, Take 1 tablet by mouth 2 times daily, Disp: 60 tablet, Rfl: 3    ARIPiprazole (ABILIFY) 2 MG tablet, Take 2 tablets by mouth daily, Disp: 30 tablet, Rfl: 3    aspirin 81 MG EC tablet, Take 81 mg by mouth 2 times daily, Disp: , Rfl:     cetirizine (ZYRTEC) 10 MG tablet, Take 10 mg by mouth daily, Disp: , Rfl:     omeprazole (PRILOSEC) 20 MG delayed release capsule, Take 20 mg by mouth daily, Disp: , Rfl:   Allergies   Allergen Reactions    Milk-Related Compounds      Stomach issues-lactose intolerant       Past Medical History:   Diagnosis Date    Cellulitis of left foot 9/3/2020    Diabetes mellitus ([facility] [address]     Hyperlipidemia     Hypertension     Kidney stone        There were no vitals filed for this visit. Work History/Social History:     Focused Lower Extremity Physical Exam:      Neurovascular examination:    Dorsalis Pedis palpable bilateral.  Posterior tibialis non-palpable bilateral due to swelling. Capillary Refill Time:  Immediate return  Hair growth:  Symmetrical and bilateral   Skin:  Not atrophic  Edema: Mild edema bilateral feet. Mild edema bilateral ankles.   Neurologic:  Light touch diminished bilateral.  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 Wound plantar left fifth metatarsal measuring approximately 0.8 cm x 0.8 cm x 0.2 cm. Mild surrounding hyperkeratotic tissue. Wound is 100% granular. No significant drainage noted. Wound does not track or probe to bone. Assessment and Plan:  Kathe Rollins was seen today for diabetes and wound check. Diagnoses and all orders for this visit:    Open wound of left foot, initial encounter  -     Culture, Wound; Future    Acute osteomyelitis of metatarsal bone of left foot (HCC)    Uncontrolled type 2 diabetes mellitus with hyperglycemia (HCC)    Cellulitis of left foot    Other orders  -     doxycycline hyclate (VIBRAMYCIN) 100 MG capsule; Take 1 capsule by mouth 2 times daily for 7 days        Postop incision and drainage left foot with fifth metatarsal resection and bone biopsy date of surgery 9/4/2020      New wound bottom left foot  Wound cultures obtained today. Did recommend radiographs. Patient did not want to have radiographs. Any increase redness drainage go to the emergency room. He is high risk for further limb loss if nonhealing he is understanding this. 30 minutes total time  I did dispense U-shaped offloading padding. Once daily bandage change. Importance of heel contact weightbearing. I did send doxycycline 100 mg for the next 7 days. Follow-up  to 2 weeks      Return in about 2 weeks (around 5/11/2021). Seen By:  Maite Steen DPM      Document was created using voice recognition software. Note was reviewed however may contain grammatical errors.

## 2021-04-30 LAB
ORGANISM: ABNORMAL
ORGANISM: ABNORMAL
WOUND/ABSCESS: ABNORMAL

## 2021-05-11 ENCOUNTER — OFFICE VISIT (OUTPATIENT)
Dept: PODIATRY | Age: 46
End: 2021-05-11
Payer: COMMERCIAL

## 2021-05-11 VITALS — BODY MASS INDEX: 37.19 KG/M2 | HEIGHT: 77 IN | WEIGHT: 315 LBS

## 2021-05-11 DIAGNOSIS — Z91.199 NONCOMPLIANCE: ICD-10-CM

## 2021-05-11 DIAGNOSIS — E11.65 UNCONTROLLED TYPE 2 DIABETES MELLITUS WITH HYPERGLYCEMIA (HCC): ICD-10-CM

## 2021-05-11 DIAGNOSIS — S91.302D OPEN WOUND OF LEFT FOOT, SUBSEQUENT ENCOUNTER: Primary | ICD-10-CM

## 2021-05-11 PROCEDURE — 99213 OFFICE O/P EST LOW 20 MIN: CPT | Performed by: PODIATRIST

## 2021-05-11 NOTE — PROGRESS NOTES
Patient in office for wound check left foot and right great toe. Johnathan Guerrier : 1975 Sex: male  Age: 51 y.o. Patient was referred by Jed Bardales DO    CC:   Postop incision and drainage left foot with fifth metatarsal resection and bone biopsy date of surgery 2020  Follow-up wound left foot        HPI:   Postop incision and drainage left foot with fifth metatarsal resection and bone biopsy date of surgery 2020    Follow-up wound on the bottom left foot. Has been using just the regular Band-Aid wearing regular shoes today. Denies any pain left foot. No bleeding or any drainage. Denies calf pain. No additional pedal complaints. Denies nausea vomiting fever chills shortness of breath.         ROS:  Const: Denies constitutional symptoms  Musculo: Denies symptoms other than stated above  Skin: Denies symptoms other than stated above      Current Outpatient Medications:     escitalopram (LEXAPRO) 20 MG tablet, Take 1 tablet by mouth daily, Disp: 30 tablet, Rfl: 3    pravastatin (PRAVACHOL) 40 MG tablet, Take 1 tablet by mouth nightly, Disp: 30 tablet, Rfl: 3    buPROPion (WELLBUTRIN XL) 300 MG extended release tablet, Take 1 tablet by mouth every morning, Disp: 30 tablet, Rfl: 3    metoprolol tartrate (LOPRESSOR) 25 MG tablet, Take 1 tablet by mouth 2 times daily, Disp: 60 tablet, Rfl: 3    insulin aspart (NOVOLOG FLEXPEN) 100 UNIT/ML injection pen, Inject 10-12 Units into the skin 3 times daily (before meals) 10-12 units 3 times daily before meals according to sliding scale., Disp: 5 pen, Rfl: 3    NOVOFINE PLUS 32G X 4 MM MISC, USE EVERY DAY UNDER THE SKIN, Disp: , Rfl:     insulin glargine (LANTUS;BASAGLAR) 100 UNIT/ML injection pen, Inject 28 Units into the skin nightly, Disp: 5 pen, Rfl: 1    Wound Dressings ([employer] HONEY WOUND) GEL, Apply 1 Tube topically 2 times daily, Disp: 1 Tube, Rfl: 2    Ertugliflozin L-PyroglutamicAc (STEGLATRO) 15 MG TABS, Take 15 mg by mouth hyperkeratotic tissue noted once again today. Assessment and Plan:  Haritha Leon was seen today for wound check and diabetes. Diagnoses and all orders for this visit:    Open wound of left foot, subsequent encounter    Uncontrolled type 2 diabetes mellitus with hyperglycemia ([facility] [address]    Noncompliance        Postop incision and drainage left foot with fifth metatarsal resection and bone biopsy date of surgery 9/4/2020      Follow-up open wound left foot    No clinical signs of infection. Did still recommend an x-ray he did not wish to have one again at this time. Wound is responding well. He does still present with regular shoes. Importance of U-shaped offloading padding, Medihoney and Band-Aid once daily. Importance of once daily bandage change. Any increased redness drainage go directly to emergency room. I will follow-up 3 weeks to monitor overall progression. He would respond well with custom diabetic inserts going forward. Return in about 3 weeks (around 6/1/2021). Seen By:  Fanta Diana DPM      Document was created using voice recognition software. Note was reviewed however may contain grammatical errors.

## 2021-05-12 ENCOUNTER — PATIENT MESSAGE (OUTPATIENT)
Dept: FAMILY MEDICINE CLINIC | Age: 46
End: 2021-05-12

## 2021-05-12 DIAGNOSIS — E11.65 UNCONTROLLED TYPE 2 DIABETES MELLITUS WITH HYPERGLYCEMIA (HCC): Primary | ICD-10-CM

## 2021-05-12 DIAGNOSIS — E11.65 UNCONTROLLED TYPE 2 DIABETES MELLITUS WITH HYPERGLYCEMIA (HCC): ICD-10-CM

## 2021-05-12 NOTE — TELEPHONE ENCOUNTER
Patient made appointment for 6/1/2021. 30-day supply pended for provider approval    Requested Prescriptions     Pending Prescriptions Disp Refills    insulin glargine (LANTUS;BASAGLAR) 100 UNIT/ML injection pen 5 pen 0     Sig: Inject 28 Units into the skin nightly

## 2021-05-12 NOTE — TELEPHONE ENCOUNTER
Incoming fax received from pharmacy requesting refill for [employer]. Patient was to follow up in 2 weeks following last appointment 1/11/2021. LVM requesting return call to schedule an appointment so we may refill his medication.

## 2021-05-13 RX ORDER — INSULIN GLARGINE 100 [IU]/ML
28 INJECTION, SOLUTION SUBCUTANEOUS NIGHTLY
Qty: 5 PEN | Refills: 0 | Status: SHIPPED
Start: 2021-05-13 | End: 2021-06-01

## 2021-05-13 NOTE — TELEPHONE ENCOUNTER
New prescription for basaglar sent to pharmacy.   [id number] - [place]  Phone:  [phone]

## 2021-05-13 NOTE — TELEPHONE ENCOUNTER
From: Rivera Borges  To: Dustin Garrison DO  Sent: 5/12/2021 6:40 PM EDT  Subject: Prescription Question    You sent a prescription for Lantau, my insurance only covers [address] [place] [address]. Can you call [address] [place] [address] in please.

## 2021-05-27 DIAGNOSIS — I10 ESSENTIAL HYPERTENSION: ICD-10-CM

## 2021-05-27 DIAGNOSIS — E11.65 UNCONTROLLED TYPE 2 DIABETES MELLITUS WITH HYPERGLYCEMIA (HCC): ICD-10-CM

## 2021-05-27 RX ORDER — LISINOPRIL 20 MG/1
20 TABLET ORAL DAILY
Qty: 30 TABLET | Refills: 0 | Status: SHIPPED
Start: 2021-05-27 | End: 2021-06-29 | Stop reason: SDUPTHER

## 2021-06-01 ENCOUNTER — OFFICE VISIT (OUTPATIENT)
Dept: PODIATRY | Age: 46
End: 2021-06-01
Payer: COMMERCIAL

## 2021-06-01 ENCOUNTER — OFFICE VISIT (OUTPATIENT)
Dept: FAMILY MEDICINE CLINIC | Age: 46
End: 2021-06-01
Payer: COMMERCIAL

## 2021-06-01 VITALS
OXYGEN SATURATION: 97 % | WEIGHT: 315 LBS | TEMPERATURE: 97.8 F | HEIGHT: 77 IN | HEART RATE: 90 BPM | RESPIRATION RATE: 20 BRPM | SYSTOLIC BLOOD PRESSURE: 131 MMHG | DIASTOLIC BLOOD PRESSURE: 90 MMHG | BODY MASS INDEX: 37.19 KG/M2

## 2021-06-01 VITALS — BODY MASS INDEX: 37.19 KG/M2 | HEIGHT: 77 IN | WEIGHT: 315 LBS

## 2021-06-01 DIAGNOSIS — I10 ESSENTIAL HYPERTENSION: ICD-10-CM

## 2021-06-01 DIAGNOSIS — S91.302D OPEN WOUND OF LEFT FOOT, SUBSEQUENT ENCOUNTER: Primary | ICD-10-CM

## 2021-06-01 DIAGNOSIS — E66.01 MORBID OBESITY WITH BMI OF 45.0-49.9, ADULT (HCC): ICD-10-CM

## 2021-06-01 DIAGNOSIS — Z76.0 MEDICATION REFILL: ICD-10-CM

## 2021-06-01 DIAGNOSIS — E11.65 UNCONTROLLED TYPE 2 DIABETES MELLITUS WITH HYPERGLYCEMIA (HCC): ICD-10-CM

## 2021-06-01 DIAGNOSIS — S90.422A BLISTER OF LEFT GREAT TOE, INITIAL ENCOUNTER: ICD-10-CM

## 2021-06-01 DIAGNOSIS — E11.65 UNCONTROLLED TYPE 2 DIABETES MELLITUS WITH HYPERGLYCEMIA (HCC): Primary | ICD-10-CM

## 2021-06-01 LAB — HBA1C MFR BLD: 10.4 %

## 2021-06-01 PROCEDURE — 99213 OFFICE O/P EST LOW 20 MIN: CPT | Performed by: PODIATRIST

## 2021-06-01 PROCEDURE — 83036 HEMOGLOBIN GLYCOSYLATED A1C: CPT | Performed by: STUDENT IN AN ORGANIZED HEALTH CARE EDUCATION/TRAINING PROGRAM

## 2021-06-01 PROCEDURE — 99214 OFFICE O/P EST MOD 30 MIN: CPT | Performed by: STUDENT IN AN ORGANIZED HEALTH CARE EDUCATION/TRAINING PROGRAM

## 2021-06-01 RX ORDER — INSULIN GLARGINE 100 [IU]/ML
32 INJECTION, SOLUTION SUBCUTANEOUS NIGHTLY
Qty: 5 PEN | Refills: 0
Start: 2021-06-01 | End: 2021-07-02 | Stop reason: SDUPTHER

## 2021-06-01 RX ORDER — ARIPIPRAZOLE 2 MG/1
4 TABLET ORAL DAILY
Qty: 120 TABLET | Refills: 1 | Status: SHIPPED
Start: 2021-06-01 | End: 2021-06-30

## 2021-06-01 ASSESSMENT — ENCOUNTER SYMPTOMS
BLOOD IN STOOL: 0
WHEEZING: 0
SHORTNESS OF BREATH: 0

## 2021-06-01 NOTE — PROGRESS NOTES
S: 51 y.o. male with   Chief Complaint   Patient presents with    Diabetes       Pt is here for his DM. He has not been controlling his diet very well. He has not been checking his sugars. He has not been taking his short acting insulin with lunch. He stopped his steglatro because of side effects. O: VS:  height is 6' 5\" (1.956 m) and weight is 443 lb 12.8 oz (201.3 kg) (abnormal). His temperature is 97.8 °F (36.6 °C). His blood pressure is 131/90 (abnormal) and his pulse is 90. His respiration is 20 and oxygen saturation is 97%. BP Readings from Last 3 Encounters:   06/01/21 (!) 131/90   01/11/21 138/86   10/30/20 126/80     See resident note      Impression/Plan:   1) DM - advised on lunch novolog. Ed on diet. Send glucose logs. Consider GLP-1.  2) HTN - cont current meds. Health Maintenance Due   Topic Date Due    Hepatitis C screen  Never done    Diabetic retinal exam  Never done    COVID-19 Vaccine (1) Never done    HIV screen  Never done    Hepatitis B vaccine (1 of 3 - Risk 3-dose series) Never done    DTaP/Tdap/Td vaccine (1 - Tdap) Never done         Attending Physician Statement  I have discussed the case, including pertinent history and exam findings with the resident. I agree with the documented assessment and plan.       Attila Vines MD

## 2021-06-01 NOTE — PROGRESS NOTES
Patient:  Nicole Merino 51 y.o. male     Date of Service: 10/30/20      Chiefcomplaint:   Chief Complaint   Patient presents with    Diabetes      History of Present Illness     DM - 10.4%. on humalog tid 10-14 units ultimately with sliding scale. lantus 20units nightly. Only taking it with breakfast and dinner  Diet has been an issue recently  In the past he has done well with reducing sugar intake  Aspirin  basaglar 32 units  humalog sliding scale   Stopped steglatro due to yeast infection  Metformin 1000mg bid. Not seeing an eye doctor currently. He states that he feels great. No n/v, abd pain, visual changes. Has cut on foot - it's healing and follows with podiatry    The 10-year ASCVD risk score (Yazmin Zavaleta., et al., 2013) is: 5.2%    Values used to calculate the score:      Age: 51 years      Sex: Male      Is Non- : No      Diabetic: Yes      Tobacco smoker: No      Systolic Blood Pressure: 926 mmHg      Is BP treated: Yes      HDL Cholesterol: 41 mg/dL      Total Cholesterol: 159 mg/dL      Med hx  Hx foot ulcer  left foot with fifth metatarsal resection and bone biopsy   Required iv abx including diflucan. Dr. Tila Caballero for podiatry    Cardio  Pravastatin  Ace 10mg    Metoprolol - had tachycardia in the past    Psych  Lexapro, abilify, wellbutrin - pcp was prescribing. Dr. Zenia Sheffield in [place]  Also Dr. Cristina [facility] for psych previously. Not following now    MS - was seeing someone at [address] [place] [address] - needs new physician here. Hasn't followed up with previous referral  Review of Systems:   Review of Systems   Constitutional: Negative for chills and fever. HENT: Negative for congestion. Respiratory: Negative for shortness of breath and wheezing. Cardiovascular: Negative for chest pain and palpitations. Gastrointestinal: Negative for blood in stool. Endocrine: Negative for polyuria. Skin: Negative for rash.    Neurological: Negative for light-headedness. Pertinent Medical, Family, Surgical, Social History:  Past Medical History:   Diagnosis Date    Cellulitis of left foot 9/3/2020    Diabetes mellitus ([facility] [address]     Hyperlipidemia     Hypertension     Kidney stone      Past Surgical History:   Procedure Laterality Date    FOOT SURGERY Left 9/4/2020    INCISION AND DRAINAGE LEFT FOOT  BONE WITH PARTIAL BONE RESECTION FIFTH METATARSAL performed [facility] Kp Pina DPM [facility]         Physical Exam   Vitals: BP (!) 131/90   Pulse 90   Temp 97.8 °F (36.6 °C)   Resp 20   Ht 6' 5\" (1.956 m)   Wt (!) 443 lb 12.8 oz (201.3 kg)   SpO2 97%   BMI 52.63 kg/m²   General Appearance: Alert, oriented, no acute distress  HEENT: No scleral icterus. No visible discharge from eyes  Neck: Not rigid. No visible masses  Chest wall/Lung: Clear to auscultation bilaterally,  respirations unlabored. No ronchi/wheezing/rales  Heart: RRR, no murmur  Abdomen: Soft, nontender  Extremities:  No edema  Skin: No rashes. No jaundice  Neuro: Alert and oriented        Psych: Appropriate mood and appropriate affect    Assessment and Plan   1. Uncontrolled type 2 diabetes mellitus with hyperglycemia (HCC)  Basaglar 32 units. Improve compliance with novolog dosing to include pre lunch. Send us sugar logs. Recommend improvement in diet, specifically pasta and simple carbs. Continue statin and ace. Continue metformin. Close fu.   -     POCT glycosylated hemoglobin (Hb A1C)  -     MICROALBUMIN / CREATININE URINE RATIO; Future  -     COMPREHENSIVE METABOLIC PANEL; Future  -     LIPID PANEL; Future  -     CBC WITH AUTO DIFFERENTIAL; Future  -     insulin glargine (BASAGLAR KWIKPEN) 100 UNIT/ML injection pen; Inject 32 Units into the skin nightly    Essential hypertension  Continue same. Morbid obesity with BMI of 45.0-49.9, adult (HCC)  Counseled diet and exercise    Medication refill  -     ARIPiprazole (ABILIFY) 2 MG tablet;  Take 2 tablets by mouth daily          Return to Office: Return in about 2 months (around 8/1/2021) for DM2. Nichol Rawls, DO       This document may have been prepared at least partially through the use of voice recognition software. Although effort is taken to assure the accuracy of this document, it is possible that grammatical, syntax,  or spelling errors may occur.

## 2021-06-01 NOTE — PROGRESS NOTES
Patient in office to follow up with wound left foot. Blade Costa : 1975 Sex: male  Age: 51 y.o. Patient was referred by Lito Thomas DO    CC:   Postop incision and drainage left foot with fifth metatarsal resection and bone biopsy date of surgery 2020  Follow-up wound left foot    New blister left great toe      HPI:   Postop incision and drainage left foot with fifth metatarsal resection and bone biopsy date of surgery 2020    Presents today new blister left great toe. Present for the past several days. States the blister is not draining. Has been wearing regular shoes. Denies current diabetic offloading padding. Denies nausea vomiting fever chills shortness of breath. Continues insulin and Metformin for diabetes. No additional pedal complaints today.       ROS:  Const: Denies constitutional symptoms  Musculo: Denies symptoms other than stated above  Skin: Denies symptoms other than stated above      Current Outpatient Medications:     ARIPiprazole (ABILIFY) 2 MG tablet, Take 2 tablets by mouth daily, Disp: 120 tablet, Rfl: 1    insulin glargine (BASAGLAR KWIKPEN) 100 UNIT/ML injection pen, Inject 32 Units into the skin nightly, Disp: 5 pen, Rfl: 0    lisinopril (PRINIVIL;ZESTRIL) 20 MG tablet, Take 1 tablet by mouth daily, Disp: 30 tablet, Rfl: 0    metFORMIN (GLUCOPHAGE) 1000 MG tablet, Take 1 tablet by mouth 2 times daily, Disp: 60 tablet, Rfl: 0    escitalopram (LEXAPRO) 20 MG tablet, Take 1 tablet by mouth daily, Disp: 30 tablet, Rfl: 3    pravastatin (PRAVACHOL) 40 MG tablet, Take 1 tablet by mouth nightly, Disp: 30 tablet, Rfl: 3    buPROPion (WELLBUTRIN XL) 300 MG extended release tablet, Take 1 tablet by mouth every morning, Disp: 30 tablet, Rfl: 3    metoprolol tartrate (LOPRESSOR) 25 MG tablet, Take 1 tablet by mouth 2 times daily, Disp: 60 tablet, Rfl: 3    insulin aspart (NOVOLOG FLEXPEN) 100 UNIT/ML injection pen, Inject 10-12 Units into the skin 3 times daily (before meals) 10-12 units 3 times daily before meals according to sliding scale., Disp: 5 pen, Rfl: 3    NOVOFINE PLUS 32G X 4 MM MISC, USE EVERY DAY UNDER THE SKIN, Disp: , Rfl:     aspirin 81 MG EC tablet, Take 81 mg by mouth 2 times daily, Disp: , Rfl:     cetirizine (ZYRTEC) 10 MG tablet, Take 10 mg by mouth daily, Disp: , Rfl:     omeprazole (PRILOSEC) 20 MG delayed release capsule, Take 20 mg by mouth daily, Disp: , Rfl:   Allergies   Allergen Reactions    Milk-Related Compounds      Stomach issues-lactose intolerant       Past Medical History:   Diagnosis Date    Cellulitis of left foot 9/3/2020    Diabetes mellitus ([facility] [address]     Hyperlipidemia     Hypertension     Kidney stone        There were no vitals filed for this visit. Work History/Social History:     Focused Lower Extremity Physical Exam:      Neurovascular examination:    Dorsalis Pedis palpable bilateral.  Posterior tibialis  palpable bilateral.  Capillary Refill Time:  Immediate return  Hair growth:  Symmetrical and bilateral   Skin:  Not atrophic  Edema: Mild edema bilateral feet. Mild edema bilateral ankles. Neurologic:  Light touch diminished bilateral.  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 Wound plantar left fifth metatarsal measuring approximately 0.2 cm x 0.3 cm x 0.1 cm.  100% granular. Blood blister distal left great toe no open wound. Blister measure approximate 1 cm x 1 cm x 0.1 cm. No surrounding erythema.     Assessment and Plan:  Emily Reid was seen today for follow-up, wound check and

## 2021-06-15 ENCOUNTER — TELEPHONE (OUTPATIENT)
Dept: ADMINISTRATIVE | Age: 46
End: 2021-06-15

## 2021-06-15 NOTE — TELEPHONE ENCOUNTER
Pt is out-of-town today and called stating his toe is infected and his pain is much worse. He requested an appt tomorrow or Thursday, no availability.      Please advise,  Thank you

## 2021-06-17 ENCOUNTER — OFFICE VISIT (OUTPATIENT)
Dept: PODIATRY | Age: 46
End: 2021-06-17

## 2021-06-17 DIAGNOSIS — E11.621 TYPE 2 DIABETES MELLITUS WITH FOOT ULCER, UNSPECIFIED WHETHER LONG TERM INSULIN USE (HCC): ICD-10-CM

## 2021-06-17 DIAGNOSIS — G60.8 HEREDITARY SENSORY NEUROPATHY: ICD-10-CM

## 2021-06-17 DIAGNOSIS — S91.302D OPEN WOUND OF LEFT FOOT, SUBSEQUENT ENCOUNTER: ICD-10-CM

## 2021-06-17 DIAGNOSIS — S91.302A OPEN WOUND OF LEFT FOOT, INITIAL ENCOUNTER: ICD-10-CM

## 2021-06-17 DIAGNOSIS — L97.509 TYPE 2 DIABETES MELLITUS WITH FOOT ULCER, UNSPECIFIED WHETHER LONG TERM INSULIN USE (HCC): ICD-10-CM

## 2021-06-17 DIAGNOSIS — S91.302D OPEN WOUND OF LEFT FOOT, SUBSEQUENT ENCOUNTER: Primary | ICD-10-CM

## 2021-06-17 PROCEDURE — 99214 OFFICE O/P EST MOD 30 MIN: CPT | Performed by: PODIATRIST

## 2021-06-17 RX ORDER — GUAIFENESIN/PSEUDOEPHEDRNE HCL 600MG-60MG
1 TABLET, EXTENDED RELEASE 12 HR ORAL 2 TIMES DAILY
Qty: 1 TUBE | Refills: 2 | Status: SHIPPED | OUTPATIENT
Start: 2021-06-17

## 2021-06-17 RX ORDER — DOXYCYCLINE HYCLATE 100 MG/1
100 CAPSULE ORAL 2 TIMES DAILY
Qty: 14 CAPSULE | Refills: 1 | Status: SHIPPED
Start: 2021-06-17 | End: 2021-07-01

## 2021-06-17 NOTE — PROGRESS NOTES
Patient in office with c/o open area to left great toe. States he noticed wound had an odor on Tuesday. Started taking some left over abx he had at home. Rubio Maya : 1975 Sex: male  Age: 51 y.o. Patient was referred by Stanley Omalley DO    CC:   Postop incision and drainage left foot with fifth metatarsal resection and bone biopsy date of surgery 2020  Wound left great toe      HPI:   Postop incision and drainage left foot with fifth metatarsal resection and bone biopsy date of surgery 2020    Presents today wound left great toe. No significant drainage noted. States he did not get the Medihoney until today has not put it on yet. Denies any calf pain. Denies nausea vomiting fever chills rash breath. No additional pedal complaints.       ROS:  Const: Denies constitutional symptoms  Musculo: Denies symptoms other than stated above  Skin: Denies symptoms other than stated above      Current Outpatient Medications:     doxycycline hyclate (VIBRAMYCIN) 100 MG capsule, Take 1 capsule by mouth 2 times daily for 14 days, Disp: 14 capsule, Rfl: 1    Wound Dressings ([facility] HONEY WOUND) GEL, Apply 1 Tube topically 2 times daily, Disp: 1 Tube, Rfl: 2    sulfamethoxazole-trimethoprim (BACTRIM) 400-80 MG per tablet, Take 1 tablet by mouth daily for 10 days, Disp: 10 tablet, Rfl: 0    ARIPiprazole (ABILIFY) 2 MG tablet, Take 2 tablets by mouth daily, Disp: 120 tablet, Rfl: 1    insulin glargine (BASAGLAR KWIKPEN) 100 UNIT/ML injection pen, Inject 32 Units into the skin nightly, Disp: 5 pen, Rfl: 0    lisinopril (PRINIVIL;ZESTRIL) 20 MG tablet, Take 1 tablet by mouth daily, Disp: 30 tablet, Rfl: 0    metFORMIN (GLUCOPHAGE) 1000 MG tablet, Take 1 tablet by mouth 2 times daily, Disp: 60 tablet, Rfl: 0    escitalopram (LEXAPRO) 20 MG tablet, Take 1 tablet by mouth daily, Disp: 30 tablet, Rfl: 3    pravastatin (PRAVACHOL) 40 MG tablet, Take 1 tablet by mouth nightly, Disp: 30 tablet, Rfl: 3    buPROPion (WELLBUTRIN XL) 300 MG extended release tablet, Take 1 tablet by mouth every morning, Disp: 30 tablet, Rfl: 3    metoprolol tartrate (LOPRESSOR) 25 MG tablet, Take 1 tablet by mouth 2 times daily, Disp: 60 tablet, Rfl: 3    insulin aspart (NOVOLOG FLEXPEN) 100 UNIT/ML injection pen, Inject 10-12 Units into the skin 3 times daily (before meals) 10-12 units 3 times daily before meals according to sliding scale., Disp: 5 pen, Rfl: 3    NOVOFINE PLUS 32G X 4 MM MISC, USE EVERY DAY UNDER THE SKIN, Disp: , Rfl:     aspirin 81 MG EC tablet, Take 81 mg by mouth 2 times daily, Disp: , Rfl:     cetirizine (ZYRTEC) 10 MG tablet, Take 10 mg by mouth daily, Disp: , Rfl:     omeprazole (PRILOSEC) 20 MG delayed release capsule, Take 20 mg by mouth daily, Disp: , Rfl:   Allergies   Allergen Reactions    Milk-Related Compounds      Stomach issues-lactose intolerant       Past Medical History:   Diagnosis Date    Cellulitis of left foot 9/3/2020    Diabetes mellitus ([facility] [address]     Hyperlipidemia     Hypertension     Kidney stone        There were no vitals filed for this visit. Work History/Social History:     Focused Lower Extremity Physical Exam:      Neurovascular examination:    Dorsalis Pedis palpable bilateral.  Posterior tibialis  palpable bilateral.  Capillary Refill Time:  Immediate return  Hair growth:  Symmetrical and bilateral   Skin:  Not atrophic  Edema: Mild edema bilateral feet. Mild edema bilateral ankles.   Neurologic:  Light touch diminished bilateral.  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 Wound plantar left great toe measuring approximately 1 cm x 0.5 cm x 0.3 cm.  1% granular. No significant drainage. Surrounding hyperkeratotic tissue. Wound does not probe to bone. Assessment and Plan:  Kathe Rollins was seen today for wound check and diabetes. Diagnoses and all orders for this visit:    Open wound of left foot, subsequent encounter  -     XR FOOT LEFT (MIN 3 VIEWS); Future  -     Culture, Wound; Future    Type 2 diabetes mellitus with foot ulcer, unspecified whether long term insulin use (HCC)  -     Culture, Wound; Future    Open wound of left foot, initial encounter  -     Culture, Wound; Future    Hereditary sensory neuropathy  -     Culture, Wound; Future    Other orders  -     doxycycline hyclate (VIBRAMYCIN) 100 MG capsule; Take 1 capsule by mouth 2 times daily for 14 days  -     Wound Dressings ([facility] HONEY WOUND) GEL; Apply 1 Tube topically 2 times daily        Postop incision and drainage left foot with fifth metatarsal resection and bone biopsy date of surgery 9/4/2020    Wound left great toe. Did obtain wound cultures today. Pending final wound cultures. Prophylactically started doxycycline 100 mg twice daily. 45-minute total time. Offloading padding discussed in detail. Importance of offloading padding with Medihoney and Band-Aid once daily. Still high risk for amputation left great toe if nonhealing he is understanding this. Worsening symptoms go to emergency room. I will follow-up 1 week to monitor progression            Return in about 1 week (around 6/24/2021). Seen By:  Maite Steen DPM      Document was created using voice recognition software. Note was reviewed however may contain grammatical errors.

## 2021-06-19 LAB
ORGANISM: ABNORMAL
WOUND/ABSCESS: ABNORMAL

## 2021-06-22 ENCOUNTER — OFFICE VISIT (OUTPATIENT)
Dept: PODIATRY | Age: 46
End: 2021-06-22
Payer: COMMERCIAL

## 2021-06-22 DIAGNOSIS — S91.302D OPEN WOUND OF LEFT FOOT, SUBSEQUENT ENCOUNTER: Primary | ICD-10-CM

## 2021-06-22 DIAGNOSIS — G60.8 HEREDITARY SENSORY NEUROPATHY: ICD-10-CM

## 2021-06-22 DIAGNOSIS — E11.621 TYPE 2 DIABETES MELLITUS WITH FOOT ULCER, UNSPECIFIED WHETHER LONG TERM INSULIN USE (HCC): ICD-10-CM

## 2021-06-22 DIAGNOSIS — L97.509 TYPE 2 DIABETES MELLITUS WITH FOOT ULCER, UNSPECIFIED WHETHER LONG TERM INSULIN USE (HCC): ICD-10-CM

## 2021-06-22 PROCEDURE — 99214 OFFICE O/P EST MOD 30 MIN: CPT | Performed by: PODIATRIST

## 2021-06-22 RX ORDER — SULFAMETHOXAZOLE AND TRIMETHOPRIM 400; 80 MG/1; MG/1
1 TABLET ORAL DAILY
Qty: 10 TABLET | Refills: 0 | Status: SHIPPED | OUTPATIENT
Start: 2021-06-22 | End: 2021-07-02

## 2021-06-22 NOTE — PROGRESS NOTES
Patient in office for wound check left great toe. Yadi Mas : 1975 Sex: male  Age: 51 y.o. Patient was referred by Maryjane Davies DO    CC:   Diabetic wound left great toe    HPI:   Denita Bravo seen today diabetic wound left great toe. Did recently just get Medihoney has not put on his great toe yet. Presents today no socks regular shoes. Has not been wearing surgical shoe. Tolerating antibiotic well. Denies nausea vomiting fever chills shortness of breath. No calf pain. No additional pedal complaints today.     ROS:  Const: Denies constitutional symptoms  Musculo: Denies symptoms other than stated above  Skin: Denies symptoms other than stated above      Current Outpatient Medications:     sulfamethoxazole-trimethoprim (BACTRIM) 400-80 MG per tablet, Take 1 tablet by mouth daily for 10 days, Disp: 10 tablet, Rfl: 0    doxycycline hyclate (VIBRAMYCIN) 100 MG capsule, Take 1 capsule by mouth 2 times daily for 14 days, Disp: 14 capsule, Rfl: 1    Wound Dressings ([facility] HONEY WOUND) GEL, Apply 1 Tube topically 2 times daily, Disp: 1 Tube, Rfl: 2    ARIPiprazole (ABILIFY) 2 MG tablet, Take 2 tablets by mouth daily, Disp: 120 tablet, Rfl: 1    insulin glargine (BASAGLAR KWIKPEN) 100 UNIT/ML injection pen, Inject 32 Units into the skin nightly, Disp: 5 pen, Rfl: 0    lisinopril (PRINIVIL;ZESTRIL) 20 MG tablet, Take 1 tablet by mouth daily, Disp: 30 tablet, Rfl: 0    metFORMIN (GLUCOPHAGE) 1000 MG tablet, Take 1 tablet by mouth 2 times daily, Disp: 60 tablet, Rfl: 0    escitalopram (LEXAPRO) 20 MG tablet, Take 1 tablet by mouth daily, Disp: 30 tablet, Rfl: 3    pravastatin (PRAVACHOL) 40 MG tablet, Take 1 tablet by mouth nightly, Disp: 30 tablet, Rfl: 3    buPROPion (WELLBUTRIN XL) 300 MG extended release tablet, Take 1 tablet by mouth every morning, Disp: 30 tablet, Rfl: 3    metoprolol tartrate (LOPRESSOR) 25 MG tablet, Take 1 tablet by mouth 2 times daily, Disp: 60 tablet, Rfl: 3    insulin aspart (NOVOLOG FLEXPEN) 100 UNIT/ML injection pen, Inject 10-12 Units into the skin 3 times daily (before meals) 10-12 units 3 times daily before meals according to sliding scale., Disp: 5 pen, Rfl: 3    NOVOFINE PLUS 32G X 4 MM MISC, USE EVERY DAY UNDER THE SKIN, Disp: , Rfl:     aspirin 81 MG EC tablet, Take 81 mg by mouth 2 times daily, Disp: , Rfl:     cetirizine (ZYRTEC) 10 MG tablet, Take 10 mg by mouth daily, Disp: , Rfl:     omeprazole (PRILOSEC) 20 MG delayed release capsule, Take 20 mg by mouth daily, Disp: , Rfl:   Allergies   Allergen Reactions    Milk-Related Compounds      Stomach issues-lactose intolerant       Past Medical History:   Diagnosis Date    Cellulitis of left foot 9/3/2020    Diabetes mellitus ([facility] [address]     Hyperlipidemia     Hypertension     Kidney stone        There were no vitals filed for this visit. Postop incision and drainage left foot with fifth metatarsal resection and bone biopsy date of surgery 9/4/2020    Work History/Social History:     Focused Lower Extremity Physical Exam:      Neurovascular examination:    Dorsalis Pedis palpable bilateral.  Posterior tibialis  palpable bilateral.  Capillary Refill Time:  Immediate return  Hair growth:  Symmetrical and bilateral   Skin:  Not atrophic  Edema: Mild edema bilateral feet. Mild edema bilateral ankles. Neurologic:  Light touch diminished bilateral.  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 No wound noted plantar fifth metatarsal head left foot today.     There is a wound plantar medial left great toe measuring approximately 1 cm x 1 cm is 0.2 cm. Wound is 100% granular. Significant surrounding hyperkeratotic tissue noted. Wound is not track or probe to bone. No drainage. Assessment and Plan:  Paul Bennett was seen today for wound check and diabetes. Diagnoses and all orders for this visit:    Open wound of left foot, subsequent encounter    Type 2 diabetes mellitus with foot ulcer, unspecified whether long term insulin use (HCC)    Hereditary sensory neuropathy    Other orders  -     sulfamethoxazole-trimethoprim (BACTRIM) 400-80 MG per tablet; Take 1 tablet by mouth daily for 10 days              Paul Bennett seen follow-up left great toe wound. Tolerating oral antibiotics well. Wound culture from 6/17/2021 results reviewed. Enterococcus faecalis  I did add Bactrim for antibiotic today. 45 total minute time. Recent hemoglobin A1c from 6/1/2021 10.4. I did recommend new blood work today. He states he does have pending blood work from his primary care physician and will get it this week. Very high risk amputation left great toe I did discuss this in detail today. Importance of offloading padding and surgical shoe. Medihoney once daily. Any increased redness drainage go to emergency room. Follow-up 1 week. Return in about 1 week (around 6/29/2021). Seen By:  Misty Germain DPM      Document was created using voice recognition software. Note was reviewed however may contain grammatical errors.

## 2021-06-28 ENCOUNTER — PATIENT MESSAGE (OUTPATIENT)
Dept: FAMILY MEDICINE CLINIC | Age: 46
End: 2021-06-28

## 2021-06-28 DIAGNOSIS — F32.A DEPRESSION, UNSPECIFIED DEPRESSION TYPE: Primary | ICD-10-CM

## 2021-06-28 NOTE — TELEPHONE ENCOUNTER
From: Gloria Vo  To: Bozena Bowser DO  Sent: 6/28/2021 11:41 AM EDT  Subject: Prescription Question    I need refills sent to my pharmacy. Also, raising my Abilify back to 4mg is requiring a prior authorization. Can you guys help me out?

## 2021-06-29 DIAGNOSIS — E11.65 UNCONTROLLED TYPE 2 DIABETES MELLITUS WITH HYPERGLYCEMIA (HCC): ICD-10-CM

## 2021-06-29 DIAGNOSIS — I10 ESSENTIAL HYPERTENSION: ICD-10-CM

## 2021-06-29 RX ORDER — LISINOPRIL 20 MG/1
20 TABLET ORAL DAILY
Qty: 30 TABLET | Refills: 2 | Status: SHIPPED
Start: 2021-06-29 | End: 2021-10-29

## 2021-06-29 NOTE — TELEPHONE ENCOUNTER
Requested Prescriptions     Pending Prescriptions Disp Refills    lisinopril (PRINIVIL;ZESTRIL) 20 MG tablet 30 tablet 2     Sig: Take 1 tablet by mouth daily    metFORMIN (GLUCOPHAGE) 1000 MG tablet 60 tablet 2     Sig: Take 1 tablet by mouth 2 times daily     (To follow Dr. Luz Chahal to [facility] in August)

## 2021-06-30 RX ORDER — ARIPIPRAZOLE 5 MG/1
5 TABLET ORAL DAILY
Qty: 30 TABLET | Refills: 1 | Status: SHIPPED | OUTPATIENT
Start: 2021-06-30 | End: 2021-10-29 | Stop reason: SDUPTHER

## 2021-07-01 ENCOUNTER — OFFICE VISIT (OUTPATIENT)
Dept: PODIATRY | Age: 46
End: 2021-07-01

## 2021-07-01 ENCOUNTER — OFFICE VISIT (OUTPATIENT)
Dept: NEUROLOGY | Age: 46
End: 2021-07-01

## 2021-07-01 ENCOUNTER — TELEPHONE (OUTPATIENT)
Dept: NEUROLOGY | Age: 46
End: 2021-07-01

## 2021-07-01 VITALS
HEART RATE: 89 BPM | TEMPERATURE: 97.1 F | DIASTOLIC BLOOD PRESSURE: 102 MMHG | SYSTOLIC BLOOD PRESSURE: 149 MMHG | WEIGHT: 315 LBS | OXYGEN SATURATION: 95 % | RESPIRATION RATE: 18 BRPM | HEIGHT: 77 IN | BODY MASS INDEX: 37.19 KG/M2

## 2021-07-01 DIAGNOSIS — L97.509 TYPE 2 DIABETES MELLITUS WITH FOOT ULCER, UNSPECIFIED WHETHER LONG TERM INSULIN USE (HCC): ICD-10-CM

## 2021-07-01 DIAGNOSIS — E11.65 UNCONTROLLED TYPE 2 DIABETES MELLITUS WITH HYPERGLYCEMIA (HCC): ICD-10-CM

## 2021-07-01 DIAGNOSIS — G60.8 HEREDITARY SENSORY NEUROPATHY: ICD-10-CM

## 2021-07-01 DIAGNOSIS — E11.621 TYPE 2 DIABETES MELLITUS WITH FOOT ULCER, UNSPECIFIED WHETHER LONG TERM INSULIN USE (HCC): ICD-10-CM

## 2021-07-01 DIAGNOSIS — G35 MULTIPLE SCLEROSIS (HCC): ICD-10-CM

## 2021-07-01 DIAGNOSIS — S91.302D OPEN WOUND OF LEFT FOOT, SUBSEQUENT ENCOUNTER: ICD-10-CM

## 2021-07-01 DIAGNOSIS — G35 MULTIPLE SCLEROSIS (HCC): Primary | ICD-10-CM

## 2021-07-01 DIAGNOSIS — S91.302D OPEN WOUND OF LEFT FOOT, SUBSEQUENT ENCOUNTER: Primary | ICD-10-CM

## 2021-07-01 LAB
ALBUMIN SERPL-MCNC: 3.9 G/DL (ref 3.5–5.2)
ALP BLD-CCNC: 85 U/L (ref 40–129)
ALT SERPL-CCNC: 32 U/L (ref 0–40)
ANION GAP SERPL CALCULATED.3IONS-SCNC: 16 MMOL/L (ref 7–16)
AST SERPL-CCNC: 31 U/L (ref 0–39)
BASOPHILS ABSOLUTE: 0.12 E9/L (ref 0–0.2)
BASOPHILS RELATIVE PERCENT: 1.1 % (ref 0–2)
BILIRUB SERPL-MCNC: 0.3 MG/DL (ref 0–1.2)
BUN BLDV-MCNC: 14 MG/DL (ref 6–20)
C-REACTIVE PROTEIN: 0.7 MG/DL (ref 0–0.4)
CALCIUM SERPL-MCNC: 9.2 MG/DL (ref 8.6–10.2)
CHLORIDE BLD-SCNC: 97 MMOL/L (ref 98–107)
CHOLESTEROL, TOTAL: 140 MG/DL (ref 0–199)
CO2: 22 MMOL/L (ref 22–29)
CREAT SERPL-MCNC: 0.6 MG/DL (ref 0.7–1.2)
EOSINOPHILS ABSOLUTE: 0.25 E9/L (ref 0.05–0.5)
EOSINOPHILS RELATIVE PERCENT: 2.2 % (ref 0–6)
GFR AFRICAN AMERICAN: >60
GFR NON-AFRICAN AMERICAN: >60 ML/MIN/1.73
GLUCOSE BLD-MCNC: 498 MG/DL (ref 74–99)
HCT VFR BLD CALC: 51.7 % (ref 37–54)
HDLC SERPL-MCNC: 31 MG/DL
HEMOGLOBIN: 17 G/DL (ref 12.5–16.5)
IMMATURE GRANULOCYTES #: 0.07 E9/L
IMMATURE GRANULOCYTES %: 0.6 % (ref 0–5)
LDL CHOLESTEROL CALCULATED: 61 MG/DL (ref 0–99)
LYMPHOCYTES ABSOLUTE: 1.95 E9/L (ref 1.5–4)
LYMPHOCYTES RELATIVE PERCENT: 17.3 % (ref 20–42)
MCH RBC QN AUTO: 32.6 PG (ref 26–35)
MCHC RBC AUTO-ENTMCNC: 32.9 % (ref 32–34.5)
MCV RBC AUTO: 99.2 FL (ref 80–99.9)
MONOCYTES ABSOLUTE: 0.79 E9/L (ref 0.1–0.95)
MONOCYTES RELATIVE PERCENT: 7 % (ref 2–12)
NEUTROPHILS ABSOLUTE: 8.1 E9/L (ref 1.8–7.3)
NEUTROPHILS RELATIVE PERCENT: 71.8 % (ref 43–80)
PDW BLD-RTO: 13.1 FL (ref 11.5–15)
PLATELET # BLD: 214 E9/L (ref 130–450)
PMV BLD AUTO: 13.2 FL (ref 7–12)
POTASSIUM SERPL-SCNC: 4.5 MMOL/L (ref 3.5–5)
RBC # BLD: 5.21 E12/L (ref 3.8–5.8)
SEDIMENTATION RATE, ERYTHROCYTE: 8 MM/HR (ref 0–15)
SODIUM BLD-SCNC: 135 MMOL/L (ref 132–146)
TOTAL PROTEIN: 7.4 G/DL (ref 6.4–8.3)
TRIGL SERPL-MCNC: 242 MG/DL (ref 0–149)
VLDLC SERPL CALC-MCNC: 48 MG/DL
WBC # BLD: 11.3 E9/L (ref 4.5–11.5)

## 2021-07-01 PROCEDURE — 99245 OFF/OP CONSLTJ NEW/EST HI 55: CPT | Performed by: PSYCHIATRY & NEUROLOGY

## 2021-07-01 PROCEDURE — 99213 OFFICE O/P EST LOW 20 MIN: CPT | Performed by: PODIATRIST

## 2021-07-01 NOTE — PROGRESS NOTES
This 58-year-old right-handed man was referred for additional evaluation and management of relapsing remitting multiple sclerosis. He was an excellent historian. His medications were Abilify, lisinopril, metformin, Bactrim, doxycycline, insulins, escitalopram, pravastatin, bupropion, metoprolol, aspirin, cetirizine and omeprazole. He previously used dimethyl fumarate. His past medical history was remarkable for diabetes mellitus of 10 years duration, with poor control. His last hemoglobin A1c was 10.4. He also suffered from hypertension, degenerative joint disease of his knees and depression, well controlled with his above regimen. There was also peripheral arterial disease; he cited a slowly resolving left foot ulceration and infection. He denied strokes, seizures, heart disease, lung disorders, gastrointestinal disease, kidney abnormalities, cancers, other connective tissue disease or skin abnormalities. His only surgery was the left foot ulceration debridement 10 months ago. He was allergic to bee stings. He also reported lactose intolerance. There was no marked trauma. He was a native of the [facility]. He was  with 2 healthy children. 1 sister suffer from hypertension and diabetes mellitus. His mother was alive at 76 with hypertension. His father  of lymphoma at 79. There was no multiple sclerosis in his family or other neurological disorders. He was of [facility] ancestry. He slept well and ate too well. He was a  for several law firms. He did not smoke, drink or use illicit drugs. He was a football  in high school. Review of systems was otherwise unremarkable, except as noted below. Multiple sclerosis was diagnosed in . He was playing baseball when he suddenly noted unsteadiness and almost falling to the ground while batting. The next morning, he was clumsy and almost fell on several occasions.   He experienced left hand numbness and weakness. He was initially evaluated for stroke, but work-up revealed multiple sclerosis. CSF analysis confirmed that diagnosis. He was referred to the [facility], where he first received previous and then Tecfidera. He was off dimethyl fumarate for almost 3 years due to his inability to afford the medication. He improved remarkably from his initial insult. Fortunately, he reported no additional deficit with disease modifying therapy in office. He realized he must continue treatments. Currently he denied any neurological issues. He reported no medical issues. He received his COVID-19 vaccinations. Physical examination displayed stable vital signs, except for a blood pressure 153/103. He was a middle-age man in no acute distress, who was alert, cooperative and oriented. He was very pleasant. He was obese. His skin was unremarkable, with no lymphadenopathy. Head examination was unrevealing. His neck was supple without thyromegaly; there were +1 carotid upstrokes without bruits; there was full range of motion of his neck in all directions. His spine displayed no abnormalities. His lungs were clear to auscultation. Cardiac testing proved unremarkable. There was abdominal obesity but no tenderness or masses. Peripheral pulses were +1 without bruits. His limbs were unremarkable except for a bandaged left foot. Straight leg raising was unrevealing. Neurological examination produced an intact mental status. Cranial nerve testing was unremarkable. I found no Wilton pupil or red desaturation. Extraocular movements were intact. He displayed normal tone and bulk with 5/5 strength throughout. There were no abnormal movements. I elicited no reflexes. I found a right Babinski but no other pathological ones. All sensory modalities were intact. Finger-to-nose and heel shin testings were performed well.   Rapid alternating movements and fine finger movements were unrevealing. He walked well, despite favoring his left foot. Romberg's was unremarkable. Laboratory data included an unremarkable CMP from 6 months ago except for a blood sugar of 419. Lipid panel was unremarkable, with an LDL level of 80 and HDL level of 41. Hemoglobin A1c's were as high as 11.9. This individual was diagnosed with relapsing remitting multiple sclerosis 13 years ago. On current examination, I find only a right Babinski. He is at high risk of recurrent disease despite his relatively benign course and without disease modifying therapy for 3 years. His EDSS is 1.0. He agrees to aggressive therapy; ocrelizumab will be started. In the interim, preliminary testings are in order as well as repeat MRIs of his head and neck. He is otherwise stable neurologically. He suffers from multiple medical comorbidities. He must control his blood sugars better and lose weight. He will follow closely with his primary care physician. He will return in 4 months. MRIs of his head neck were scheduled with and without contrast.  Hepatitis panels, IgG levels and CARMEN virus assays were scheduled. Ocrelizumab forms will proceed. He will report back when the above tests have been completed. He will also call after the completion of his first rounds of ocrelizumab infusions. He will call at any time if problems arise. I spent 80 minutes with the patient with over 50 % spent in counseling and disease management. All patient issues were addressed and all questions were answered.

## 2021-07-01 NOTE — PROGRESS NOTES
Patient in office for wound check left great toe. [facility] : 1975 Sex: male  Age: 51 y.o. Patient was referred by Anayeli Nguyen DO    CC:   Follow-up diabetic wound left great toe      HPI:   Follow-up diabetic wound left great toe  Does have Medihoney and a Band-Aid left great toe wound. Denies any drainage. Still has been walking on his foot. Denies calf pain. Denies nausea vomiting fever chills shortness of breath.       ROS:  Const: Denies constitutional symptoms  Musculo: Denies symptoms other than stated above  Skin: Denies symptoms other than stated above      Current Outpatient Medications:     ARIPiprazole (ABILIFY) 5 MG tablet, Take 1 tablet by mouth daily, Disp: 30 tablet, Rfl: 1    lisinopril (PRINIVIL;ZESTRIL) 20 MG tablet, Take 1 tablet by mouth daily, Disp: 30 tablet, Rfl: 2    metFORMIN (GLUCOPHAGE) 1000 MG tablet, Take 1 tablet by mouth 2 times daily, Disp: 60 tablet, Rfl: 2    sulfamethoxazole-trimethoprim (BACTRIM) 400-80 MG per tablet, Take 1 tablet by mouth daily for 10 days, Disp: 10 tablet, Rfl: 0    Wound Dressings ([facility] HONEY WOUND) GEL, Apply 1 Tube topically 2 times daily, Disp: 1 Tube, Rfl: 2    insulin glargine (BASAGLAR KWIKPEN) 100 UNIT/ML injection pen, Inject 32 Units into the skin nightly, Disp: 5 pen, Rfl: 0    escitalopram (LEXAPRO) 20 MG tablet, Take 1 tablet by mouth daily, Disp: 30 tablet, Rfl: 3    pravastatin (PRAVACHOL) 40 MG tablet, Take 1 tablet by mouth nightly, Disp: 30 tablet, Rfl: 3    buPROPion (WELLBUTRIN XL) 300 MG extended release tablet, Take 1 tablet by mouth every morning, Disp: 30 tablet, Rfl: 3    metoprolol tartrate (LOPRESSOR) 25 MG tablet, Take 1 tablet by mouth 2 times daily, Disp: 60 tablet, Rfl: 3    insulin aspart (NOVOLOG FLEXPEN) 100 UNIT/ML injection pen, Inject 10-12 Units into the skin 3 times daily (before meals) 10-12 units 3 times daily before meals according to sliding scale., Disp: 5 pen, Rfl: 3    NOVOFINE PLUS 32G X 4 MM MISC, USE EVERY DAY UNDER THE SKIN, Disp: , Rfl:     aspirin 81 MG EC tablet, Take 81 mg by mouth 2 tablets daily, Disp: , Rfl:     cetirizine (ZYRTEC) 10 MG tablet, Take 10 mg by mouth daily, Disp: , Rfl:     omeprazole (PRILOSEC) 20 MG delayed release capsule, Take 20 mg by mouth daily, Disp: , Rfl:   Allergies   Allergen Reactions    Bee Venom Swelling    Milk-Related Compounds      Stomach issues-lactose intolerant       Past Medical History:   Diagnosis Date    Cellulitis of left foot 9/3/2020    Diabetes mellitus ([facility] [address]     Hyperlipidemia     Hypertension     Kidney stone        There were no vitals filed for this visit. Postop incision and drainage left foot with fifth metatarsal resection and bone biopsy date of surgery 9/4/2020    Work History/Social History:     Focused Lower Extremity Physical Exam:      Neurovascular examination:    Dorsalis Pedis palpable bilateral.  Posterior tibialis  palpable bilateral.  Capillary Refill Time:  Immediate return  Hair growth:  Symmetrical and bilateral   Skin:  Not atrophic  Edema: Mild edema bilateral feet. Mild edema bilateral ankles. Neurologic:  Light touch diminished bilateral.  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 No wound noted plantar fifth metatarsal head left foot today. There is a wound plantar medial left great toe measuring approximately 1 cm x 1 cm is 0.2 cm. Surrounding hyperkeratotic tissue noted. Wound is 100% granular.   Wound does not track or probe to bone.      Assessment and Plan:  Itz Valdez was seen today for wound check. Diagnoses and all orders for this visit:    Open wound of left foot, subsequent encounter    Type 2 diabetes mellitus with foot ulcer, unspecified whether long term insulin use (HCC)    Hereditary sensory neuropathy            Follow-up left great toe wound    Order for CBC with differential, ESR and CRP ordered once again today. Still does have a wound left great toe and is high risk for amputation left great toe if nonhealing. He is understanding this. Did stress several times importance of U-shaped offloading padding Medihoney Band-Aid and surgical shoe daily. I will follow-up 1 week to monitor progression. Addendum  White blood cell count 11.3, CRP 0.7, sed rate 8          No follow-ups on file. Seen By:  Reuben Carballo DPM      Document was created using voice recognition software. Note was reviewed however may contain grammatical errors.

## 2021-07-02 DIAGNOSIS — E11.65 UNCONTROLLED TYPE 2 DIABETES MELLITUS WITH HYPERGLYCEMIA (HCC): ICD-10-CM

## 2021-07-02 LAB
HAV IGM SER IA-ACNC: NORMAL
HEPATITIS B CORE IGM ANTIBODY: NORMAL
HEPATITIS B SURFACE ANTIGEN INTERPRETATION: NORMAL
HEPATITIS C ANTIBODY INTERPRETATION: NORMAL
IGG: 1066 MG/DL (ref 700–1600)

## 2021-07-02 RX ORDER — INSULIN GLARGINE 100 [IU]/ML
32 INJECTION, SOLUTION SUBCUTANEOUS NIGHTLY
Qty: 5 PEN | Refills: 2 | Status: SHIPPED
Start: 2021-07-02 | End: 2021-10-29

## 2021-07-02 NOTE — TELEPHONE ENCOUNTER
Patient to follow Dr. Joan Ocampo to [place] in August (declined to schedule at this time)    Requested Prescriptions     Pending Prescriptions Disp Refills    insulin glargine (BASAGLAR KWIKPEN) 100 UNIT/ML injection pen 5 pen 2     Sig: Inject 32 Units into the skin nightly

## 2021-07-08 ENCOUNTER — TELEPHONE (OUTPATIENT)
Dept: NEUROLOGY | Age: 46
End: 2021-07-08

## 2021-07-08 LAB
Lab: NORMAL
REPORT: NORMAL
THIS TEST SENT TO: NORMAL

## 2021-07-08 NOTE — TELEPHONE ENCOUNTER
Spoke with [address] [place] [address] MRI . MRI brain was approved, and MRI cervical Spine was denied. They will reach out to patient to schedule the MRI brain. MRI cervical spine was cancelled.

## 2021-07-13 ENCOUNTER — TELEPHONE (OUTPATIENT)
Dept: NEUROLOGY | Age: 46
End: 2021-07-13

## 2021-07-13 NOTE — TELEPHONE ENCOUNTER
[facility] denied Ocrevus criteria not met - No documentation on type of MS - Please addend note stating RRMS.   Please Advise  Electronically signed by Ganesh Gray on 7/13/21 at 9:02 AM EDT

## 2021-07-15 ENCOUNTER — OFFICE VISIT (OUTPATIENT)
Dept: PODIATRY | Age: 46
End: 2021-07-15
Payer: COMMERCIAL

## 2021-07-15 VITALS — BODY MASS INDEX: 37.19 KG/M2 | HEIGHT: 77 IN | WEIGHT: 315 LBS

## 2021-07-15 DIAGNOSIS — G60.8 HEREDITARY SENSORY NEUROPATHY: ICD-10-CM

## 2021-07-15 DIAGNOSIS — L97.509 TYPE 2 DIABETES MELLITUS WITH FOOT ULCER, UNSPECIFIED WHETHER LONG TERM INSULIN USE (HCC): ICD-10-CM

## 2021-07-15 DIAGNOSIS — I70.245 ATHEROSCLEROSIS OF NATIVE ARTERIES OF LEFT LEG WITH ULCERATION OF OTHER PART OF FOOT (HCC): ICD-10-CM

## 2021-07-15 DIAGNOSIS — E11.621 TYPE 2 DIABETES MELLITUS WITH FOOT ULCER, UNSPECIFIED WHETHER LONG TERM INSULIN USE (HCC): ICD-10-CM

## 2021-07-15 DIAGNOSIS — S91.302D OPEN WOUND OF LEFT FOOT, SUBSEQUENT ENCOUNTER: Primary | ICD-10-CM

## 2021-07-15 PROCEDURE — 11042 DBRDMT SUBQ TIS 1ST 20SQCM/<: CPT | Performed by: PODIATRIST

## 2021-07-15 NOTE — PROGRESS NOTES
Patient here for follow up on wound to left foot. Patient feels that it has improved a little since last visit. Tye Kanner : 1975 Sex: male  Age: 51 y.o. Patient was referred by Crescencio Hernández DO    CC:   Follow-up diabetic wound left great toe      HPI:   Follow-up diabetic wound left great toe  States he did use Neosporin and a Band-Aid today. Presents with regular shoes. No calf pain. Has noticed improvement in wound overall. Denies nausea vomiting fever chills shortness of breath. Still does have Medihoney at home. No recent injury.     ROS:  Const: Denies constitutional symptoms  Musculo: Denies symptoms other than stated above  Skin: Denies symptoms other than stated above      Current Outpatient Medications:     insulin glargine (BASAGLAR KWIKPEN) 100 UNIT/ML injection pen, Inject 32 Units into the skin nightly, Disp: 5 pen, Rfl: 2    ARIPiprazole (ABILIFY) 5 MG tablet, Take 1 tablet by mouth daily, Disp: 30 tablet, Rfl: 1    lisinopril (PRINIVIL;ZESTRIL) 20 MG tablet, Take 1 tablet by mouth daily, Disp: 30 tablet, Rfl: 2    metFORMIN (GLUCOPHAGE) 1000 MG tablet, Take 1 tablet by mouth 2 times daily, Disp: 60 tablet, Rfl: 2    Wound Dressings ([facility] MANUKA HONEY WOUND) GEL, Apply 1 Tube topically 2 times daily, Disp: 1 Tube, Rfl: 2    escitalopram (LEXAPRO) 20 MG tablet, Take 1 tablet by mouth daily, Disp: 30 tablet, Rfl: 3    pravastatin (PRAVACHOL) 40 MG tablet, Take 1 tablet by mouth nightly, Disp: 30 tablet, Rfl: 3    buPROPion (WELLBUTRIN XL) 300 MG extended release tablet, Take 1 tablet by mouth every morning, Disp: 30 tablet, Rfl: 3    metoprolol tartrate (LOPRESSOR) 25 MG tablet, Take 1 tablet by mouth 2 times daily, Disp: 60 tablet, Rfl: 3    insulin aspart (NOVOLOG FLEXPEN) 100 UNIT/ML injection pen, Inject 10-12 Units into the skin 3 times daily (before meals) 10-12 units 3 times daily before meals according to sliding scale., Disp: 5 pen, Rfl: 3    NOVOFINE PLUS drainage noted. Wound does not track or probe to bone. Assessment and Plan:  Willis Woodruff was seen today for wound check. Diagnoses and all orders for this visit:    Open wound of left foot, subsequent encounter    Type 2 diabetes mellitus with foot ulcer, unspecified whether long term insulin use (HCC)    Hereditary sensory neuropathy    Atherosclerosis of native arteries of left leg with ulceration of other part of foot ([address]            Follow-up diabetic wound left great toe    Excisional debridement performed with sharp sterile #15 blade deep through subcutaneous tissue entire wound. Pre and post-wound measurements taken. Wound does not probe to bone. Stressed importance of U-shaped offloading padding Medihoney and Band-Aid once daily. Still has significant callus tissue around open wound and I did stress importance of avoiding barefoot. Any increased redness or drainage any nausea vomiting fever chills shortness of breath go to emergency room. Still remains high risk amputation left great toe due to continued weightbearing with history of diabetes. He is understanding this. I will follow-up 2 weeks. Return in about 2 weeks (around 7/29/2021). Seen By:  Omero Jennings DPM      Document was created using voice recognition software. Note was reviewed however may contain grammatical errors.

## 2021-07-16 DIAGNOSIS — G35 MULTIPLE SCLEROSIS (HCC): ICD-10-CM

## 2021-07-25 DIAGNOSIS — I10 ESSENTIAL HYPERTENSION: ICD-10-CM

## 2021-07-25 DIAGNOSIS — F32.A DEPRESSION, UNSPECIFIED DEPRESSION TYPE: ICD-10-CM

## 2021-07-25 DIAGNOSIS — E78.5 HYPERLIPIDEMIA, UNSPECIFIED HYPERLIPIDEMIA TYPE: ICD-10-CM

## 2021-07-26 RX ORDER — PRAVASTATIN SODIUM 40 MG
TABLET ORAL
Qty: 30 TABLET | Refills: 0 | Status: SHIPPED
Start: 2021-07-26 | End: 2021-08-24

## 2021-07-26 RX ORDER — BUPROPION HYDROCHLORIDE 300 MG/1
300 TABLET ORAL EVERY MORNING
Qty: 30 TABLET | Refills: 0 | Status: SHIPPED
Start: 2021-07-26 | End: 2021-08-24

## 2021-07-26 RX ORDER — ESCITALOPRAM OXALATE 20 MG/1
20 TABLET ORAL DAILY
Qty: 30 TABLET | Refills: 0 | Status: SHIPPED
Start: 2021-07-26 | End: 2021-08-24

## 2021-07-26 NOTE — TELEPHONE ENCOUNTER
Requested Prescriptions     Pending Prescriptions Disp Refills    escitalopram (LEXAPRO) 20 MG tablet [Pharmacy Med Name: ESCITALOPRAM 20MG TABLETS] 30 tablet 0     Sig: TAKE 1 TABLET BY MOUTH DAILY    pravastatin (PRAVACHOL) 40 MG tablet [Pharmacy Med Name: PRAVASTATIN 40MG TABLETS] 30 tablet 0     Sig: TAKE 1 TABLET BY MOUTH EVERY NIGHT    metoprolol tartrate (LOPRESSOR) 25 MG tablet [Pharmacy Med Name: METOPROLOL TARTRATE 25MG TABLETS] 60 tablet 0     Sig: TAKE 1 TABLET BY MOUTH TWICE DAILY    buPROPion (WELLBUTRIN XL) 300 MG extended release tablet [Pharmacy Med Name: BUPROPION XL 300MG TABLETS] 30 tablet 0     Sig: TAKE 1 TABLET BY MOUTH EVERY MORNING

## 2021-07-29 ENCOUNTER — OFFICE VISIT (OUTPATIENT)
Dept: PODIATRY | Age: 46
End: 2021-07-29
Payer: COMMERCIAL

## 2021-07-29 VITALS — WEIGHT: 315 LBS | BODY MASS INDEX: 37.19 KG/M2 | HEIGHT: 77 IN

## 2021-07-29 DIAGNOSIS — E11.621 TYPE 2 DIABETES MELLITUS WITH FOOT ULCER, UNSPECIFIED WHETHER LONG TERM INSULIN USE (HCC): ICD-10-CM

## 2021-07-29 DIAGNOSIS — S91.301A OPEN WOUND OF RIGHT FOOT, INITIAL ENCOUNTER: ICD-10-CM

## 2021-07-29 DIAGNOSIS — L97.509 TYPE 2 DIABETES MELLITUS WITH FOOT ULCER, UNSPECIFIED WHETHER LONG TERM INSULIN USE (HCC): ICD-10-CM

## 2021-07-29 DIAGNOSIS — S91.302D OPEN WOUND OF LEFT FOOT, SUBSEQUENT ENCOUNTER: Primary | ICD-10-CM

## 2021-07-29 DIAGNOSIS — I70.245 ATHEROSCLEROSIS OF NATIVE ARTERIES OF LEFT LEG WITH ULCERATION OF OTHER PART OF FOOT (HCC): ICD-10-CM

## 2021-07-29 DIAGNOSIS — G60.8 HEREDITARY SENSORY NEUROPATHY: ICD-10-CM

## 2021-07-29 PROCEDURE — 11042 DBRDMT SUBQ TIS 1ST 20SQCM/<: CPT | Performed by: PODIATRIST

## 2021-07-29 NOTE — PROGRESS NOTES
Patient here for follow up on wound check to left foot. Patient states that he stepped on a piece of glass in the shower and both feet are cut and looks red. Wound is improving of the left great toe. Kylie Osorio : 1975 Sex: male  Age: 51 y.o. Patient was referred by Sue Alex DO    CC:   Follow-up diabetic wound left great toe      HPI:   Follow-up diabetic wound left great toe  Did step on glass on the bottom of his right foot states he got it out no pain on the bottom of the left heel but states when he was getting the glass off the heel he used his right foot and scratched the top of the right foot. No open wounds does have a dried scab. No pain right foot. States he does feel the wound on the bottom left great toe is improving. Denies nausea vomiting fever chills shortness of breath. No drainage noted. Presents with regular shoes and no socks today. Still has not got his custom diabetic inserts. States he will try to get them this week.     ROS:  Const: Denies constitutional symptoms  Musculo: Denies symptoms other than stated above  Skin: Denies symptoms other than stated above      Current Outpatient Medications:     escitalopram (LEXAPRO) 20 MG tablet, TAKE 1 TABLET BY MOUTH DAILY, Disp: 30 tablet, Rfl: 0    pravastatin (PRAVACHOL) 40 MG tablet, TAKE 1 TABLET BY MOUTH EVERY NIGHT, Disp: 30 tablet, Rfl: 0    metoprolol tartrate (LOPRESSOR) 25 MG tablet, TAKE 1 TABLET BY MOUTH TWICE DAILY, Disp: 60 tablet, Rfl: 0    buPROPion (WELLBUTRIN XL) 300 MG extended release tablet, TAKE 1 TABLET BY MOUTH EVERY MORNING, Disp: 30 tablet, Rfl: 0    insulin glargine (BASAGLAR KWIKPEN) 100 UNIT/ML injection pen, Inject 32 Units into the skin nightly, Disp: 5 pen, Rfl: 2    ARIPiprazole (ABILIFY) 5 MG tablet, Take 1 tablet by mouth daily, Disp: 30 tablet, Rfl: 1    lisinopril (PRINIVIL;ZESTRIL) 20 MG tablet, Take 1 tablet by mouth daily, Disp: 30 tablet, Rfl: 2    metFORMIN (GLUCOPHAGE) 1000 MG tablet, Take 1 tablet by mouth 2 times daily, Disp: 60 tablet, Rfl: 2    Wound Dressings ([facility] MANUKA HONEY WOUND) GEL, Apply 1 Tube topically 2 times daily, Disp: 1 Tube, Rfl: 2    insulin aspart (NOVOLOG FLEXPEN) 100 UNIT/ML injection pen, Inject 10-12 Units into the skin 3 times daily (before meals) 10-12 units 3 times daily before meals according to sliding scale., Disp: 5 pen, Rfl: 3    NOVOFINE PLUS 32G X 4 MM MISC, USE EVERY DAY UNDER THE SKIN, Disp: , Rfl:     aspirin 81 MG EC tablet, Take 81 mg by mouth 2 tablets daily, Disp: , Rfl:     cetirizine (ZYRTEC) 10 MG tablet, Take 10 mg by mouth daily, Disp: , Rfl:     omeprazole (PRILOSEC) 20 MG delayed release capsule, Take 20 mg by mouth daily, Disp: , Rfl:   Allergies   Allergen Reactions    Bee Venom Swelling    Milk-Related Compounds      Stomach issues-lactose intolerant       Past Medical History:   Diagnosis Date    Cellulitis of left foot 9/3/2020    [facility] [address] [address]     Hyperlipidemia     Hypertension     Kidney stone        There were no vitals filed for this visit. Postop incision and drainage left foot with fifth metatarsal resection and bone biopsy date of surgery 9/4/2020    Work History/Social History:     Focused Lower Extremity Physical Exam:      Neurovascular examination:    Dorsalis Pedis palpable bilateral.  Posterior tibialis  palpable bilateral.  Capillary Refill Time:  Immediate return  Hair growth:  Symmetrical and bilateral   Skin:  Not atrophic  Edema: Mild edema bilateral feet. Mild edema bilateral ankles.   Neurologic:  Light touch diminished bilateral.  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 No crepitus noted left great toe. No crepitus left foot    Dermatology examination:      Wound remains well coapted plantar fifth metatarsal left foot    There is a wound plantar medial left great toe measuring approximately 0.8 cm x 0.8 cm x 0.2 cm.  1/2% granular. Continued surrounding hyperkeratotic tissue. No drainage. Wound does not probe to bone. Dried scabs dorsal right foot with no open wounds. Assessment and Plan:  Lizz Mcdonald was seen today for wound check. Diagnoses and all orders for this visit:    Open wound of left foot, subsequent encounter    Type 2 diabetes mellitus with foot ulcer, unspecified whether long term insulin use (HCC)    Hereditary sensory neuropathy    Atherosclerosis of native arteries of left leg with ulceration of other part of foot ([facility] [address]    Open wound of right foot, initial encounter            Follow-up diabetic wound left great toe    Excisional debridement performed with sharp sterile #15 blade deep through subcutaneous tissue entire wound. Pre and post-wound measurements taken. Wound does not probe to bone. Stressed importance of daily foot ankle exam.  Importance of getting the diabetic inserts and diabetic offloading padding discussed in detail. He is high risk for lower extremity limb loss if nonhealing. Any increased redness drainage go to the emergency room. He is understanding of this. Stressed importance of continue glucose check daily. Importance of following up with his primary care physician. I will follow-up 2 weeks. No follow-ups on file. Seen By:  Azul Cotton DPM      Document was created using voice recognition software. Note was reviewed however may contain grammatical errors.

## 2021-08-12 ENCOUNTER — OFFICE VISIT (OUTPATIENT)
Dept: PODIATRY | Age: 46
End: 2021-08-12
Payer: COMMERCIAL

## 2021-08-12 VITALS — WEIGHT: 315 LBS | BODY MASS INDEX: 37.19 KG/M2 | HEIGHT: 77 IN

## 2021-08-12 DIAGNOSIS — Z91.199 NONCOMPLIANCE: ICD-10-CM

## 2021-08-12 DIAGNOSIS — S91.302D OPEN WOUND OF LEFT FOOT, SUBSEQUENT ENCOUNTER: Primary | ICD-10-CM

## 2021-08-12 DIAGNOSIS — S91.301D OPEN WOUND OF RIGHT FOOT, SUBSEQUENT ENCOUNTER: ICD-10-CM

## 2021-08-12 DIAGNOSIS — L97.509 TYPE 2 DIABETES MELLITUS WITH FOOT ULCER, UNSPECIFIED WHETHER LONG TERM INSULIN USE (HCC): ICD-10-CM

## 2021-08-12 DIAGNOSIS — E11.621 TYPE 2 DIABETES MELLITUS WITH FOOT ULCER, UNSPECIFIED WHETHER LONG TERM INSULIN USE (HCC): ICD-10-CM

## 2021-08-12 DIAGNOSIS — G60.8 HEREDITARY SENSORY NEUROPATHY: ICD-10-CM

## 2021-08-12 PROCEDURE — 99213 OFFICE O/P EST LOW 20 MIN: CPT | Performed by: PODIATRIST

## 2021-08-12 NOTE — PROGRESS NOTES
Patient:  Orlin Briggs 51 y.o. male     Date of Service: 8/12/21      Chiefcomplaint: No chief complaint on file. History of Present Illness   ***    Pertinent Medical, Family, Surgical, Social History:  Past Medical History:   Diagnosis Date    Cellulitis of left foot 9/3/2020    Diabetes mellitus ([facility] [address]     Hyperlipidemia     Hypertension     Kidney stone      Physical Exam   Vitals: There were no vitals taken for this visit. General Appearance: Alert, oriented, no acute distress  HEENT: No scleral icterus. No visible discharge from eyes  Neck: Not rigid. No visible masses  Chest wall/Lung: Clear to auscultation bilaterally,  respirations unlabored. No ronchi/wheezing/rales  Heart: RRR, no murmur  Abdomen: Soft, nontender  Extremities:  No edema  Skin: No rashes. No jaundice  Neuro: Alert and oriented        Psych: Appropriate mood and appropriate affect    Assessment and Plan   1. Essential hypertension      No follow-ups on file. James Fishman,      This document may have been prepared at least partially through the use of voice recognition software. Although effort is taken to assure the accuracy of this document, it is possible that grammatical, syntax,  or spelling errors may occur.

## 2021-08-12 NOTE — PROGRESS NOTES
Patient here for follow up on wound check to left foot. C/o wound to bottom of right foot. Bernardino Landau : 1975 Sex: male  Age: 51 y.o. Patient was referred by Sybil Mckinnon DO    CC:   Follow-up diabetic wound left great toe      HPI:   Follow-up diabetic wound left great toe  Does have a wound on the bottom of the right foot. Wearing regular shoes today. Does have Medihoney at home but does not have any on today. Does have difficulty putting the Medihoney on his feet. Denies any redness or drainage. No pain left or right foot. Denies any drainage. Denies calf pain. Denies nausea vomiting fever chills shortness of breath.     ROS:  Const: Denies constitutional symptoms  Musculo: Denies symptoms other than stated above  Skin: Denies symptoms other than stated above      Current Outpatient Medications:     escitalopram (LEXAPRO) 20 MG tablet, TAKE 1 TABLET BY MOUTH DAILY, Disp: 30 tablet, Rfl: 0    pravastatin (PRAVACHOL) 40 MG tablet, TAKE 1 TABLET BY MOUTH EVERY NIGHT, Disp: 30 tablet, Rfl: 0    metoprolol tartrate (LOPRESSOR) 25 MG tablet, TAKE 1 TABLET BY MOUTH TWICE DAILY, Disp: 60 tablet, Rfl: 0    buPROPion (WELLBUTRIN XL) 300 MG extended release tablet, TAKE 1 TABLET BY MOUTH EVERY MORNING, Disp: 30 tablet, Rfl: 0    insulin glargine (BASAGLAR KWIKPEN) 100 UNIT/ML injection pen, Inject 32 Units into the skin nightly, Disp: 5 pen, Rfl: 2    ARIPiprazole (ABILIFY) 5 MG tablet, Take 1 tablet by mouth daily, Disp: 30 tablet, Rfl: 1    lisinopril (PRINIVIL;ZESTRIL) 20 MG tablet, Take 1 tablet by mouth daily, Disp: 30 tablet, Rfl: 2    metFORMIN (GLUCOPHAGE) 1000 MG tablet, Take 1 tablet by mouth 2 times daily, Disp: 60 tablet, Rfl: 2    Wound Dressings ([facility] MANUKA HONEY WOUND) GEL, Apply 1 Tube topically 2 times daily, Disp: 1 Tube, Rfl: 2    insulin aspart (NOVOLOG FLEXPEN) 100 UNIT/ML injection pen, Inject 10-12 Units into the skin 3 times daily (before meals) 10-12 x 0.2 cm. 100% granular wound. No drainage. Wound does not probe to bone. Wound plantar fifth metatarsal head right foot measuring approximately 0.8 cm x 0.2 cm x 0.1 cm.  100% granular. Deep through subcutaneous tissue only. Surrounding hyperkeratotic tissue. Wound does not probe to bone. Assessment and Plan:  Lourdes Moore was seen today for wound check and diabetes. Diagnoses and all orders for this visit:    Open wound of left foot, subsequent encounter    Type 2 diabetes mellitus with foot ulcer, unspecified whether long term insulin use ([facility] [address]    Hereditary sensory neuropathy    Noncompliance    Open wound of right foot, subsequent encounter            Follow-up diabetic wound left great toe    Excisional debridement performed with sharp sterile #15 blade deep through subcutaneous tissue entire wound plantar right great toe. Pre and post-wound measurements taken. Wound does not probe to bone. Does have a new wound right foot right fifth metatarsal head which is subcutaneous tissue depth only. Importance of daily diabetic exam.  Importance of following up with his primary care physician. Still do recommend surgical shoes and offloading. Medihoney and Band-Aid once daily with padding. He is walking in regular shoes today. He is prone to nonhealing due to weightbearing pressure and diabetes. He is prone for further amputation of his feet if nonhealing. He is understanding this. Any increased redness or drainage go to the emergency room. I will follow-up 2 weeks to monitor progression. Return in about 2 weeks (around 8/26/2021). Seen By:  Ananya Rose DPM      Document was created using voice recognition software. Note was reviewed however may contain grammatical errors.

## 2021-08-16 ENCOUNTER — OFFICE VISIT (OUTPATIENT)
Dept: FAMILY MEDICINE CLINIC | Age: 46
End: 2021-08-16
Payer: COMMERCIAL

## 2021-08-16 VITALS
HEART RATE: 88 BPM | DIASTOLIC BLOOD PRESSURE: 97 MMHG | RESPIRATION RATE: 20 BRPM | TEMPERATURE: 97.2 F | HEIGHT: 77 IN | SYSTOLIC BLOOD PRESSURE: 155 MMHG | BODY MASS INDEX: 52.77 KG/M2

## 2021-08-16 DIAGNOSIS — G35 MS (MULTIPLE SCLEROSIS) (HCC): ICD-10-CM

## 2021-08-16 DIAGNOSIS — E11.65 UNCONTROLLED TYPE 2 DIABETES MELLITUS WITH HYPERGLYCEMIA (HCC): Primary | ICD-10-CM

## 2021-08-16 DIAGNOSIS — E66.01 MORBID OBESITY WITH BMI OF 45.0-49.9, ADULT (HCC): ICD-10-CM

## 2021-08-16 DIAGNOSIS — E78.5 HYPERLIPIDEMIA, UNSPECIFIED HYPERLIPIDEMIA TYPE: ICD-10-CM

## 2021-08-16 DIAGNOSIS — L97.524 DIABETIC ULCER OF TOE OF LEFT FOOT ASSOCIATED WITH DIABETES MELLITUS DUE TO UNDERLYING CONDITION, WITH NECROSIS OF BONE (HCC): ICD-10-CM

## 2021-08-16 DIAGNOSIS — I10 ESSENTIAL HYPERTENSION: ICD-10-CM

## 2021-08-16 DIAGNOSIS — E08.621 DIABETIC ULCER OF TOE OF LEFT FOOT ASSOCIATED WITH DIABETES MELLITUS DUE TO UNDERLYING CONDITION, WITH NECROSIS OF BONE (HCC): ICD-10-CM

## 2021-08-16 PROCEDURE — 99214 OFFICE O/P EST MOD 30 MIN: CPT | Performed by: STUDENT IN AN ORGANIZED HEALTH CARE EDUCATION/TRAINING PROGRAM

## 2021-08-16 NOTE — PROGRESS NOTES
Patient:  Marissa Pedro 51 y.o. male     Date of Service: 8/16/21      Chiefcomplaint:   Chief Complaint   Patient presents with    Diabetes     last A1C ruchi 10.4     History of Present Illness     Diabetes  Patient presents with uncontrolled diabetes last A1c 10. 4. patient is currently on long-term and mealtime insulin. He says he is compliant with this regimen. However he is not checking any sugars. He is no longer on Steglatro. He continues Metformin at this time. He does note that his sugars are likely elevated and he does not follow a carb control regimen. Is open to checking sugars and improving carb control. Is aware of the need for diabetic retinal exam.  History of diabetic foot wound. Multiple sclerosis  Patient follows with neuro and had a recent MRI. He is likely to start new immunosuppressant medication if approved. Presenting symptoms were tingling and fatigue these have not progressed. Hypertension  Patient with chronic elevated blood pressure. He is on lisinopril and metoprolol currently. He does not check blood pressure at home. He is open to getting a blood pressure cuff    Elevated BMI  Patient with elevated BMI above 50. Believes most of his issue is with calories. He is unsure how many calories per day he is taking in as he does not check. He notes that exercise is difficult due to diabetic foot wounds on both feet at this time. Does follow with podiatry. He does think that he may be able to get back into a walking regimen after these wounds of healed. He does not have stationary bike. Prevention  Patient did have Covid vaccine. He is aware of the upcoming need for colonoscopy would like to delay at this time x1 year. he does not have family history of colon cancer.     Pertinent Medical, Family, Surgical, Social History:  Past Medical History:   Diagnosis Date    Cellulitis of left foot 09/03/2020    Diabetes mellitus ([facility] [address]     Hyperlipidemia     Hypertension  Kidney stone     Multiple sclerosis (HCC)      Physical Exam   Vitals: BP (!) 155/97   Pulse 88   Temp 97.2 °F (36.2 °C) (Temporal)   Resp 20   Ht 6' 5\" (1.956 m)   BMI 52.77 kg/m²   General Appearance: Alert, oriented, no acute distress  HEENT: No scleral icterus. No visible discharge from eyes  Neck: Not rigid. No visible masses  Chest wall/Lung: Clear to auscultation bilaterally,  respirations unlabored. No ronchi/wheezing/rales  Heart: RRR, no murmur  Abdomen: Soft, nontender  Extremities:  No edema  Skin: No rashes. No jaundice. Note healing diabetic foot wound on left. Neuro: Alert and oriented        Psych: Appropriate mood and appropriate affect    Assessment and Plan   1. Uncontrolled type 2 diabetes mellitus with hyperglycemia ([facility])  Check sugars up to 3 times a day. Continue same insulin regimen for now. Needs diabetic retinal exam.  Following with podiatry for diabetic ulcer. Last podiatry note reviewed. Labs reviewed. Patient glucose elevated. a1c elevated. A1c in 2 months    2. MS (multiple sclerosis) [facility])  Patient following with neurology. Last note reviewed. Patient is going to start immunosuppressive therapy. Symptoms stable at this time    3. Diabetic ulcer of toe of left foot associated with diabetes mellitus due to underlying condition, with necrosis of bone ([address]  Well-healing ulcer of the left foot. Patient does note scratch that developed recently on the right foot. He follows with podiatry every 2 weeks. In between he does wrap the wounds. Will need ongoing therapy. 4. Essential hypertension  Medications are reviewed. Blood pressure is elevated today. Patient will need to check blood pressures at home and was instructed to get a cuff. We will recheck at next visit and consider increasing lisinopril or adding second agent. If patient able to improve weight this will likely make significant difference in blood pressure as well.      5. Morbid obesity with BMI of 45.0-49.9, adult [facility])  Patient works from home does not get much exercise. He does note diet does seem to be an issue but does not count calories. He was given keaton for his iPhone to count calories and instructed to do this over the next couple weeks. He can email this information to our office. Instructed to decrease calorie count by approximately 10%. If patient cannot exercise on his feet he will need a stationary bike or other method. 6. HLD  Patient with good cholesterol control. Last ldl, hdl, triglyc reviewed. Plan to continue statin. Triglycerides elevated but not fasting labs. Return in about 2 months (around 10/16/2021) for DM2, HTN. Fermin Cortes,      This document may have been prepared at least partially through the use of voice recognition software. Although effort is taken to assure the accuracy of this document, it is possible that grammatical, syntax,  or spelling errors may occur.

## 2021-08-24 ENCOUNTER — TELEPHONE (OUTPATIENT)
Dept: NEUROLOGY | Age: 46
End: 2021-08-24

## 2021-08-24 NOTE — TELEPHONE ENCOUNTER
3rd attempt to reach Gurrola samantha for an expedited appeal. Phone # [phone] Ext [phone]. For Gareth [place].

## 2021-08-27 ENCOUNTER — TELEPHONE (OUTPATIENT)
Dept: ADMINISTRATIVE | Age: 46
End: 2021-08-27

## 2021-08-27 ENCOUNTER — APPOINTMENT (OUTPATIENT)
Dept: GENERAL RADIOLOGY | Age: 46
DRG: 617 | End: 2021-08-27
Payer: COMMERCIAL

## 2021-08-27 ENCOUNTER — HOSPITAL ENCOUNTER (INPATIENT)
Age: 46
LOS: 5 days | Discharge: NURSING, REHAB OR LONG-TERM CARE | DRG: 617 | End: 2021-09-01
Attending: EMERGENCY MEDICINE | Admitting: INTERNAL MEDICINE
Payer: COMMERCIAL

## 2021-08-27 DIAGNOSIS — L03.115 CELLULITIS OF RIGHT LOWER EXTREMITY: ICD-10-CM

## 2021-08-27 DIAGNOSIS — E10.621 DIABETIC ULCER OF RIGHT MIDFOOT ASSOCIATED WITH TYPE 1 DIABETES MELLITUS, WITH FAT LAYER EXPOSED (HCC): Primary | ICD-10-CM

## 2021-08-27 DIAGNOSIS — L97.412 DIABETIC ULCER OF RIGHT MIDFOOT ASSOCIATED WITH TYPE 1 DIABETES MELLITUS, WITH FAT LAYER EXPOSED (HCC): Primary | ICD-10-CM

## 2021-08-27 PROBLEM — E11.69 DIABETIC FOOT ULCER WITH OSTEOMYELITIS (HCC): Status: ACTIVE | Noted: 2021-08-27

## 2021-08-27 PROBLEM — M86.9 DIABETIC FOOT ULCER WITH OSTEOMYELITIS (HCC): Status: ACTIVE | Noted: 2021-08-27

## 2021-08-27 PROBLEM — L97.509 DIABETIC FOOT ULCER WITH OSTEOMYELITIS (HCC): Status: ACTIVE | Noted: 2021-08-27

## 2021-08-27 PROBLEM — E11.621 DIABETIC FOOT ULCER WITH OSTEOMYELITIS (HCC): Status: ACTIVE | Noted: 2021-08-27

## 2021-08-27 LAB
ALBUMIN SERPL-MCNC: 3.6 G/DL (ref 3.5–5.2)
ALP BLD-CCNC: 105 U/L (ref 40–129)
ALT SERPL-CCNC: 23 U/L (ref 0–40)
ANION GAP SERPL CALCULATED.3IONS-SCNC: 17 MMOL/L (ref 7–16)
AST SERPL-CCNC: 28 U/L (ref 0–39)
BACTERIA: NORMAL /HPF
BASOPHILS ABSOLUTE: 0.12 E9/L (ref 0–0.2)
BASOPHILS RELATIVE PERCENT: 0.5 % (ref 0–2)
BILIRUB SERPL-MCNC: 1.2 MG/DL (ref 0–1.2)
BILIRUBIN URINE: NEGATIVE
BLOOD, URINE: ABNORMAL
BUN BLDV-MCNC: 13 MG/DL (ref 6–20)
C-REACTIVE PROTEIN: 5.5 MG/DL (ref 0–0.4)
CALCIUM SERPL-MCNC: 9.1 MG/DL (ref 8.6–10.2)
CHLORIDE BLD-SCNC: 91 MMOL/L (ref 98–107)
CLARITY: CLEAR
CO2: 21 MMOL/L (ref 22–29)
COLOR: YELLOW
CREAT SERPL-MCNC: 0.7 MG/DL (ref 0.7–1.2)
EOSINOPHILS ABSOLUTE: 0.01 E9/L (ref 0.05–0.5)
EOSINOPHILS RELATIVE PERCENT: 0 % (ref 0–6)
GFR AFRICAN AMERICAN: >60
GFR NON-AFRICAN AMERICAN: >60 ML/MIN/1.73
GLUCOSE BLD-MCNC: 400 MG/DL (ref 74–99)
GLUCOSE URINE: >=1000 MG/DL
HCT VFR BLD CALC: 47.4 % (ref 37–54)
HEMOGLOBIN: 16.2 G/DL (ref 12.5–16.5)
IMMATURE GRANULOCYTES #: 0.15 E9/L
IMMATURE GRANULOCYTES %: 0.7 % (ref 0–5)
KETONES, URINE: 40 MG/DL
LACTIC ACID: 2.9 MMOL/L (ref 0.5–2.2)
LEUKOCYTE ESTERASE, URINE: NEGATIVE
LYMPHOCYTES ABSOLUTE: 1.49 E9/L (ref 1.5–4)
LYMPHOCYTES RELATIVE PERCENT: 6.6 % (ref 20–42)
MCH RBC QN AUTO: 32.7 PG (ref 26–35)
MCHC RBC AUTO-ENTMCNC: 34.2 % (ref 32–34.5)
MCV RBC AUTO: 95.6 FL (ref 80–99.9)
METER GLUCOSE: 404 MG/DL (ref 74–99)
MONOCYTES ABSOLUTE: 1.6 E9/L (ref 0.1–0.95)
MONOCYTES RELATIVE PERCENT: 7.1 % (ref 2–12)
NEUTROPHILS ABSOLUTE: 19.08 E9/L (ref 1.8–7.3)
NEUTROPHILS RELATIVE PERCENT: 85.1 % (ref 43–80)
NITRITE, URINE: NEGATIVE
PDW BLD-RTO: 13.2 FL (ref 11.5–15)
PH UA: 5.5 (ref 5–9)
PLATELET # BLD: 229 E9/L (ref 130–450)
PMV BLD AUTO: 11.8 FL (ref 7–12)
POTASSIUM REFLEX MAGNESIUM: 5.1 MMOL/L (ref 3.5–5)
PROTEIN UA: 100 MG/DL
RBC # BLD: 4.96 E12/L (ref 3.8–5.8)
RBC # BLD: NORMAL 10*6/UL
RBC UA: NORMAL /HPF (ref 0–2)
REASON FOR REJECTION: NORMAL
REJECTED TEST: NORMAL
SARS-COV-2, NAAT: NOT DETECTED
SEDIMENTATION RATE, ERYTHROCYTE: 30 MM/HR (ref 0–15)
SODIUM BLD-SCNC: 129 MMOL/L (ref 132–146)
SPECIFIC GRAVITY UA: 1.02 (ref 1–1.03)
TOTAL PROTEIN: 8.2 G/DL (ref 6.4–8.3)
UROBILINOGEN, URINE: 0.2 E.U./DL
WBC # BLD: 22.5 E9/L (ref 4.5–11.5)
WBC UA: NORMAL /HPF (ref 0–5)

## 2021-08-27 PROCEDURE — 6370000000 HC RX 637 (ALT 250 FOR IP): Performed by: EMERGENCY MEDICINE

## 2021-08-27 PROCEDURE — 36415 COLL VENOUS BLD VENIPUNCTURE: CPT

## 2021-08-27 PROCEDURE — 81001 URINALYSIS AUTO W/SCOPE: CPT

## 2021-08-27 PROCEDURE — 2580000003 HC RX 258: Performed by: EMERGENCY MEDICINE

## 2021-08-27 PROCEDURE — 87186 SC STD MICRODIL/AGAR DIL: CPT

## 2021-08-27 PROCEDURE — 73630 X-RAY EXAM OF FOOT: CPT

## 2021-08-27 PROCEDURE — 80053 COMPREHEN METABOLIC PANEL: CPT

## 2021-08-27 PROCEDURE — 2580000003 HC RX 258: Performed by: INTERNAL MEDICINE

## 2021-08-27 PROCEDURE — 87635 SARS-COV-2 COVID-19 AMP PRB: CPT

## 2021-08-27 PROCEDURE — 87205 SMEAR GRAM STAIN: CPT

## 2021-08-27 PROCEDURE — 87147 CULTURE TYPE IMMUNOLOGIC: CPT

## 2021-08-27 PROCEDURE — 6360000002 HC RX W HCPCS: Performed by: EMERGENCY MEDICINE

## 2021-08-27 PROCEDURE — 99233 SBSQ HOSP IP/OBS HIGH 50: CPT | Performed by: INTERNAL MEDICINE

## 2021-08-27 PROCEDURE — 96374 THER/PROPH/DIAG INJ IV PUSH: CPT

## 2021-08-27 PROCEDURE — 86140 C-REACTIVE PROTEIN: CPT

## 2021-08-27 PROCEDURE — 6370000000 HC RX 637 (ALT 250 FOR IP): Performed by: INTERNAL MEDICINE

## 2021-08-27 PROCEDURE — 87040 BLOOD CULTURE FOR BACTERIA: CPT

## 2021-08-27 PROCEDURE — 82962 GLUCOSE BLOOD TEST: CPT

## 2021-08-27 PROCEDURE — 85651 RBC SED RATE NONAUTOMATED: CPT

## 2021-08-27 PROCEDURE — 99284 EMERGENCY DEPT VISIT MOD MDM: CPT

## 2021-08-27 PROCEDURE — 2060000000 HC ICU INTERMEDIATE R&B

## 2021-08-27 PROCEDURE — 87070 CULTURE OTHR SPECIMN AEROBIC: CPT

## 2021-08-27 PROCEDURE — 85025 COMPLETE CBC W/AUTO DIFF WBC: CPT

## 2021-08-27 PROCEDURE — 83605 ASSAY OF LACTIC ACID: CPT

## 2021-08-27 RX ORDER — 0.9 % SODIUM CHLORIDE 0.9 %
1000 INTRAVENOUS SOLUTION INTRAVENOUS ONCE
Status: COMPLETED | OUTPATIENT
Start: 2021-08-27 | End: 2021-08-27

## 2021-08-27 RX ORDER — ONDANSETRON 4 MG/1
4 TABLET, ORALLY DISINTEGRATING ORAL EVERY 8 HOURS PRN
Status: DISCONTINUED | OUTPATIENT
Start: 2021-08-27 | End: 2021-09-01 | Stop reason: HOSPADM

## 2021-08-27 RX ORDER — NICOTINE POLACRILEX 4 MG
15 LOZENGE BUCCAL PRN
Status: DISCONTINUED | OUTPATIENT
Start: 2021-08-27 | End: 2021-09-01 | Stop reason: HOSPADM

## 2021-08-27 RX ORDER — SODIUM CHLORIDE 0.9 % (FLUSH) 0.9 %
5-40 SYRINGE (ML) INJECTION PRN
Status: DISCONTINUED | OUTPATIENT
Start: 2021-08-27 | End: 2021-08-31 | Stop reason: SDUPTHER

## 2021-08-27 RX ORDER — ACETAMINOPHEN 650 MG/1
650 SUPPOSITORY RECTAL EVERY 6 HOURS PRN
Status: DISCONTINUED | OUTPATIENT
Start: 2021-08-27 | End: 2021-09-01 | Stop reason: HOSPADM

## 2021-08-27 RX ORDER — ONDANSETRON 2 MG/ML
4 INJECTION INTRAMUSCULAR; INTRAVENOUS EVERY 6 HOURS PRN
Status: DISCONTINUED | OUTPATIENT
Start: 2021-08-27 | End: 2021-09-01 | Stop reason: HOSPADM

## 2021-08-27 RX ORDER — ESCITALOPRAM OXALATE 10 MG/1
20 TABLET ORAL DAILY
Status: DISCONTINUED | OUTPATIENT
Start: 2021-08-27 | End: 2021-09-01 | Stop reason: HOSPADM

## 2021-08-27 RX ORDER — PANTOPRAZOLE SODIUM 40 MG/1
40 TABLET, DELAYED RELEASE ORAL
Status: DISCONTINUED | OUTPATIENT
Start: 2021-08-28 | End: 2021-09-01 | Stop reason: HOSPADM

## 2021-08-27 RX ORDER — SODIUM CHLORIDE, SODIUM LACTATE, POTASSIUM CHLORIDE, CALCIUM CHLORIDE 600; 310; 30; 20 MG/100ML; MG/100ML; MG/100ML; MG/100ML
INJECTION, SOLUTION INTRAVENOUS CONTINUOUS
Status: DISCONTINUED | OUTPATIENT
Start: 2021-08-27 | End: 2021-09-01 | Stop reason: HOSPADM

## 2021-08-27 RX ORDER — SODIUM CHLORIDE 9 MG/ML
25 INJECTION, SOLUTION INTRAVENOUS PRN
Status: DISCONTINUED | OUTPATIENT
Start: 2021-08-27 | End: 2021-08-31 | Stop reason: SDUPTHER

## 2021-08-27 RX ORDER — BUPROPION HYDROCHLORIDE 300 MG/1
300 TABLET ORAL EVERY MORNING
Status: DISCONTINUED | OUTPATIENT
Start: 2021-08-28 | End: 2021-09-01 | Stop reason: HOSPADM

## 2021-08-27 RX ORDER — ACETAMINOPHEN 325 MG/1
650 TABLET ORAL EVERY 6 HOURS PRN
Status: DISCONTINUED | OUTPATIENT
Start: 2021-08-27 | End: 2021-09-01 | Stop reason: HOSPADM

## 2021-08-27 RX ORDER — DEXTROSE MONOHYDRATE 25 G/50ML
12.5 INJECTION, SOLUTION INTRAVENOUS PRN
Status: DISCONTINUED | OUTPATIENT
Start: 2021-08-27 | End: 2021-09-01 | Stop reason: HOSPADM

## 2021-08-27 RX ORDER — POLYETHYLENE GLYCOL 3350 17 G/17G
17 POWDER, FOR SOLUTION ORAL DAILY PRN
Status: DISCONTINUED | OUTPATIENT
Start: 2021-08-27 | End: 2021-09-01 | Stop reason: HOSPADM

## 2021-08-27 RX ORDER — PRAVASTATIN SODIUM 20 MG
40 TABLET ORAL DAILY
Status: DISCONTINUED | OUTPATIENT
Start: 2021-08-27 | End: 2021-09-01 | Stop reason: HOSPADM

## 2021-08-27 RX ORDER — INSULIN GLARGINE 100 [IU]/ML
32 INJECTION, SOLUTION SUBCUTANEOUS NIGHTLY
Status: DISCONTINUED | OUTPATIENT
Start: 2021-08-27 | End: 2021-08-30

## 2021-08-27 RX ORDER — SODIUM CHLORIDE 0.9 % (FLUSH) 0.9 %
5-40 SYRINGE (ML) INJECTION EVERY 12 HOURS SCHEDULED
Status: DISCONTINUED | OUTPATIENT
Start: 2021-08-27 | End: 2021-08-31 | Stop reason: SDUPTHER

## 2021-08-27 RX ORDER — ASPIRIN 81 MG/1
81 TABLET ORAL DAILY
Status: DISCONTINUED | OUTPATIENT
Start: 2021-08-27 | End: 2021-09-01 | Stop reason: HOSPADM

## 2021-08-27 RX ORDER — ARIPIPRAZOLE 5 MG/1
5 TABLET ORAL DAILY
Status: DISCONTINUED | OUTPATIENT
Start: 2021-08-27 | End: 2021-09-01 | Stop reason: HOSPADM

## 2021-08-27 RX ORDER — LISINOPRIL 20 MG/1
20 TABLET ORAL DAILY
Status: DISCONTINUED | OUTPATIENT
Start: 2021-08-27 | End: 2021-09-01 | Stop reason: HOSPADM

## 2021-08-27 RX ORDER — DEXTROSE MONOHYDRATE 50 MG/ML
100 INJECTION, SOLUTION INTRAVENOUS PRN
Status: DISCONTINUED | OUTPATIENT
Start: 2021-08-27 | End: 2021-09-01 | Stop reason: HOSPADM

## 2021-08-27 RX ADMIN — SODIUM CHLORIDE, POTASSIUM CHLORIDE, SODIUM LACTATE AND CALCIUM CHLORIDE: 600; 310; 30; 20 INJECTION, SOLUTION INTRAVENOUS at 20:12

## 2021-08-27 RX ADMIN — INSULIN HUMAN 10 UNITS: 100 INJECTION, SOLUTION PARENTERAL at 15:34

## 2021-08-27 RX ADMIN — Medication 10 ML: at 20:15

## 2021-08-27 RX ADMIN — WATER 2000 MG: 1 INJECTION INTRAMUSCULAR; INTRAVENOUS; SUBCUTANEOUS at 21:16

## 2021-08-27 RX ADMIN — INSULIN LISPRO 5 UNITS: 100 INJECTION, SOLUTION INTRAVENOUS; SUBCUTANEOUS at 20:34

## 2021-08-27 RX ADMIN — SODIUM CHLORIDE 1000 ML: 9 INJECTION, SOLUTION INTRAVENOUS at 15:33

## 2021-08-27 RX ADMIN — PRAVASTATIN SODIUM 40 MG: 20 TABLET ORAL at 20:13

## 2021-08-27 RX ADMIN — VANCOMYCIN HYDROCHLORIDE 2000 MG: 10 INJECTION, POWDER, LYOPHILIZED, FOR SOLUTION INTRAVENOUS at 16:47

## 2021-08-27 RX ADMIN — INSULIN LISPRO 12 UNITS: 100 INJECTION, SOLUTION INTRAVENOUS; SUBCUTANEOUS at 20:21

## 2021-08-27 RX ADMIN — METOPROLOL TARTRATE 25 MG: 25 TABLET, FILM COATED ORAL at 20:13

## 2021-08-27 RX ADMIN — INSULIN GLARGINE 32 UNITS: 100 INJECTION, SOLUTION SUBCUTANEOUS at 20:22

## 2021-08-27 RX ADMIN — ARIPIPRAZOLE 5 MG: 5 TABLET ORAL at 20:13

## 2021-08-27 RX ADMIN — SODIUM CHLORIDE 1000 ML: 9 INJECTION, SOLUTION INTRAVENOUS at 15:34

## 2021-08-27 ASSESSMENT — PAIN SCALES - GENERAL
PAINLEVEL_OUTOF10: 10
PAINLEVEL_OUTOF10: 7

## 2021-08-27 ASSESSMENT — PAIN DESCRIPTION - LOCATION: LOCATION: FOOT

## 2021-08-27 ASSESSMENT — PAIN DESCRIPTION - ORIENTATION: ORIENTATION: RIGHT

## 2021-08-27 ASSESSMENT — PAIN DESCRIPTION - PAIN TYPE: TYPE: ACUTE PAIN

## 2021-08-27 NOTE — H&P
[facility] History and Physical    Admission Date  8/27/2021 12:21 PM  Chief Complaint Body aches, foot wound / pain / discharge  Admit Dx   No admission diagnoses are documented for this encounter. Subjective  History of Present Illness  Willis Woodruff is a 53-year-old male with past medical history pertinent for insulin-dependent diabetes, hyperlipidemia, hypertension, multiple sclerosis, known noncompliance who presented to the emergency department today with right foot wound. Patient reports that he had a cut on his right foot 2 months ago, follows with Dr. Toby Shrestha. Pt reports he has been having pain but more concerning was some discharge he noted from the wound. He was to see podiatry today, though canceled due to feeling poorly; he did call the office to urged him to report to the ER. Here, he is afebrile but mildly tachycardic. Laboratory work-up demonstrated a number of abnormalities including metabolic acidosis with resultant hyponatremia, hyperkalemia, some elevation in the anion gap likely due to lactic acidosis of 2.9. Patient hyperglycemic with blood sugar 400, white count was 22. 5. X-ray of the foot showed some cortical abnormalities potentially suggestive of underlying osteomyelitis though no subcutaneous emphysema. Patient being admitted for podiatric evaluation as well as IV antibiotics.     Review of Systems - 12-point review of systems has been reviewed and is otherwise negative except as listed in the HPI    Past Medical History:   Diagnosis Date    Cellulitis of left foot 09/03/2020    [facility] mellitus ([facility] [address]     Hyperlipidemia     Hypertension     Kidney stone     Multiple sclerosis ([facility] [address]      Past Surgical History:   Procedure Laterality Date    FOOT SURGERY Left 9/4/2020    INCISION AND DRAINAGE LEFT FOOT  BONE WITH PARTIAL BONE RESECTION FIFTH METATARSAL performed by Omero Jennings DPM at [facility] OR     Prior to Admission medications    Medication Sig Start Date End Date Taking? Authorizing Provider   insulin aspart (NOVOLOG FLEXPEN) 100 UNIT/ML injection pen 5 units with breakfast and lunch and 10 units with dinner 8/24/21   Yaneth Holliday, DO   buPROPion (WELLBUTRIN XL) 300 MG extended release tablet TAKE 1 TABLET BY MOUTH EVERY MORNING 8/24/21   Yaneth Holliday, DO   metoprolol tartrate (LOPRESSOR) 25 MG tablet Take 1 tablet by mouth 2 times daily 8/24/21   Yaneth Lynn, DO   pravastatin (PRAVACHOL) 40 MG tablet Take 1 tablet by mouth daily 8/24/21   [facility]   escitalopram (LEXAPRO) 20 MG tablet Take 1 tablet by mouth daily 8/24/21   [facility], DO   insulin glargine [facility]) 100 UNIT/ML injection pen Inject 32 Units into the skin nightly 7/2/21   Chad Nettles MD   ARIPiprazole (ABILIFY) 5 MG tablet Take 1 tablet by mouth daily 6/30/21   Chad Nettles MD   lisinopril (PRINIVIL;ZESTRIL) 20 MG tablet Take 1 tablet by mouth daily 6/29/21   Chad Nettles MD   metFORMIN (GLUCOPHAGE) 1000 MG tablet Take 1 tablet by mouth 2 times daily 6/29/21   Chad Nettles MD   Wound Dressings ([facility] MANUKA HONEY WOUND) GEL Apply 1 Tube topically 2 times daily 6/17/21   Etienne Oneill DPM   NOVOFINE PLUS 32G X 4 MM MISC USE EVERY DAY UNDER THE SKIN 1/27/21   Historical Provider, MD   aspirin 81 MG EC tablet Take 81 mg by mouth 2 tablets daily    Historical Provider, MD   cetirizine (ZYRTEC) 10 MG tablet Take 10 mg by mouth daily    Historical Provider, MD   omeprazole (PRILOSEC) 20 MG delayed release capsule Take 20 mg by mouth daily    Historical Provider, MD     Allergies  Bee venom and Milk-related compounds    Social History   reports that he has never smoked. He has never used smokeless tobacco. He reports that he does not drink alcohol and does not use drugs. Family History  family history includes Diabetes in his father; Hypertension in his mother.     Objective  Physical Exam   General: well-developed, well-nourished, no acute distress, cooperative  Skin: warm, dry, intact, normal color without cyanosis  HEENT: normocephalic, atraumatic, mucous membranes normal  Respiratory: clear to auscultation bilaterally without respiratory distress  Cardiovascular: regular rate and rhythm without murmur / rub / gallop  Abdominal: obese, soft, nontender, nondistended, normoactive bowel sounds  Extremities: no mottling, pulses intact, no edema, R foot and RLE erythema / TTP. Lateral R foot w eschar w/o active drainage  Neurologic: awake, alert, no focal deficits  Psychiatric: normal affect, cooperative    *Available labs, imaging studies, microbiologic studies, cardiac studies have been reviewed    Assessment / Plan  Diabetic R foot wound - not septic. WBC 22.5 / follow CBC. Lactic 2.9 likely due to this; IVF and recheck in AM.  ESR was 30, check CRP. No abx given in ED yet w wound and blood cx already obtained there. Consults to ID, podiatry, and wound care. IDDM - hold orals / diabetic diet / ISS + BG checks ACHS / hypoglycemic protocol / target -180     Pt's PMH otherwise pertinent for HTN, HPL, MS. Continue outpt med regimen; if any particular issues regarding these items, will address and move to active problem list above.     Code status  Full  DVT prophylaxis SCDs; hold Lovenox in case of need for OR  Disposition  Home when ready    Electronically signed by Margie Zepeda DO on 8/27/2021 at 3:55 PM

## 2021-08-27 NOTE — TELEPHONE ENCOUNTER
Spoke with patient and advised him to go to ER for eval and tx per Dr Jenna Stevens. Patient states he did not want to go to ER but he would think about it.

## 2021-08-27 NOTE — ED NOTES
RN faxed SBAR to floor. Called floor to confirm receipt. Spoke with Selma who confirmed.       Blake Bowman RN  08/27/21 3136

## 2021-08-27 NOTE — CONSULTS
Podiatry Consult H&P  8/27/2021   Diane Becerra       Consulting Diagnosis: Diabetic foot ulcer  Consulting Physician: Dr. Ce Acuna    Chief Complaint: Nausea, fevers, chills, right foot ulcer    SUBJECTIVE: Diane Becerra is a 51 y.o. uncontrolled so dependent type II diabetic male who presented to the [facility] ED on 8/27/2021 for evaluation of nausea, fevers, chills, cellulitis swelling right lower extremity. Podiatry was consulted on 8/27 for evaluation of right foot ulcer and cellulitis. Patient states that he stepped on glass while in the shower roughly a month ago and has since developed ulcers to bilateral feet. He is also been treated for left great toe wound with regular debridements. He has been seeing Dr. Davion Moran in outpatient setting for podiatric management. He states that for the last 2 days the right foot has become significantly more swollen, red, and painful. He also admits to nausea fevers and chills for last 2 days accompanying his worsening pain right lower extremity. Patient underwent a left fifth metatarsal partial bone section with bone biopsy 1 year prior with Dr. Davion Moran. he denies being on any antibiotics within the last few weeks. He denies any vomiting, diarrhea, chest pain, calf pain. In the ED x-ray findings reveal soft tissue swelling with cortical irregularity at the third metatarsal base not clinically correlating with foot, likely posttraumatic healing. No subcutaneous emphysema, no cortical irregularities of distal metatarsals or all digits right foot. Leukocytotic on admission WBC 22.5. Cultures taken of right fifth metatarsal wound in ED.        Past Medical History:   Diagnosis Date    Cellulitis of left foot 09/03/2020    Diabetes mellitus ([address]     Hyperlipidemia     Hypertension     Kidney stone     Multiple sclerosis [facility])         Past Surgical History:   Procedure Laterality Date    FOOT SURGERY Left 9/4/2020    INCISION AND DRAINAGE LEFT FOOT  BONE WITH PARTIAL BONE RESECTION FIFTH METATARSAL performed by Antonio Barnes DPM at [facility] OR         Family History   Problem Relation Age of Onset    Hypertension Mother     Diabetes Father         Social History     Tobacco Use    Smoking status: Never Smoker    Smokeless tobacco: Never Used   Substance Use Topics    Alcohol use: Never        Prior to Admission medications    Medication Sig Start Date End Date Taking?  Authorizing Provider   insulin aspart (NOVOLOG FLEXPEN) 100 UNIT/ML injection pen 5 units with breakfast and lunch and 10 units with dinner 8/24/21  Yes Fermin Cortes, DO   buPROPion (WELLBUTRIN XL) 300 MG extended release tablet TAKE 1 TABLET BY MOUTH EVERY MORNING 8/24/21  Yes Fermin Cortes DO   metoprolol tartrate (LOPRESSOR) 25 MG tablet Take 1 tablet by mouth 2 times daily 8/24/21  Yes Fermin Cortes DO   pravastatin (PRAVACHOL) 40 MG tablet Take 1 tablet by mouth daily 8/24/21  Yes Fermin Cortes DO   escitalopram (LEXAPRO) 20 MG tablet Take 1 tablet by mouth daily 8/24/21  Yes Fermin Cortes DO   insulin glargine (BASAGLAR KWIKPEN) 100 UNIT/ML injection pen Inject 32 Units into the skin nightly 7/2/21  Yes Azael Keenan MD   ARIPiprazole (ABILIFY) 5 MG tablet Take 1 tablet by mouth daily 6/30/21  Yes Azael Keenan MD   lisinopril (PRINIVIL;ZESTRIL) 20 MG tablet Take 1 tablet by mouth daily 6/29/21  Yes Azael Keenan MD   metFORMIN (GLUCOPHAGE) 1000 MG tablet Take 1 tablet by mouth 2 times daily 6/29/21  Yes Azael Keenan MD   Wound Dressings ([facility] MANUKA HONEY WOUND) GEL Apply 1 Tube topically 2 times daily 6/17/21  Yes Antonio Barnes DPM   NOVOFINE PLUS 32G X 4 MM MISC USE EVERY DAY UNDER THE SKIN 1/27/21  Yes Historical Provider, MD   aspirin 81 MG EC tablet Take 81 mg by mouth 2 tablets daily   Yes Historical Provider, MD   cetirizine (ZYRTEC) 10 MG tablet Take 10 mg by mouth daily   Yes Historical Provider, MD   omeprazole (PRILOSEC) 20 MG delayed release capsule Take 20 mg by mouth daily   Yes Historical Provider, MD        Bee venom and Milk-related compounds         OBJECTIVE:        Vitals:    08/27/21 1815   BP: (!) 180/89   Pulse: 115   Resp: 16   Temp: 99.8 °F (37.7 °C)   SpO2: 97%                      EXAM:        Pt is AAOx3, AOx3    Vascular Exam: DP and PT pulses strongly palpable bilateral lower extremities. Refill time brisk to all digits left foot 1 through 5, digits 1 through 4 right. Cyanotic changes to right fifth digit plantarly and distally. Significant increase in warmth to right foot, ankle, lower leg diffuse erythema right foot with significant focal increase erythema to dorsal lateral right foot just proximal to the fourth and fifth digits. Neuro Exam: Diminished protective sensation bilaterally. Dermatologic Exam: Full-thickness ulcer right fifth metatarsal head negative probe to bone, down to level subcutaneous tissue, no undermining, tunneling, no crepitus, fluctuance, no active drainage, no malodor, no proximal streaking. Fluctuance to subfifth MPJ sulcus With cyanotic discoloration likely hematoma versus abscess. Left foot medial IPJ hallux full-thickness ulcer with granular wound bed, hyperkeratotic periwound, no crepitus, fluctuance, no undermining, tunneling, negative for bone, negative malodor, no proximal streaking. Stable well adhered scab subfifth metatarsal left and plantar heel left    MSK: Muscle strength 5/5 Bilateral .  Mid range of motion first MPJ bilaterally. Ankle joint equinus dorsiflexion. Tenderness on palpation to periwound, right fifth metatarsal head plantarly, right fifth digit.     Current Facility-Administered Medications   Medication Dose Route Frequency Provider Last Rate Last Admin    pravastatin (PRAVACHOL) tablet 40 mg  40 mg Oral Daily Otoniel White DO        [START ON 8/28/2021] pantoprazole (PROTONIX) tablet 40 mg  40 mg Oral QAM  Otoniel White DO        metoprolol tartrate (LOPRESSOR) tablet 25 mg  25 mg Oral BID Otoniel R Lockso, DO        lisinopril (PRINIVIL;ZESTRIL) tablet 20 mg  20 mg Oral Daily Otoniel R Lockso, DO        ARIPiprazole (ABILIFY) tablet 5 mg  5 mg Oral Daily Otoniel R Lockso, DO        aspirin EC tablet 81 mg  81 mg Oral Daily Otoniel R Lockso, DO        [START ON 8/28/2021] buPROPion (WELLBUTRIN XL) extended release tablet 300 mg  300 mg Oral QAM Otoniel R Lockso, DO        escitalopram (LEXAPRO) tablet 20 mg  20 mg Oral Daily Otoniel R Lockso, DO        glucose (GLUTOSE) 40 % oral gel 15 g  15 g Oral PRN Otoniel R Lockso, DO        dextrose 50 % IV solution  12.5 g IntraVENous PRN Otoniel R Lockso, DO        glucagon (rDNA) injection 1 mg  1 mg IntraMUSCular PRN Otoniel R Lockso, DO        dextrose 5 % solution  100 mL/hr IntraVENous PRN Otoniel R Lockso, DO        sodium chloride flush 0.9 % injection 5-40 mL  5-40 mL IntraVENous 2 times per day Darion Donnelly, DO        sodium chloride flush 0.9 % injection 5-40 mL  5-40 mL IntraVENous PRN Otoniel R Lockso, DO        0.9 % sodium chloride infusion  25 mL IntraVENous PRN Otoniel R Lockso, DO        ondansetron (ZOFRAN-ODT) disintegrating tablet 4 mg  4 mg Oral Q8H PRN Otoniel R Lockso, DO        Or    ondansetron (ZOFRAN) injection 4 mg  4 mg IntraVENous Q6H PRN Otoniel R Lockso, DO        polyethylene glycol (GLYCOLAX) packet 17 g  17 g Oral Daily PRN [facility] Lockso, DO        acetaminophen (TYLENOL) tablet 650 mg  650 mg Oral Q6H PRN Otoniel R Lockso, DO        Or    acetaminophen (TYLENOL) suppository 650 mg  650 mg Rectal Q6H PRN Otoniel R Lockso, DO        insulin glargine (LANTUS) injection vial 32 Units  32 Units Subcutaneous Nightly Otoniel R Lockso, DO        insulin lispro (HUMALOG) injection vial 5 Units  5 Units Subcutaneous TID WC Otoniel R Lockso, DO        insulin lispro (HUMALOG) injection vial 0-12 Units  0-12 Units Subcutaneous TID WC Otoniel R Lockso, DO        insulin lispro (HUMALOG) injection vial 0-6 Units  0-6 Units Subcutaneous Nightly Otoniel R Lockso, DO        lactated ringers infusion   IntraVENous Continuous Otoniel R Lockso, DO        cefTRIAXone (ROCEPHIN) 2,000 mg in sterile water 20 mL IV syringe  2,000 mg IntraVENous Once Jose Martinez DO            Lab Results   Component Value Date    WBC 22.5 (H) 08/27/2021    HCT 47.4 08/27/2021    HGB 16.2 08/27/2021     08/27/2021     (L) 08/27/2021    K 5.1 (H) 08/27/2021    CL 91 (L) 08/27/2021    CO2 21 (L) 08/27/2021    BUN 13 08/27/2021    CREATININE 0.7 08/27/2021    GLUCOSE 400 (H) 08/27/2021    CRP 5.5 (H) 08/27/2021         Radiographs:    ASSESSMENT:  - Full thickness diabetic foot ulcer subfifth metatarsal right foot, POA, infected  - Full-thickness diabetic foot ulcer medial IPJ hallux left foot, POA, not infected  - Cellulitis, Right lower extremity   - Uncontrolled type 2 diabetes mellitus with peripheral neuropathy  - Leukocytosis 22.5 on admission       PLAN:  - Examined and evaluated  - All labs, imaging, and charts reviewed  - WBC 22.5 on admission, ESR 30, CRP 5.5  -MRI with and without contrast right foot  - X-ray right foot 8/27 reveals no osteomyelitis, no gas, cortical irregularities third metatarsal base likely old traumatic healing fracture. - Wound cultures right subfifth metatarsal and wound obtained & sent in ED  - Betadine DSD dressing  - We will likely need surgical invention I&D right foot, probable right fifth digit amputation. We will hold off intervention pending MRI findings surgical planning  - ABX; Rocephin, ID on consult  - Arterial studies ordered and pending  -We will continue to follow      D/W: Dr. Dinh Barriga DPM    Thank you for involving podiatry in this patients care. Please do not hesitate to call with any questions or concerns.      [address] [facility] PGY2  8/27/2021   7:41 PM

## 2021-08-27 NOTE — ED PROVIDER NOTES
HPI:  8/27/21,   Time: 12:50 PM EDT         Aaron Bell is a 51 y.o. male presenting to the ED for Pain swelling in the RT foot, beginning 2 months ago. The complaint has been persistent, mild in severity, and worsened by standing. Patient is diabetic states he cut the bottom of his right foot approximately 2 months ago. He noticed increased swelling redness and pain. States the pain travels up his right leg. Patient is a controlled type II diabetic morbidly obese. He does see podiatry. He has not seen them for this infection. He reports chills fever last night at home. No recent Covid vaccines. No Covid exposures. ROS:   Pertinent positives and negatives are stated within HPI, all other systems reviewed and are negative.  --------------------------------------------- PAST HISTORY ---------------------------------------------  Past Medical History:  has a past medical history of Cellulitis of left foot, Diabetes mellitus ([facility] [address] Hyperlipidemia, Hypertension, Kidney stone, and Multiple sclerosis ([facility] [address] Past Surgical History:  has a past surgical history that includes Foot surgery (Left, 9/4/2020). Social History:  reports that he has never smoked. He has never used smokeless tobacco. He reports that he does not drink alcohol and does not use drugs. Family History: family history includes Diabetes in his father; Hypertension in his mother. The patients home medications have been reviewed.     Allergies: Bee venom and Milk-related compounds    -------------------------------------------------- RESULTS -------------------------------------------------  All laboratory and radiology results have been personally reviewed by myself   LABS:  Results for orders placed or performed during the hospital encounter of 08/27/21   COVID-19, Rapid    Specimen: Nasopharyngeal Swab; Nasal   Result Value Ref Range    SARS-CoV-2, NAAT Not Detected Not Detected   Comprehensive Metabolic Panel w/ Reflex to MG Result Value Ref Range    Sodium 129 (L) 132 - 146 mmol/L    Potassium reflex Magnesium 5.1 (H) 3.5 - 5.0 mmol/L    Chloride 91 (L) 98 - 107 mmol/L    CO2 21 (L) 22 - 29 mmol/L    Anion Gap 17 (H) 7 - 16 mmol/L    Glucose 400 (H) 74 - 99 mg/dL    BUN 13 6 - 20 mg/dL    CREATININE 0.7 0.7 - 1.2 mg/dL    GFR Non-African American >60 >=60 mL/min/1.73    GFR African American >60     Calcium 9.1 8.6 - 10.2 mg/dL    Total Protein 8.2 6.4 - 8.3 g/dL    Albumin 3.6 3.5 - 5.2 g/dL    Total Bilirubin 1.2 0.0 - 1.2 mg/dL    Alkaline Phosphatase 105 40 - 129 U/L    ALT 23 0 - 40 U/L    AST 28 0 - 39 U/L   C-Reactive Protein   Result Value Ref Range    CRP 5.5 (H) 0.0 - 0.4 mg/dL   Lactic Acid, Plasma   Result Value Ref Range    Lactic Acid 2.9 (H) 0.5 - 2.2 mmol/L   Urinalysis   Result Value Ref Range    Color, UA Yellow Straw/Yellow    Clarity, UA Clear Clear    Glucose, Ur >=1000 (A) Negative mg/dL    Bilirubin Urine Negative Negative    Ketones, Urine 40 (A) Negative mg/dL    Specific Gravity, UA 1.020 1.005 - 1.030    Blood, Urine SMALL (A) Negative    pH, UA 5.5 5.0 - 9.0    Protein,  (A) Negative mg/dL    Urobilinogen, Urine 0.2 <2.0 E.U./dL    Nitrite, Urine Negative Negative    Leukocyte Esterase, Urine Negative Negative   CBC auto differential   Result Value Ref Range    WBC 22.5 (H) 4.5 - 11.5 E9/L    RBC 4.96 3.80 - 5.80 E12/L    Hemoglobin 16.2 12.5 - 16.5 g/dL    Hematocrit 47.4 37.0 - 54.0 %    MCV 95.6 80.0 - 99.9 fL    MCH 32.7 26.0 - 35.0 pg    MCHC 34.2 32.0 - 34.5 %    RDW 13.2 11.5 - 15.0 fL    Platelets 042 667 - 623 E9/L    MPV 11.8 7.0 - 12.0 fL    Neutrophils % 85.1 (H) 43.0 - 80.0 %    Immature Granulocytes % 0.7 0.0 - 5.0 %    Lymphocytes % 6.6 (L) 20.0 - 42.0 %    Monocytes % 7.1 2.0 - 12.0 %    Eosinophils % 0.0 0.0 - 6.0 %    Basophils % 0.5 0.0 - 2.0 %    Neutrophils Absolute 19.08 (H) 1.80 - 7.30 E9/L    Immature Granulocytes # 0.15 E9/L    Lymphocytes Absolute 1.49 (L) 1.50 - 4.00 E9/L Monocytes Absolute 1.60 (H) 0.10 - 0.95 E9/L    Eosinophils Absolute 0.01 (L) 0.05 - 0.50 E9/L    Basophils Absolute 0.12 0.00 - 0.20 E9/L    RBC Morphology Normal    Sedimentation Rate   Result Value Ref Range    Sed Rate 30 (H) 0 - 15 mm/Hr   SPECIMEN REJECTION   Result Value Ref Range    Rejected Test cbcwd esr     Reason for Rejection see below    Microscopic Urinalysis   Result Value Ref Range    WBC, UA 1-3 0 - 5 /HPF    RBC, UA 1-3 0 - 2 /HPF    Bacteria, UA NONE SEEN None Seen /HPF       RADIOLOGY:  Interpreted by Radiologist.  XR FOOT RIGHT (MIN 3 VIEWS)   Final Result   There is deformity and cortical irregularity of the 3rd proximal metatarsal,   possibly related to prior trauma. If there is point tenderness,   cross-sectional imaging could be obtained to further evaluate for other   osseous lesions/other etiology. No acute fracture or dislocation is seen. ------------------------- NURSING NOTES AND VITALS REVIEWED ---------------------------   The nursing notes within the ED encounter and vital signs as below have been reviewed. BP (!) 175/103   Pulse 109   Temp 98 °F (36.7 °C)   Resp 16   Ht 6' 5\" (1.956 m)   Wt (!) 450 lb (204.1 kg)   SpO2 95%   BMI 53.36 kg/m²   Oxygen Saturation Interpretation: Normal      ---------------------------------------------------PHYSICAL EXAM--------------------------------------      Constitutional/General: Alert and oriented x3, well appearing, non toxic in NAD. BMI of 53  Head: NC/AT  Eyes: PERRL, EOMI  Mouth: Oropharynx clear, handling secretions, no trismus  Neck: Supple, full ROM, no meningeal signs  Pulmonary: Lungs clear to auscultation bilaterally, no wheezes, rales, or rhonchi. Not in respiratory distress  Cardiovascular:  Regular rate and rhythm, no murmurs, gallops, or rubs. 2+ distal pulses  Abdomen: Soft, non tender, non distended,   Extremities: Moves all extremities x 4.  Warm and well perfused, patient's right foot is erythematous it is tender to palpation there is no soft tissue crepitus. He does have some erythema edema tracks up his right lower leg. Is also tender to palpation. He does have to necrotic eschars on the dorsum of the fifth toe and also the dorsum of the lateral aspect of the right foot. There is no drainage noted. Skin: warm and dry without rash  Neurologic: GCS 15,  Psych: Normal Affect      ------------------------------ ED COURSE/MEDICAL DECISION MAKING----------------------  Medications   vancomycin 2000 mg in dextrose 5% 500 ml IVPB (2,000 mg IntraVENous New Bag 8/27/21 1647)   cefTRIAXone (ROCEPHIN) 2,000 mg in sterile water 20 mL IV syringe (has no administration in time range)   0.9 % sodium chloride bolus (0 mLs IntraVENous Stopped 8/27/21 1630)   insulin regular (HUMULIN R;NOVOLIN R) injection 10 Units (10 Units IntraVENous Given 8/27/21 1534)   0.9 % sodium chloride bolus (1,000 mLs IntraVENous New Bag 8/27/21 1533)         Medical Decision Making:      ED Course as of Aug 27 1652   Fri Aug 27, 2021   1546 Patient be admitted to [facility] for evaluation. Consultations pending with his podiatrist.  Wound cultures were obtained of his right foot. X-rays demonstrate no soft tissue air. There is some concern for osteomyelitis. Consultation pending with infectious disease. [JN]      ED Course User Index  [JN] Gerard Shelby DO     Attempt was made with Dr. Brittny Dobson. This patient has elevated white count we will start him on bank and Rocephin. Consultation was made with podiatry they are aware of the consult did not call back they texted to see the patient tomorrow. Counseling: The emergency provider has spoken with the patient and discussed todays results, in addition to providing specific details for the plan of care and counseling regarding the diagnosis and prognosis.   Questions are answered at this time and they are agreeable with the plan.      --------------------------------- IMPRESSION AND DISPOSITION ---------------------------------    IMPRESSION  1. Diabetic ulcer of right midfoot associated with type 1 diabetes mellitus, with fat layer exposed ([address]    2.  Cellulitis of right lower extremity        DISPOSITION  Disposition: Admit to med/surg floor  Patient condition is stable                Hilda Escalante DO  08/27/21 1148

## 2021-08-27 NOTE — TELEPHONE ENCOUNTER
Pt would like to talk to Nurse- had appt yesterday had to cancel- he has chills and several other symptoms. He thought he had covid but now thinks he has Sepsis  [phone].

## 2021-08-27 NOTE — ED PROVIDER NOTES
FIRST PROVIDER CONTACT ASSESSMENT NOTE                                                                                                Department of Emergency Medicine                                                      First Provider Note  21  11:58 AM EDT  NAME: Lydia Sanford  : 1975  MRN: [id number]    Chief Complaint: Generalized Body Aches (pt having a cut on right foot 2 months ago. see pediatrist. history of diabetes. ), Wound Check, and Foot Pain      History of Present Illness:   Lydia Sanford is a 51 y.o. male who presents to the ED for right foot wound with redness and pain. Body aches and general malaise. Pt is diabetic. Not checking sugars. Hx diabetic foot wounds and osteomyelitis left foot last year. Focused Physical Exam:  VS:    ED Triage Vitals [21 1154]   BP Temp Temp Source Pulse Resp SpO2 Height Weight   (!) 178/94 98.1 °F (36.7 °C) Oral 107 -- 97 % 6' 5\" (1.956 m) (!) 450 lb (204.1 kg)        General: Alert and in no apparent distress. Medical History:  has a past medical history of Cellulitis of left foot, Diabetes mellitus ([address] Hyperlipidemia, Hypertension, Kidney stone, and Multiple sclerosis ([address] Surgical History:  has a past surgical history that includes Foot surgery (Left, 2020). Social History:  reports that he has never smoked. He has never used smokeless tobacco. He reports that he does not drink alcohol and does not use drugs. Family History: family history includes Diabetes in his father; Hypertension in his mother.     Allergies: Bee venom and Milk-related compounds     Initial Plan of Care:  Initiate Treatment-Testing, Proceed toTreatment Area When Bed Available for ED Attending/MLP to Continue Care    -------------------------------------------------END OF FIRST PROVIDER CONTACT ASSESSMENT NOTE--------------------------------------------------------  Electronically signed by Randy Pope PA-C   DD: 21 Jazlyn Panda PA-C  08/27/21 1152

## 2021-08-28 ENCOUNTER — ANESTHESIA EVENT (OUTPATIENT)
Dept: OPERATING ROOM | Age: 46
DRG: 617 | End: 2021-08-28
Payer: COMMERCIAL

## 2021-08-28 ENCOUNTER — ANESTHESIA (OUTPATIENT)
Dept: OPERATING ROOM | Age: 46
DRG: 617 | End: 2021-08-28
Payer: COMMERCIAL

## 2021-08-28 PROBLEM — I96 GANGRENE OF TOE OF RIGHT FOOT (HCC): Chronic | Status: ACTIVE | Noted: 2021-08-28

## 2021-08-28 LAB
ANION GAP SERPL CALCULATED.3IONS-SCNC: 13 MMOL/L (ref 7–16)
BUN BLDV-MCNC: 13 MG/DL (ref 6–20)
CALCIUM SERPL-MCNC: 8.5 MG/DL (ref 8.6–10.2)
CHLORIDE BLD-SCNC: 96 MMOL/L (ref 98–107)
CO2: 21 MMOL/L (ref 22–29)
CREAT SERPL-MCNC: 0.6 MG/DL (ref 0.7–1.2)
GFR AFRICAN AMERICAN: >60
GFR NON-AFRICAN AMERICAN: >60 ML/MIN/1.73
GLUCOSE BLD-MCNC: 374 MG/DL (ref 74–99)
HBA1C MFR BLD: 11.7 % (ref 4–5.6)
HCT VFR BLD CALC: 46.5 % (ref 37–54)
HEMOGLOBIN: 15.6 G/DL (ref 12.5–16.5)
LACTIC ACID: 1.5 MMOL/L (ref 0.5–2.2)
MCH RBC QN AUTO: 32.4 PG (ref 26–35)
MCHC RBC AUTO-ENTMCNC: 33.5 % (ref 32–34.5)
MCV RBC AUTO: 96.7 FL (ref 80–99.9)
METER GLUCOSE: 287 MG/DL (ref 74–99)
METER GLUCOSE: 359 MG/DL (ref 74–99)
METER GLUCOSE: 395 MG/DL (ref 74–99)
METER GLUCOSE: 405 MG/DL (ref 74–99)
PDW BLD-RTO: 13 FL (ref 11.5–15)
PLATELET # BLD: 257 E9/L (ref 130–450)
PMV BLD AUTO: 11.7 FL (ref 7–12)
POTASSIUM SERPL-SCNC: 4 MMOL/L (ref 3.5–5)
RBC # BLD: 4.81 E12/L (ref 3.8–5.8)
SODIUM BLD-SCNC: 130 MMOL/L (ref 132–146)
WBC # BLD: 24.8 E9/L (ref 4.5–11.5)

## 2021-08-28 PROCEDURE — 99232 SBSQ HOSP IP/OBS MODERATE 35: CPT | Performed by: INTERNAL MEDICINE

## 2021-08-28 PROCEDURE — 36415 COLL VENOUS BLD VENIPUNCTURE: CPT

## 2021-08-28 PROCEDURE — 83605 ASSAY OF LACTIC ACID: CPT

## 2021-08-28 PROCEDURE — 99254 IP/OBS CNSLTJ NEW/EST MOD 60: CPT | Performed by: SURGERY

## 2021-08-28 PROCEDURE — 2580000003 HC RX 258: Performed by: INTERNAL MEDICINE

## 2021-08-28 PROCEDURE — 2500000003 HC RX 250 WO HCPCS: Performed by: SPECIALIST

## 2021-08-28 PROCEDURE — 85027 COMPLETE CBC AUTOMATED: CPT

## 2021-08-28 PROCEDURE — 80048 BASIC METABOLIC PNL TOTAL CA: CPT

## 2021-08-28 PROCEDURE — 6370000000 HC RX 637 (ALT 250 FOR IP): Performed by: INTERNAL MEDICINE

## 2021-08-28 PROCEDURE — 83036 HEMOGLOBIN GLYCOSYLATED A1C: CPT

## 2021-08-28 PROCEDURE — 2580000003 HC RX 258: Performed by: SPECIALIST

## 2021-08-28 PROCEDURE — 82962 GLUCOSE BLOOD TEST: CPT

## 2021-08-28 PROCEDURE — 99233 SBSQ HOSP IP/OBS HIGH 50: CPT | Performed by: PODIATRIST

## 2021-08-28 PROCEDURE — 2060000000 HC ICU INTERMEDIATE R&B

## 2021-08-28 PROCEDURE — 6360000002 HC RX W HCPCS: Performed by: SPECIALIST

## 2021-08-28 RX ORDER — CLINDAMYCIN PHOSPHATE 900 MG/50ML
900 INJECTION INTRAVENOUS EVERY 8 HOURS
Status: DISCONTINUED | OUTPATIENT
Start: 2021-08-28 | End: 2021-08-30

## 2021-08-28 RX ADMIN — INSULIN LISPRO 3 UNITS: 100 INJECTION, SOLUTION INTRAVENOUS; SUBCUTANEOUS at 22:02

## 2021-08-28 RX ADMIN — LISINOPRIL 20 MG: 20 TABLET ORAL at 08:34

## 2021-08-28 RX ADMIN — INSULIN LISPRO 5 UNITS: 100 INJECTION, SOLUTION INTRAVENOUS; SUBCUTANEOUS at 10:51

## 2021-08-28 RX ADMIN — METOPROLOL TARTRATE 25 MG: 25 TABLET, FILM COATED ORAL at 08:34

## 2021-08-28 RX ADMIN — CEFTAROLINE FOSAMIL 600 MG: 600 POWDER, FOR SOLUTION INTRAVENOUS at 16:42

## 2021-08-28 RX ADMIN — PRAVASTATIN SODIUM 40 MG: 20 TABLET ORAL at 08:34

## 2021-08-28 RX ADMIN — INSULIN LISPRO 10 UNITS: 100 INJECTION, SOLUTION INTRAVENOUS; SUBCUTANEOUS at 07:00

## 2021-08-28 RX ADMIN — INSULIN LISPRO 12 UNITS: 100 INJECTION, SOLUTION INTRAVENOUS; SUBCUTANEOUS at 16:35

## 2021-08-28 RX ADMIN — INSULIN LISPRO 10 UNITS: 100 INJECTION, SOLUTION INTRAVENOUS; SUBCUTANEOUS at 10:51

## 2021-08-28 RX ADMIN — METOPROLOL TARTRATE 25 MG: 25 TABLET, FILM COATED ORAL at 22:02

## 2021-08-28 RX ADMIN — INSULIN LISPRO 5 UNITS: 100 INJECTION, SOLUTION INTRAVENOUS; SUBCUTANEOUS at 07:00

## 2021-08-28 RX ADMIN — CLINDAMYCIN PHOSPHATE 900 MG: 900 INJECTION, SOLUTION INTRAVENOUS at 18:23

## 2021-08-28 RX ADMIN — Medication 10 ML: at 08:34

## 2021-08-28 RX ADMIN — ACETAMINOPHEN 650 MG: 325 TABLET ORAL at 12:57

## 2021-08-28 RX ADMIN — INSULIN GLARGINE 32 UNITS: 100 INJECTION, SOLUTION SUBCUTANEOUS at 22:01

## 2021-08-28 RX ADMIN — INSULIN LISPRO 5 UNITS: 100 INJECTION, SOLUTION INTRAVENOUS; SUBCUTANEOUS at 16:35

## 2021-08-28 RX ADMIN — ESCITALOPRAM OXALATE 20 MG: 10 TABLET ORAL at 08:34

## 2021-08-28 RX ADMIN — PANTOPRAZOLE SODIUM 40 MG: 40 TABLET, DELAYED RELEASE ORAL at 06:53

## 2021-08-28 RX ADMIN — ARIPIPRAZOLE 5 MG: 5 TABLET ORAL at 08:34

## 2021-08-28 RX ADMIN — SODIUM CHLORIDE, POTASSIUM CHLORIDE, SODIUM LACTATE AND CALCIUM CHLORIDE: 600; 310; 30; 20 INJECTION, SOLUTION INTRAVENOUS at 16:43

## 2021-08-28 RX ADMIN — BUPROPION HYDROCHLORIDE 300 MG: 300 TABLET, EXTENDED RELEASE ORAL at 08:34

## 2021-08-28 RX ADMIN — ASPIRIN 81 MG: 81 TABLET, COATED ORAL at 08:34

## 2021-08-28 ASSESSMENT — PAIN DESCRIPTION - PAIN TYPE
TYPE: ACUTE PAIN
TYPE: ACUTE PAIN

## 2021-08-28 ASSESSMENT — PAIN DESCRIPTION - LOCATION
LOCATION: HEAD
LOCATION: FOOT

## 2021-08-28 ASSESSMENT — PAIN SCALES - GENERAL
PAINLEVEL_OUTOF10: 0
PAINLEVEL_OUTOF10: 2
PAINLEVEL_OUTOF10: 3
PAINLEVEL_OUTOF10: 0

## 2021-08-28 ASSESSMENT — PAIN DESCRIPTION - ORIENTATION: ORIENTATION: RIGHT

## 2021-08-28 ASSESSMENT — PAIN DESCRIPTION - DESCRIPTORS: DESCRIPTORS: HEADACHE

## 2021-08-28 NOTE — PROGRESS NOTES
Physician Progress Note      PATIENT:               Ekaterina Meyers  CSN #:                  [id number]  :                       1975  ADMIT DATE:       2021 12:21 PM  [address] [place] DATE:  RESPONDING  PROVIDER #:        Kiarra Norton MD          QUERY TEXT:    Patient admitted with Cellulitis. Patient noted to have Elevated WBC and   lactic acid. If possible, please document in the progress notes and discharge   summary if you are evaluating and /or treating any of the following: The medical record reflects the following:  Risk Factors: Cellulitis  Clinical Indicators: Per IM progress note  \". Noel Confer Noel Confer Right foot diabetic   infection/infected ulcer with cellulitis-with elevated white count and lactic   acid. Noel Confer Noel Confer \" On arrival WBC: 22.5, Lactic: 2.9, CRP: 5.5, HR: 107, Temp: 98.1, RR:   16, BP: 178/94  Treatment: Ceftriaxone. Options provided:  -- Sepsis, present on admission  -- Sepsis, present on admission, now resolved  -- Cellulitis without Sepsis  -- Other - I will add my own diagnosis  -- Disagree - Not applicable / Not valid  -- Disagree - Clinically unable to determine / Unknown  -- Refer to Clinical Documentation Reviewer    PROVIDER RESPONSE TEXT:    This patient has Cellulitis without Sepsis.     Query created by: Jyothi Degroot on 2021 2:49 PM      Electronically signed by:  Kiarra Norton MD 2021 6:44 PM

## 2021-08-28 NOTE — PROGRESS NOTES
[facility] Hospitalist   Progress Note    Admitting Date and Time: 8/27/2021 12:21 PM  Admit Dx: Diabetic foot ulcer with osteomyelitis ([facility] [Q64.059, E11.69, L97.509, M86.9]  Cellulitis of right lower extremity [L03.115]  Diabetic ulcer of right midfoot associated with type 1 diabetes mellitus, with fat layer exposed ([facility] [A88.230, L97.412]    Seen for follow on problems as listed below. Subjective:  Denies CP ,sob or abd pain. Feels very weak & fatigued. Foot pain well controlled. D/w nursing. ROS: denies fever, chills, cp, sob, n/v, HA unless stated above.      pravastatin  40 mg Oral Daily    pantoprazole  40 mg Oral QAM AC    metoprolol tartrate  25 mg Oral BID    lisinopril  20 mg Oral Daily    ARIPiprazole  5 mg Oral Daily    aspirin  81 mg Oral Daily    buPROPion  300 mg Oral QAM    escitalopram  20 mg Oral Daily    sodium chloride flush  5-40 mL IntraVENous 2 times per day    insulin glargine  32 Units Subcutaneous Nightly    insulin lispro  5 Units Subcutaneous TID WC    insulin lispro  0-12 Units Subcutaneous TID WC    insulin lispro  0-6 Units Subcutaneous Nightly     glucose, 15 g, PRN  dextrose, 12.5 g, PRN  glucagon (rDNA), 1 mg, PRN  dextrose, 100 mL/hr, PRN  sodium chloride flush, 5-40 mL, PRN  sodium chloride, 25 mL, PRN  ondansetron, 4 mg, Q8H PRN   Or  ondansetron, 4 mg, Q6H PRN  polyethylene glycol, 17 g, Daily PRN  acetaminophen, 650 mg, Q6H PRN   Or  acetaminophen, 650 mg, Q6H PRN         Objective:    BP (!) 163/67   Pulse 103   Temp 98.7 °F (37.1 °C) (Oral)   Resp 18   Ht 6' 5\" (1.956 m)   Wt (!) 442 lb (200.5 kg)   SpO2 97%   BMI 52.41 kg/m²   General Appearance: alert and oriented to person, place and time, with morbid obesity, in no acute distress  Skin: warm and dry  Head: normocephalic and atraumatic  Eyes: pupils equal, round, and reactive to light, extraocular eye movements intact, conjunctivae normal  Neck: supple and non-tender without mass  Pulmonary/Chest: clear to auscultation bilaterally  Cardiovascular: normal rate, regular rhythm, normal S1 and S2  Abdomen: soft, non-tender, non-distended, normal bowel sounds, no masses or organomegaly  Extremities: no cyanosis, clubbing , right foot with dressing dry and intact, refuses to get examined. Musculoskeletal: normal range of motion  Neurologic:  no cranial nerve deficit,  speech normal      Recent Labs     08/27/21  1219 08/28/21  0500   * 130*   K 5.1* 4.0   CL 91* 96*   CO2 21* 21*   BUN 13 13   CREATININE 0.7 0.6*   GLUCOSE 400* 374*   CALCIUM 9.1 8.5*       Recent Labs     08/27/21  1300 08/28/21  0500   WBC 22.5* 24.8*   RBC 4.96 4.81   HGB 16.2 15.6   HCT 47.4 46.5   MCV 95.6 96.7   MCH 32.7 32.4   MCHC 34.2 33.5   RDW 13.2 13.0    257   MPV 11.8 11.7       Labs and images reviewed     Radiology:   XR FOOT RIGHT (MIN 3 VIEWS)   Final Result   There is deformity and cortical irregularity of the 3rd proximal metatarsal,   possibly related to prior trauma. If there is point tenderness,   cross-sectional imaging could be obtained to further evaluate for other   osseous lesions/other etiology. No acute fracture or dislocation is seen. MRI FOOT RIGHT W WO CONTRAST    (Results Pending)   VL LOWER EXTREMITY ARTERIAL SEGMENTAL PRESSURES W PPG    (Results Pending)       Assessment:    Active Problems:    Diabetic foot ulcer associated with diabetes mellitus due to underlying condition ([address]    Uncontrolled type 2 diabetes mellitus with hyperglycemia ([address]    Diabetic foot ulcer with osteomyelitis ([address]  Resolved Problems:    * No resolved hospital problems. *      Plan:  Right foot diabetic infection/infected ulcer with cellulitis-with elevated white count and lactic acid. Follow cultures. ID and podiatry consulted. Defer antibiotics to ID. Continue local care as per podiatry. Having MRI as per podiatry. Surgical plans asper podiatry. IV ceftriaxone was given in er.Arterial studies pending. Lactic acidosis secondary to #1 -on IV fluids, monitor. Insulin dependent DM ,pporly controlled -basal/bolus/hypoglycemia protocol. Hypertension- resume as per home    Hyperlipidemia- on statin     MS- out pt follow up    On lovenox for dvt prophy  Full code.                Electronically signed by Kya Clifford MD on 8/28/2021 at 11:07 AM

## 2021-08-28 NOTE — PROGRESS NOTES
[facility] Quality Flow/Interdisciplinary Rounds Progress Note        Quality Flow Rounds held on August 28, 2021    Disciplines Attending:  Bedside Nurse, ,  and Nursing Unit Leadership    Rafa Calixto was admitted on 8/27/2021 12:21 PM    Anticipated Discharge Date:  Expected Discharge Date: 08/30/21    Disposition:    Jose Score:  Jose Scale Score: 19    Readmission Risk              Risk of Unplanned Readmission:  12           Discussed patient goal for the day, patient clinical progression, and barriers to discharge. The following Goal(s) of the Day/Commitment(s) have been identified: For OR tomorrow, Vanco IV and Rocephin IV in ER x 1, monitor vitals and lab values, check Vascular Surgery plan, discharge planning.       Pascual Alejnadre RN  August 28, 2021

## 2021-08-28 NOTE — PROGRESS NOTES
Chief Complaint: Follow-up right foot infection    SUBJECTIVE: Jenice Duane seen bedside today follow-up right foot infection. Symptoms worsening since Wednesday was  Advised to go to the emergency room and did present yesterday with elevated white count. States still does not feel 100% today. Does feel better than yesterday. No calf pain. States the toe does look more purple today on the right fifth toe. Unsure of recent injury but once again worsening symptoms since Wednesday which he was advised from our office to go directly to the emergency room on Friday once he did call. Understanding of severity of infection and wishing to proceed with surgery at this time. Past Medical History:   Diagnosis Date    Cellulitis of left foot 09/03/2020    Diabetes mellitus ([address]     Hyperlipidemia     Hypertension     Kidney stone     Multiple sclerosis (HCC)         Past Surgical History:   Procedure Laterality Date    FOOT SURGERY Left 9/4/2020    INCISION AND DRAINAGE LEFT FOOT  BONE WITH PARTIAL BONE RESECTION FIFTH METATARSAL performed by Michael Champagne DPM at [facility] OR         Family History   Problem Relation Age of Onset    Hypertension Mother     Diabetes Father         Social History     Tobacco Use    Smoking status: Never Smoker    Smokeless tobacco: Never Used   Substance Use Topics    Alcohol use: Never        Prior to Admission medications    Medication Sig Start Date End Date Taking?  Authorizing Provider   insulin aspart (NOVOLOG FLEXPEN) 100 UNIT/ML injection pen 5 units with breakfast and lunch and 10 units with dinner 8/24/21  Yes Baljit Painter, DO   buPROPion (WELLBUTRIN XL) 300 MG extended release tablet TAKE 1 TABLET BY MOUTH EVERY MORNING 8/24/21  Yes Baljit Painter, DO   metoprolol tartrate (LOPRESSOR) 25 MG tablet Take 1 tablet by mouth 2 times daily 8/24/21  Yes Baljit Painter, DO   pravastatin (PRAVACHOL) 40 MG tablet Take 1 tablet by mouth daily 8/24/21  Yes Lamond Shone tunneling, negative for bone, negative malodor, no proximal streaking. Stable well adhered scab subfifth metatarsal left and plantar heel left    Plan wound right fifth metatarsal head with significant surrounding hyperkeratotic tissue. Wound measures approximately 1.5 cm x 1 cm x 0.8 cm. Wound does probe to bone today. Mild drainage noted. There is surrounding erythema dorsal lateral right foot. Cyanotic changes with gangrenous changes entire fifth digit. There is an open wound plantar fifth toe which is deep to bone. Mild drainage appreciated. MSK: Muscle strength 5/5 Bilateral .  Mid range of motion first MPJ bilaterally. Ankle joint equinus dorsiflexion. Tenderness on palpation to periwound, right fifth metatarsal head plantarly, right fifth digit.     Current Facility-Administered Medications   Medication Dose Route Frequency Provider Last Rate Last Admin    pravastatin (PRAVACHOL) tablet 40 mg  40 mg Oral Daily Otoniel R Lockso, DO   40 mg at 08/27/21 2013    pantoprazole (PROTONIX) tablet 40 mg  40 mg Oral QAM AC Otoniel R Lockso, DO   40 mg at 08/28/21 6351    metoprolol tartrate (LOPRESSOR) tablet 25 mg  25 mg Oral BID Sudie Marts Lockso, DO   25 mg at 08/27/21 2013    lisinopril (PRINIVIL;ZESTRIL) tablet 20 mg  20 mg Oral Daily Otoniel R Lockso, DO        ARIPiprazole (ABILIFY) tablet 5 mg  5 mg Oral Daily Otoniel R Lockso, DO   5 mg at 08/27/21 2013    aspirin EC tablet 81 mg  81 mg Oral Daily Otoniel R Lockso, DO        buPROPion (WELLBUTRIN XL) extended release tablet 300 mg  300 mg Oral QAM Otoniel R Lockso, DO        escitalopram (LEXAPRO) tablet 20 mg  20 mg Oral Daily Otoniel R Lockso, DO        glucose (GLUTOSE) 40 % oral gel 15 g  15 g Oral PRN Otoniel R Lockso, DO        dextrose 50 % IV solution  12.5 g IntraVENous PRN Otoniel R Lockso, DO        glucagon (rDNA) injection 1 mg  1 mg IntraMUSCular PRN Otoniel R Lockso, DO        dextrose 5 % solution  100 mL/hr IntraVENous PRN Ac Hals, DO        sodium chloride flush 0.9 % injection 5-40 mL  5-40 mL IntraVENous 2 times per day Ac Hals, DO   10 mL at 08/27/21 2015    sodium chloride flush 0.9 % injection 5-40 mL  5-40 mL IntraVENous PRN Otoniel R Lockso, DO        0.9 % sodium chloride infusion  25 mL IntraVENous PRN Otoniel R Lockso, DO        ondansetron (ZOFRAN-ODT) disintegrating tablet 4 mg  4 mg Oral Q8H PRN Otnoiel R Lockso, DO        Or    ondansetron (ZOFRAN) injection 4 mg  4 mg IntraVENous Q6H PRN Otoniel R Lockso, DO        polyethylene glycol (GLYCOLAX) packet 17 g  17 g Oral Daily PRN Sophy Jesus Lockso, DO        acetaminophen (TYLENOL) tablet 650 mg  650 mg Oral Q6H PRN Otoniel R Lockso, DO        Or    acetaminophen (TYLENOL) suppository 650 mg  650 mg Rectal Q6H PRN Otoniel R Lockso, DO        insulin glargine (LANTUS) injection vial 32 Units  32 Units Subcutaneous Nightly Otoniel R Lockso, DO   32 Units at 08/27/21 2022    insulin lispro (HUMALOG) injection vial 5 Units  5 Units Subcutaneous TID WC Otoniel R Lockso, DO   5 Units at 08/28/21 0700    insulin lispro (HUMALOG) injection vial 0-12 Units  0-12 Units Subcutaneous TID WC Otoniel R Lockso, DO   10 Units at 08/28/21 0700    insulin lispro (HUMALOG) injection vial 0-6 Units  0-6 Units Subcutaneous Nightly Otoniel R Lockso, DO        lactated ringers infusion   IntraVENous Continuous Otoniel R Lockso, DO   Paused at 08/28/21 0229        Lab Results   Component Value Date    WBC 24.8 (H) 08/28/2021    HCT 46.5 08/28/2021    HGB 15.6 08/28/2021     08/28/2021     (L) 08/28/2021    K 4.0 08/28/2021    CL 96 (L) 08/28/2021    CO2 21 (L) 08/28/2021    BUN 13 08/28/2021    CREATININE 0.6 (L) 08/28/2021    GLUCOSE 374 (H) 08/28/2021    CRP 5.5 (H) 08/27/2021         Radiographs:    ASSESSMENT:  - Full thickness diabetic foot ulcer subfifth metatarsal right foot, POA, infected  - Full-thickness diabetic foot ulcer medial IPJ

## 2021-08-28 NOTE — PROGRESS NOTES
Patient continues to refuse to wear heart monitor at this time. Patient educated on the reason and risk of refusal of the heart monitor. Patient continues to refuse to wear the heart monitor.

## 2021-08-28 NOTE — CONSULTS
Vascular Surgery Consultation Note    Reason for Consult:  DFU, PVD    HPI : This is a 51 y.o. male admitted on 8/27/2021 12:21 PM with increased drainage. He does have pain currently it a 4/10, achey sore. Worse with dressing changes, palpation. Pain medication helps take edge off. He was noted to be hyperglycemic on admission with elevated wbc. He was noted on xray to have concern for osteomyelitis. He had first cut his right foot 6/2021. He has been following with Dr. Sharmila Chan in regards to these issues. He has had similar issues with his left lateral foot in 2020 which are now healed. He does have a blister of first toe on left which is improving. He has a known hx of DM. Vascular surgery is consulted in regards to dfu, PVD.       ROS : All others Negative if blank [], Positive if [x]  General Urinary   [] Fevers [] Hematuria   [] Chills [] Dysuria   [] Weight Loss Vascular   Skin [] Claudication   [x] Tissue Loss [] Rest Pain   Eyes Neurologic   [] Wears Glasses/Contacts [] Stroke/TIA   [] Vision Changes [] Focal weakness   Respiratory [] Slurred Speech    [] Shortness of breath ENT   Cardiovascular [] Difficulty swallowing   [] Chest Pain Endocrine    [x] Shortness of breath with exertion [] Increased Thirst   Gastrointestinal    [] Abdominal Pain    [] Melena   [] Hematochezia         Past Medical History:   Diagnosis Date    Cellulitis of left foot 09/03/2020    Diabetes mellitus ([facility]     Hyperlipidemia     Hypertension     Kidney stone     Multiple sclerosis ([facility]         Past Surgical History:   Procedure Laterality Date    FOOT SURGERY Left 9/4/2020    INCISION AND DRAINAGE LEFT FOOT  BONE WITH PARTIAL BONE RESECTION FIFTH METATARSAL performed by Stalin Doran DPM at [facility] OR     Current Medications:    dextrose      sodium chloride      lactated ringers Stopped (08/28/21 0229)      glucose, dextrose, glucagon (rDNA), dextrose, sodium chloride flush, sodium chloride, ondansetron **OR** ondansetron, polyethylene glycol, acetaminophen **OR** acetaminophen    ceftaroline fosamil (TEFLARO) 600 MG IVPB  600 mg IntraVENous Q12H    clindamycin (CLEOCIN) IV  900 mg IntraVENous Q8H    pravastatin  40 mg Oral Daily    pantoprazole  40 mg Oral QAM AC    metoprolol tartrate  25 mg Oral BID    lisinopril  20 mg Oral Daily    ARIPiprazole  5 mg Oral Daily    aspirin  81 mg Oral Daily    buPROPion  300 mg Oral QAM    escitalopram  20 mg Oral Daily    sodium chloride flush  5-40 mL IntraVENous 2 times per day    insulin glargine  32 Units Subcutaneous Nightly    insulin lispro  5 Units Subcutaneous TID WC    insulin lispro  0-12 Units Subcutaneous TID WC    insulin lispro  0-6 Units Subcutaneous Nightly      Allergies:  Bee venom and Milk-related compounds  Social History     Socioeconomic History    Marital status: Single     Spouse name: Not on file    Number of children: Not on file    Years of education: Not on file    Highest education level: Not on file   Occupational History    Not on file   Tobacco Use    Smoking status: Never Smoker    Smokeless tobacco: Never Used   Vaping Use    Vaping Use: Never used   Substance and Sexual Activity    Alcohol use: Never    Drug use: Never    Sexual activity: Yes     Partners: Female   Other Topics Concern    Not on file   Social History Narrative    Not on file     Social Determinants of Health     Financial Resource Strain:     Difficulty of Paying Living Expenses:    Food Insecurity:     Worried About Running Out of Food in the Last Year:     Ran Out of Food in the Last Year:    Transportation Needs:     Lack of Transportation (Medical):      Lack of Transportation (Non-Medical):    Physical Activity:     Days of Exercise per Week:     Minutes of Exercise per Session:    Stress:     Feeling of Stress :    Social Connections:     Frequency of Communication with Friends and Family:     Frequency of Social Gatherings with Friends and Family:     Attends Jew Services:     Active Member of Clubs or Organizations:     Attends Club or Organization Meetings:     Marital Status:    Intimate Partner Violence:     Fear of Current or Ex-Partner:     Emotionally Abused:     Physically Abused:     Sexually Abused:      Family History   Problem Relation Age of Onset    Hypertension Mother     Diabetes Father      PHYSICAL EXAM:    BP (!) 151/81   Pulse 121   Temp 98.9 °F (37.2 °C) (Oral)   Resp 18   Ht 6' 5\" (1.956 m)   Wt (!) 442 lb (200.5 kg)   SpO2 91%   BMI 52.41 kg/m²   CONSTITUTIONAL:   Awake, alert, cooperative  PSYCHIATRIC :  Oriented to time, place and person      Appropriate insight to disease process  EYES: Lids and lashes normal  ENT:  External ears and nose without lesions   Hearing deficits notnoted  NECK: Supple, symmetrical, trachea midline   Thyroid goiter not appreciate  LUNGS:  No increased work of breathing                 Good respiratory excursion  CARDIOVASCULAR:  regular rate and rhythm   ABDOMEN:  soft, non-distended, non-tender  EXTREMITIES:   R UE 5/5 strength   No cyanosis noted in nail beds  L UE 5/5 strength   No cyanosis noted in nail beds  R LE Edema present   Open wounds present   Cyanosis of fifth toe, gangrene   Cellulitis of foot   L LE Edema slight   Open wound present first toe, eschar, blister  R femoral 2+ L femoral 2+   R posterior tibial 2+ L posterior tibial 2+   R dorsalis pedis 2+ L dorsalis pedis 2+     LABS:    Lab Results   Component Value Date    WBC 24.8 (H) 08/28/2021    HGB 15.6 08/28/2021    HCT 46.5 08/28/2021     08/28/2021    K 4.0 08/28/2021    BUN 13 08/28/2021    CREATININE 0.6 (L) 08/28/2021     Lab Results   Component Value Date    LABA1C 11.7 (H) 08/28/2021     No results found for: EAG  Lab Results   Component Value Date    LABALBU 3.6 08/27/2021        Radiology pertinent to vascular surgery evaluation reviewed    Assesment/Plan  R DFU  · No evidence of significant pvd on exam  · No plan for vascular surgical intervention  · abis and pvrs pending will evaluate when available  · Plan for incision, drainage of right foot wound and partial fifth ray amputation  · Ok to proceed from vascular pov  · Continue Medical management with ASA and statin  · Cholesterol panel was ordered  · HgbA1C 11.7    · optimization per PCP  · Emphasized importance of better bs control to help wound healing  · Prelabumin check  · Nutrition will need optimized, consult nutrition  · Discussed with pt tobacco use and relationship to PVD   pt never a smoker   Pt understands tobacco use can contribute to worsening of pvd and development of  limb loss   · Emphasized importance of foot care and to call if develops any wounds, ulcerations, changes in appearance, claudication and/or symptoms  · local wound care  · Offloading  · He understands he is high risk for limb loss especially if he has recurrent similar issues  · We did discuss option of hyperbarics in the future  · Will discuss with Dr Marina Mendez and if felt to be beneficial will have screened as an outpt    Mihir Atkinson MD

## 2021-08-28 NOTE — PROGRESS NOTES
Comprehensive Nutrition Assessment    Type and Reason for Visit:  Initial, Wound, Positive Nutrition Screen    Nutrition Recommendations/Plan: Continue current diet and ONS for wound healing. Nutrition Assessment:  PMHx of  insulin-dependent diabetes, hyperlipidemia, hypertension, multiple sclerosis, known noncompliance who presented to the emergency department today with right foot wound. Pt adm with diabetic foot ulcer. Pt at risk d/t wound. Will start ONS for wound healing. Malnutrition Assessment:  Malnutrition Status:  No malnutrition    Context:  Acute Illness     Findings of the 6 clinical characteristics of malnutrition:  Energy Intake:  No significant decrease in energy intake  Weight Loss:  No significant weight loss     Body Fat Loss:  No significant body fat loss     Muscle Mass Loss:  No significant muscle mass loss    Fluid Accumulation:  No significant fluid accumulation     Strength:  Not Performed    Estimated Daily Nutrient Needs:  Energy (kcal):  1040-4469 (10-11kcal/kg CBW); Weight Used for Energy Requirements:  Current     Protein (g):  200-220g (2.0-2.3g/kg IBW); Weight Used for Protein Requirements:  Ideal        Fluid (ml/day):  0726-5892; Method Used for Fluid Requirements:  1 ml/kcal      Nutrition Related Findings:  A&Ox4, BS active, Abd WDL, Edema BLE +3 and RUE +2 nonpitting      Wounds:  Diabetic Ulcer (podiatry following)       Current Nutrition Therapies:    ADULT DIET;  Regular; 5 carb choices (75 gm/meal)  Diet NPO Exceptions are: Sips of Water with Meds    Anthropometric Measures:  · Height: 6' 5\" (195.6 cm)  · Current Body Weight: 442 lb (200.5 kg) (8/28)   · Admission Body Weight:      · Usual Body Weight: 442 lb 9.6 oz (200.8 kg) (10/30/20 actual)     · Ideal Body Weight: 208 lbs; % Ideal Body Weight 212.5 %   · BMI: 52.4      · BMI Categories: Obese Class 3 (BMI 40.0 or greater)       Nutrition Diagnosis:   · Increased nutrient needs related to increase demand for energy/nutrients as evidenced by wounds    Nutrition Interventions:   Food and/or Nutrient Delivery:  Continue Current Diet, Start Oral Nutrition Supplement (Jos BID)  Nutrition Education/Counseling:  Education initiated (discussed importance of ONS)   Coordination of Nutrition Care:  Continue to monitor while inpatient    Goals:  >75% of meals and ONS consumed       Nutrition Monitoring and Evaluation:   Behavioral-Environmental Outcomes:  None Identified   Food/Nutrient Intake Outcomes:  Food and Nutrient Intake, Supplement Intake  Physical Signs/Symptoms Outcomes:  Biochemical Data, Fluid Status or Edema, Nutrition Focused Physical Findings, Skin, Weight     Discharge Planning:     Too soon to determine     Electronically signed by Suzette Garrison RD, LD on 8/28/21 at 9:58 AM EDT    Contact: [phone]

## 2021-08-28 NOTE — PROGRESS NOTES
The PCA alerted this nurse that the patient was on the floor. This nurse upon arriving to the room found the patient on the floor into the bathroom laying on back. Patient explained that they awoke and had to have a bowel movement urgently and went to the bathroom without waiting for assistance. The patient stated that while standing in the bathroom, the patient's knee gave out and fell from a standing position onto buttocks and then against the wall. No signs or symptoms of injury at this time. No thinners ordered. Patient's vitals normal for patient. Transport called re: patient's size to get overmat to return to bed. Patient returned to bed and educated on not getting up independently, bed alarm placed, due to foot wounds patient instructed to utilize urinal and bed ban going forward. Dr. Sandhya Brian notified and new orders given.

## 2021-08-28 NOTE — PROGRESS NOTES
Dr. Maral Vásquez notified of new consult for Presurgical planning for amputation Right 5th via [employer].

## 2021-08-28 NOTE — ANESTHESIA PRE PROCEDURE
Department of Anesthesiology  Preprocedure Note       Name:  Bernardino Landau   Age:  51 y.o.  :  1975                                          MRN:  [id number]         Date:  2021      Surgeon: Sharmila Chan    Procedure:  I&D, partial amputation 5th ray    Medications prior to admission:   Prior to Admission medications    Medication Sig Start Date End Date Taking?  Authorizing Provider   insulin aspart (NOVOLOG FLEXPEN) 100 UNIT/ML injection pen 5 units with breakfast and lunch and 10 units with dinner 21  Yes Sybil Mckinnon DO   buPROPion (WELLBUTRIN XL) 300 MG extended release tablet TAKE 1 TABLET BY MOUTH EVERY MORNING 21  Yes Sybil Mckinnon DO   metoprolol tartrate (LOPRESSOR) 25 MG tablet Take 1 tablet by mouth 2 times daily 21  Yes Sybil Mckinnon DO   pravastatin (PRAVACHOL) 40 MG tablet Take 1 tablet by mouth daily 21  Yes Sybil Mckinnon DO   escitalopram (LEXAPRO) 20 MG tablet Take 1 tablet by mouth daily 21  Yes Sybil Mckinnon DO   insulin glargine (BASAGLAR KWIKPEN) 100 UNIT/ML injection pen Inject 32 Units into the skin nightly 21  Yes Sabino Rodrigues MD   ARIPiprazole (ABILIFY) 5 MG tablet Take 1 tablet by mouth daily 21  Yes Sabino Rodrigues MD   lisinopril (PRINIVIL;ZESTRIL) 20 MG tablet Take 1 tablet by mouth daily 21  Yes Sabino Rodrigues MD   metFORMIN (GLUCOPHAGE) 1000 MG tablet Take 1 tablet by mouth 2 times daily 21  Yes Sabino Rodrigues MD   Wound Dressings ([employer] MANUK[facility] HONEY WOUND) GEL Apply 1 Tube topically 2 times daily 21  Yes Stalin Droan DPM   NOVOFINE PLUS 32G X 4 MM MISC USE EVERY DAY UNDER THE SKIN 21  Yes Historical Provider, MD   aspirin 81 MG EC tablet Take 81 mg by mouth 2 tablets daily   Yes Historical Provider, MD   cetirizine (ZYRTEC) 10 MG tablet Take 10 mg by mouth daily   Yes Historical Provider, MD   omeprazole (PRILOSEC) 20 MG delayed release capsule Take 20 mg by mouth daily   Yes Historical Provider, MD       Current medications:    Current Facility-Administered Medications   Medication Dose Route Frequency Provider Last Rate Last Admin    ceftaroline fosamil (TEFLARO) 600 mg in dextrose 5 % 50 mL IVPB  600 mg IntraVENous Q12H Jose Renteria MD        clindamycin (CLEOCIN) 900 mg in dextrose 5 % 50 mL IVPB  900 mg IntraVENous Q8H Jose Renteria MD        pravastatin (PRAVACHOL) tablet 40 mg  40 mg Oral Daily Otoniel R Lockso, DO   40 mg at 08/28/21 0834    pantoprazole (PROTONIX) tablet 40 mg  40 mg Oral QAM AC Otoniel R Lockso, DO   40 mg at 08/28/21 4637    metoprolol tartrate (LOPRESSOR) tablet 25 mg  25 mg Oral BID Dorethia Claw Lockso, DO   25 mg at 08/28/21 0834    lisinopril (PRINIVIL;ZESTRIL) tablet 20 mg  20 mg Oral Daily Otoniel R Lockso, DO   20 mg at 08/28/21 0834    ARIPiprazole (ABILIFY) tablet 5 mg  5 mg Oral Daily Otoniel R Lockso, DO   5 mg at 08/28/21 0834    aspirin EC tablet 81 mg  81 mg Oral Daily Otoniel R Lockso, DO   81 mg at 08/28/21 0834    buPROPion (WELLBUTRIN XL) extended release tablet 300 mg  300 mg Oral QAM Otoniel R Lockso, DO   300 mg at 08/28/21 0834    escitalopram (LEXAPRO) tablet 20 mg  20 mg Oral Daily Otoniel R Lockso, DO   20 mg at 08/28/21 0834    glucose (GLUTOSE) 40 % oral gel 15 g  15 g Oral PRN Otoniel R Lockso, DO        dextrose 50 % IV solution  12.5 g IntraVENous PRN Otoniel R Lockso, DO        glucagon (rDNA) injection 1 mg  1 mg IntraMUSCular PRN Otoniel R Lockso, DO        dextrose 5 % solution  100 mL/hr IntraVENous PRN Otoniel R Lockso, DO        sodium chloride flush 0.9 % injection 5-40 mL  5-40 mL IntraVENous 2 times per day Dorethia Claw Lockso, DO   10 mL at 08/28/21 0834    sodium chloride flush 0.9 % injection 5-40 mL  5-40 mL IntraVENous PRN Otoniel R Lockso, DO        0.9 % sodium chloride infusion  25 mL IntraVENous PRN Otoniel R Lockso, DO        ondansetron (ZOFRAN-ODT) disintegrating tablet 4 mg  4 mg Oral Q8H PRN Eleanor Kempso, DO        Or    ondansetron (ZOFRAN) injection 4 mg  4 mg IntraVENous Q6H PRN Otoniel R Lockso, DO        polyethylene glycol (GLYCOLAX) packet 17 g  17 g Oral Daily PRN Eleanor Pavon Lockso, DO        acetaminophen (TYLENOL) tablet 650 mg  650 mg Oral Q6H PRN Eleanor Kempso, DO   650 mg at 08/28/21 1257    Or    acetaminophen (TYLENOL) suppository 650 mg  650 mg Rectal Q6H PRN Eleanor Pavon Lockso, DO        insulin glargine (LANTUS) injection vial 32 Units  32 Units Subcutaneous Nightly Eleanor Pavon Lockso, DO   32 Units at 08/27/21 2022    insulin lispro (HUMALOG) injection vial 5 Units  5 Units Subcutaneous TID  Otoniel R Lockso, DO   5 Units at 08/28/21 1051    insulin lispro (HUMALOG) injection vial 0-12 Units  0-12 Units Subcutaneous TID  Otoniel R Lockso, DO   10 Units at 08/28/21 1051    insulin lispro (HUMALOG) injection vial 0-6 Units  0-6 Units Subcutaneous Nightly Otoniel R Lockso, DO        lactated ringers infusion   IntraVENous Continuous Otoniel R Lockso, DO   Paused at 08/28/21 0229       Allergies:     Allergies   Allergen Reactions    Bee Venom Swelling    Milk-Related Compounds      Stomach issues-lactose intolerant       Problem List:    Patient Active Problem List   Diagnosis Code    Diabetic foot ulcer associated with diabetes mellitus due to underlying condition ([facility] [address] Q64.342, L97.509    Essential hypertension I10    Morbid obesity with BMI of 45.0-49.9, adult ([address] E66.01, Z68.42    Uncontrolled type 2 diabetes mellitus with hyperglycemia ([address] E11.65    Hyperlipidemia E78.5    MS (multiple sclerosis) ([address] G35    Diabetic foot ulcer with osteomyelitis ([address] E11.621, E11.69, L97.509, M86.9       Past Medical History:        Diagnosis Date    Cellulitis of left foot 09/03/2020    Diabetes mellitus ([address]     Hyperlipidemia     Hypertension     Kidney stone     Multiple sclerosis ([address]        Past Surgical History:        Procedure Laterality Date    FOOT SURGERY Left 9/4/2020    INCISION AND DRAINAGE LEFT FOOT  BONE WITH PARTIAL BONE RESECTION FIFTH METATARSAL performed by Etienne Oneill DPM at [address] [address] History:    Social History     Tobacco Use    Smoking status: Never Smoker    Smokeless tobacco: Never Used   Substance Use Topics    Alcohol use: Never                                Counseling given: Not Answered      Vital Signs (Current):   Vitals:    08/28/21 0815 08/28/21 0945 08/28/21 0950 08/28/21 1215   BP: (!) 163/67   (!) 151/81   Pulse: 103   121   Resp: 18   18   Temp: 98.7 °F (37.1 °C)   98.9 °F (37.2 °C)   TempSrc: Oral   Oral   SpO2: 97%   91%   Weight:  (!) 442 lb (200.5 kg)     Height:   6' 5\" (1.956 m)                                               BP Readings from Last 3 Encounters:   08/28/21 (!) 151/81   08/16/21 (!) 155/97   07/01/21 (!) 149/102       NPO Status:                                                                                 BMI:   Wt Readings from Last 3 Encounters:   08/28/21 (!) 442 lb (200.5 kg)   08/12/21 (!) 445 lb (201.9 kg)   07/29/21 (!) 445 lb (201.9 kg)     Body mass index is 52.41 kg/m². CBC:   Lab Results   Component Value Date    WBC 24.8 08/28/2021    RBC 4.81 08/28/2021    HGB 15.6 08/28/2021    HCT 46.5 08/28/2021    MCV 96.7 08/28/2021    RDW 13.0 08/28/2021     08/28/2021       CMP:   Lab Results   Component Value Date     08/28/2021    K 4.0 08/28/2021    K 5.1 08/27/2021    CL 96 08/28/2021    CO2 21 08/28/2021    BUN 13 08/28/2021    CREATININE 0.6 08/28/2021    GFRAA >60 08/28/2021    LABGLOM >60 08/28/2021    GLUCOSE 374 08/28/2021    PROT 8.2 08/27/2021    CALCIUM 8.5 08/28/2021    BILITOT 1.2 08/27/2021    ALKPHOS 105 08/27/2021    AST 28 08/27/2021    ALT 23 08/27/2021       POC Tests: No results for input(s): POCGLU, POCNA, POCK, POCCL, POCBUN, POCHEMO, POCHCT in the last 72 hours.     Coags: No results found for: PROTIME, INR, APTT    HCG (If Applicable): No results found for: PREGTESTUR, PREGSERUM, HCG, HCGQUANT     ABGs: No results found for: PHART, PO2ART, YTO1CUZ, NZB5FAV, BEART, L1JQHUIO     Type & Screen (If Applicable):  No results found for: LABABO, LABRH    Drug/Infectious Status (If Applicable):  No results found for: HIV, HEPCAB    COVID-19 Screening (If Applicable):   Lab Results   Component Value Date    COVID19 Not Detected 08/27/2021    COVID19 Not Detected 09/04/2020           Anesthesia Evaluation  Patient summary reviewed and Nursing notes reviewed no history of anesthetic complications:   Airway: Mallampati: III  TM distance: >3 FB   Neck ROM: full  Mouth opening: > = 3 FB Dental: normal exam         Pulmonary:Negative Pulmonary ROS breath sounds clear to auscultation                             Cardiovascular:    (+) hypertension:, hyperlipidemia        Rhythm: regular  Rate: normal           Beta Blocker:  Dose within 24 Hrs         Neuro/Psych:   Negative Neuro/Psych ROS               ROS comment: Multiple sclerosis GI/Hepatic/Renal:   (+) morbid obesity          Endo/Other:    (+) DiabetesType II DM, poorly controlled, , .                 Abdominal:             Vascular: negative vascular ROS. Other Findings:           Anesthesia Plan      MAC     ASA 3       Induction: intravenous. Anesthetic plan and risks discussed with patient. Plan discussed with CRNA. Patient to be re-evaluated by DOS Anesthesiologist        Sharlene Gonzales MD   8/28/2021    DOS STAFF ADDENDUM:    Patient seen and examined, physical exam updated as needed, chart reviewed. NPO status confirmed. Anesthetic options and risks discussed with patient/legal guardian. Patient/legal guardian verbalized understanding and agrees to proceed.      Ivan Bailon MD  Staff Anesthesiologist  August 29, 2021  6:25 AM

## 2021-08-29 ENCOUNTER — APPOINTMENT (OUTPATIENT)
Dept: GENERAL RADIOLOGY | Age: 46
DRG: 617 | End: 2021-08-29
Payer: COMMERCIAL

## 2021-08-29 VITALS — DIASTOLIC BLOOD PRESSURE: 79 MMHG | TEMPERATURE: 99.1 F | OXYGEN SATURATION: 97 % | SYSTOLIC BLOOD PRESSURE: 155 MMHG

## 2021-08-29 LAB
ANION GAP SERPL CALCULATED.3IONS-SCNC: 11 MMOL/L (ref 7–16)
BUN BLDV-MCNC: 10 MG/DL (ref 6–20)
CALCIUM SERPL-MCNC: 7.9 MG/DL (ref 8.6–10.2)
CHLORIDE BLD-SCNC: 98 MMOL/L (ref 98–107)
CHOLESTEROL, TOTAL: 83 MG/DL (ref 0–199)
CO2: 24 MMOL/L (ref 22–29)
CREAT SERPL-MCNC: 0.6 MG/DL (ref 0.7–1.2)
GFR AFRICAN AMERICAN: >60
GFR NON-AFRICAN AMERICAN: >60 ML/MIN/1.73
GLUCOSE BLD-MCNC: 303 MG/DL (ref 74–99)
GRAM STAIN ORDERABLE: NORMAL
GRAM STAIN RESULT: NORMAL
HCT VFR BLD CALC: 42.5 % (ref 37–54)
HDLC SERPL-MCNC: 28 MG/DL
HEMOGLOBIN: 14.4 G/DL (ref 12.5–16.5)
LDL CHOLESTEROL CALCULATED: 38 MG/DL (ref 0–99)
MCH RBC QN AUTO: 32.8 PG (ref 26–35)
MCHC RBC AUTO-ENTMCNC: 33.9 % (ref 32–34.5)
MCV RBC AUTO: 96.8 FL (ref 80–99.9)
METER GLUCOSE: 267 MG/DL (ref 74–99)
METER GLUCOSE: 270 MG/DL (ref 74–99)
METER GLUCOSE: 330 MG/DL (ref 74–99)
METER GLUCOSE: 363 MG/DL (ref 74–99)
METER GLUCOSE: 422 MG/DL (ref 74–99)
PDW BLD-RTO: 13.2 FL (ref 11.5–15)
PLATELET # BLD: 221 E9/L (ref 130–450)
PMV BLD AUTO: 11.4 FL (ref 7–12)
POTASSIUM SERPL-SCNC: 3.7 MMOL/L (ref 3.5–5)
PREALBUMIN: 4 MG/DL (ref 20–40)
RBC # BLD: 4.39 E12/L (ref 3.8–5.8)
SODIUM BLD-SCNC: 133 MMOL/L (ref 132–146)
TRIGL SERPL-MCNC: 85 MG/DL (ref 0–149)
VLDLC SERPL CALC-MCNC: 17 MG/DL
WBC # BLD: 19.7 E9/L (ref 4.5–11.5)
WOUND/ABSCESS: NORMAL

## 2021-08-29 PROCEDURE — 2580000003 HC RX 258: Performed by: STUDENT IN AN ORGANIZED HEALTH CARE EDUCATION/TRAINING PROGRAM

## 2021-08-29 PROCEDURE — 28810 AMPUTATION TOE & METATARSAL: CPT | Performed by: PODIATRIST

## 2021-08-29 PROCEDURE — 87102 FUNGUS ISOLATION CULTURE: CPT

## 2021-08-29 PROCEDURE — 87070 CULTURE OTHR SPECIMN AEROBIC: CPT

## 2021-08-29 PROCEDURE — 80061 LIPID PANEL: CPT

## 2021-08-29 PROCEDURE — 6360000002 HC RX W HCPCS: Performed by: STUDENT IN AN ORGANIZED HEALTH CARE EDUCATION/TRAINING PROGRAM

## 2021-08-29 PROCEDURE — 7100000001 HC PACU RECOVERY - ADDTL 15 MIN: Performed by: PODIATRIST

## 2021-08-29 PROCEDURE — 36415 COLL VENOUS BLD VENIPUNCTURE: CPT

## 2021-08-29 PROCEDURE — 6360000002 HC RX W HCPCS: Performed by: NURSE ANESTHETIST, CERTIFIED REGISTERED

## 2021-08-29 PROCEDURE — 87206 SMEAR FLUORESCENT/ACID STAI: CPT

## 2021-08-29 PROCEDURE — 2500000003 HC RX 250 WO HCPCS: Performed by: SPECIALIST

## 2021-08-29 PROCEDURE — 0Y6X0Z0 DETACHMENT AT RIGHT 5TH TOE, COMPLETE, OPEN APPROACH: ICD-10-PCS | Performed by: PODIATRIST

## 2021-08-29 PROCEDURE — 88304 TISSUE EXAM BY PATHOLOGIST: CPT

## 2021-08-29 PROCEDURE — 6370000000 HC RX 637 (ALT 250 FOR IP): Performed by: STUDENT IN AN ORGANIZED HEALTH CARE EDUCATION/TRAINING PROGRAM

## 2021-08-29 PROCEDURE — 87186 SC STD MICRODIL/AGAR DIL: CPT

## 2021-08-29 PROCEDURE — 3700000000 HC ANESTHESIA ATTENDED CARE: Performed by: PODIATRIST

## 2021-08-29 PROCEDURE — 82962 GLUCOSE BLOOD TEST: CPT

## 2021-08-29 PROCEDURE — 87077 CULTURE AEROBIC IDENTIFY: CPT

## 2021-08-29 PROCEDURE — 3209999900 FLUORO FOR SURGICAL PROCEDURES

## 2021-08-29 PROCEDURE — 2500000003 HC RX 250 WO HCPCS: Performed by: STUDENT IN AN ORGANIZED HEALTH CARE EDUCATION/TRAINING PROGRAM

## 2021-08-29 PROCEDURE — 87116 MYCOBACTERIA CULTURE: CPT

## 2021-08-29 PROCEDURE — 2500000003 HC RX 250 WO HCPCS: Performed by: PODIATRIST

## 2021-08-29 PROCEDURE — 2580000003 HC RX 258: Performed by: NURSE ANESTHETIST, CERTIFIED REGISTERED

## 2021-08-29 PROCEDURE — 6360000002 HC RX W HCPCS: Performed by: SPECIALIST

## 2021-08-29 PROCEDURE — 99232 SBSQ HOSP IP/OBS MODERATE 35: CPT | Performed by: INTERNAL MEDICINE

## 2021-08-29 PROCEDURE — 87205 SMEAR GRAM STAIN: CPT

## 2021-08-29 PROCEDURE — 6370000000 HC RX 637 (ALT 250 FOR IP): Performed by: INTERNAL MEDICINE

## 2021-08-29 PROCEDURE — 3600000012 HC SURGERY LEVEL 2 ADDTL 15MIN: Performed by: PODIATRIST

## 2021-08-29 PROCEDURE — 2580000003 HC RX 258: Performed by: SPECIALIST

## 2021-08-29 PROCEDURE — 80048 BASIC METABOLIC PNL TOTAL CA: CPT

## 2021-08-29 PROCEDURE — 87176 TISSUE HOMOGENIZATION CULTR: CPT

## 2021-08-29 PROCEDURE — 88311 DECALCIFY TISSUE: CPT

## 2021-08-29 PROCEDURE — 7100000000 HC PACU RECOVERY - FIRST 15 MIN: Performed by: PODIATRIST

## 2021-08-29 PROCEDURE — 87015 SPECIMEN INFECT AGNT CONCNTJ: CPT

## 2021-08-29 PROCEDURE — 85027 COMPLETE CBC AUTOMATED: CPT

## 2021-08-29 PROCEDURE — 87075 CULTR BACTERIA EXCEPT BLOOD: CPT

## 2021-08-29 PROCEDURE — 2060000000 HC ICU INTERMEDIATE R&B

## 2021-08-29 PROCEDURE — 88305 TISSUE EXAM BY PATHOLOGIST: CPT

## 2021-08-29 PROCEDURE — 2709999900 HC NON-CHARGEABLE SUPPLY: Performed by: PODIATRIST

## 2021-08-29 PROCEDURE — 3700000001 HC ADD 15 MINUTES (ANESTHESIA): Performed by: PODIATRIST

## 2021-08-29 PROCEDURE — 2500000003 HC RX 250 WO HCPCS: Performed by: NURSE ANESTHETIST, CERTIFIED REGISTERED

## 2021-08-29 PROCEDURE — 87147 CULTURE TYPE IMMUNOLOGIC: CPT

## 2021-08-29 PROCEDURE — 3600000002 HC SURGERY LEVEL 2 BASE: Performed by: PODIATRIST

## 2021-08-29 PROCEDURE — 84134 ASSAY OF PREALBUMIN: CPT

## 2021-08-29 RX ORDER — ONDANSETRON 2 MG/ML
INJECTION INTRAMUSCULAR; INTRAVENOUS PRN
Status: DISCONTINUED | OUTPATIENT
Start: 2021-08-29 | End: 2021-08-29 | Stop reason: SDUPTHER

## 2021-08-29 RX ORDER — LIDOCAINE HYDROCHLORIDE 20 MG/ML
INJECTION, SOLUTION EPIDURAL; INFILTRATION; INTRACAUDAL; PERINEURAL PRN
Status: DISCONTINUED | OUTPATIENT
Start: 2021-08-29 | End: 2021-08-29 | Stop reason: SDUPTHER

## 2021-08-29 RX ORDER — PROMETHAZINE HYDROCHLORIDE 25 MG/ML
INJECTION, SOLUTION INTRAMUSCULAR; INTRAVENOUS PRN
Status: DISCONTINUED | OUTPATIENT
Start: 2021-08-29 | End: 2021-08-29 | Stop reason: SDUPTHER

## 2021-08-29 RX ORDER — HYDROCODONE BITARTRATE AND ACETAMINOPHEN 5; 325 MG/1; MG/1
1 TABLET ORAL PRN
Status: DISCONTINUED | OUTPATIENT
Start: 2021-08-29 | End: 2021-08-29 | Stop reason: HOSPADM

## 2021-08-29 RX ORDER — SODIUM CHLORIDE 9 MG/ML
25 INJECTION, SOLUTION INTRAVENOUS PRN
Status: DISCONTINUED | OUTPATIENT
Start: 2021-08-29 | End: 2021-08-31 | Stop reason: SDUPTHER

## 2021-08-29 RX ORDER — DEXAMETHASONE SODIUM PHOSPHATE 4 MG/ML
INJECTION, SOLUTION INTRA-ARTICULAR; INTRALESIONAL; INTRAMUSCULAR; INTRAVENOUS; SOFT TISSUE PRN
Status: DISCONTINUED | OUTPATIENT
Start: 2021-08-29 | End: 2021-08-29 | Stop reason: SDUPTHER

## 2021-08-29 RX ORDER — LIDOCAINE HYDROCHLORIDE 10 MG/ML
INJECTION, SOLUTION EPIDURAL; INFILTRATION; INTRACAUDAL; PERINEURAL PRN
Status: DISCONTINUED | OUTPATIENT
Start: 2021-08-29 | End: 2021-08-29 | Stop reason: ALTCHOICE

## 2021-08-29 RX ORDER — SODIUM CHLORIDE 9 MG/ML
INJECTION, SOLUTION INTRAVENOUS CONTINUOUS PRN
Status: DISCONTINUED | OUTPATIENT
Start: 2021-08-29 | End: 2021-08-29 | Stop reason: SDUPTHER

## 2021-08-29 RX ORDER — MIDAZOLAM HYDROCHLORIDE 1 MG/ML
INJECTION INTRAMUSCULAR; INTRAVENOUS PRN
Status: DISCONTINUED | OUTPATIENT
Start: 2021-08-29 | End: 2021-08-29 | Stop reason: SDUPTHER

## 2021-08-29 RX ORDER — SODIUM CHLORIDE 0.9 % (FLUSH) 0.9 %
10 SYRINGE (ML) INJECTION EVERY 12 HOURS SCHEDULED
Status: DISCONTINUED | OUTPATIENT
Start: 2021-08-29 | End: 2021-09-01 | Stop reason: HOSPADM

## 2021-08-29 RX ORDER — FENTANYL CITRATE 50 UG/ML
INJECTION, SOLUTION INTRAMUSCULAR; INTRAVENOUS PRN
Status: DISCONTINUED | OUTPATIENT
Start: 2021-08-29 | End: 2021-08-29 | Stop reason: SDUPTHER

## 2021-08-29 RX ORDER — PROPOFOL 10 MG/ML
INJECTION, EMULSION INTRAVENOUS PRN
Status: DISCONTINUED | OUTPATIENT
Start: 2021-08-29 | End: 2021-08-29 | Stop reason: SDUPTHER

## 2021-08-29 RX ORDER — SODIUM CHLORIDE 0.9 % (FLUSH) 0.9 %
10 SYRINGE (ML) INJECTION PRN
Status: DISCONTINUED | OUTPATIENT
Start: 2021-08-29 | End: 2021-09-01 | Stop reason: HOSPADM

## 2021-08-29 RX ORDER — HYDROCODONE BITARTRATE AND ACETAMINOPHEN 5; 325 MG/1; MG/1
2 TABLET ORAL PRN
Status: DISCONTINUED | OUTPATIENT
Start: 2021-08-29 | End: 2021-08-29 | Stop reason: HOSPADM

## 2021-08-29 RX ADMIN — CLINDAMYCIN PHOSPHATE 900 MG: 900 INJECTION, SOLUTION INTRAVENOUS at 00:30

## 2021-08-29 RX ADMIN — SODIUM CHLORIDE, PRESERVATIVE FREE 10 ML: 5 INJECTION INTRAVENOUS at 09:36

## 2021-08-29 RX ADMIN — INSULIN LISPRO 15 UNITS: 100 INJECTION, SOLUTION INTRAVENOUS; SUBCUTANEOUS at 16:15

## 2021-08-29 RX ADMIN — CEFTAROLINE FOSAMIL 600 MG: 600 POWDER, FOR SOLUTION INTRAVENOUS at 15:14

## 2021-08-29 RX ADMIN — INSULIN LISPRO 5 UNITS: 100 INJECTION, SOLUTION INTRAVENOUS; SUBCUTANEOUS at 16:04

## 2021-08-29 RX ADMIN — ARIPIPRAZOLE 5 MG: 5 TABLET ORAL at 09:36

## 2021-08-29 RX ADMIN — PROPOFOL 160 MG: 10 INJECTION, EMULSION INTRAVENOUS at 07:38

## 2021-08-29 RX ADMIN — ONDANSETRON 4 MG: 2 INJECTION INTRAMUSCULAR; INTRAVENOUS at 07:44

## 2021-08-29 RX ADMIN — LISINOPRIL 20 MG: 20 TABLET ORAL at 09:36

## 2021-08-29 RX ADMIN — LIDOCAINE HYDROCHLORIDE 100 MG: 20 INJECTION, SOLUTION EPIDURAL; INFILTRATION; INTRACAUDAL; PERINEURAL at 07:38

## 2021-08-29 RX ADMIN — INSULIN LISPRO 12 UNITS: 100 INJECTION, SOLUTION INTRAVENOUS; SUBCUTANEOUS at 16:03

## 2021-08-29 RX ADMIN — DEXAMETHASONE SODIUM PHOSPHATE 8 MG: 4 INJECTION, SOLUTION INTRAMUSCULAR; INTRAVENOUS at 07:44

## 2021-08-29 RX ADMIN — INSULIN GLARGINE 32 UNITS: 100 INJECTION, SOLUTION SUBCUTANEOUS at 22:33

## 2021-08-29 RX ADMIN — BUPROPION HYDROCHLORIDE 300 MG: 300 TABLET, EXTENDED RELEASE ORAL at 09:36

## 2021-08-29 RX ADMIN — ESCITALOPRAM OXALATE 20 MG: 10 TABLET ORAL at 09:36

## 2021-08-29 RX ADMIN — INSULIN LISPRO 6 UNITS: 100 INJECTION, SOLUTION INTRAVENOUS; SUBCUTANEOUS at 06:43

## 2021-08-29 RX ADMIN — SODIUM CHLORIDE: 9 INJECTION, SOLUTION INTRAVENOUS at 07:22

## 2021-08-29 RX ADMIN — INSULIN LISPRO 5 UNITS: 100 INJECTION, SOLUTION INTRAVENOUS; SUBCUTANEOUS at 11:15

## 2021-08-29 RX ADMIN — CEFTAROLINE FOSAMIL 600 MG: 600 POWDER, FOR SOLUTION INTRAVENOUS at 03:04

## 2021-08-29 RX ADMIN — CLINDAMYCIN PHOSPHATE 900 MG: 900 INJECTION, SOLUTION INTRAVENOUS at 16:34

## 2021-08-29 RX ADMIN — CLINDAMYCIN PHOSPHATE 900 MG: 900 INJECTION, SOLUTION INTRAVENOUS at 07:40

## 2021-08-29 RX ADMIN — SODIUM CHLORIDE, POTASSIUM CHLORIDE, SODIUM LACTATE AND CALCIUM CHLORIDE: 600; 310; 30; 20 INJECTION, SOLUTION INTRAVENOUS at 09:47

## 2021-08-29 RX ADMIN — PROMETHAZINE HYDROCHLORIDE 6.25 MG: 25 INJECTION INTRAMUSCULAR; INTRAVENOUS at 07:45

## 2021-08-29 RX ADMIN — ASPIRIN 81 MG: 81 TABLET, COATED ORAL at 09:35

## 2021-08-29 RX ADMIN — MIDAZOLAM 2 MG: 1 INJECTION INTRAMUSCULAR; INTRAVENOUS at 07:28

## 2021-08-29 RX ADMIN — PRAVASTATIN SODIUM 40 MG: 20 TABLET ORAL at 09:36

## 2021-08-29 RX ADMIN — FENTANYL CITRATE 100 MCG: 50 INJECTION, SOLUTION INTRAMUSCULAR; INTRAVENOUS at 07:38

## 2021-08-29 RX ADMIN — METOPROLOL TARTRATE 25 MG: 25 TABLET, FILM COATED ORAL at 22:33

## 2021-08-29 RX ADMIN — ACETAMINOPHEN 650 MG: 325 TABLET ORAL at 12:55

## 2021-08-29 RX ADMIN — INSULIN LISPRO 10 UNITS: 100 INJECTION, SOLUTION INTRAVENOUS; SUBCUTANEOUS at 11:15

## 2021-08-29 RX ADMIN — METOPROLOL TARTRATE 25 MG: 25 TABLET, FILM COATED ORAL at 09:36

## 2021-08-29 RX ADMIN — INSULIN LISPRO 4 UNITS: 100 INJECTION, SOLUTION INTRAVENOUS; SUBCUTANEOUS at 22:33

## 2021-08-29 RX ADMIN — CLINDAMYCIN PHOSPHATE 900 MG: 900 INJECTION, SOLUTION INTRAVENOUS at 23:56

## 2021-08-29 ASSESSMENT — PULMONARY FUNCTION TESTS
PIF_VALUE: 17
PIF_VALUE: 17
PIF_VALUE: 5
PIF_VALUE: 17
PIF_VALUE: 1
PIF_VALUE: 16
PIF_VALUE: 17
PIF_VALUE: 16
PIF_VALUE: 17
PIF_VALUE: 16
PIF_VALUE: 17
PIF_VALUE: 6
PIF_VALUE: 17
PIF_VALUE: 1
PIF_VALUE: 16
PIF_VALUE: 18
PIF_VALUE: 16
PIF_VALUE: 1
PIF_VALUE: 23
PIF_VALUE: 17
PIF_VALUE: 6
PIF_VALUE: 3
PIF_VALUE: 16
PIF_VALUE: 16
PIF_VALUE: 5
PIF_VALUE: 16
PIF_VALUE: 16
PIF_VALUE: 14
PIF_VALUE: 17
PIF_VALUE: 15
PIF_VALUE: 15
PIF_VALUE: 6
PIF_VALUE: 16
PIF_VALUE: 16
PIF_VALUE: 7
PIF_VALUE: 16
PIF_VALUE: 6
PIF_VALUE: 16
PIF_VALUE: 18
PIF_VALUE: 16
PIF_VALUE: 18
PIF_VALUE: 18
PIF_VALUE: 16
PIF_VALUE: 15
PIF_VALUE: 16
PIF_VALUE: 18
PIF_VALUE: 0
PIF_VALUE: 5
PIF_VALUE: 16
PIF_VALUE: 2
PIF_VALUE: 0
PIF_VALUE: 2
PIF_VALUE: 16
PIF_VALUE: 16
PIF_VALUE: 6
PIF_VALUE: 16
PIF_VALUE: 2
PIF_VALUE: 16
PIF_VALUE: 17
PIF_VALUE: 1
PIF_VALUE: 19
PIF_VALUE: 16

## 2021-08-29 ASSESSMENT — PAIN SCALES - GENERAL
PAINLEVEL_OUTOF10: 0
PAINLEVEL_OUTOF10: 7
PAINLEVEL_OUTOF10: 0

## 2021-08-29 ASSESSMENT — PAIN DESCRIPTION - PAIN TYPE: TYPE: ACUTE PAIN

## 2021-08-29 ASSESSMENT — PAIN DESCRIPTION - DESCRIPTORS: DESCRIPTORS: HEADACHE

## 2021-08-29 ASSESSMENT — PAIN DESCRIPTION - LOCATION: LOCATION: HEAD

## 2021-08-29 NOTE — PROGRESS NOTES
[facility] Hospitalist   Progress Note    Admitting Date and Time: 8/27/2021 12:21 PM  Admit Dx: Diabetic foot ulcer with osteomyelitis ([facility] [address] [Z10.291, E11.69, L97.509, M86.9]  Cellulitis of right lower extremity [L03.115]  Diabetic ulcer of right midfoot associated with type 1 diabetes mellitus, with fat layer exposed ([facility] [address] [N20.081, L97.412]    Seen for follow on problems as listed below. Subjective:  S/p  OR today. Denies any chest pain short of breath or abdominal pain. Postop/right foot pain well controlled with current medications. ROS: denies fever, chills, cp, sob, n/v, HA unless stated above.      sodium chloride flush  10 mL IntraVENous 2 times per day    ceftaroline fosamil (TEFLARO) 600 MG IVPB  600 mg IntraVENous Q12H    clindamycin (CLEOCIN) IV  900 mg IntraVENous Q8H    pravastatin  40 mg Oral Daily    pantoprazole  40 mg Oral QAM AC    metoprolol tartrate  25 mg Oral BID    lisinopril  20 mg Oral Daily    ARIPiprazole  5 mg Oral Daily    aspirin  81 mg Oral Daily    buPROPion  300 mg Oral QAM    escitalopram  20 mg Oral Daily    sodium chloride flush  5-40 mL IntraVENous 2 times per day    insulin glargine  32 Units Subcutaneous Nightly    insulin lispro  5 Units Subcutaneous TID WC    insulin lispro  0-12 Units Subcutaneous TID WC    insulin lispro  0-6 Units Subcutaneous Nightly     sodium chloride flush, 10 mL, PRN  sodium chloride, 25 mL, PRN  glucose, 15 g, PRN  dextrose, 12.5 g, PRN  glucagon (rDNA), 1 mg, PRN  dextrose, 100 mL/hr, PRN  sodium chloride flush, 5-40 mL, PRN  sodium chloride, 25 mL, PRN  ondansetron, 4 mg, Q8H PRN   Or  ondansetron, 4 mg, Q6H PRN  polyethylene glycol, 17 g, Daily PRN  acetaminophen, 650 mg, Q6H PRN   Or  acetaminophen, 650 mg, Q6H PRN         Objective:    BP (!) 143/80   Pulse 103   Temp 98.4 °F (36.9 °C) (Oral)   Resp 18   Ht 6' 5\" (1.956 m)   Wt (!) 370 lb 3.2 oz (167.9 kg)   SpO2 94%   BMI 43.90 kg/m²   General Gangrene of toe of right foot ([facility] [address]  Active Problems:    Diabetic foot ulcer associated with diabetes mellitus due to underlying condition ([facility] [address]    Uncontrolled type 2 diabetes mellitus with hyperglycemia ([facility] [address]    Diabetic foot ulcer with osteomyelitis ([facility] [address]  Resolved Problems:    * No resolved hospital problems. *      Plan:  Right foot diabetic infection/infected ulcer with cellulitis/right fifth toe gangrene-with elevated white count and lactic acid. ID and podiatry following. S/p incision and debridement/drainage of right foot with right fifth toe amputation on 8/29/2021. Continue ceftaroline and clindamycin as per ID . Follow surgical cultures. Vascular surgery consulted. As per vascular surgery no evidence of significant peripheral vascular disease on exam, no vascular intervention needed. Risk factor modification was advised. Lactic acidosis secondary to #1 -rx'd with  IV fluids, resolved, monitor. Contaminant bacteremia. Insulin dependent DM ,poorly controlled -Hb A1C 11.7 ,basal/bolus/hypoglycemia protocol. ? Has compliance issues. Will advise him to follow with endocrinologist.    Hypertension- resume as per home    Hyperlipidemia- on statin     MS- out pt follow up    On lovenox for dvt prophy  Full code.                Electronically signed by Kostas Garcia MD on 8/29/2021 at 12:35 PM

## 2021-08-29 NOTE — OP NOTE
Operative Note      Patient: Shyanne Tovar  YOB: 1975  MRN: [id number]    Date of Procedure: 8/29/2021    Pre-Op Diagnosis: diabetic foot ulcer/DM    Post-Op Diagnosis: Same       Procedure(s):  FOOT DEBRIDEMENT INCISION AND DRAINAGE  TOE AMPUTATION fifth ray resection    Surgeon(s):  Darrick Trejo DPM    Assistant:   * No surgical staff found *    Anesthesia: Monitor Anesthesia Care    Estimated Blood Loss (mL): 98FE    Complications: None    Specimens:   ID Type Source Tests Collected by Time Destination   1 : RIGHT FIFTH TOE CULTURE Tissue Tissue CULTURE, ANAEROBIC, CULTURE, FUNGUS, GRAM STAIN, CULTURE, SURGICAL, CULTURE WITH SMEAR, ACID FAST Gricelda Kim, [facility] 8/29/2021 4649    A : 5th metatarsal head, right foot Bone Bone SURGICAL PATHOLOGY Darrick Trejo, [facility] 8/29/2021 0728    B : 5th proximal metatarsal, right foot Bone Bone SURGICAL PATHOLOGY Darrick Trejo, [facility] 8/29/2021 0807    C : 5th digit Bone Bone SURGICAL PATHOLOGY Darrick Trejo, [facility] 8/29/2021 0827        Implants:  * No implants in log *      Drains: * No LDAs found *    Indication for procedure  [facility] was consulted after several days of increased redness with a skin change right fifth toe. Infectious disease and vascular surgery were consulted. Clinically does present as gangrenous changes right fifth toe and likely osteomyelitis fifth metatarsal head. Was unable to have an MRI due to his size. Elevated hemoglobin A1c. White blood cell count has trended down since yesterday but still elevated. Plan for incision and drainage right foot with partial fifth ray amputation. He is high risk for further lower extremity limb loss going forward. He is understanding this. Risk and benefits surgery discussed in detail including but not limited to continued infection, need for subsequent surgery, further limb loss, loss of limb and death. Understanding wish to proceed with surgical intervention.     Detailed Description of Procedure: Following satisfactory preoperative evaluation patient was brought back in the OR and placed in a supine position. General anesthesia ministered anesthesia team.  Following sedation well-padded pneumatic tourniquet applied right ankle. Foot prepped and draped in usual sterile and aseptic manner tourniquet was inflated to 150 mmHg foot was then lowered on surgical field. Attention directed overlying fifth digit where significant gangrenous changes and cyanotic changes noted with drainage. Open wound plantar fifth metatarsal head which does probe to bone with drainage noted. Linear incision was made over the fifth metatarsal extending when racquet type incision around the entire fifth digit at the level of metatarsophalangeal joint. Incision and drainage performed at bone depth with excisional debridement. Toe was removed at the joint level and passed the back table for permanent pathology. Drainage was appreciated at this time overlying entire fifth metatarsal.  I did use aerobic and anaerobic culture swabs at this time. Incision was then carefully deepened deep to bone to expose approximately the distal 50% of the fifth metatarsal.  C-arm fluoroscopy used AP and lateral radiograph confirming placement for bone cut. Did make a bone cutter approximately 50% at the level of the metatarsal and passed the bone for permanent pathology. A secondary bone cut was then made proximal to my bone cut and I did also send this bone for permanent pathology. I did use a freer at the lateral aspect of the fourth metatarsal where there was mild drainage. I did debride nonviable tissue and passed to the back table for culture. Incision flushed with copious amounts sterile saline. Repeat AP and lateral radiograph used to confirm resection partial fifth ray. Tourniquet deflated total tourniquet time 27 minutes. Immediate capillary refill time all remaining digits.   Quarter-inch plain packing was used open incision. I did use retention sutures with 2-0 nylon. 10 cc 1% lidocaine plain injected proximal field block. Bandage applied consisting of the following. Betadine soaked Adaptic, 4 x 4, Igor Ace bandage. Transported recovery room vital signs stable vascular status intact right foot. Orders written as follows    Strict nonweightbearing right foot. Pending intraoperative cultures. Infectious disease and vascular surgery team on board appreciate input. Resume diabetic diet. Does remain high risk for further lower extremity limb loss. Strict nonweightbearing and will follow white blood cell count tomorrow.       Electronically signed by Annie Daniel DPM on 8/29/2021 at 8:30 AM

## 2021-08-29 NOTE — BRIEF OP NOTE
Brief Postoperative Note      Patient: Odilia Bauer  YOB: 1975  MRN: [id number]    Date of Procedure: 8/29/2021    Pre-Op Diagnosis: diabetic foot ulcer/DM    Post-Op Diagnosis: Same       Procedure(s):  FOOT DEBRIDEMENT INCISION AND DRAINAGE  TOE AMPUTATION fifth ray resection    Surgeon(s):  Kirstin Yarbrough DPM    Assistant:  * No surgical staff found *    Anesthesia: General    Estimated Blood Loss (mL): 00ZH    Complications: None    Specimens:   ID Type Source Tests Collected by Time Destination   1 : RIGHT FIFTH TOE CULTURE Tissue Tissue CULTURE, ANAEROBIC, CULTURE, FUNGUS, GRAM STAIN, CULTURE, SURGICAL, CULTURE WITH SMEAR, ACID FAST Danton Klinefelter, [facility] 8/29/2021 0729    2 : right foot culture Body Fluid Fluid CULTURE, ANAEROBIC, CULTURE, FUNGUS, GRAM STAIN, CULTURE, BODY FLUID, CULTURE WITH SMEAR, ACID FAST Danton Klinefelter, K[facility] 8/29/2021 0830    A : 5th metatarsal head, right foot Bone Bone SURGICAL PATHOLOGY Kirstin Yarbrough DPM 8/29/2021 0728    B : 5th proximal metatarsal, right foot Bone Bone SURGICAL PATHOLOGY Kirstin Yarbrough DPM 8/29/2021 0807    C : 5th digit Bone Bone SURGICAL PATHOLOGY Kirstin Yarbrough DPM 8/29/2021 0827        Implants:  * No implants in log *      Drains: * No LDAs found *    Findings: See full op report    Electronically signed by Nikhil Cabezas DPM on 8/29/2021 at 9:27 AM

## 2021-08-29 NOTE — PROGRESS NOTES
[address] [place] [address] [facility]  LAISHAIDA  Progress Note    SUBJECTIVE:  Chief Complaint   Patient presents with    Generalized Body Aches     pt having a cut on right foot 2 months ago. see pediatrist. history of diabetes.  Wound Check    Foot Pain     Patient is tolerating medications. No nausea, vomiting, diarrhea. Having more stools than usual he says today. No fevers  Visitor present     Review of systems:  As stated above in the chief complaint, otherwise negative. Medications:  Scheduled Meds:   sodium chloride flush  10 mL IntraVENous 2 times per day    ceftaroline fosamil (TEFLARO) 600 MG IVPB  600 mg IntraVENous Q12H    clindamycin (CLEOCIN) IV  900 mg IntraVENous Q8H    pravastatin  40 mg Oral Daily    pantoprazole  40 mg Oral QAM AC    metoprolol tartrate  25 mg Oral BID    lisinopril  20 mg Oral Daily    ARIPiprazole  5 mg Oral Daily    aspirin  81 mg Oral Daily    buPROPion  300 mg Oral QAM    escitalopram  20 mg Oral Daily    sodium chloride flush  5-40 mL IntraVENous 2 times per day    insulin glargine  32 Units Subcutaneous Nightly    insulin lispro  5 Units Subcutaneous TID WC    insulin lispro  0-12 Units Subcutaneous TID WC    insulin lispro  0-6 Units Subcutaneous Nightly     Continuous Infusions:   sodium chloride      dextrose      sodium chloride      lactated ringers 75 mL/hr at 21 0947     PRN Meds:sodium chloride flush, sodium chloride, glucose, dextrose, glucagon (rDNA), dextrose, sodium chloride flush, sodium chloride, ondansetron **OR** ondansetron, polyethylene glycol, acetaminophen **OR** acetaminophen    OBJECTIVE:  BP (!) 143/80   Pulse 103   Temp 98.4 °F (36.9 °C) (Oral)   Resp 18   Ht 6' 5\" (1.956 m)   Wt (!) 370 lb 3.2 oz (167.9 kg)   SpO2 94%   BMI 43.90 kg/m²   Temp  Av.2 °F (37.3 °C)  Min: 97.4 °F (36.3 °C)  Max: 99.3 °F (37.4 °C)  Constitutional: The patient is awake, alert, and oriented. Lying in bed.  Visitor present  Skin: Warm and dry. No rashes were noted. HEENT: Round and reactive pupils. Moist mucous membranes. No ulcerations or thrush. Neck: Supple to movements. Chest: No use of accessory muscles to breathe. Symmetrical expansion. No wheezing, crackles or rhonchi. Cardiovascular: S1 and S2 are rhythmic and regular. No murmurs appreciated. Abdomen: Positive bowel sounds to auscultation. Benign to palpation. No masses felt. Morbidly obese. Extremities: mild edema. Right foot is wrapped in a dry dressing 5th toe is missing.  There is some erythema on the right ankle   Lines: peripheral    Laboratory and Tests Review:  Lab Results   Component Value Date    WBC 19.7 (H) 08/29/2021    WBC 24.8 (H) 08/28/2021    WBC 22.5 (H) 08/27/2021    HGB 14.4 08/29/2021    HCT 42.5 08/29/2021    MCV 96.8 08/29/2021     08/29/2021     Lab Results   Component Value Date    NEUTROABS 19.08 (H) 08/27/2021    NEUTROABS 8.10 (H) 07/01/2021    NEUTROABS 5.70 09/09/2020     No results found for: [facility]  Lab Results   Component Value Date    ALT 23 08/27/2021    AST 28 08/27/2021    ALKPHOS 105 08/27/2021    BILITOT 1.2 08/27/2021     Lab Results   Component Value Date     08/29/2021    K 3.7 08/29/2021    K 5.1 08/27/2021    CL 98 08/29/2021    CO2 24 08/29/2021    BUN 10 08/29/2021    CREATININE 0.6 08/29/2021    CREATININE 0.6 08/28/2021    CREATININE 0.7 08/27/2021    GFRAA >60 08/29/2021    LABGLOM >60 08/29/2021    GLUCOSE 303 08/29/2021    PROT 8.2 08/27/2021    LABALBU 3.6 08/27/2021    CALCIUM 7.9 08/29/2021    BILITOT 1.2 08/27/2021    ALKPHOS 105 08/27/2021    AST 28 08/27/2021    ALT 23 08/27/2021     Lab Results   Component Value Date    CRP 5.5 (H) 08/27/2021    CRP 0.7 (H) 07/01/2021    CRP 0.6 (H) 12/29/2020     Lab Results   Component Value Date    SEDRATE 30 (H) 08/27/2021    SEDRATE 8 07/01/2021    SEDRATE 7 12/29/2020     Radiology:  Noted     Microbiology:   Surgical cultures: pending  Blood cultures: 1/4 bottles GPR - dipthroid like   Wound culture: abundant GNR, CoNS, group B strep, alpha hemolytic strep     ASSESSMENT:  · Limb threatening diabetic foot infection status post debridement, incision and drainage of the right foot with right 5th toe amputation 8/29/2021  · Gangrene right fifth toe  · Fever with leukocytosis associated to the above, improving     PLAN:  · Continue Ceftaroline & Clindamycin  · Per op note wound probed to bone - patient informed he will likely need 6 weeks of IV antibiotics, which he is not pleased with   · Check final & intraoperative cultures  · Monitor labs- WBC 19.7 today     AVEL Moreno - CNP  11:08 AM  8/29/2021     Patient seen and examined. I had a face to face encounter with the patient. Agree with exam.  Assessment and plan as outlined above and directed by me. Addition and corrections were done as deemed appropriate. My exam and plan include: Patient is doing well. Fifth toe was amputated. Last time he was here he had to go to an [facility] because he lives in [place] and they could not service him at home. We will continue current treatment. We will try to hopefully find an antibiotic that he could receive once a day. Cultures growing Streptococcus agalactiae.   Blood cultures are most likely a contaminant    Kory Patel MD  8/29/2021  2:26 PM

## 2021-08-30 ENCOUNTER — APPOINTMENT (OUTPATIENT)
Dept: INTERVENTIONAL RADIOLOGY/VASCULAR | Age: 46
DRG: 617 | End: 2021-08-30
Payer: COMMERCIAL

## 2021-08-30 LAB
ANION GAP SERPL CALCULATED.3IONS-SCNC: 13 MMOL/L (ref 7–16)
BUN BLDV-MCNC: 11 MG/DL (ref 6–20)
CALCIUM SERPL-MCNC: 8.4 MG/DL (ref 8.6–10.2)
CHLORIDE BLD-SCNC: 101 MMOL/L (ref 98–107)
CO2: 21 MMOL/L (ref 22–29)
CREAT SERPL-MCNC: 0.5 MG/DL (ref 0.7–1.2)
GFR AFRICAN AMERICAN: >60
GFR NON-AFRICAN AMERICAN: >60 ML/MIN/1.73
GLUCOSE BLD-MCNC: 387 MG/DL (ref 74–99)
GRAM STAIN ORDERABLE: NORMAL
GRAM STAIN ORDERABLE: NORMAL
HCT VFR BLD CALC: 42.6 % (ref 37–54)
HEMOGLOBIN: 14.2 G/DL (ref 12.5–16.5)
MCH RBC QN AUTO: 32.6 PG (ref 26–35)
MCHC RBC AUTO-ENTMCNC: 33.3 % (ref 32–34.5)
MCV RBC AUTO: 97.9 FL (ref 80–99.9)
METER GLUCOSE: 347 MG/DL (ref 74–99)
METER GLUCOSE: 353 MG/DL (ref 74–99)
METER GLUCOSE: 358 MG/DL (ref 74–99)
METER GLUCOSE: 383 MG/DL (ref 74–99)
ORGANISM: ABNORMAL
ORGANISM: ABNORMAL
PDW BLD-RTO: 13.3 FL (ref 11.5–15)
PLATELET # BLD: 240 E9/L (ref 130–450)
PMV BLD AUTO: 11.5 FL (ref 7–12)
POTASSIUM SERPL-SCNC: 4.1 MMOL/L (ref 3.5–5)
RBC # BLD: 4.35 E12/L (ref 3.8–5.8)
SODIUM BLD-SCNC: 135 MMOL/L (ref 132–146)
WBC # BLD: 17.3 E9/L (ref 4.5–11.5)
WOUND/ABSCESS: ABNORMAL
WOUND/ABSCESS: ABNORMAL

## 2021-08-30 PROCEDURE — 2580000003 HC RX 258: Performed by: STUDENT IN AN ORGANIZED HEALTH CARE EDUCATION/TRAINING PROGRAM

## 2021-08-30 PROCEDURE — 97161 PT EVAL LOW COMPLEX 20 MIN: CPT

## 2021-08-30 PROCEDURE — 6370000000 HC RX 637 (ALT 250 FOR IP): Performed by: INTERNAL MEDICINE

## 2021-08-30 PROCEDURE — 99232 SBSQ HOSP IP/OBS MODERATE 35: CPT | Performed by: INTERNAL MEDICINE

## 2021-08-30 PROCEDURE — 36415 COLL VENOUS BLD VENIPUNCTURE: CPT

## 2021-08-30 PROCEDURE — 6360000002 HC RX W HCPCS: Performed by: STUDENT IN AN ORGANIZED HEALTH CARE EDUCATION/TRAINING PROGRAM

## 2021-08-30 PROCEDURE — 6370000000 HC RX 637 (ALT 250 FOR IP): Performed by: STUDENT IN AN ORGANIZED HEALTH CARE EDUCATION/TRAINING PROGRAM

## 2021-08-30 PROCEDURE — 82962 GLUCOSE BLOOD TEST: CPT

## 2021-08-30 PROCEDURE — 1200000000 HC SEMI PRIVATE

## 2021-08-30 PROCEDURE — 80048 BASIC METABOLIC PNL TOTAL CA: CPT

## 2021-08-30 PROCEDURE — 85027 COMPLETE CBC AUTOMATED: CPT

## 2021-08-30 PROCEDURE — 97165 OT EVAL LOW COMPLEX 30 MIN: CPT

## 2021-08-30 PROCEDURE — 93923 UPR/LXTR ART STDY 3+ LVLS: CPT

## 2021-08-30 RX ORDER — CETIRIZINE HYDROCHLORIDE 10 MG/1
10 TABLET ORAL DAILY
Status: DISCONTINUED | OUTPATIENT
Start: 2021-08-30 | End: 2021-09-01 | Stop reason: HOSPADM

## 2021-08-30 RX ORDER — INSULIN GLARGINE 100 [IU]/ML
40 INJECTION, SOLUTION SUBCUTANEOUS NIGHTLY
Status: DISCONTINUED | OUTPATIENT
Start: 2021-08-30 | End: 2021-09-01 | Stop reason: HOSPADM

## 2021-08-30 RX ORDER — ZOLPIDEM TARTRATE 5 MG/1
5 TABLET ORAL NIGHTLY PRN
Status: DISCONTINUED | OUTPATIENT
Start: 2021-08-30 | End: 2021-08-31

## 2021-08-30 RX ADMIN — INSULIN LISPRO 5 UNITS: 100 INJECTION, SOLUTION INTRAVENOUS; SUBCUTANEOUS at 12:23

## 2021-08-30 RX ADMIN — PANTOPRAZOLE SODIUM 40 MG: 40 TABLET, DELAYED RELEASE ORAL at 06:30

## 2021-08-30 RX ADMIN — Medication 10 ML: at 10:11

## 2021-08-30 RX ADMIN — METOPROLOL TARTRATE 25 MG: 25 TABLET, FILM COATED ORAL at 10:10

## 2021-08-30 RX ADMIN — Medication 10 ML: at 20:52

## 2021-08-30 RX ADMIN — SODIUM CHLORIDE, POTASSIUM CHLORIDE, SODIUM LACTATE AND CALCIUM CHLORIDE: 600; 310; 30; 20 INJECTION, SOLUTION INTRAVENOUS at 21:32

## 2021-08-30 RX ADMIN — INSULIN LISPRO 10 UNITS: 100 INJECTION, SOLUTION INTRAVENOUS; SUBCUTANEOUS at 12:24

## 2021-08-30 RX ADMIN — SODIUM CHLORIDE, PRESERVATIVE FREE 10 ML: 5 INJECTION INTRAVENOUS at 10:10

## 2021-08-30 RX ADMIN — PRAVASTATIN SODIUM 40 MG: 20 TABLET ORAL at 10:10

## 2021-08-30 RX ADMIN — INSULIN LISPRO 10 UNITS: 100 INJECTION, SOLUTION INTRAVENOUS; SUBCUTANEOUS at 16:45

## 2021-08-30 RX ADMIN — BUPROPION HYDROCHLORIDE 300 MG: 300 TABLET, EXTENDED RELEASE ORAL at 10:09

## 2021-08-30 RX ADMIN — SODIUM CHLORIDE, POTASSIUM CHLORIDE, SODIUM LACTATE AND CALCIUM CHLORIDE: 600; 310; 30; 20 INJECTION, SOLUTION INTRAVENOUS at 13:43

## 2021-08-30 RX ADMIN — SODIUM CHLORIDE, PRESERVATIVE FREE 10 ML: 5 INJECTION INTRAVENOUS at 20:47

## 2021-08-30 RX ADMIN — INSULIN LISPRO 10 UNITS: 100 INJECTION, SOLUTION INTRAVENOUS; SUBCUTANEOUS at 06:27

## 2021-08-30 RX ADMIN — ESCITALOPRAM OXALATE 20 MG: 10 TABLET ORAL at 10:10

## 2021-08-30 RX ADMIN — ZOLPIDEM TARTRATE 5 MG: 5 TABLET ORAL at 20:43

## 2021-08-30 RX ADMIN — INSULIN LISPRO 5 UNITS: 100 INJECTION, SOLUTION INTRAVENOUS; SUBCUTANEOUS at 16:45

## 2021-08-30 RX ADMIN — METOPROLOL TARTRATE 25 MG: 25 TABLET, FILM COATED ORAL at 20:43

## 2021-08-30 RX ADMIN — CEFTAROLINE FOSAMIL 600 MG: 600 POWDER, FOR SOLUTION INTRAVENOUS at 16:44

## 2021-08-30 RX ADMIN — INSULIN LISPRO 4 UNITS: 100 INJECTION, SOLUTION INTRAVENOUS; SUBCUTANEOUS at 20:43

## 2021-08-30 RX ADMIN — ARIPIPRAZOLE 5 MG: 5 TABLET ORAL at 10:10

## 2021-08-30 RX ADMIN — CEFTAROLINE FOSAMIL 600 MG: 600 POWDER, FOR SOLUTION INTRAVENOUS at 03:01

## 2021-08-30 RX ADMIN — INSULIN LISPRO 5 UNITS: 100 INJECTION, SOLUTION INTRAVENOUS; SUBCUTANEOUS at 06:29

## 2021-08-30 RX ADMIN — LISINOPRIL 20 MG: 20 TABLET ORAL at 10:10

## 2021-08-30 RX ADMIN — CETIRIZINE HYDROCHLORIDE 10 MG: 10 TABLET, FILM COATED ORAL at 16:44

## 2021-08-30 RX ADMIN — ASPIRIN 81 MG: 81 TABLET, COATED ORAL at 10:09

## 2021-08-30 RX ADMIN — INSULIN GLARGINE 40 UNITS: 100 INJECTION, SOLUTION SUBCUTANEOUS at 20:46

## 2021-08-30 ASSESSMENT — PAIN SCALES - GENERAL
PAINLEVEL_OUTOF10: 0

## 2021-08-30 NOTE — CARE COORDINATION
COVID negative 8/27. Met w/ patient. Explained role of  and plan of care. Lives with his mother, sister, and niece in a 2 story house w/ ramp to entrance. Independent PTA. POD # 1 I&D R foot/ R 5th toe amput. - will probably need further debride as per podiatry note. PT am-pac 16. Continues on iv abx-awaiting final ID plan. Requesting [facility] on discharge- referral made- awaiting acceptance. Pt lives in [place]. PCP is Dr. Ava Hinojosa and pharmacy is [facility].  Will follow Giovanny Whipple, RN case manager

## 2021-08-30 NOTE — PROGRESS NOTES
Physical Therapy    Facility/Department: [facility] SURG  Initial Assessment    NAME: Lydia Sanford  : 1975  MRN: [id number]    Date of Service: 2021       REQUIRES PT FOLLOW UP: Yes       Patient Diagnosis(es): The primary encounter diagnosis was Diabetic ulcer of right midfoot associated with type 1 diabetes mellitus, with fat layer exposed ([address] A diagnosis of Cellulitis of right lower extremity was also pertinent to this visit. has a past medical history of Cellulitis of left foot, Diabetes mellitus ([address] Gangrene of toe of right foot ([address] Hyperlipidemia, Hypertension, Kidney stone, and Multiple sclerosis ([address] has a past surgical history that includes Foot surgery (Left, 2020); Foot Debridement (Right, 2021); and Toe amputation (Right, 2021). Evaluating Therapist: Maday Uriarte, PT     May need bariatric ww, w/c     Referring Provider:  Florencio Quick MD    PT order : Pt eval and treat     Room #:  920    DIAGNOSIS: diabetic foot ulcer s/p R 5th ray amp   Additional Pertinent History:fall while in hospital   PRECAUTIONS:  Falls, strict NWB  R LE     Social:  Pt lives with mother  in a  2 floor plan   Prior to admission pt walked with  No AD, independent      Initial Evaluation  Date: 2021  Treatment      Short Term/ Long Term   Goals   Was pt agreeable to Eval/treatment? Yes      Does pt have pain? R LE      Bed Mobility  Rolling:  Independent   Supine to sit:   Independent   Sit to supine:  Independent   Scooting:  Independent   Independent    Transfers Sit to stand:  SBA - see comment   Stand to sit: SBA   Stand pivot: NT   Independent with maintaining R LE NWB    Ambulation   Unable to maintain NWB   15  feet with bariatric ww  with  SBA , R LE NWB        Stair negotiation: ascended and descended NT   TBA    LE ROM  Grossly WFL      LE strength  WFL      AM- PAC RAW score   16/ 24            Pt is alert and Oriented x  3      Balance:  SBA , but pt unable to maintain R LE NWB with transfer or static stand       Bed/Chair alarm: not available on bariatric bed      ASSESSMENT  Pt displays functional ability as noted in the objective portion of this evaluation. Conditions Requiring Skilled Therapeutic Intervention:    [x]Decreased strength     []Decreased ROM  [x]Decreased functional mobility  [x]Decreased balance   [x]Decreased endurance   []Decreased posture  []Decreased sensation  [x]Decreased coordination   []Decreased vision  [x]Decreased safety awareness   [x]Increased pain     Treatment/Education:    Pt in bed upon arrival. Instructed in R LE strict NWB prior to mobility. Cues for hand placement with transfers and R LE NWB. Pt did not follow NWB with sit to stand. States he cannot due to \"cluminess. \" Re-emphasized order for R LE strict NWB. Gait NT due to pt unable to maintain  LE NWB. Pt reports he cannot use a knee scooter fur to OA of B knees. May need w/c for home. Pt educated on fall risk,  R LE NWB , safety with mobility         Patient response to education:   Pt verbalized understanding Pt demonstrated skill Pt requires further education in this area   Yes  no  yes        Comments:  Pt left  In bed after session, with call light in reach. Rehab potential is Good for reaching above PT goals. Pts/ family goals   1. None stated     Patient and or family understand(s) diagnosis, prognosis, and plan of care. -  Yes     PLAN  PT care will be provided in accordance with the objectives noted above. Whenever appropriate, clear delegation orders will be provided for nursing staff. Exercises and functional mobility practice will be used as well as appropriate assistive devices or modalities to obtain goals. Patient and family education will also be administered as needed.         PLAN OF CARE:    Current Treatment Recommendations     [x] Strengthening to improve independence with functional mobility   [] ROM to improve independence with functional mobility   [x] Balance Training to improve static/dynamic balance and to reduce fall risk  [x] Endurance Training to improve activity tolerance during functional mobility   [x] Transfer Training to improve safety and independence with all functional transfers   [x] Gait Training to improve gait mechanics, endurance and assess need for appropriate assistive device  [] Stair Training in preparation for safe discharge home and/or into the community   [x] Positioning to prevent skin breakdown and contractures  [x] Safety and Education Training   [x] Patient/Caregiver Education   [] HEP  [] Other     Frequency of treatments will be 1-3 x/week x  7-14 days. Time in: 0930   Time out:  0946       Evaluation Time includes thorough review of current medical information, gathering information on past medical history/social history and prior level of function, completion of standardized testing/informal observation of tasks, assessment of data and education on plan of care and goals.     CPT codes:  [x] Low Complexity PT evaluation 11184  [] Moderate Complexity PT evaluation 23691  [] High Complexity PT evaluation 62908  [] PT Re-evaluation 41298  [] Gait training 76824  minutes  [] Therapeutic activities 01737  minutes  [] Therapeutic exercises 92984  minutes  [] Neuromuscular reeducation 84583  minutes       [address] number:  PT 4260

## 2021-08-30 NOTE — PROGRESS NOTES
[facility]   Progress Note    Admitting Date and Time: 8/27/2021 12:21 PM  Admit Dx: Diabetic foot ulcer with osteomyelitis ([address] [Z66.471, E11.69, L97.509, M86.9]  Cellulitis of right lower extremity [L03.115]  Diabetic ulcer of right midfoot associated with type 1 diabetes mellitus, with fat layer exposed ([address] [F80.459, L97.412]    Seen for follow on problems as listed below. Subjective:  Denies CP ,sob or abd pain . Rt foot pain well controlled with current meds. Requesting to restart Zyrtec as per home & needs sleeping meds. Blood sugars uncontrolled with high HbA1c. Lantus increased as below. ROS: denies fever, chills, cp, sob, n/v, HA unless stated above.      sodium chloride flush  10 mL IntraVENous 2 times per day    ceftaroline fosamil (TEFLARO) 600 MG IVPB  600 mg IntraVENous Q12H    pravastatin  40 mg Oral Daily    pantoprazole  40 mg Oral QAM AC    metoprolol tartrate  25 mg Oral BID    lisinopril  20 mg Oral Daily    ARIPiprazole  5 mg Oral Daily    aspirin  81 mg Oral Daily    buPROPion  300 mg Oral QAM    escitalopram  20 mg Oral Daily    sodium chloride flush  5-40 mL IntraVENous 2 times per day    insulin glargine  32 Units SubCUTAneous Nightly    insulin lispro  5 Units SubCUTAneous TID WC    insulin lispro  0-12 Units SubCUTAneous TID WC    insulin lispro  0-6 Units SubCUTAneous Nightly     sodium chloride flush, 10 mL, PRN  sodium chloride, 25 mL, PRN  glucose, 15 g, PRN  dextrose, 12.5 g, PRN  glucagon (rDNA), 1 mg, PRN  dextrose, 100 mL/hr, PRN  sodium chloride flush, 5-40 mL, PRN  sodium chloride, 25 mL, PRN  ondansetron, 4 mg, Q8H PRN   Or  ondansetron, 4 mg, Q6H PRN  polyethylene glycol, 17 g, Daily PRN  acetaminophen, 650 mg, Q6H PRN   Or  acetaminophen, 650 mg, Q6H PRN         Objective:    BP (!) 146/87   Pulse 87   Temp 98.3 °F (36.8 °C) (Oral)   Resp 20   Ht 6' 5\" (1.956 m)   Wt (!) 370 lb 3.2 oz (167.9 kg)   SpO2 96%   BMI 43.90 kg/m²   General Appearance: alert and oriented to person, place and time, with morbid obesity, in no acute distress  Skin: warm and dry  Head: normocephalic and atraumatic  Eyes: pupils equal, round, and reactive to light, extraocular eye movements intact, conjunctivae normal  Neck: supple and non-tender without mass  Pulmonary/Chest: clear to auscultation bilaterally  Cardiovascular: normal rate, regular rhythm, normal S1 and S2  Abdomen: soft, non-tender, non-distended, normal bowel sounds, no masses or organomegaly  Extremities: no cyanosis, clubbing , right foot with dressing dry and intact, refuses to get examined. Musculoskeletal: normal range of motion  Neurologic:  no cranial nerve deficit,  speech normal      Recent Labs     08/28/21  0500 08/29/21  0650 08/30/21  0725   * 133 135   K 4.0 3.7 4.1   CL 96* 98 101   CO2 21* 24 21*   BUN 13 10 11   CREATININE 0.6* 0.6* 0.5*   GLUCOSE 374* 303* 387*   CALCIUM 8.5* 7.9* 8.4*       Recent Labs     08/28/21  0500 08/29/21  0650 08/30/21  0725   WBC 24.8* 19.7* 17.3*   RBC 4.81 4.39 4.35   HGB 15.6 14.4 14.2   HCT 46.5 42.5 42.6   MCV 96.7 96.8 97.9   MCH 32.4 32.8 32.6   MCHC 33.5 33.9 33.3   RDW 13.0 13.2 13.3    221 240   MPV 11.7 11.4 11.5       Labs and images reviewed     Radiology:   FLUORO FOR SURGICAL PROCEDURES   Final Result   Intraprocedural fluoroscopic spot images as above. See separate procedure   report for more information. XR FOOT RIGHT (MIN 3 VIEWS)   Final Result   There is deformity and cortical irregularity of the 3rd proximal metatarsal,   possibly related to prior trauma. If there is point tenderness,   cross-sectional imaging could be obtained to further evaluate for other   osseous lesions/other etiology. No acute fracture or dislocation is seen.          VL LOWER EXTREMITY ARTERIAL SEGMENTAL PRESSURES W PPG    (Results Pending)       Assessment:    Principal Problem:    Gangrene of toe of right foot [facility])  Active Problems:    Diabetic foot ulcer associated with diabetes mellitus due to underlying condition ([facility]    Uncontrolled type 2 diabetes mellitus with hyperglycemia ([facility]    Diabetic foot ulcer with osteomyelitis ([facility]  Resolved Problems:    * No resolved hospital problems. *      Plan:  Right foot diabetic infection/infected ulcer with cellulitis/right fifth toe gangrene-with elevated white count and lactic acid. ID and podiatry following. S/p incision and debridement/drainage of right foot with right fifth toe amputation on 8/29/2021. Continue ceftaroline and clindamycin as per ID . Follow surgical cultures. Vascular surgery consulted. As per vascular surgery no evidence of significant peripheral vascular disease on exam, no vascular intervention needed. Risk factor modification was advised. Antibiotics advised by ID-clindamycin discontinued, ceftaroline continued. Wound cultures with group B strep, alphahemolytic strep, GNR, CoNS. Podiatry following and planning further debridement in the next few days. Lactic acidosis secondary to #1 -rx'd with  IV fluids, resolved, monitor. Contaminant bacteremia. Insulin dependent DM ,poorly controlled -Hb A1C 11.7 ,basal/bolus/hypoglycemia protocol. ? Has compliance issues. Will advise him to follow with endocrinologist.  Increase Lantus to 40 units nightly. Hypertension- resume as per home    Hyperlipidemia- on statin     MS- out pt follow up    On lovenox for dvt prophy  Full code.                Electronically signed by Ade Baum MD on 8/30/2021 at 1:34 PM

## 2021-08-30 NOTE — PROGRESS NOTES
Department of Podiatry  Progress Note    SUBJECTIVE:  Seen bedside this morning status post incision and drainage of the right foot with fifth digit amputation (date of surgery 8/29/2021). Resting comfortably in bed this morning. Dressings are clean dry and intact and he is tolerating them well to his right foot. No constitutional symptoms reported, he feels much better than when he was originally admitted. Reports no pain to the right foot at this time. No other lower extremity concerns at this time.     OBJECTIVE:    Scheduled Meds:   sodium chloride flush  10 mL IntraVENous 2 times per day    ceftaroline fosamil (TEFLARO) 600 MG IVPB  600 mg IntraVENous Q12H    clindamycin (CLEOCIN) IV  900 mg IntraVENous Q8H    pravastatin  40 mg Oral Daily    pantoprazole  40 mg Oral QAM AC    metoprolol tartrate  25 mg Oral BID    lisinopril  20 mg Oral Daily    ARIPiprazole  5 mg Oral Daily    aspirin  81 mg Oral Daily    buPROPion  300 mg Oral QAM    escitalopram  20 mg Oral Daily    sodium chloride flush  5-40 mL IntraVENous 2 times per day    insulin glargine  32 Units SubCUTAneous Nightly    insulin lispro  5 Units SubCUTAneous TID WC    insulin lispro  0-12 Units SubCUTAneous TID WC    insulin lispro  0-6 Units SubCUTAneous Nightly     Continuous Infusions:   sodium chloride      dextrose      sodium chloride      lactated ringers 75 mL/hr at 08/29/21 0947     PRN Meds:.sodium chloride flush, sodium chloride, glucose, dextrose, glucagon (rDNA), dextrose, sodium chloride flush, sodium chloride, ondansetron **OR** ondansetron, polyethylene glycol, acetaminophen **OR** acetaminophen    Allergies   Allergen Reactions    Bee Venom Swelling    Milk-Related Compounds      Stomach issues-lactose intolerant       /65   Pulse 86   Temp 97.5 °F (36.4 °C) (Oral)   Resp 18   Ht 6' 5\" (1.956 m)   Wt (!) 370 lb 3.2 oz (167.9 kg)   SpO2 95%   BMI 43.90 kg/m²       EXAM:    Dorsalis pedis and posterior tibial pulses remain palpable to the right foot. To the right foot there is a loosely reapproximated incision with nylon suture to the lateral aspect of the right foot at the site of the fifth digit amputation. To the plantar lateral aspect of the right foot there is also a full-thickness ulceration measuring proximally 1.5 cm x 1.5 cm which probes deep. There is no gross fluctuance, no gross crepitation remaining. Decreased erythema and edema right foot. No purulence or significant drainage noted. To the left foot dorsalis pedis and posterior tibial pulses are palpable. The full-thickness ulceration present to the left medial IPJ of the hallux remains stable granular wound bed. Apartments of the left lower extremity remain soft and compressible without pain. Pictures with clinical correlation below.                                Scheduled Meds:   sodium chloride flush  10 mL IntraVENous 2 times per day    ceftaroline fosamil (TEFLARO) 600 MG IVPB  600 mg IntraVENous Q12H    clindamycin (CLEOCIN) IV  900 mg IntraVENous Q8H    pravastatin  40 mg Oral Daily    pantoprazole  40 mg Oral QAM AC    metoprolol tartrate  25 mg Oral BID    lisinopril  20 mg Oral Daily    ARIPiprazole  5 mg Oral Daily    aspirin  81 mg Oral Daily    buPROPion  300 mg Oral QAM    escitalopram  20 mg Oral Daily    sodium chloride flush  5-40 mL IntraVENous 2 times per day    insulin glargine  32 Units SubCUTAneous Nightly    insulin lispro  5 Units SubCUTAneous TID WC    insulin lispro  0-12 Units SubCUTAneous TID WC    insulin lispro  0-6 Units SubCUTAneous Nightly     Continuous Infusions:   sodium chloride      dextrose      sodium chloride      lactated ringers 75 mL/hr at 08/29/21 0947     PRN Meds:.sodium chloride flush, sodium chloride, glucose, dextrose, glucagon (rDNA), dextrose, sodium chloride flush, sodium chloride, ondansetron **OR** ondansetron, polyethylene glycol, acetaminophen **OR** acetaminophen    RADIOLOGY:  FLUORO FOR SURGICAL PROCEDURES   Final Result   Intraprocedural fluoroscopic spot images as above. See separate procedure   report for more information. XR FOOT RIGHT (MIN 3 VIEWS)   Final Result   There is deformity and cortical irregularity of the 3rd proximal metatarsal,   possibly related to prior trauma. If there is point tenderness,   cross-sectional imaging could be obtained to further evaluate for other   osseous lesions/other etiology. No acute fracture or dislocation is seen. VL LOWER EXTREMITY ARTERIAL SEGMENTAL PRESSURES W PPG    (Results Pending)     /65   Pulse 86   Temp 97.5 °F (36.4 °C) (Oral)   Resp 18   Ht 6' 5\" (1.956 m)   Wt (!) 370 lb 3.2 oz (167.9 kg)   SpO2 95%   BMI 43.90 kg/m²     LABS:    Recent Labs     08/29/21  0650 08/30/21  0725   WBC 19.7* 17.3*   HGB 14.4 14.2   HCT 42.5 42.6    240        Recent Labs     08/30/21  0725      K 4.1      CO2 21*   BUN 11   CREATININE 0.5*        Recent Labs     08/27/21  1219   PROT 8.2         ASSESSMENT:  1.  Status post incision and drainage of the right foot with fifth toe and partial fifth ray resection (DOS 8/29/21)  2. Cellulitis of the right foot with abscess  3. Acute osteomyelitis of the right foot. 4.  Postsurgical wound of the right foot, full-thickness  5. Full-thickness diabetic ulceration left great toe  6. Uncontrolled type 2 diabetes mellitus with peripheral neuropathy    PLAN:  - Patient was examined and evaluated. - Reviewed patient's recent lab results and pertinent diagnostic imaging.  - Reviewed ancillary service notes. - Pain Control: IV and PO   - Appreciate input from vascular team   - Appreciate PT OT. Recommend nonweightbearing to the right forefoot. He may weight-bear as tolerable for transfer to the right heel. Weight-bear as tolerable to the left foot.   Status post incision and drainage right foot with partial fifth ray amputation date of surgery 8/29/2021. Infectious disease on board appreciate input with antibiotics going forward likely PICC line upon discharge. Still high risk for further lower extremity limb loss he is understanding this. No immediate plans for surgery at this time as his white blood cell count is trending down significantly. I will still recommend nonweightbearing right foot. Packing was changed today. Bandage was changed with Adaptic, 4 x 4, Igor Ace bandage surgical shoe with Ace bandage. I will follow while in house.

## 2021-08-30 NOTE — CARE COORDINATION
[facility] Quality Flow/Interdisciplinary Rounds Progress Note        Quality Flow Rounds held on August 30, 2021    Disciplines Attending:  Bedside Nurse, ,  and Nursing Unit Leadership    Roderick Ortega was admitted on 8/27/2021 12:21 PM    Anticipated Discharge Date:  Expected Discharge Date: 08/30/21    Disposition:    Jose Score:  Jose Scale Score: 19    Readmission Risk              Risk of Unplanned Readmission:  13           Discussed patient goal for the day, patient clinical progression, and barriers to discharge. The following Goal(s) of the Day/Commitment(s) have been identified:  Check ID plan for antibiotic reconciliation.       Isidro Brown RN  August 30, 2021

## 2021-08-31 LAB
ANION GAP SERPL CALCULATED.3IONS-SCNC: 10 MMOL/L (ref 7–16)
BUN BLDV-MCNC: 10 MG/DL (ref 6–20)
CALCIUM SERPL-MCNC: 8.1 MG/DL (ref 8.6–10.2)
CHLORIDE BLD-SCNC: 99 MMOL/L (ref 98–107)
CO2: 25 MMOL/L (ref 22–29)
CREAT SERPL-MCNC: 0.6 MG/DL (ref 0.7–1.2)
GFR AFRICAN AMERICAN: >60
GFR NON-AFRICAN AMERICAN: >60 ML/MIN/1.73
GLUCOSE BLD-MCNC: 316 MG/DL (ref 74–99)
HCT VFR BLD CALC: 41.4 % (ref 37–54)
HEMOGLOBIN: 13.8 G/DL (ref 12.5–16.5)
MCH RBC QN AUTO: 32.6 PG (ref 26–35)
MCHC RBC AUTO-ENTMCNC: 33.3 % (ref 32–34.5)
MCV RBC AUTO: 97.9 FL (ref 80–99.9)
METER GLUCOSE: 265 MG/DL (ref 74–99)
METER GLUCOSE: 267 MG/DL (ref 74–99)
METER GLUCOSE: 268 MG/DL (ref 74–99)
METER GLUCOSE: 291 MG/DL (ref 74–99)
PDW BLD-RTO: 13.2 FL (ref 11.5–15)
PLATELET # BLD: 282 E9/L (ref 130–450)
PMV BLD AUTO: 11.1 FL (ref 7–12)
POTASSIUM SERPL-SCNC: 3.9 MMOL/L (ref 3.5–5)
RBC # BLD: 4.23 E12/L (ref 3.8–5.8)
SODIUM BLD-SCNC: 134 MMOL/L (ref 132–146)
WBC # BLD: 15 E9/L (ref 4.5–11.5)

## 2021-08-31 PROCEDURE — 36415 COLL VENOUS BLD VENIPUNCTURE: CPT

## 2021-08-31 PROCEDURE — 6370000000 HC RX 637 (ALT 250 FOR IP): Performed by: INTERNAL MEDICINE

## 2021-08-31 PROCEDURE — 1200000000 HC SEMI PRIVATE

## 2021-08-31 PROCEDURE — 80048 BASIC METABOLIC PNL TOTAL CA: CPT

## 2021-08-31 PROCEDURE — 99232 SBSQ HOSP IP/OBS MODERATE 35: CPT | Performed by: INTERNAL MEDICINE

## 2021-08-31 PROCEDURE — 2580000003 HC RX 258: Performed by: STUDENT IN AN ORGANIZED HEALTH CARE EDUCATION/TRAINING PROGRAM

## 2021-08-31 PROCEDURE — 85027 COMPLETE CBC AUTOMATED: CPT

## 2021-08-31 PROCEDURE — 99024 POSTOP FOLLOW-UP VISIT: CPT | Performed by: PODIATRIST

## 2021-08-31 PROCEDURE — 6360000002 HC RX W HCPCS: Performed by: STUDENT IN AN ORGANIZED HEALTH CARE EDUCATION/TRAINING PROGRAM

## 2021-08-31 PROCEDURE — 6370000000 HC RX 637 (ALT 250 FOR IP): Performed by: STUDENT IN AN ORGANIZED HEALTH CARE EDUCATION/TRAINING PROGRAM

## 2021-08-31 PROCEDURE — 82962 GLUCOSE BLOOD TEST: CPT

## 2021-08-31 RX ORDER — SODIUM CHLORIDE 0.9 % (FLUSH) 0.9 %
5-40 SYRINGE (ML) INJECTION PRN
Status: DISCONTINUED | OUTPATIENT
Start: 2021-08-31 | End: 2021-09-01 | Stop reason: HOSPADM

## 2021-08-31 RX ORDER — HEPARIN SODIUM (PORCINE) LOCK FLUSH IV SOLN 100 UNIT/ML 100 UNIT/ML
3 SOLUTION INTRAVENOUS PRN
Status: DISCONTINUED | OUTPATIENT
Start: 2021-08-31 | End: 2021-09-01 | Stop reason: HOSPADM

## 2021-08-31 RX ORDER — SODIUM CHLORIDE 0.9 % (FLUSH) 0.9 %
5-40 SYRINGE (ML) INJECTION EVERY 12 HOURS SCHEDULED
Status: DISCONTINUED | OUTPATIENT
Start: 2021-08-31 | End: 2021-09-01 | Stop reason: HOSPADM

## 2021-08-31 RX ORDER — LIDOCAINE HYDROCHLORIDE 10 MG/ML
5 INJECTION, SOLUTION EPIDURAL; INFILTRATION; INTRACAUDAL; PERINEURAL ONCE
Status: COMPLETED | OUTPATIENT
Start: 2021-08-31 | End: 2021-09-01

## 2021-08-31 RX ORDER — LANOLIN ALCOHOL/MO/W.PET/CERES
3 CREAM (GRAM) TOPICAL NIGHTLY PRN
Status: DISCONTINUED | OUTPATIENT
Start: 2021-08-31 | End: 2021-09-01 | Stop reason: HOSPADM

## 2021-08-31 RX ORDER — SODIUM CHLORIDE 9 MG/ML
25 INJECTION, SOLUTION INTRAVENOUS PRN
Status: DISCONTINUED | OUTPATIENT
Start: 2021-08-31 | End: 2021-09-01 | Stop reason: HOSPADM

## 2021-08-31 RX ORDER — HEPARIN SODIUM (PORCINE) LOCK FLUSH IV SOLN 100 UNIT/ML 100 UNIT/ML
3 SOLUTION INTRAVENOUS EVERY 12 HOURS SCHEDULED
Status: DISCONTINUED | OUTPATIENT
Start: 2021-08-31 | End: 2021-09-01 | Stop reason: HOSPADM

## 2021-08-31 RX ADMIN — SODIUM CHLORIDE, PRESERVATIVE FREE 10 ML: 5 INJECTION INTRAVENOUS at 08:54

## 2021-08-31 RX ADMIN — ESCITALOPRAM OXALATE 20 MG: 10 TABLET ORAL at 08:53

## 2021-08-31 RX ADMIN — PANTOPRAZOLE SODIUM 40 MG: 40 TABLET, DELAYED RELEASE ORAL at 05:43

## 2021-08-31 RX ADMIN — BUPROPION HYDROCHLORIDE 300 MG: 300 TABLET, EXTENDED RELEASE ORAL at 08:53

## 2021-08-31 RX ADMIN — INSULIN LISPRO 5 UNITS: 100 INJECTION, SOLUTION INTRAVENOUS; SUBCUTANEOUS at 08:54

## 2021-08-31 RX ADMIN — CETIRIZINE HYDROCHLORIDE 10 MG: 10 TABLET, FILM COATED ORAL at 08:53

## 2021-08-31 RX ADMIN — ARIPIPRAZOLE 5 MG: 5 TABLET ORAL at 08:53

## 2021-08-31 RX ADMIN — PRAVASTATIN SODIUM 40 MG: 20 TABLET ORAL at 08:53

## 2021-08-31 RX ADMIN — INSULIN LISPRO 5 UNITS: 100 INJECTION, SOLUTION INTRAVENOUS; SUBCUTANEOUS at 11:59

## 2021-08-31 RX ADMIN — CEFTAROLINE FOSAMIL 600 MG: 600 POWDER, FOR SOLUTION INTRAVENOUS at 15:22

## 2021-08-31 RX ADMIN — METOPROLOL TARTRATE 25 MG: 25 TABLET, FILM COATED ORAL at 08:53

## 2021-08-31 RX ADMIN — METOPROLOL TARTRATE 25 MG: 25 TABLET, FILM COATED ORAL at 20:50

## 2021-08-31 RX ADMIN — INSULIN LISPRO 5 UNITS: 100 INJECTION, SOLUTION INTRAVENOUS; SUBCUTANEOUS at 17:04

## 2021-08-31 RX ADMIN — INSULIN LISPRO 6 UNITS: 100 INJECTION, SOLUTION INTRAVENOUS; SUBCUTANEOUS at 11:59

## 2021-08-31 RX ADMIN — INSULIN GLARGINE 40 UNITS: 100 INJECTION, SOLUTION SUBCUTANEOUS at 20:50

## 2021-08-31 RX ADMIN — INSULIN LISPRO 6 UNITS: 100 INJECTION, SOLUTION INTRAVENOUS; SUBCUTANEOUS at 07:31

## 2021-08-31 RX ADMIN — CEFTAROLINE FOSAMIL 600 MG: 600 POWDER, FOR SOLUTION INTRAVENOUS at 02:48

## 2021-08-31 RX ADMIN — LISINOPRIL 20 MG: 20 TABLET ORAL at 08:53

## 2021-08-31 RX ADMIN — ASPIRIN 81 MG: 81 TABLET, COATED ORAL at 08:53

## 2021-08-31 RX ADMIN — INSULIN LISPRO 6 UNITS: 100 INJECTION, SOLUTION INTRAVENOUS; SUBCUTANEOUS at 17:04

## 2021-08-31 RX ADMIN — Medication 3 MG: at 20:50

## 2021-08-31 RX ADMIN — INSULIN LISPRO 3 UNITS: 100 INJECTION, SOLUTION INTRAVENOUS; SUBCUTANEOUS at 20:50

## 2021-08-31 ASSESSMENT — PAIN SCALES - GENERAL
PAINLEVEL_OUTOF10: 0
PAINLEVEL_OUTOF10: 0

## 2021-08-31 NOTE — PROGRESS NOTES
Report called to 5S. Gave nurse to nurse report via telephone. Patient is aware of transfer. Answered any questions the patient may have had at this time.     Electronically signed by Roldan Duval on 8/30/21 at 9:03 PM EDT

## 2021-08-31 NOTE — PROGRESS NOTES
lb 6.4 oz (207 kg)   SpO2 95%   BMI 54.12 kg/m²   General Appearance: alert and oriented to person, place and time, with morbid obesity, in no acute distress  Skin: warm and dry  Head: normocephalic and atraumatic  Eyes: pupils equal, round, and reactive to light, extraocular eye movements intact, conjunctivae normal  Neck: supple and non-tender without mass  Pulmonary/Chest: clear to auscultation bilaterally  Cardiovascular: normal rate, regular rhythm, normal S1 and S2  Abdomen: soft, non-tender, non-distended, normal bowel sounds, no masses or organomegaly  Extremities: no cyanosis, clubbing , right foot with dressing dry and intact, refuses to get examined. Musculoskeletal: normal range of motion  Neurologic:  no cranial nerve deficit,  speech normal      Recent Labs     08/29/21  0650 08/30/21  0725 08/31/21  0527    135 134   K 3.7 4.1 3.9   CL 98 101 99   CO2 24 21* 25   BUN 10 11 10   CREATININE 0.6* 0.5* 0.6*   GLUCOSE 303* 387* 316*   CALCIUM 7.9* 8.4* 8.1*       Recent Labs     08/29/21  0650 08/30/21  0725 08/31/21  0527   WBC 19.7* 17.3* 15.0*   RBC 4.39 4.35 4.23   HGB 14.4 14.2 13.8   HCT 42.5 42.6 41.4   MCV 96.8 97.9 97.9   MCH 32.8 32.6 32.6   MCHC 33.9 33.3 33.3   RDW 13.2 13.3 13.2    240 282   MPV 11.4 11.5 11.1       Labs and images reviewed     Radiology:   VL LOWER EXTREMITY ARTERIAL SEGMENTAL PRESSURES W PPG   Final Result   1. Normal ankle brachial indices bilaterally at 1.1. Multiphasic waveforms   identified at all levels on arterial Doppler evaluation. Low suspicion for   high-grade stenosis. 2.  Relatively preserved pulsatility, waveform morphology, and amplitudes in   the digits of the right foot. Amputated right 5th toe. Mildly decreased   amplitudes in the right low thigh and right ankle may reflect changes related   to mild underlying microvascular disease.       3.  Digital waveform evaluation of the digits of the left foot demonstrate   preserved pulsatility with mild to moderately decreased amplitude. These   findings likely reflect changes related to a mild to moderate degree of   underlying microvascular disease. At this time there appears to be good   collateral flow. FLUORO FOR SURGICAL PROCEDURES   Final Result   Intraprocedural fluoroscopic spot images as above. See separate procedure   report for more information. XR FOOT RIGHT (MIN 3 VIEWS)   Final Result   There is deformity and cortical irregularity of the 3rd proximal metatarsal,   possibly related to prior trauma. If there is point tenderness,   cross-sectional imaging could be obtained to further evaluate for other   osseous lesions/other etiology. No acute fracture or dislocation is seen. Assessment:    Principal Problem:    Gangrene of toe of right foot (HCC)  Active Problems:    Diabetic foot ulcer associated with diabetes mellitus due to underlying condition ([facility] [address]    Uncontrolled type 2 diabetes mellitus with hyperglycemia ([facility] [address]    Diabetic foot ulcer with osteomyelitis ([facility] [address]  Resolved Problems:    * No resolved hospital problems. *      Plan:  Right foot diabetic infection/infected ulcer with cellulitis/right fifth toe gangrene-with elevated white count and lactic acid. ID and podiatry following. S/p incision and debridement/drainage of right foot with right fifth toe amputation on 8/29/2021. Continue ceftaroline and clindamycin as per ID . Follow surgical cultures. Vascular surgery consulted. As per vascular surgery no evidence of significant peripheral vascular disease on exam, no vascular intervention needed. Risk factor modification was advised. Antibiotics advised by ID-clindamycin discontinued, ceftaroline continued. Surgical cultures with strep agalactiae, alphahemolytic strep, beta-hemolytic strep. .Podiatry following and planning further debridement in the next few days. As per ID will have PICC line today. Leucocytosis improving. Lactic acidosis secondary to #1 -rx'd with  IV fluids, resolved, monitor. Contaminant  corynebacterium bacteremia. Insulin dependent DM ,poorly controlled -Hb A1C 11.7 ,basal/bolus/hypoglycemia protocol. ? Has compliance issues. Will advise him to follow with endocrinologist.  Increase Lantus to 40 units nightly. Hypertension- resume as per home    Hyperlipidemia- on statin     MS- out pt follow up    On lovenox for dvt prophy  Full code. DC once has PICC line & abx are advised by ID.           Electronically signed by Abilio Thornton MD on 8/31/2021 at 12:17 PM

## 2021-08-31 NOTE — PROGRESS NOTES
noted.   HEENT: Round and reactive pupils. Moist mucous membranes. No ulcerations or thrush. Neck: Supple to movements. Chest: No respiratory distress. Symmetrical expansion. Clear. No crackles  Cardiovascular: S1 and S2 are rhythmic and regular. No murmurs appreciated. Abdomen: Positive bowel sounds to auscultation. Benign to palpation. No masses felt. Morbidly obese. Extremities: mild edema. Right foot is wrapped in a dry dressing 5th toe is missing.  There is some darkening erythema on the right ankle   Lines: peripheral     -- right foot pot op 8/30/2021     -- right foot pot op 8/30/2021     - left foot 8/30/2021     Laboratory and Tests Review:  Lab Results   Component Value Date    WBC 15.0 (H) 08/31/2021    WBC 17.3 (H) 08/30/2021    WBC 19.7 (H) 08/29/2021    HGB 13.8 08/31/2021    HCT 41.4 08/31/2021    MCV 97.9 08/31/2021     08/31/2021     Lab Results   Component Value Date    NEUTROABS 19.08 (H) 08/27/2021    NEUTROABS 8.10 (H) 07/01/2021    NEUTROABS 5.70 09/09/2020     No results found for: [facility]  Lab Results   Component Value Date    ALT 23 08/27/2021    AST 28 08/27/2021    ALKPHOS 105 08/27/2021    BILITOT 1.2 08/27/2021     Lab Results   Component Value Date     08/31/2021    K 3.9 08/31/2021    K 5.1 08/27/2021    CL 99 08/31/2021    CO2 25 08/31/2021    BUN 10 08/31/2021    CREATININE 0.6 08/31/2021    CREATININE 0.5 08/30/2021    CREATININE 0.6 08/29/2021    GFRAA >60 08/31/2021    LABGLOM >60 08/31/2021    GLUCOSE 316 08/31/2021    PROT 8.2 08/27/2021    LABALBU 3.6 08/27/2021    CALCIUM 8.1 08/31/2021    BILITOT 1.2 08/27/2021    ALKPHOS 105 08/27/2021    AST 28 08/27/2021    ALT 23 08/27/2021     Lab Results   Component Value Date    CRP 5.5 (H) 08/27/2021    CRP 0.7 (H) 07/01/2021    CRP 0.6 (H) 12/29/2020     Lab Results   Component Value Date    SEDRATE 30 (H) 08/27/2021    SEDRATE 8 07/01/2021    SEDRATE 7 12/29/2020     Radiology:  Noted     Microbiology: Surgical cultures: Strep agalactiae, alpha hemolytic strep, beta hemolytic strep   Blood cultures: 1/4 bottles GPR - dipthroid like - Corynebacterium   Wound culture: Strep agalactiae, CoNS    ASSESSMENT:  · Limb threatening diabetic foot infection status post debridement, incision and drainage of the right foot with right 5th toe amputation 8/29/2021  · Gangrene right fifth toe  · Fever with leukocytosis associated to the above, improving     PLAN:  · Continue Ceftaroline for now. He will need 6 weeks of IV antibiotics  · Will order PICC today   · No need to treat contaminant in blood cultures   · Podiatry following & planning for further debridement in the next few days    · Monitor labs- WBC trending down 15. Janis Winslow, AVEL - CNP  11:05 AM  8/31/2021     Patient seen and examined. I had a face to face encounter with the patient. Agree with exam.  Assessment and plan as outlined above and directed by me. Addition and corrections were done as deemed appropriate. My exam and plan include: The patient is tolerating antibiotics well. He will need further debridement by podiatry. A PICC has been ordered. Informed Consent for PICC:  I explained the risks, benefits, alternative options, and potential complications associated with insertion of Peripherally Inserted Central Catheter (PICC) with the patient prior to the procedure.     Electronically signed by Karin Martinez MD on 8/31/2021 at 2:37 PM     Karin Martinez MD  8/31/2021  2:37 PM

## 2021-08-31 NOTE — PROGRESS NOTES
foot.  To the right foot there is a loosely reapproximated incision with nylon suture to the lateral aspect of the right foot at the site of the fifth digit amputation. To the plantar lateral aspect of the right foot there is also a full-thickness ulceration measuring proximally 1.5 cm x 1.5 cm which probes deep. No significant erythema or drainage. To the left foot dorsalis pedis and posterior tibial pulses are palpable. The full-thickness ulceration present to the left medial IPJ of the hallux remains stable granular wound bed. Apartments of the left lower extremity remain soft and compressible without pain. Pictures with clinical correlation below. Scheduled Meds:   cetirizine  10 mg Oral Daily    insulin glargine  40 Units SubCUTAneous Nightly    sodium chloride flush  10 mL IntraVENous 2 times per day    ceftaroline fosamil (TEFLARO) 600 MG IVPB  600 mg IntraVENous Q12H    pravastatin  40 mg Oral Daily    pantoprazole  40 mg Oral QAM AC    metoprolol tartrate  25 mg Oral BID    lisinopril  20 mg Oral Daily    ARIPiprazole  5 mg Oral Daily    aspirin  81 mg Oral Daily    buPROPion  300 mg Oral QAM    escitalopram  20 mg Oral Daily    sodium chloride flush  5-40 mL IntraVENous 2 times per day    insulin lispro  5 Units SubCUTAneous TID WC    insulin lispro  0-12 Units SubCUTAneous TID WC    insulin lispro  0-6 Units SubCUTAneous Nightly     Continuous Infusions:   sodium chloride      dextrose      sodium chloride      lactated ringers 75 mL/hr at 08/30/21 2132     PRN Meds:.zolpidem, sodium chloride flush, sodium chloride, glucose, dextrose, glucagon (rDNA), dextrose, sodium chloride flush, sodium chloride, ondansetron **OR** ondansetron, polyethylene glycol, acetaminophen **OR** acetaminophen    RADIOLOGY:  VL LOWER EXTREMITY ARTERIAL SEGMENTAL PRESSURES W PPG   Final Result   1. Normal ankle brachial indices bilaterally at 1.1.   Multiphasic waveforms   identified at all levels on arterial Doppler evaluation. Low suspicion for   high-grade stenosis. 2.  Relatively preserved pulsatility, waveform morphology, and amplitudes in   the digits of the right foot. Amputated right 5th toe. Mildly decreased   amplitudes in the right low thigh and right ankle may reflect changes related   to mild underlying microvascular disease. 3.  Digital waveform evaluation of the digits of the left foot demonstrate   preserved pulsatility with mild to moderately decreased amplitude. These   findings likely reflect changes related to a mild to moderate degree of   underlying microvascular disease. At this time there appears to be good   collateral flow. FLUORO FOR SURGICAL PROCEDURES   Final Result   Intraprocedural fluoroscopic spot images as above. See separate procedure   report for more information. XR FOOT RIGHT (MIN 3 VIEWS)   Final Result   There is deformity and cortical irregularity of the 3rd proximal metatarsal,   possibly related to prior trauma. If there is point tenderness,   cross-sectional imaging could be obtained to further evaluate for other   osseous lesions/other etiology. No acute fracture or dislocation is seen. BP (!) 142/91   Pulse 107   Temp 99 °F (37.2 °C) (Oral)   Resp 18   Ht 6' 5\" (1.956 m)   Wt (!) 456 lb 6.4 oz (207 kg)   SpO2 95%   BMI 54.12 kg/m²     LABS:    Recent Labs     08/30/21  0725 08/31/21  0527   WBC 17.3* 15.0*   HGB 14.2 13.8   HCT 42.6 41.4    282        Recent Labs     08/31/21  0527      K 3.9   CL 99   CO2 25   BUN 10   CREATININE 0.6*        No results for input(s): PROT, INR, APTT in the last 72 hours. ASSESSMENT:  1.  Status post incision and drainage of the right foot with fifth toe and partial fifth ray resection (DOS 8/29/21)  2. Cellulitis of the right foot with abscess  3. Acute osteomyelitis of the right foot. 4.  Postsurgical wound of the right foot, full-thickness  5. Full-thickness diabetic ulceration left great toe  6. Uncontrolled type 2 diabetes mellitus with peripheral neuropathy    PLAN:  - Patient was examined and evaluated. - Reviewed patient's recent lab results and pertinent diagnostic imaging.  - Reviewed ancillary service notes. - Pain Control: IV and PO   - Dressing change was performed this morning dressings consist of sterile packing, DSD and Ace bandage. Wound cultures growing gram-positive organisms and group B strep at this time. Blood cultures x1 growth of coynebacterium. Surgical cultures beginning to grow beta hemolytic strep. Pathology pending.  - Non invasive arterial studies were significant for calcific microvascular disease in bilateral feet. No suggestion of stenosis to bilateral lower extremities. - Appreciate PT OT. Recommend nonweightbearing to the right forefoot. He may weight-bear as tolerable for transfer to the right heel. Weight-bear as tolerable to the left foot.

## 2021-09-01 VITALS
RESPIRATION RATE: 18 BRPM | HEIGHT: 77 IN | DIASTOLIC BLOOD PRESSURE: 89 MMHG | TEMPERATURE: 98 F | HEART RATE: 111 BPM | OXYGEN SATURATION: 91 % | WEIGHT: 315 LBS | SYSTOLIC BLOOD PRESSURE: 148 MMHG | BODY MASS INDEX: 37.19 KG/M2

## 2021-09-01 LAB
ANAEROBIC CULTURE: ABNORMAL
ANAEROBIC CULTURE: ABNORMAL
ANION GAP SERPL CALCULATED.3IONS-SCNC: 11 MMOL/L (ref 7–16)
BODY FLUID CULTURE, STERILE: NORMAL
BUN BLDV-MCNC: 8 MG/DL (ref 6–20)
CALCIUM SERPL-MCNC: 8 MG/DL (ref 8.6–10.2)
CHLORIDE BLD-SCNC: 101 MMOL/L (ref 98–107)
CO2: 23 MMOL/L (ref 22–29)
CREAT SERPL-MCNC: 0.6 MG/DL (ref 0.7–1.2)
CULTURE, BLOOD 2: NORMAL
GFR AFRICAN AMERICAN: >60
GFR NON-AFRICAN AMERICAN: >60 ML/MIN/1.73
GLUCOSE BLD-MCNC: 265 MG/DL (ref 74–99)
GRAM STAIN RESULT: NORMAL
HCT VFR BLD CALC: 42.3 % (ref 37–54)
HEMOGLOBIN: 14.2 G/DL (ref 12.5–16.5)
MCH RBC QN AUTO: 32.3 PG (ref 26–35)
MCHC RBC AUTO-ENTMCNC: 33.6 % (ref 32–34.5)
MCV RBC AUTO: 96.4 FL (ref 80–99.9)
METER GLUCOSE: 234 MG/DL (ref 74–99)
METER GLUCOSE: 266 MG/DL (ref 74–99)
METER GLUCOSE: 287 MG/DL (ref 74–99)
ORGANISM: ABNORMAL
ORGANISM: ABNORMAL
PDW BLD-RTO: 13.3 FL (ref 11.5–15)
PLATELET # BLD: 267 E9/L (ref 130–450)
PMV BLD AUTO: 11.3 FL (ref 7–12)
POTASSIUM SERPL-SCNC: 4 MMOL/L (ref 3.5–5)
RBC # BLD: 4.39 E12/L (ref 3.8–5.8)
SODIUM BLD-SCNC: 135 MMOL/L (ref 132–146)
WBC # BLD: 14.6 E9/L (ref 4.5–11.5)

## 2021-09-01 PROCEDURE — 02HV33Z INSERTION OF INFUSION DEVICE INTO SUPERIOR VENA CAVA, PERCUTANEOUS APPROACH: ICD-10-PCS | Performed by: INTERNAL MEDICINE

## 2021-09-01 PROCEDURE — 36415 COLL VENOUS BLD VENIPUNCTURE: CPT

## 2021-09-01 PROCEDURE — 2580000003 HC RX 258: Performed by: STUDENT IN AN ORGANIZED HEALTH CARE EDUCATION/TRAINING PROGRAM

## 2021-09-01 PROCEDURE — 76937 US GUIDE VASCULAR ACCESS: CPT

## 2021-09-01 PROCEDURE — C1751 CATH, INF, PER/CENT/MIDLINE: HCPCS

## 2021-09-01 PROCEDURE — 82962 GLUCOSE BLOOD TEST: CPT

## 2021-09-01 PROCEDURE — 2500000003 HC RX 250 WO HCPCS: Performed by: REGISTERED NURSE

## 2021-09-01 PROCEDURE — 6360000002 HC RX W HCPCS: Performed by: STUDENT IN AN ORGANIZED HEALTH CARE EDUCATION/TRAINING PROGRAM

## 2021-09-01 PROCEDURE — 6370000000 HC RX 637 (ALT 250 FOR IP): Performed by: REGISTERED NURSE

## 2021-09-01 PROCEDURE — 99024 POSTOP FOLLOW-UP VISIT: CPT | Performed by: PODIATRIST

## 2021-09-01 PROCEDURE — 6370000000 HC RX 637 (ALT 250 FOR IP): Performed by: INTERNAL MEDICINE

## 2021-09-01 PROCEDURE — 6370000000 HC RX 637 (ALT 250 FOR IP): Performed by: STUDENT IN AN ORGANIZED HEALTH CARE EDUCATION/TRAINING PROGRAM

## 2021-09-01 PROCEDURE — 99239 HOSP IP/OBS DSCHRG MGMT >30: CPT | Performed by: INTERNAL MEDICINE

## 2021-09-01 PROCEDURE — 85027 COMPLETE CBC AUTOMATED: CPT

## 2021-09-01 PROCEDURE — 36569 INSJ PICC 5 YR+ W/O IMAGING: CPT

## 2021-09-01 PROCEDURE — 2580000003 HC RX 258: Performed by: REGISTERED NURSE

## 2021-09-01 PROCEDURE — 80048 BASIC METABOLIC PNL TOTAL CA: CPT

## 2021-09-01 RX ORDER — METRONIDAZOLE 500 MG/1
500 TABLET ORAL EVERY 8 HOURS SCHEDULED
Qty: 126 TABLET | Refills: 0 | DISCHARGE
Start: 2021-09-01 | End: 2021-10-13

## 2021-09-01 RX ORDER — METRONIDAZOLE 500 MG/1
500 TABLET ORAL EVERY 8 HOURS SCHEDULED
Status: DISCONTINUED | OUTPATIENT
Start: 2021-09-01 | End: 2021-09-01 | Stop reason: HOSPADM

## 2021-09-01 RX ORDER — LANOLIN ALCOHOL/MO/W.PET/CERES
3 CREAM (GRAM) TOPICAL NIGHTLY PRN
Qty: 30 TABLET | Refills: 0
Start: 2021-09-01 | End: 2021-10-29

## 2021-09-01 RX ADMIN — ESCITALOPRAM OXALATE 20 MG: 10 TABLET ORAL at 08:41

## 2021-09-01 RX ADMIN — SODIUM CHLORIDE, POTASSIUM CHLORIDE, SODIUM LACTATE AND CALCIUM CHLORIDE: 600; 310; 30; 20 INJECTION, SOLUTION INTRAVENOUS at 02:13

## 2021-09-01 RX ADMIN — PRAVASTATIN SODIUM 40 MG: 20 TABLET ORAL at 08:41

## 2021-09-01 RX ADMIN — INSULIN LISPRO 6 UNITS: 100 INJECTION, SOLUTION INTRAVENOUS; SUBCUTANEOUS at 06:43

## 2021-09-01 RX ADMIN — LISINOPRIL 20 MG: 20 TABLET ORAL at 08:41

## 2021-09-01 RX ADMIN — LIDOCAINE HYDROCHLORIDE 2 ML: 10 INJECTION, SOLUTION EPIDURAL; INFILTRATION; INTRACAUDAL; PERINEURAL at 14:10

## 2021-09-01 RX ADMIN — METOPROLOL TARTRATE 25 MG: 25 TABLET, FILM COATED ORAL at 08:41

## 2021-09-01 RX ADMIN — Medication 40 ML: at 14:50

## 2021-09-01 RX ADMIN — INSULIN LISPRO 5 UNITS: 100 INJECTION, SOLUTION INTRAVENOUS; SUBCUTANEOUS at 06:44

## 2021-09-01 RX ADMIN — INSULIN LISPRO 6 UNITS: 100 INJECTION, SOLUTION INTRAVENOUS; SUBCUTANEOUS at 16:40

## 2021-09-01 RX ADMIN — INSULIN LISPRO 4 UNITS: 100 INJECTION, SOLUTION INTRAVENOUS; SUBCUTANEOUS at 11:46

## 2021-09-01 RX ADMIN — INSULIN LISPRO 5 UNITS: 100 INJECTION, SOLUTION INTRAVENOUS; SUBCUTANEOUS at 16:43

## 2021-09-01 RX ADMIN — CETIRIZINE HYDROCHLORIDE 10 MG: 10 TABLET, FILM COATED ORAL at 08:41

## 2021-09-01 RX ADMIN — INSULIN LISPRO 5 UNITS: 100 INJECTION, SOLUTION INTRAVENOUS; SUBCUTANEOUS at 11:47

## 2021-09-01 RX ADMIN — ASPIRIN 81 MG: 81 TABLET, COATED ORAL at 08:41

## 2021-09-01 RX ADMIN — CEFTAROLINE FOSAMIL 600 MG: 600 POWDER, FOR SOLUTION INTRAVENOUS at 02:58

## 2021-09-01 RX ADMIN — PANTOPRAZOLE SODIUM 40 MG: 40 TABLET, DELAYED RELEASE ORAL at 05:40

## 2021-09-01 RX ADMIN — METRONIDAZOLE 500 MG: 500 TABLET ORAL at 16:44

## 2021-09-01 RX ADMIN — CEFTAROLINE FOSAMIL 600 MG: 600 POWDER, FOR SOLUTION INTRAVENOUS at 16:32

## 2021-09-01 RX ADMIN — ARIPIPRAZOLE 5 MG: 5 TABLET ORAL at 08:41

## 2021-09-01 NOTE — CARE COORDINATION
Await PICC insertion; iv teflaro; [facility] accepted ; naifert initiated 2/66; await auth. covid neg; transport forms on chart. Luciano Presley.

## 2021-09-01 NOTE — PROGRESS NOTES
Physical Therapy    Pt NA for Rx, states to receive a PIC line, Nurse coming soon to do this. Will follow on another date/time.   Lina Speaks PTA [id number]

## 2021-09-01 NOTE — PROGRESS NOTES
Occupational Therapy  Patient treatment attempted this PM.  Per pt IV team just left the room to retrieve supplies to insert PICC line. Will attempt at a later time.                                                  Aditi INGRAM/JEANETH [id number]

## 2021-09-01 NOTE — CARE COORDINATION
Advised by Ekaterina Flowers from [address] [place] [address] pt has been approved for [address] [place] [address]; PICC being placed today; d/w  DR. Rafat Wood; will provided d/c order after PICC placed; states OK for family to transport via private vehicle to [address] [place] [address] at discharge. Podiatry note noted. Left VM message with DL Garcia NP to request ID clearance for discharge. Miriam Alejandre.

## 2021-09-01 NOTE — PROGRESS NOTES
Department of Podiatry  Progress Note    SUBJECTIVE:  Seen bedside this morning status post incision and drainage of the right foot with fifth digit amputation (date of surgery 8/29/2021). Resting in bed this morning. No acute events overnight. Denies nausea vomiting fever chills shortness breath. No calf pain. Laying in bed. Seen with resident today. No additional pedal complaints. OBJECTIVE:    Scheduled Meds:   lidocaine PF  5 mL IntraDERmal Once    sodium chloride flush  5-40 mL IntraVENous 2 times per day    heparin flush  3 mL IntraVENous 2 times per day    cetirizine  10 mg Oral Daily    insulin glargine  40 Units SubCUTAneous Nightly    sodium chloride flush  10 mL IntraVENous 2 times per day    ceftaroline fosamil (TEFLARO) 600 MG IVPB  600 mg IntraVENous Q12H    pravastatin  40 mg Oral Daily    pantoprazole  40 mg Oral QAM AC    metoprolol tartrate  25 mg Oral BID    lisinopril  20 mg Oral Daily    ARIPiprazole  5 mg Oral Daily    aspirin  81 mg Oral Daily    buPROPion  300 mg Oral QAM    escitalopram  20 mg Oral Daily    insulin lispro  5 Units SubCUTAneous TID WC    insulin lispro  0-12 Units SubCUTAneous TID WC    insulin lispro  0-6 Units SubCUTAneous Nightly     Continuous Infusions:   sodium chloride      dextrose      lactated ringers 75 mL/hr at 09/01/21 0213     PRN Meds:.sodium chloride flush, sodium chloride, heparin flush, melatonin, sodium chloride flush, glucose, dextrose, glucagon (rDNA), dextrose, ondansetron **OR** ondansetron, polyethylene glycol, acetaminophen **OR** acetaminophen    Allergies   Allergen Reactions    Bee Venom Swelling    Milk-Related Compounds      Stomach issues-lactose intolerant       BP (!) 148/89   Pulse 111   Temp 98 °F (36.7 °C) (Oral)   Resp 18   Ht 6' 5\" (1.956 m)   Wt (!) 456 lb 4.8 oz (207 kg)   SpO2 91%   BMI 54.11 kg/m²       EXAM:  No erythema or significant edema right foot.       Dorsalis pedis and posterior tibial pulses remain palpable to the right foot. To the right foot there is a loosely reapproximated incision with nylon suture to the lateral aspect of the right foot at the site of the fifth digit amputation. To the plantar lateral aspect of the right foot there is also a full-thickness ulceration measuring proximally 1.5 cm x 1.5 cm which probes deep. No significant erythema or drainage. No drainage noted today. Left foot dorsalis pedis and posterior tibial pulses are palpable. Negative Homans bilateral  Wiggling remaining toes        Scheduled Meds:   lidocaine PF  5 mL IntraDERmal Once    sodium chloride flush  5-40 mL IntraVENous 2 times per day    heparin flush  3 mL IntraVENous 2 times per day    cetirizine  10 mg Oral Daily    insulin glargine  40 Units SubCUTAneous Nightly    sodium chloride flush  10 mL IntraVENous 2 times per day    ceftaroline fosamil (TEFLARO) 600 MG IVPB  600 mg IntraVENous Q12H    pravastatin  40 mg Oral Daily    pantoprazole  40 mg Oral QAM AC    metoprolol tartrate  25 mg Oral BID    lisinopril  20 mg Oral Daily    ARIPiprazole  5 mg Oral Daily    aspirin  81 mg Oral Daily    buPROPion  300 mg Oral QAM    escitalopram  20 mg Oral Daily    insulin lispro  5 Units SubCUTAneous TID WC    insulin lispro  0-12 Units SubCUTAneous TID WC    insulin lispro  0-6 Units SubCUTAneous Nightly     Continuous Infusions:   sodium chloride      dextrose      lactated ringers 75 mL/hr at 09/01/21 0213     PRN Meds:.sodium chloride flush, sodium chloride, heparin flush, melatonin, sodium chloride flush, glucose, dextrose, glucagon (rDNA), dextrose, ondansetron **OR** ondansetron, polyethylene glycol, acetaminophen **OR** acetaminophen    RADIOLOGY:  VL LOWER EXTREMITY ARTERIAL SEGMENTAL PRESSURES W PPG   Final Result   1. Normal ankle brachial indices bilaterally at 1.1. Multiphasic waveforms   identified at all levels on arterial Doppler evaluation. Low suspicion for   high-grade stenosis.       2.  Relatively preserved pulsatility, waveform morphology, and amplitudes in   the digits of the right foot. Amputated right 5th toe. Mildly decreased   amplitudes in the right low thigh and right ankle may reflect changes related   to mild underlying microvascular disease. 3.  Digital waveform evaluation of the digits of the left foot demonstrate   preserved pulsatility with mild to moderately decreased amplitude. These   findings likely reflect changes related to a mild to moderate degree of   underlying microvascular disease. At this time there appears to be good   collateral flow. FLUORO FOR SURGICAL PROCEDURES   Final Result   Intraprocedural fluoroscopic spot images as above. See separate procedure   report for more information. XR FOOT RIGHT (MIN 3 VIEWS)   Final Result   There is deformity and cortical irregularity of the 3rd proximal metatarsal,   possibly related to prior trauma. If there is point tenderness,   cross-sectional imaging could be obtained to further evaluate for other   osseous lesions/other etiology. No acute fracture or dislocation is seen. BP (!) 148/89   Pulse 111   Temp 98 °F (36.7 °C) (Oral)   Resp 18   Ht 6' 5\" (1.956 m)   Wt (!) 456 lb 4.8 oz (207 kg)   SpO2 91%   BMI 54.11 kg/m²     LABS:    Recent Labs     08/31/21  0527 09/01/21  0752   WBC 15.0* 14.6*   HGB 13.8 14.2   HCT 41.4 42.3    267        Recent Labs     09/01/21  0752      K 4.0      CO2 23   BUN 8   CREATININE 0.6*        No results for input(s): PROT, INR, APTT in the last 72 hours. ASSESSMENT:  1.  Status post incision and drainage of the right foot with fifth toe and partial fifth ray resection (DOS 8/29/21)  2. Cellulitis of the right foot with abscess  3. Acute osteomyelitis of the right foot. 4.  Postsurgical wound of the right foot, full-thickness  5. Full-thickness diabetic ulceration left great toe  6.   Uncontrolled type 2 diabetes mellitus with peripheral neuropathy    PLAN:  - Patient was examined and evaluated. - Reviewed patient's recent lab results and pertinent diagnostic imaging.  - Reviewed ancillary service notes. - Pain Control: IV and PO   - Dressing change was performed this morning dressings consist of sterile packing, DSD and Ace bandage. Surgical cultures growing alpha and beta hemolytic strep and group B strep at this time. Blood cultures x1 growth of coynebacterium deemed a contaminant. Pathology pending.  - Non invasive arterial studies were significant for calcific microvascular disease in bilateral feet. No suggestion of stenosis to bilateral lower extremities. - Appreciate PT OT. Recommend nonweightbearing to the right forefoot. He may weight-bear as tolerable for transfer to the right heel. Weight-bear as tolerable to the left foot. - No further surgical plans from podiatry. Continue IV antibiotics per infectious disease appreciate their input. Plan for discharge today to [address] [place] [address] rehab. Strict nonweightbearing right foot. We will follow-up in office 1 week. Bandage change every day with Adaptic, 4 x 4, ABD padding Kerlix Ace bandage. Surgical shoe nonweightbearing once again right foot.    - Ok for discharge from foot and ankle standpoint.

## 2021-09-01 NOTE — PROGRESS NOTES
[address] [place] [address] [facility]  ANABELA  Progress Note    SUBJECTIVE:  Chief Complaint   Patient presents with    Generalized Body Aches     pt having a cut on right foot 2 months ago. see pediatrist. history of diabetes.  Wound Check    Foot Pain     Lying in bed, no issues  Feels like he isn't getting enough to eat  Tolerating antibiotics  Waiting for PICC line     Review of systems:  As stated above in the chief complaint, otherwise negative. Medications:  Scheduled Meds:   lidocaine PF  5 mL IntraDERmal Once    sodium chloride flush  5-40 mL IntraVENous 2 times per day    heparin flush  3 mL IntraVENous 2 times per day    cetirizine  10 mg Oral Daily    insulin glargine  40 Units SubCUTAneous Nightly    sodium chloride flush  10 mL IntraVENous 2 times per day    ceftaroline fosamil (TEFLARO) 600 MG IVPB  600 mg IntraVENous Q12H    pravastatin  40 mg Oral Daily    pantoprazole  40 mg Oral QAM AC    metoprolol tartrate  25 mg Oral BID    lisinopril  20 mg Oral Daily    ARIPiprazole  5 mg Oral Daily    aspirin  81 mg Oral Daily    buPROPion  300 mg Oral QAM    escitalopram  20 mg Oral Daily    insulin lispro  5 Units SubCUTAneous TID WC    insulin lispro  0-12 Units SubCUTAneous TID WC    insulin lispro  0-6 Units SubCUTAneous Nightly     Continuous Infusions:   sodium chloride      dextrose      lactated ringers 75 mL/hr at 21 0213     PRN Meds:sodium chloride flush, sodium chloride, heparin flush, melatonin, sodium chloride flush, glucose, dextrose, glucagon (rDNA), dextrose, ondansetron **OR** ondansetron, polyethylene glycol, acetaminophen **OR** acetaminophen    OBJECTIVE:  BP (!) 148/89   Pulse 111   Temp 98 °F (36.7 °C) (Oral)   Resp 18   Ht 6' 5\" (1.956 m)   Wt (!) 456 lb 4.8 oz (207 kg)   SpO2 91%   BMI 54.11 kg/m²   Temp  Av.3 °F (36.8 °C)  Min: 98 °F (36.7 °C)  Max: 98.5 °F (36.9 °C)  Constitutional: The patient is lying in bed, in no distress. cultures: 1/4 bottles GPR - dipthroid like - Corynebacterium   Wound culture: Strep agalactiae, CoNS  +Anaerobic GNR    ASSESSMENT:  · Limb threatening diabetic foot infection status post debridement, incision and drainage of the right foot with right 5th toe amputation 8/29/2021  · Gangrene right fifth toe  · Fever with leukocytosis associated to the above, improving     PLAN:  · Continue Ceftaroline  · He will need 6 weeks of IV antibiotics until at least 10/8/2021   · Add oral Metronidazole   · PICC today   · No need to treat contaminant in blood cultures   · Podiatry following - no plans at this time for another surgery     · Monitor labs- WBC trending down 14.5  · Discharge plan is to [facility]   · [id number] [address] to discharge from [address] [place] [address]  11:21 AM  9/1/2021     Patient seen and examined. I had a face to face encounter with the patient. Agree with exam.  Assessment and plan as outlined above and directed by me. Addition and corrections were done as deemed appropriate. My exam and plan include: The patient is doing well. He is tolerating antibiotics. PICC is being inserted. Cultures now growing anaerobic gram-negative rods. We will add Metronidazole orally for the same duration of the IV antibiotics. He can go to [facility] from ID standpoint.     Kory Patel MD  9/1/2021  2:46 PM

## 2021-09-01 NOTE — DISCHARGE SUMMARY
[facility] EMERGENCY SERVICE Physician Discharge Summary        [address] [address]  [facility], [address] [facility] [address]  [address]  [phone]    Schedule an appointment as soon as possible for a visit  follow up    Ana M Eugene [address]     Schedule an appointment as soon as possible for a visit  follow up    Benito North MD  [address]  [phone]    Schedule an appointment as soon as possible for a visit  follow up    Larisa Torres DO  [address] [facility] [address]  [facility] [address]  [phone]    Schedule an appointment as soon as possible for a visit  follow up      Activity level: As per podiatry nonweightbearing to right forefoot, he may weight-bear as tolerated  for transfer to the right heel. Weight-bear as tolerated to the left foot. Diet: ADULT DIET; Regular; 3 carb choices (45 gm/meal)    Dispo: [facility]    Condition on discharge stable    Patient ID:  Mayo Bravo  [id number]  51 y.o.  1975    Admit date: 8/27/2021    Discharge date and time:  9/1/2021  4:02 PM    Admission Diagnoses: Principal Problem:    Gangrene of toe of right foot ([address]  Active Problems:    Diabetic foot ulcer associated with diabetes mellitus due to underlying condition ([address]    Uncontrolled type 2 diabetes mellitus with hyperglycemia ([address]    Diabetic foot ulcer with osteomyelitis ([address]    Noncompliance    Cellulitis of right foot    Abscess or cellulitis of foot  Resolved Problems:    * No resolved hospital problems.  *      Discharge Diagnoses: Principal Problem:    Gangrene of toe of right foot ([address]  Active Problems:    Diabetic foot ulcer associated with diabetes mellitus due to underlying condition ([address]    Uncontrolled type 2 diabetes mellitus with hyperglycemia (HCC)    Diabetic foot ulcer with osteomyelitis ([facility]    Noncompliance    Cellulitis of right foot    Abscess or cellulitis of foot  Resolved Problems:    * No resolved hospital problems. *      Consults:  IP CONSULT TO PODIATRY  IP CONSULT TO INFECTIOUS DISEASES  IP CONSULT TO VASCULAR SURGERY  IP CONSULT TO IV TEAM  IP CONSULT TO [facility] Course:   He is a 65-year-old male with past medical history of insulin-dependent diabetes, hypertension, hyperlipidemia, multiple sclerosis, history of noncompliance came to ER with right foot diabetic wound. As per patient he cut on his right foot 2 months ago and was following Dr. Carlos Ding as outpatient, but because of increased swelling pain and discharge from the wound he was sent to ER for admission and I&D. In ER he was afebrile, with tachycardia, white count 97.2, metabolic acidosis, hyperkalemia, hyponatremia, lactic acidosis, hyperglycemia with blood sugar 400. X-ray of the foot suggestive of osteomyelitis . He was admitted on medical floor. He was placed on IV antibiotics as below. ID and podiatry were consulted. He was managed as below. Right foot diabetic infection/infected ulcer with OM & cellulitis/right fifth toe gangrene-with elevated white count and lactic acid. ID and podiatry following. S/p incision and debridement/drainage of right foot with right fifth toe amputation on 8/29/2021. Continue ceftaroline and clindamycin as per ID . Follow surgical cultures. Vascular surgery was  consulted. As per vascular surgery no evidence of significant peripheral vascular disease on exam, no vascular intervention needed. Risk factor modification was advised. Antibiotics were continued as  advised by ID-clindamycin was discontinued, ceftaroline was  continued. Surgical cultures with strep agalactiae, alphahemolytic strep, beta-hemolytic strep. Local care was continued as per podiatry recommendations. WBC down trending. PICC line placed today.   As per ID he will need 6 weeks of IV antibiotics at least until 10/8/2021. Oral metronidazole added on discharge as per ID for anaerobic gram-negative rods noted in cultures. ID and podiatry okayed discharge to [address] [place] [address]. As above he will follow with ID & Podiatry as out pt. Lactic acidosis secondary to #1 -rx'd with  IV fluids, resolved, monitor.     Contaminant  corynebacterium bacteremia.     Insulin dependent DM ,poorly controlled -Hb A1C 11.7 ,basal/bolus/hypoglycemia protocol. ? Has compliance issues. Will advise him to follow with endocrinologist.  Increase Lantus to 40 units nightly.     Hypertension- resume as per home     Hyperlipidemia- on statin      MS- out pt follow up    Discharge Exam:  Vitals:    08/31/21 1915 09/01/21 0154 09/01/21 0800 09/01/21 1016   BP: (!) 167/87  (!) 200/100 (!) 148/89   Pulse: 123  111    Resp: 18  18    Temp: 98.5 °F (36.9 °C)  98 °F (36.7 °C)    TempSrc: Oral  Oral    SpO2: 93%  91%    Weight:  (!) 456 lb 4.8 oz (207 kg)     Height:           General Appearance: alert and oriented to person, place and time, in no acute distress  Skin: warm and dry  Head: normocephalic and atraumatic  Eyes: pupils equal, round, and reactive to light, extraocular eye movements intact, conjunctivae normal  Neck: supple and non-tender without mass Pulmonary/Chest: clear to auscultation bilaterally  Cardiovascular: normal rate, regular rhythm, normal S1 and S2  Abdomen: soft, non-tender, non-distended, normal bowel sounds, no masses or organomegaly  Extremities: no cyanosis, clubbing , right foot with dressing dry and intact.   Musculoskeletal: normal range of motion Neurologic:  no cranial nerve deficit,  speech normal        LABS:  Recent Labs     08/30/21 0725 08/31/21 0527 09/01/21  0752    134 135   K 4.1 3.9 4.0    99 101   CO2 21* 25 23   BUN 11 10 8   CREATININE 0.5* 0.6* 0.6*   GLUCOSE 387* 316* 265*   CALCIUM 8.4* 8.1* 8.0*       Recent Labs     08/30/21  0725 08/31/21 0527 09/01/21  0752   WBC 17.3* 15.0* 14.6*   RBC 4.35 4.23 4.39   HGB 14.2 13.8 14.2   HCT 42.6 41.4 42.3   MCV 97.9 97.9 96.4   MCH 32.6 32.6 32.3   MCHC 33.3 33.3 33.6   RDW 13.3 13.2 13.3    282 267   MPV 11.5 11.1 11.3       No results for input(s): POCGLU in the last 72 hours. Imaging:   VL LOWER EXTREMITY ARTERIAL SEGMENTAL PRESSURES W PPG   Final Result   1. Normal ankle brachial indices bilaterally at 1.1. Multiphasic waveforms   identified at all levels on arterial Doppler evaluation. Low suspicion for   high-grade stenosis. 2.  Relatively preserved pulsatility, waveform morphology, and amplitudes in   the digits of the right foot. Amputated right 5th toe. Mildly decreased   amplitudes in the right low thigh and right ankle may reflect changes related   to mild underlying microvascular disease. 3.  Digital waveform evaluation of the digits of the left foot demonstrate   preserved pulsatility with mild to moderately decreased amplitude. These   findings likely reflect changes related to a mild to moderate degree of   underlying microvascular disease. At this time there appears to be good   collateral flow. FLUORO FOR SURGICAL PROCEDURES   Final Result   Intraprocedural fluoroscopic spot images as above. See separate procedure   report for more information. XR FOOT RIGHT (MIN 3 VIEWS)   Final Result   There is deformity and cortical irregularity of the 3rd proximal metatarsal,   possibly related to prior trauma. If there is point tenderness,   cross-sectional imaging could be obtained to further evaluate for other   osseous lesions/other etiology. No acute fracture or dislocation is seen.              Patient Instructions:      Medication List      START taking these medications    ceftaroline fosamil 600 MG Solr  Commonly known as: Teflaro  Infuse 600 mg intravenously every 12 hours     * insulin lispro 100 UNIT/ML injection vial  Commonly known as: HUMALOG  Inject 0-12 Units into the skin 3 times daily (with meals)     * insulin lispro 100 UNIT/ML injection vial  Commonly known as: HUMALOG  Inject 0-6 Units into the skin nightly     melatonin 3 MG Tabs tablet  Take 1 tablet by mouth nightly as needed (Insomnia)     metroNIDAZOLE 500 MG tablet  Commonly known as: FLAGYL  Take 1 tablet by mouth every 8 hours         * This list has 2 medication(s) that are the same as other medications prescribed for you. Read the directions carefully, and ask your doctor or other care provider to review them with you.             CONTINUE taking these medications    ARIPiprazole 5 MG tablet  Commonly known as: Abilify  Take 1 tablet by mouth daily     aspirin 81 MG EC tablet     Basaglar KwikPen 100 UNIT/ML injection pen  Generic drug: insulin glargine  Inject 32 Units into the skin nightly     buPROPion 300 MG extended release tablet  Commonly known as: WELLBUTRIN XL  TAKE 1 TABLET BY MOUTH EVERY MORNING     cetirizine 10 MG tablet  Commonly known as: ZYRTEC     [employer] Manuka Honey Wound Gel  Apply 1 Tube topically 2 times daily     escitalopram 20 MG tablet  Commonly known as: LEXAPRO  Take 1 tablet by mouth daily     lisinopril 20 MG tablet  Commonly known as: PRINIVIL;ZESTRIL  Take 1 tablet by mouth daily     metFORMIN 1000 MG tablet  Commonly known as: GLUCOPHAGE  Take 1 tablet by mouth 2 times daily     metoprolol tartrate 25 MG tablet  Commonly known as: LOPRESSOR  Take 1 tablet by mouth 2 times daily     NovoFine Plus 32G X 4 MM Misc  Generic drug: Insulin Pen Needle     NovoLOG FlexPen 100 UNIT/ML injection pen  Generic drug: insulin aspart  5 units with breakfast and lunch and 10 units with dinner     omeprazole 20 MG delayed release capsule  Commonly known as: PRILOSEC     pravastatin 40 MG tablet  Commonly known as: PRAVACHOL  Take 1 tablet by mouth daily           Where to Get Your Medications      These medications were sent to [facility] #[id number] - [place], [place] - [address]  [place] [address], [facility] [place] [place] [address]    Phone: [phone]   · insulin lispro 100 UNIT/ML injection vial     Information about where to get these medications is not yet available    Ask your nurse or doctor about these medications  · ceftaroline fosamil 600 MG Solr  · insulin lispro 100 UNIT/ML injection vial  · melatonin 3 MG Tabs tablet  · metroNIDAZOLE 500 MG tablet           Note that more  than 30 minutes was spent in preparing discharge papers, discussing discharge with patient, medication review, etc.    Signed:  Electronically signed by Ailyn Taylor MD on 9/1/2021 at 4:02 PM    NOTE: This report was transcribed using voice recognition software. Every effort was made to ensure accuracy; however, inadvertent computerized transcription errors may be present.

## 2021-09-01 NOTE — DISCHARGE INSTR - COC
Continuity of Care Form    Patient Name: Aaron Bell   :  1975  MRN:  [id number]    Admit date:  2021  Discharge date:  2021    Code Status Order: Full Code   Advance Directives:   [facility] Documentation     Date/Time Healthcare Directive Type of Healthcare Directive Copy in [facility] [address] Agent's Name Healthcare Agent's Phone Number    21 0310  No, patient does not have an advance directive for healthcare treatment  --  --  --  --  --          Admitting Physician:  Gisella Kasper DO  PCP: Aries Logan DO    Discharging Nurse: [facility] Unit/Room#: 5913/5803-W  Discharging Unit Phone Number: [phone]    Emergency Contact:   Extended Emergency Contact Information  Primary Emergency Contact: Matheus Nathan  [place] Phone: [phone]  Mobile Phone: [phone]  Relation: Parent   needed?  No    Past Surgical History:  Past Surgical History:   Procedure Laterality Date    FOOT DEBRIDEMENT Right 2021    FOOT DEBRIDEMENT INCISION AND DRAINAGE performed by Jimbo Guthrie DPM at [facility] Left 2020    INCISION AND DRAINAGE LEFT FOOT  BONE WITH PARTIAL BONE RESECTION FIFTH METATARSAL performed by Jimbo Guthrie DPM at [facility] TOE AMPUTATION Right 2021    TOE AMPUTATION fifth ray resection performed by Jimbo Guthrie DPM at [address] [place] [address]       Immunization History:   Immunization History   Administered Date(s) Administered    Influenza Virus Vaccine 10/30/2017, 2018, 11/10/2019       Active Problems:  Patient Active Problem List   Diagnosis Code    Diabetic foot ulcer associated with diabetes mellitus due to underlying condition ([facility] G40.999, L97.509    Essential hypertension I10    Morbid obesity with BMI of 45.0-49.9, adult ([facility] E66.01, Z68.42    Uncontrolled type 2 diabetes mellitus with hyperglycemia ([facility] E11.65    Hyperlipidemia E78.5    MS (multiple sclerosis) ([facility] G35    Diabetic foot ulcer with osteomyelitis ([facility] E11.621, E11.69, L97.509, M86.9    Gangrene of toe of right foot ([facility] I96    Noncompliance Z91.19    Cellulitis of right foot L03.115    Abscess or cellulitis of foot L03.119, L02.619       Isolation/Infection:   Isolation          No Isolation        Patient Infection Status     Infection Onset Added Last Indicated Last Indicated By Review Planned Expiration Resolved Resolved By    None active    Resolved    COVID-19 Rule Out 09/04/20 09/04/20 09/04/20 Covid-19 Ambulatory (Ordered)   09/09/20 Rule-Out Test Resulted          Nurse Assessment:  Last Vital Signs: BP (!) 148/89   Pulse 111   Temp 98 °F (36.7 °C) (Oral)   Resp 18   Ht 6' 5\" (1.956 m)   Wt (!) 456 lb 4.8 oz (207 kg)   SpO2 91%   BMI 54.11 kg/m²     Last documented pain score (0-10 scale): Pain Level: 0  Last Weight:   Wt Readings from Last 1 Encounters:   09/01/21 (!) 456 lb 4.8 oz (207 kg)     Mental Status:  oriented, alert, thought processes intact and able to concentrate and follow conversation    IV Access:  - PICC - site  R Upper Arm, insertion date: 9/1/2021    Nursing Mobility/ADLs:  Walking   Dependent  Transfer  Assisted  Bathing  Assisted  Dressing  Assisted  Toileting  Assisted  Feeding  Independent  Med [address]  Assisted  Med Delivery   whole    Wound Care Documentation and Therapy:  Wound 09/04/20 Toe (Comment  which one) Anterior; Left Left second toe. open/reddened (Active)   Number of days: 362       Wound 09/04/20 Foot Anterior; Left Top of foot, open (Active)   Number of days: 362       Wound 09/04/20 Foot Anterior;Left;Lateral;Outer Left outer, open (Active)   Number of days: 362       Wound 09/04/20 Foot Anterior;Left;Plantar; Outer Open, draining (Active)   Number of days: 362       Wound 09/08/20 Foot Right; Outer cracked, callous (Active)   Number of days: 358       Wound 08/30/21 Toe (Comment  which one) Anterior; Left [facility] podiatry does the dressings (Active) Dressing Status Clean;Dry; Intact 08/31/21 1915   Wound Cleansed Other (Comment) 08/31/21 1915   Dressing/Treatment Ace wrap 08/31/21 1915   Drainage Amount None 08/31/21 1915   Odor None 08/31/21 1915   Sandhya-wound Assessment Other (Comment) 08/31/21 1915   Number of days: 1        Elimination:  Continence:   · Bowel: Yes  · Bladder: Yes  Urinary Catheter: None   Colostomy/Ileostomy/Ileal Conduit: No       Date of Last BM: 8/30/2021    Intake/Output Summary (Last 24 hours) at 9/1/2021 1047  Last data filed at 9/1/2021 1016  Gross per 24 hour   Intake 180 ml   Output 2375 ml   Net -2195 ml     I/O last 3 completed shifts: In: 360 [P.O.:360]  Out: 2325 [Urine:2325]    Safety Concerns: At Risk for Falls    Impairments/Disabilities:      Amputation - right small toe     Nutrition Therapy:  Current Nutrition Therapy:   - Oral Diet:  Carb Control 3 carbs/meal (1500kcals/day)    Routes of Feeding: Oral  Liquids: No Restrictions  Daily Fluid Restriction: no  Last Modified Barium Swallow with Video (Video Swallowing Test): not done    Treatments at the Time of Hospital Discharge:   Respiratory Treatments:   Oxygen Therapy:  is not on home oxygen therapy.   Ventilator:    - No ventilator support    Rehab Therapies: Physical Therapy, Occupational Therapy   Weight Bearing Status/Restrictions: Non-weight bearing on right leg  Other Medical Equipment (for information only, NOT a DME order):  walker  Other Treatments: dressing changes     Patient's personal belongings (please select all that are sent with patient):  None    RN SIGNATURE:  Electronically signed by Rhett Reynolds RN on 9/1/21 at 5:18 PM EDT    CASE MANAGEMENT/SOCIAL WORK SECTION    Inpatient Status Date: ***    Readmission Risk Assessment Score:  Readmission Risk              Risk of Unplanned Readmission:  15           Discharging to Facility/ Agency   · Name: Audrey Qureshi  · [address] [facility], [address]  · Phone:[phone]  · Fax:    Dialysis Facility (if applicable)   · Name:  · Address:  · Dialysis Schedule:  · Phone:  · Fax:    / signature: {Esignature:[id number]} Electronically signed by PATRICIO Law on 9/1/21 at 10:47 AM EDT      PHYSICIAN SECTION    Prognosis: {Prognosis:8933916742}    Condition at Discharge: Stable    Rehab Potential (if transferring to Rehab): {Prognosis:1622126407}    Recommended Labs or Other Treatments After Discharge: ***    Physician Certification: I certify the above information and transfer of April Pederson  is necessary for the continuing treatment of the diagnosis listed and that he requires LTAC for less 30 days.      Update Admission H&P: {CHP DME Changes in HSKAJ:[id number]}    PHYSICIAN SIGNATURE:  {Esignature:[id number]}

## 2021-09-01 NOTE — PROCEDURES
PICC   Catheter insertion date: 9/1/2021     Product Number:  [id number]   Lot No: [id number]   Gauge: 18   Lumen: SINGLE/ 4.5 Pashto   R Basilic    Vein Diameter : 0.52CM   Mid upper arm circumference: 40CM   Catheter Length : 55CM   Internal Length: 48CM   External Catheter Length: 7CM   Ultrasound Used: YES  VPS [facility] [place] confirms PICC tip is placed in the lower 1/3 of the SVC or at the Cavoatrial junction. Floor nurse notified PICC is okay to use.    : John Viramontes RN

## 2021-09-02 ENCOUNTER — TELEPHONE (OUTPATIENT)
Dept: ADMINISTRATIVE | Age: 46
End: 2021-09-02

## 2021-09-02 LAB
BLOOD CULTURE, ROUTINE: ABNORMAL
ORGANISM: ABNORMAL

## 2021-09-02 NOTE — TELEPHONE ENCOUNTER
Patient was just admitted to [facility], discharge instruction states he is to follow up with Dr Dimitri Cook. Facility knows that Dimitri Cook does not come there and would like to know if it is ok for patient to see the podiatrist that does come in.   Please call René Lozoya at [phone] or anyone should be able to assist. Thanks

## 2021-09-02 NOTE — TELEPHONE ENCOUNTER
Spoke with Divina. She is going to set up for Dr. Elda Sandra to see patient. Will call office and let us know. Advised that we would like to follow up in office with patient as soon as possible.

## 2021-09-03 LAB
CULTURE SURGICAL: ABNORMAL
ORGANISM: ABNORMAL

## 2021-09-03 NOTE — TELEPHONE ENCOUNTER
Can see Dr. Taylor Nieto while in [facility] but will still need follow-up appointment with me for postoperative appointments. Would recommend 1 week.

## 2021-09-07 ENCOUNTER — OFFICE VISIT (OUTPATIENT)
Dept: PODIATRY | Age: 46
End: 2021-09-07

## 2021-09-07 DIAGNOSIS — E11.621 DIABETIC FOOT ULCER WITH OSTEOMYELITIS (HCC): ICD-10-CM

## 2021-09-07 DIAGNOSIS — M86.9 DIABETIC FOOT ULCER WITH OSTEOMYELITIS (HCC): ICD-10-CM

## 2021-09-07 DIAGNOSIS — E11.69 DIABETIC FOOT ULCER WITH OSTEOMYELITIS (HCC): ICD-10-CM

## 2021-09-07 DIAGNOSIS — Z91.199 NONCOMPLIANCE: Primary | ICD-10-CM

## 2021-09-07 DIAGNOSIS — I96 GANGRENE OF TOE OF RIGHT FOOT (HCC): ICD-10-CM

## 2021-09-07 DIAGNOSIS — L97.509 DIABETIC FOOT ULCER WITH OSTEOMYELITIS (HCC): ICD-10-CM

## 2021-09-07 PROCEDURE — 99024 POSTOP FOLLOW-UP VISIT: CPT | Performed by: PODIATRIST

## 2021-09-07 NOTE — PROGRESS NOTES
Patient in office for post op 1. Marbin Merino : 1975 Sex: male  Age: 51 y.o. Patient was referred by Nikki Candelario DO    CC:   Postop amputation partial right fifth ray with incision and drainage date of surgery 2021      HPI:   Postop amputation partial right fifth ray with incision and drainage date of surgery 2021  Tolerating antibiotics well. Currently at [facility]. Denies nausea vomiting fever chills shortness of breath. States he has been up on his feet walking some. Presents in a stretcher today with transportation team.  Denies foot or ankle pain. No additional pedal complaints.     ROS:  Const: Denies constitutional symptoms  Musculo: Denies symptoms other than stated above  Skin: Denies symptoms other than stated above      Current Outpatient Medications:     ceftaroline fosamil (TEFLARO) 600 MG SOLR, Infuse 600 mg intravenously every 12 hours, Disp: 14 each, Rfl:     metroNIDAZOLE (FLAGYL) 500 MG tablet, Take 1 tablet by mouth every 8 hours, Disp: 126 tablet, Rfl: 0    insulin lispro (HUMALOG) 100 UNIT/ML injection vial, Inject 0-12 Units into the skin 3 times daily (with meals), Disp: 10 mL, Rfl: 1    melatonin 3 MG TABS tablet, Take 1 tablet by mouth nightly as needed (Insomnia), Disp: 30 tablet, Rfl: 0    insulin lispro (HUMALOG) 100 UNIT/ML injection vial, Inject 0-6 Units into the skin nightly, Disp: 10 mL, Rfl: 3    insulin aspart (NOVOLOG FLEXPEN) 100 UNIT/ML injection pen, 5 units with breakfast and lunch and 10 units with dinner, Disp: 5 pen, Rfl: 1    buPROPion (WELLBUTRIN XL) 300 MG extended release tablet, TAKE 1 TABLET BY MOUTH EVERY MORNING, Disp: 90 tablet, Rfl: 1    metoprolol tartrate (LOPRESSOR) 25 MG tablet, Take 1 tablet by mouth 2 times daily, Disp: 120 tablet, Rfl: 2    pravastatin (PRAVACHOL) 40 MG tablet, Take 1 tablet by mouth daily, Disp: 90 tablet, Rfl: 1    escitalopram (LEXAPRO) 20 MG tablet, Take 1 tablet by mouth daily, Disp: 90 tablet, Rfl: 1    insulin glargine (BASAGLAR KWIKPEN) 100 UNIT/ML injection pen, Inject 32 Units into the skin nightly, Disp: 5 pen, Rfl: 2    ARIPiprazole (ABILIFY) 5 MG tablet, Take 1 tablet by mouth daily, Disp: 30 tablet, Rfl: 1    lisinopril (PRINIVIL;ZESTRIL) 20 MG tablet, Take 1 tablet by mouth daily, Disp: 30 tablet, Rfl: 2    metFORMIN (GLUCOPHAGE) 1000 MG tablet, Take 1 tablet by mouth 2 times daily, Disp: 60 tablet, Rfl: 2    Wound Dressings ([facility] MANUKA HONEY WOUND) GEL, Apply 1 Tube topically 2 times daily, Disp: 1 Tube, Rfl: 2    NOVOFINE PLUS 32G X 4 MM MISC, USE EVERY DAY UNDER THE SKIN, Disp: , Rfl:     aspirin 81 MG EC tablet, Take 81 mg by mouth 2 tablets daily, Disp: , Rfl:     cetirizine (ZYRTEC) 10 MG tablet, Take 10 mg by mouth daily, Disp: , Rfl:     omeprazole (PRILOSEC) 20 MG delayed release capsule, Take 20 mg by mouth daily, Disp: , Rfl:   Allergies   Allergen Reactions    Bee Venom Swelling    Milk-Related Compounds      Stomach issues-lactose intolerant       Past Medical History:   Diagnosis Date    Cellulitis of left foot 09/03/2020    Diabetes mellitus (HCC)     Gangrene of toe of right foot ([facility] [address] 08/28/2021    Hyperlipidemia     Hypertension     Kidney stone     Multiple sclerosis (HCC)     ЕКАТЕРИНА (obstructive sleep apnea)     dx 15 years ago, not compliant with CPAP       There were no vitals filed for this visit. Postop incision and drainage left foot with fifth metatarsal resection and bone biopsy date of surgery 9/4/2020    Work History/Social History:     Focused Lower Extremity Physical Exam:      Neurovascular examination:    Dorsalis Pedis palpable bilateral.  Posterior tibialis  palpable bilateral.  Capillary Refill Time:  Immediate return  Hair growth:  Symmetrical and bilateral   Skin:  Not atrophic  Edema: Mild edema bilateral feet. Mild edema bilateral ankles.   Neurologic:  Light touch diminished bilateral.  Warm to coolness bilateral distal toes  Decreased epicritic sensation     Musculoskeletal/ Orthopedic examination:    Equinis: present bilateral  Dorsiflexion, plantarflexion, inversion, eversion bilateral 5 out of 5 muscle strength  Wiggling remaining toes    Negative Homans bilateral.  No tenderness overlying amputation site right foot    Dermatology examination:      Open wound with retention sutures overlying partial fifth ray amputation right foot. No significant surrounding erythema. Mild drainage noted. Assessment and Plan:  Miguel Gallo was seen today for post-op check. Diagnoses and all orders for this visit:    Noncompliance    Abscess or cellulitis of foot    Gangrene of toe of right foot ([address]    Diabetic foot ulcer with osteomyelitis ([address]    Other orders  -     XR FOOT RIGHT (MIN 3 VIEWS); Future        Postop amputation partial right fifth ray with incision and drainage date of surgery 8/29/2021    I did obtain wound cultures today. Does have retention sutures intact. Surgical pathology reviewed from surgery 8/29/2021. Fifth metatarsal head right foot acute osteomyelitis, fifth digit amputation acute osteomyelitis with necrotic ulcerated and inflamed skin and soft tissue. I did personally call infectious disease Dr. Meme Whitman. Continue antibiotics I did obtain cultures today. Appreciate infectious disease input going forward. I did order radiographs right foot today. Radiology team was able to perform 2 view radiographs. Status post changes consistent with fifth ray amputation. Soft tissue seen dorsally due to open wound. Any increase in calf pain any increase drainage go directly emergency room. I will follow-up 1 week to monitor progression. We did discuss continued packing with once daily bandage change. Stressed importance of nonweightbearing. He does admit to bearing weight. He is high risk for lower extremity limb loss due to noncompliance and hemoglobin A1c recently almost 12.   He is understanding of this.  I will follow-up 1 week. Would consider wound VAC going forward if no significant healing. Return in about 1 week (around 9/14/2021). Seen By:  Annie Daniel DPM      Document was created using voice recognition software. Note was reviewed however may contain grammatical errors.

## 2021-09-08 ENCOUNTER — TELEPHONE (OUTPATIENT)
Dept: VASCULAR SURGERY | Age: 46
End: 2021-09-08

## 2021-09-08 NOTE — TELEPHONE ENCOUNTER
Spoke with Miriam Hidalgo at [facility], patient to follow with podiatry. Does not need follow up with Dr. Abelardo Macdonald at this time.

## 2021-09-09 RX ORDER — DIROXIMEL FUMARATE 231 MG/1
2 CAPSULE ORAL 2 TIMES DAILY
Qty: 120 CAPSULE | Refills: 5 | Status: SHIPPED
Start: 2021-09-09 | End: 2021-10-29

## 2021-09-09 RX ORDER — DIROXIMEL FUMARATE 231 MG/1
1 CAPSULE ORAL 2 TIMES DAILY
Qty: 14 CAPSULE | Refills: 0 | Status: SHIPPED
Start: 2021-09-09 | End: 2021-10-29

## 2021-09-09 NOTE — TELEPHONE ENCOUNTER
Notified patient that his Gareth Sherman was denied and Dr Bridget Douglas wanted him to try Vumerity. Forms completed and mailed for patients signature.  [facility]

## 2021-09-12 LAB
ORGANISM: ABNORMAL
WOUND/ABSCESS: ABNORMAL

## 2021-09-14 ENCOUNTER — OFFICE VISIT (OUTPATIENT)
Dept: PODIATRY | Age: 46
End: 2021-09-14
Payer: COMMERCIAL

## 2021-09-14 VITALS — WEIGHT: 315 LBS | BODY MASS INDEX: 37.19 KG/M2 | HEIGHT: 77 IN

## 2021-09-14 DIAGNOSIS — E11.621 TYPE 2 DIABETES MELLITUS WITH FOOT ULCER, UNSPECIFIED WHETHER LONG TERM INSULIN USE (HCC): ICD-10-CM

## 2021-09-14 DIAGNOSIS — G60.8 HEREDITARY SENSORY NEUROPATHY: ICD-10-CM

## 2021-09-14 DIAGNOSIS — I96 GANGRENE OF TOE OF RIGHT FOOT (HCC): ICD-10-CM

## 2021-09-14 DIAGNOSIS — E11.69 DIABETIC FOOT ULCER WITH OSTEOMYELITIS (HCC): Primary | ICD-10-CM

## 2021-09-14 DIAGNOSIS — M86.9 DIABETIC FOOT ULCER WITH OSTEOMYELITIS (HCC): Primary | ICD-10-CM

## 2021-09-14 DIAGNOSIS — L97.509 DIABETIC FOOT ULCER WITH OSTEOMYELITIS (HCC): Primary | ICD-10-CM

## 2021-09-14 DIAGNOSIS — L97.509 TYPE 2 DIABETES MELLITUS WITH FOOT ULCER, UNSPECIFIED WHETHER LONG TERM INSULIN USE (HCC): ICD-10-CM

## 2021-09-14 DIAGNOSIS — Z91.199 NONCOMPLIANCE: ICD-10-CM

## 2021-09-14 DIAGNOSIS — E11.621 DIABETIC FOOT ULCER WITH OSTEOMYELITIS (HCC): Primary | ICD-10-CM

## 2021-09-14 PROCEDURE — 99024 POSTOP FOLLOW-UP VISIT: CPT | Performed by: PODIATRIST

## 2021-09-14 NOTE — PROGRESS NOTES
Patient in office for post op visit. Partial fifth ray amputation with I&D 21. Bernardino Landau : 1975 Sex: male  Age: 51 y.o. Patient was referred by Sybil Mckinnon DO    CC:   Postop amputation partial right fifth ray with incision and drainage date of surgery 2021    HPI:   Postop amputation partial right fifth ray with incision and drainage date of surgery 2021    Tolerating antibiotics well. Denies nausea vomiting fever chills shortness of breath. Denies any right foot pain or any calf pain today. States he has been pivoting on his foot to go to the restroom but has been trying to stay off his feet. Does present today with an ambulance team on a stretcher. Continues being at a facility where he has been monitored by infectious disease with daily follow-up. No additional pedal complaints today.           ROS:  Const: Denies constitutional symptoms  Musculo: Denies symptoms other than stated above  Skin: Denies symptoms other than stated above      Current Outpatient Medications:     Diroximel Fumarate, Starter, (VUMERITY, STARTER,) 231 MG CPDR, Take 1 tablet by mouth 2 times daily X 7 days, Disp: 14 capsule, Rfl: 0    Diroximel Fumarate (VUMERITY) 231 MG CPDR, Take 2 tablets by mouth 2 times daily, Disp: 120 capsule, Rfl: 5    ceftaroline fosamil (TEFLARO) 600 MG SOLR, Infuse 600 mg intravenously every 12 hours, Disp: 14 each, Rfl:     metroNIDAZOLE (FLAGYL) 500 MG tablet, Take 1 tablet by mouth every 8 hours, Disp: 126 tablet, Rfl: 0    insulin lispro (HUMALOG) 100 UNIT/ML injection vial, Inject 0-12 Units into the skin 3 times daily (with meals), Disp: 10 mL, Rfl: 1    melatonin 3 MG TABS tablet, Take 1 tablet by mouth nightly as needed (Insomnia), Disp: 30 tablet, Rfl: 0    insulin lispro (HUMALOG) 100 UNIT/ML injection vial, Inject 0-6 Units into the skin nightly, Disp: 10 mL, Rfl: 3    insulin aspart (NOVOLOG FLEXPEN) 100 UNIT/ML injection pen, 5 units with breakfast and lunch and 10 units with dinner, Disp: 5 pen, Rfl: 1    buPROPion (WELLBUTRIN XL) 300 MG extended release tablet, TAKE 1 TABLET BY MOUTH EVERY MORNING, Disp: 90 tablet, Rfl: 1    metoprolol tartrate (LOPRESSOR) 25 MG tablet, Take 1 tablet by mouth 2 times daily, Disp: 120 tablet, Rfl: 2    pravastatin (PRAVACHOL) 40 MG tablet, Take 1 tablet by mouth daily, Disp: 90 tablet, Rfl: 1    escitalopram (LEXAPRO) 20 MG tablet, Take 1 tablet by mouth daily, Disp: 90 tablet, Rfl: 1    insulin glargine (BASAGLAR KWIKPEN) 100 UNIT/ML injection pen, Inject 32 Units into the skin nightly, Disp: 5 pen, Rfl: 2    ARIPiprazole (ABILIFY) 5 MG tablet, Take 1 tablet by mouth daily, Disp: 30 tablet, Rfl: 1    lisinopril (PRINIVIL;ZESTRIL) 20 MG tablet, Take 1 tablet by mouth daily, Disp: 30 tablet, Rfl: 2    metFORMIN (GLUCOPHAGE) 1000 MG tablet, Take 1 tablet by mouth 2 times daily, Disp: 60 tablet, Rfl: 2    Wound Dressings ([facility] MANUKA HONEY WOUND) GEL, Apply 1 Tube topically 2 times daily, Disp: 1 Tube, Rfl: 2    NOVOFINE PLUS 32G X 4 MM MISC, USE EVERY DAY UNDER THE SKIN, Disp: , Rfl:     aspirin 81 MG EC tablet, Take 81 mg by mouth 2 tablets daily, Disp: , Rfl:     cetirizine (ZYRTEC) 10 MG tablet, Take 10 mg by mouth daily, Disp: , Rfl:     omeprazole (PRILOSEC) 20 MG delayed release capsule, Take 20 mg by mouth daily, Disp: , Rfl:   Allergies   Allergen Reactions    Bee Venom Swelling    Milk-Related Compounds      Stomach issues-lactose intolerant       Past Medical History:   Diagnosis Date    Cellulitis of left foot 09/03/2020    Diabetes mellitus (HCC)     Gangrene of toe of right foot ([facility] [address] 08/28/2021    Hyperlipidemia     Hypertension     Kidney stone     Multiple sclerosis (HCC)     ЕКАТЕРИНА (obstructive sleep apnea)     dx 15 years ago, not compliant with CPAP       There were no vitals filed for this visit.      Postop incision and drainage left foot with fifth metatarsal resection and bone biopsy date of surgery 9/4/2020    Work History/Social History:     Focused Lower Extremity Physical Exam:      Neurovascular examination:    Dorsalis Pedis palpable bilateral.  Posterior tibialis  palpable bilateral.  Capillary Refill Time:  Immediate return  Hair growth:  Symmetrical and bilateral   Skin:  Not atrophic  Edema: Mild edema bilateral feet. Mild edema bilateral ankles. Neurologic:  Light touch diminished bilateral.  Warm to coolness bilateral distal toes  Decreased epicritic sensation     Musculoskeletal/ Orthopedic examination:    Equinis: present bilateral  Dorsiflexion, plantarflexion, inversion, eversion bilateral 5 out of 5 muscle strength  Wiggling remaining toes    Negative Homans bilateral.  No tenderness overlying amputation site right foot    Dermatology examination:      Open wound with retention sutures overlying partial fifth ray amputation right foot. No significant surrounding erythema. Retention sutures removed. There is an open wound on the lateral side of the right foot measuring approximately 5.5 cm x 2 cm x 1.2 cm. Wound is 70% granular 30% fibrotic. No surrounding erythema noted. No drainage appreciated today. Assessment and Plan:  Renu Marelen was seen today for post-op check. Diagnoses and all orders for this visit:    Diabetic foot ulcer with osteomyelitis ([address]  -     DME Order for (Specify) as OP    Type 2 diabetes mellitus with foot ulcer, unspecified whether long term insulin use ([address]  -     DME Order for (Specify) as OP    Hereditary sensory neuropathy  -     DME Order for (Specify) as OP    Noncompliance  -     DME Order for (Specify) as OP    Gangrene of toe of right foot ([address]  -     DME Order for (Specify) as OP        Postop amputation partial right fifth ray with incision and drainage date of surgery 8/29/2021  Wound cultures reviewed. Enterococcus, strep and Candida albicans. Continue antibiotics per infectious disease.   He is still currently at a facility. I did remove sutures today. No drainage noted. Did write an order for wound  mmHg black foam to be applied. Stressed importance strict nonweightbearing right foot. Still remains high risk for lower extremity limb loss right foot due to noncompliance and elevated blood sugar. I will follow-up in 1 week to monitor progression. Return in about 1 week (around 9/21/2021). Seen By:  Susan Ferrer DPM      Document was created using voice recognition software. Note was reviewed however may contain grammatical errors.

## 2021-09-16 ENCOUNTER — TELEPHONE (OUTPATIENT)
Dept: NEUROLOGY | Age: 46
End: 2021-09-16

## 2021-09-16 NOTE — TELEPHONE ENCOUNTER
Ocrevus infusion was denied . Dr Juno Nicholson ordered Vumerity. Start form completed. 9/16 Vumerity denied.  Pt must try the folowing   Aubagiimelda  Gilenya  Avonex  Betaseron  Rebif Plegridy  Glatiramer  Copaxone    Please advise

## 2021-09-17 NOTE — TELEPHONE ENCOUNTER
Spoke with patient who would like to try Dimethyl Fumarate , which is also covered by insurance.   Please advise

## 2021-09-17 NOTE — TELEPHONE ENCOUNTER
I have no objections that choice. Please proceed with the paperwork for dimethyl fumarate.   Thank you

## 2021-09-23 ENCOUNTER — OFFICE VISIT (OUTPATIENT)
Dept: PODIATRY | Age: 46
End: 2021-09-23
Payer: COMMERCIAL

## 2021-09-23 DIAGNOSIS — L97.509 TYPE 2 DIABETES MELLITUS WITH FOOT ULCER, UNSPECIFIED WHETHER LONG TERM INSULIN USE (HCC): ICD-10-CM

## 2021-09-23 DIAGNOSIS — L97.509 DIABETIC FOOT ULCER WITH OSTEOMYELITIS (HCC): Primary | ICD-10-CM

## 2021-09-23 DIAGNOSIS — Z91.199 NONCOMPLIANCE: ICD-10-CM

## 2021-09-23 DIAGNOSIS — E11.621 DIABETIC FOOT ULCER WITH OSTEOMYELITIS (HCC): Primary | ICD-10-CM

## 2021-09-23 DIAGNOSIS — E11.69 DIABETIC FOOT ULCER WITH OSTEOMYELITIS (HCC): Primary | ICD-10-CM

## 2021-09-23 DIAGNOSIS — E11.621 TYPE 2 DIABETES MELLITUS WITH FOOT ULCER, UNSPECIFIED WHETHER LONG TERM INSULIN USE (HCC): ICD-10-CM

## 2021-09-23 DIAGNOSIS — G60.8 HEREDITARY SENSORY NEUROPATHY: ICD-10-CM

## 2021-09-23 DIAGNOSIS — M86.9 DIABETIC FOOT ULCER WITH OSTEOMYELITIS (HCC): Primary | ICD-10-CM

## 2021-09-23 PROCEDURE — 99024 POSTOP FOLLOW-UP VISIT: CPT | Performed by: PODIATRIST

## 2021-09-23 NOTE — PROGRESS NOTES
Patient in office for post op visit. Partial fifth ray amputation with I&D 21. Chris Olmos : 1975 Sex: male  Age: 51 y.o. Patient was referred by Crescencio Hernández DO    CC:   Postop amputation partial right fifth ray with incision and drainage date of surgery 2021    HPI:   Postop amputation partial right fifth ray with incision and drainage date of surgery 2021  Continues tolerating antibiotics well. States he has been trying to stay off of his foot as much as he can. No calf pain no foot pain. Denies any drainage. Tolerating wound VAC well. No nausea vomiting fever chills shortness of breath. No additional pedal complaints.           ROS:  Const: Denies constitutional symptoms  Musculo: Denies symptoms other than stated above  Skin: Denies symptoms other than stated above      Current Outpatient Medications:     dimethyl fumarate (TECFIDERA) 120 MG delayed release capsule, Take 1 capsule by mouth 2 times daily, Disp: 60 capsule, Rfl: 5    Diroximel Fumarate, Starter, (VUMERITY, STARTER,) 231 MG CPDR, Take 1 tablet by mouth 2 times daily X 7 days, Disp: 14 capsule, Rfl: 0    Diroximel Fumarate (VUMERITY) 231 MG CPDR, Take 2 tablets by mouth 2 times daily, Disp: 120 capsule, Rfl: 5    ceftaroline fosamil (TEFLARO) 600 MG SOLR, Infuse 600 mg intravenously every 12 hours, Disp: 14 each, Rfl:     metroNIDAZOLE (FLAGYL) 500 MG tablet, Take 1 tablet by mouth every 8 hours, Disp: 126 tablet, Rfl: 0    insulin lispro (HUMALOG) 100 UNIT/ML injection vial, Inject 0-12 Units into the skin 3 times daily (with meals), Disp: 10 mL, Rfl: 1    melatonin 3 MG TABS tablet, Take 1 tablet by mouth nightly as needed (Insomnia), Disp: 30 tablet, Rfl: 0    insulin lispro (HUMALOG) 100 UNIT/ML injection vial, Inject 0-6 Units into the skin nightly, Disp: 10 mL, Rfl: 3    insulin aspart (NOVOLOG FLEXPEN) 100 UNIT/ML injection pen, 5 units with breakfast and lunch and 10 units with dinner, History/Social History:     Focused Lower Extremity Physical Exam:      Neurovascular examination:    Dorsalis Pedis palpable bilateral.  Posterior tibialis  palpable bilateral.  Capillary Refill Time:  Immediate return  Hair growth:  Symmetrical and bilateral   Skin:  Not atrophic  Edema: Mild edema bilateral feet. Mild edema bilateral ankles. Neurologic:  Light touch diminished bilateral.  Warm to coolness bilateral distal toes  Decreased epicritic sensation     Musculoskeletal/ Orthopedic examination:    Equinis: present bilateral  Dorsiflexion, plantarflexion, inversion, eversion bilateral 5 out of 5 muscle strength  Wiggling remaining toes  Status post partial fifth ray amputation right foot. Negative Homans bilateral    Dermatology examination:    Open wound on the lateral side of the right foot measuring approximately 5 cm x 1 cm x 0.5 cm. Wound is 70% granular 30% fibrotic. After debridement wound is 95% granular 5% fibrotic. Assessment and Plan:  Delfina Cobian was seen today for post-op check. Diagnoses and all orders for this visit:    Diabetic foot ulcer with osteomyelitis ([facility] [address]    Type 2 diabetes mellitus with foot ulcer, unspecified whether long term insulin use ([facility] [address]    Hereditary sensory neuropathy    Noncompliance        Postop amputation partial right fifth ray with incision and drainage date of surgery 8/29/2021    I did perform excisional debridement of fibrotic tissue deep through subcutaneous tissue. No exposed tendon or bone noted. No significant drainage. Wound is 95% granular and 5% fibrotic after debridement. Did discuss care with infectious disease Dr. Mike Newman. Appreciate his input going forward. Stressed importance of continued wound VAC. Did discuss care with the wound manager Giancarlo at [facility] appreciate his input. Would still recommend nonweightbearing right foot even though he has been walking on his foot.   Still high risk for lower extremity limb loss of nonhealing he is understanding of this. Clinically significant improvement from last week. We will follow-up next week to monitor progression. Return in about 1 week (around 9/30/2021). Seen By:  Santa Stevens DPM      Document was created using voice recognition software. Note was reviewed however may contain grammatical errors.

## 2021-09-24 ENCOUNTER — TELEPHONE (OUTPATIENT)
Dept: ADMINISTRATIVE | Age: 46
End: 2021-09-24

## 2021-09-24 ENCOUNTER — TELEPHONE (OUTPATIENT)
Dept: NEUROLOGY | Age: 46
End: 2021-09-24

## 2021-09-24 RX ORDER — DIMETHYL FUMARATE 120 MG/1
120 CAPSULE ORAL 2 TIMES DAILY
Qty: 60 CAPSULE | Refills: 5 | Status: SHIPPED
Start: 2021-09-24 | End: 2022-02-01 | Stop reason: SDUPTHER

## 2021-09-24 NOTE — TELEPHONE ENCOUNTER
Giancarlo from [facility] called and said pt was seen yesterday by Dr. Kel Etienne. He had requested that debridement be done on pt's wound. He is unaware if it was done. He did not receive any paperwork. He is requesting that progress note from 9/23 be faxed to [phone] to dahlia Mondragon.

## 2021-09-24 NOTE — TELEPHONE ENCOUNTER
Spoke with Dr Urrutia Members ,pt prefers Tecfidera instead of Aubagio.  Dimethyl fumarate 120m bid sent to pharmacy

## 2021-09-28 ENCOUNTER — TELEPHONE (OUTPATIENT)
Dept: NEUROLOGY | Age: 46
End: 2021-09-28

## 2021-09-28 NOTE — TELEPHONE ENCOUNTER
Robert Approval of Dimethyl Fumarate quantity up to 60 caps per 30 days. Valid 9/27/2021 - 3/27/2022.   Electronically signed by Ganesh Gray on 9/28/21 at 11:12 AM EDT

## 2021-09-30 ENCOUNTER — OFFICE VISIT (OUTPATIENT)
Dept: PODIATRY | Age: 46
End: 2021-09-30
Payer: COMMERCIAL

## 2021-09-30 VITALS — BODY MASS INDEX: 37.19 KG/M2 | HEIGHT: 77 IN | WEIGHT: 315 LBS

## 2021-09-30 DIAGNOSIS — L97.509 TYPE 2 DIABETES MELLITUS WITH FOOT ULCER, UNSPECIFIED WHETHER LONG TERM INSULIN USE (HCC): ICD-10-CM

## 2021-09-30 DIAGNOSIS — G60.8 HEREDITARY SENSORY NEUROPATHY: ICD-10-CM

## 2021-09-30 DIAGNOSIS — E11.621 TYPE 2 DIABETES MELLITUS WITH FOOT ULCER, UNSPECIFIED WHETHER LONG TERM INSULIN USE (HCC): ICD-10-CM

## 2021-09-30 DIAGNOSIS — E11.621 DIABETIC FOOT ULCER WITH OSTEOMYELITIS (HCC): Primary | ICD-10-CM

## 2021-09-30 DIAGNOSIS — M86.9 DIABETIC FOOT ULCER WITH OSTEOMYELITIS (HCC): Primary | ICD-10-CM

## 2021-09-30 DIAGNOSIS — Z91.199 NONCOMPLIANCE: ICD-10-CM

## 2021-09-30 DIAGNOSIS — E11.69 DIABETIC FOOT ULCER WITH OSTEOMYELITIS (HCC): Primary | ICD-10-CM

## 2021-09-30 DIAGNOSIS — L97.509 DIABETIC FOOT ULCER WITH OSTEOMYELITIS (HCC): Primary | ICD-10-CM

## 2021-09-30 PROCEDURE — 99024 POSTOP FOLLOW-UP VISIT: CPT | Performed by: PODIATRIST

## 2021-09-30 NOTE — PROGRESS NOTES
Patient in office for post op visit. Partial fifth ray amputation with I&D 21.     Aaron Bell : 1975 Sex: male  Age: 51 y.o. Patient was referred by Aries Logan DO    CC:   Postop amputation partial right fifth ray with incision and drainage date of surgery 2021    HPI:   Postop amputation partial right fifth ray with incision and drainage date of surgery 2021    Tolerating wound VAC well. Tolerating antibiotics well. No calf pain. Denies nausea vomiting fever chills shortness of breath.         ROS:  Const: Denies constitutional symptoms  Musculo: Denies symptoms other than stated above  Skin: Denies symptoms other than stated above      Current Outpatient Medications:     dimethyl fumarate (TECFIDERA) 120 MG delayed release capsule, Take 1 capsule by mouth 2 times daily, Disp: 60 capsule, Rfl: 5    Diroximel Fumarate, Starter, (VUMERITY, STARTER,) 231 MG CPDR, Take 1 tablet by mouth 2 times daily X 7 days, Disp: 14 capsule, Rfl: 0    Diroximel Fumarate (VUMERITY) 231 MG CPDR, Take 2 tablets by mouth 2 times daily, Disp: 120 capsule, Rfl: 5    ceftaroline fosamil (TEFLARO) 600 MG SOLR, Infuse 600 mg intravenously every 12 hours, Disp: 14 each, Rfl:     metroNIDAZOLE (FLAGYL) 500 MG tablet, Take 1 tablet by mouth every 8 hours, Disp: 126 tablet, Rfl: 0    insulin lispro (HUMALOG) 100 UNIT/ML injection vial, Inject 0-12 Units into the skin 3 times daily (with meals), Disp: 10 mL, Rfl: 1    melatonin 3 MG TABS tablet, Take 1 tablet by mouth nightly as needed (Insomnia), Disp: 30 tablet, Rfl: 0    insulin lispro (HUMALOG) 100 UNIT/ML injection vial, Inject 0-6 Units into the skin nightly, Disp: 10 mL, Rfl: 3    insulin aspart (NOVOLOG FLEXPEN) 100 UNIT/ML injection pen, 5 units with breakfast and lunch and 10 units with dinner, Disp: 5 pen, Rfl: 1    buPROPion (WELLBUTRIN XL) 300 MG extended release tablet, TAKE 1 TABLET BY MOUTH EVERY MORNING, Disp: 90 tablet, Rfl: 1    metoprolol tartrate (LOPRESSOR) 25 MG tablet, Take 1 tablet by mouth 2 times daily, Disp: 120 tablet, Rfl: 2    pravastatin (PRAVACHOL) 40 MG tablet, Take 1 tablet by mouth daily, Disp: 90 tablet, Rfl: 1    escitalopram (LEXAPRO) 20 MG tablet, Take 1 tablet by mouth daily, Disp: 90 tablet, Rfl: 1    insulin glargine (BASAGLAR KWIKPEN) 100 UNIT/ML injection pen, Inject 32 Units into the skin nightly, Disp: 5 pen, Rfl: 2    ARIPiprazole (ABILIFY) 5 MG tablet, Take 1 tablet by mouth daily, Disp: 30 tablet, Rfl: 1    lisinopril (PRINIVIL;ZESTRIL) 20 MG tablet, Take 1 tablet by mouth daily, Disp: 30 tablet, Rfl: 2    metFORMIN (GLUCOPHAGE) 1000 MG tablet, Take 1 tablet by mouth 2 times daily, Disp: 60 tablet, Rfl: 2    Wound Dressings ([facility] MANUKA HONEY WOUND) GEL, Apply 1 Tube topically 2 times daily, Disp: 1 Tube, Rfl: 2    NOVOFINE PLUS 32G X 4 MM MISC, USE EVERY DAY UNDER THE SKIN, Disp: , Rfl:     aspirin 81 MG EC tablet, Take 81 mg by mouth 2 tablets daily, Disp: , Rfl:     cetirizine (ZYRTEC) 10 MG tablet, Take 10 mg by mouth daily, Disp: , Rfl:     omeprazole (PRILOSEC) 20 MG delayed release capsule, Take 20 mg by mouth daily, Disp: , Rfl:   Allergies   Allergen Reactions    Bee Venom Swelling    Milk-Related Compounds      Stomach issues-lactose intolerant       Past Medical History:   Diagnosis Date    Cellulitis of left foot 09/03/2020    Diabetes mellitus (HCC)     Gangrene of toe of right foot ([facility] [address] 08/28/2021    Hyperlipidemia     Hypertension     Kidney stone     Multiple sclerosis (HCC)     ЕКАТЕРИНА (obstructive sleep apnea)     dx 15 years ago, not compliant with CPAP       There were no vitals filed for this visit.      Postop incision and drainage left foot with fifth metatarsal resection and bone biopsy date of surgery 9/4/2020    Work History/Social History:     Focused Lower Extremity Physical Exam:      Neurovascular examination:    Dorsalis Pedis palpable bilateral.  Posterior tibialis  palpable bilateral.  Capillary Refill Time:  Immediate return  Hair growth:  Symmetrical and bilateral   Skin:  Not atrophic  Edema: Mild edema bilateral feet. Mild edema bilateral ankles. Neurologic:  Light touch diminished bilateral.  Warm to coolness bilateral distal toes  Decreased epicritic sensation     Musculoskeletal/ Orthopedic examination:    Equinis: present bilateral  Dorsiflexion, plantarflexion, inversion, eversion bilateral 5 out of 5 muscle strength  Wiggling remaining toes  Status post partial fifth ray amputation right foot. Negative Homans bilateral    Dermatology examination:    Open wound on the dorsal lateral of the right foot measuring approximately 5 cm x 0.5 cm x 0.3 cm. Wound is 100% granular. No surrounding erythema. No open wound plantar right foot today. No surrounding erythema right foot. Assessment and Plan:  Fermin Guillory was seen today for post-op check. Diagnoses and all orders for this visit:    Diabetic foot ulcer with osteomyelitis ([facility] [address]    Type 2 diabetes mellitus with foot ulcer, unspecified whether long term insulin use ([facility] [address]    Hereditary sensory neuropathy    Noncompliance        Postop amputation partial right fifth ray with incision and drainage date of surgery 8/29/2021    I did remove the wound VAC today. Continues to heal very well with conservative management with wound VAC and IV therapy. Appreciate infectious disease input going forward. Does present today on a stretcher with EMT staff which was present in the room upon transfer. Bandage change today. Adaptic, 4 x 4 Igor Kerlix Ace bandage. Stressed importance strict nonweightbearing right lower leg. Still high risk lower extremity limb loss if nonhealing. Wound is responding well to conservative management I will follow-up in 2 weeks. If he is discharged from [facility] he will need home health care with wound VAC therapy until follow-up. Return in about 2 weeks (around 10/14/2021). Seen By:  Annie Daniel DPM      Document was created using voice recognition software. Note was reviewed however may contain grammatical errors.

## 2021-10-04 LAB
FUNGUS (MYCOLOGY) CULTURE: NORMAL
FUNGUS STAIN: NORMAL

## 2021-10-14 ENCOUNTER — OFFICE VISIT (OUTPATIENT)
Dept: PODIATRY | Age: 46
End: 2021-10-14
Payer: COMMERCIAL

## 2021-10-14 VITALS — WEIGHT: 315 LBS | BODY MASS INDEX: 37.19 KG/M2 | HEIGHT: 77 IN

## 2021-10-14 DIAGNOSIS — E11.621 DIABETIC FOOT ULCER WITH OSTEOMYELITIS (HCC): Primary | ICD-10-CM

## 2021-10-14 DIAGNOSIS — L97.509 DIABETIC FOOT ULCER WITH OSTEOMYELITIS (HCC): Primary | ICD-10-CM

## 2021-10-14 DIAGNOSIS — G60.8 HEREDITARY SENSORY NEUROPATHY: ICD-10-CM

## 2021-10-14 DIAGNOSIS — E11.621 TYPE 2 DIABETES MELLITUS WITH FOOT ULCER, UNSPECIFIED WHETHER LONG TERM INSULIN USE (HCC): ICD-10-CM

## 2021-10-14 DIAGNOSIS — E11.69 DIABETIC FOOT ULCER WITH OSTEOMYELITIS (HCC): Primary | ICD-10-CM

## 2021-10-14 DIAGNOSIS — Z91.199 NONCOMPLIANCE: ICD-10-CM

## 2021-10-14 DIAGNOSIS — M86.9 DIABETIC FOOT ULCER WITH OSTEOMYELITIS (HCC): Primary | ICD-10-CM

## 2021-10-14 DIAGNOSIS — L97.509 TYPE 2 DIABETES MELLITUS WITH FOOT ULCER, UNSPECIFIED WHETHER LONG TERM INSULIN USE (HCC): ICD-10-CM

## 2021-10-14 PROCEDURE — 99024 POSTOP FOLLOW-UP VISIT: CPT | Performed by: PODIATRIST

## 2021-10-14 RX ORDER — AMLODIPINE BESYLATE 10 MG/1
TABLET ORAL
COMMUNITY
Start: 2021-10-08 | End: 2021-10-29 | Stop reason: SDUPTHER

## 2021-10-14 RX ORDER — LISINOPRIL 40 MG/1
TABLET ORAL
COMMUNITY
Start: 2021-10-08 | End: 2021-10-29 | Stop reason: SDUPTHER

## 2021-10-14 RX ORDER — CLONIDINE HYDROCHLORIDE 0.1 MG/1
TABLET ORAL
COMMUNITY
Start: 2021-10-08 | End: 2021-10-29 | Stop reason: SDUPTHER

## 2021-10-14 RX ORDER — ERTUGLIFLOZIN 15 MG/1
TABLET, FILM COATED ORAL
COMMUNITY
Start: 2021-09-27 | End: 2021-10-29

## 2021-10-14 RX ORDER — HYDRALAZINE HYDROCHLORIDE 100 MG/1
TABLET, FILM COATED ORAL
COMMUNITY
Start: 2021-10-08 | End: 2021-10-29 | Stop reason: SDUPTHER

## 2021-10-14 NOTE — PROGRESS NOTES
Disp: 30 tablet, Rfl: 0    insulin lispro (HUMALOG) 100 UNIT/ML injection vial, Inject 0-6 Units into the skin nightly, Disp: 10 mL, Rfl: 3    insulin aspart (NOVOLOG FLEXPEN) 100 UNIT/ML injection pen, 5 units with breakfast and lunch and 10 units with dinner, Disp: 5 pen, Rfl: 1    buPROPion (WELLBUTRIN XL) 300 MG extended release tablet, TAKE 1 TABLET BY MOUTH EVERY MORNING, Disp: 90 tablet, Rfl: 1    metoprolol tartrate (LOPRESSOR) 25 MG tablet, Take 1 tablet by mouth 2 times daily, Disp: 120 tablet, Rfl: 2    pravastatin (PRAVACHOL) 40 MG tablet, Take 1 tablet by mouth daily, Disp: 90 tablet, Rfl: 1    escitalopram (LEXAPRO) 20 MG tablet, Take 1 tablet by mouth daily, Disp: 90 tablet, Rfl: 1    insulin glargine (BASAGLAR KWIKPEN) 100 UNIT/ML injection pen, Inject 32 Units into the skin nightly, Disp: 5 pen, Rfl: 2    ARIPiprazole (ABILIFY) 5 MG tablet, Take 1 tablet by mouth daily, Disp: 30 tablet, Rfl: 1    lisinopril (PRINIVIL;ZESTRIL) 20 MG tablet, Take 1 tablet by mouth daily, Disp: 30 tablet, Rfl: 2    metFORMIN (GLUCOPHAGE) 1000 MG tablet, Take 1 tablet by mouth 2 times daily, Disp: 60 tablet, Rfl: 2    Wound Dressings ([facility] MANUKA HONEY WOUND) GEL, Apply 1 Tube topically 2 times daily, Disp: 1 Tube, Rfl: 2    NOVOFINE PLUS 32G X 4 MM MISC, USE EVERY DAY UNDER THE SKIN, Disp: , Rfl:     aspirin 81 MG EC tablet, Take 81 mg by mouth 2 tablets daily, Disp: , Rfl:     cetirizine (ZYRTEC) 10 MG tablet, Take 10 mg by mouth daily, Disp: , Rfl:     omeprazole (PRILOSEC) 20 MG delayed release capsule, Take 20 mg by mouth daily, Disp: , Rfl:   Allergies   Allergen Reactions    Bee Venom Swelling    Milk-Related Compounds      Stomach issues-lactose intolerant       Past Medical History:   Diagnosis Date    Cellulitis of left foot 09/03/2020    Diabetes mellitus (HCC)     Gangrene of toe of right foot ([facility] [address] 08/28/2021    Hyperlipidemia     Hypertension     Kidney stone     Multiple sclerosis ([facility] [address]     ЕКАТЕРИНА (obstructive sleep apnea)     dx 15 years ago, not compliant with CPAP       There were no vitals filed for this visit. Postop incision and drainage left foot with fifth metatarsal resection and bone biopsy date of surgery 9/4/2020    Work History/Social History:     Focused Lower Extremity Physical Exam:      Neurovascular examination:    Dorsalis Pedis palpable bilateral.  Posterior tibialis  palpable bilateral.  Capillary Refill Time:  Immediate return  Hair growth:  Symmetrical and bilateral   Skin:  Not atrophic  Edema: Mild edema bilateral feet. Mild edema bilateral ankles. Neurologic:  Light touch diminished bilateral.  Warm to coolness bilateral distal toes  Decreased epicritic sensation     Musculoskeletal/ Orthopedic examination:    Equinis: present bilateral  Dorsiflexion, plantarflexion, inversion, eversion bilateral 5 out of 5 muscle strength  Wiggling remaining toes  Status post partial fifth ray amputation right foot. Negative Homans. No pain right foot. Dermatology examination:    Dorsal lateral right foot wound measuring approximately 3 cm x 0.5 cm x 0.3 cm. Wound is 100% granulated. No surrounding erythema. No exposed tendon or bone. Wound does not track to bone. Assessment and Plan:  Araseli Lopes was seen today for post-op check. Diagnoses and all orders for this visit:    Diabetic foot ulcer with osteomyelitis ([address]    Type 2 diabetes mellitus with foot ulcer, unspecified whether long term insulin use ([address]    Hereditary sensory neuropathy    Noncompliance        Postop amputation partial right fifth ray with incision and drainage date of surgery 8/29/2021    Presents with no bandage intact today. I did recommend using Medihoney and bandage with Adaptic, 4 x 4 Igor Ace bandage once daily. Presented today with no bandage and slippers on. Still high risk for further lower extremity limb loss of nonhealing. Clinically the wound is significantly improved.   He is approximately a month and a half following partial fifth ray amputation right foot. Does have follow-up with his primary care physician next week appreciate input. I will follow-up in office 2 weeks. Any increased redness drainage any new wounds go the emergency room. Return in about 2 weeks (around 10/28/2021). Seen By:  Marcelino Melendrez DPM      Document was created using voice recognition software. Note was reviewed however may contain grammatical errors.

## 2021-10-19 LAB
AFB CULTURE (MYCOBACTERIA): NORMAL
AFB SMEAR: NORMAL

## 2021-10-21 ENCOUNTER — OFFICE VISIT (OUTPATIENT)
Dept: PODIATRY | Age: 46
End: 2021-10-21
Payer: COMMERCIAL

## 2021-10-21 VITALS — BODY MASS INDEX: 37.19 KG/M2 | HEIGHT: 77 IN | WEIGHT: 315 LBS

## 2021-10-21 DIAGNOSIS — E11.69 DIABETIC FOOT ULCER WITH OSTEOMYELITIS (HCC): Primary | ICD-10-CM

## 2021-10-21 DIAGNOSIS — L97.509 TYPE 2 DIABETES MELLITUS WITH FOOT ULCER, UNSPECIFIED WHETHER LONG TERM INSULIN USE (HCC): ICD-10-CM

## 2021-10-21 DIAGNOSIS — E11.621 TYPE 2 DIABETES MELLITUS WITH FOOT ULCER, UNSPECIFIED WHETHER LONG TERM INSULIN USE (HCC): ICD-10-CM

## 2021-10-21 DIAGNOSIS — L97.509 DIABETIC FOOT ULCER WITH OSTEOMYELITIS (HCC): Primary | ICD-10-CM

## 2021-10-21 DIAGNOSIS — E11.621 DIABETIC FOOT ULCER WITH OSTEOMYELITIS (HCC): Primary | ICD-10-CM

## 2021-10-21 DIAGNOSIS — M86.9 DIABETIC FOOT ULCER WITH OSTEOMYELITIS (HCC): Primary | ICD-10-CM

## 2021-10-21 DIAGNOSIS — G60.8 HEREDITARY SENSORY NEUROPATHY: ICD-10-CM

## 2021-10-21 DIAGNOSIS — Z91.199 NONCOMPLIANCE: ICD-10-CM

## 2021-10-21 PROCEDURE — 99024 POSTOP FOLLOW-UP VISIT: CPT | Performed by: PODIATRIST

## 2021-10-21 NOTE — PROGRESS NOTES
ЕКАТЕРИНА (obstructive sleep apnea)     dx 15 years ago, not compliant with CPAP       There were no vitals filed for this visit. Postop incision and drainage left foot with fifth metatarsal resection and bone biopsy date of surgery 9/4/2020    Work History/Social History:     Focused Lower Extremity Physical Exam:      Neurovascular examination:    Dorsalis Pedis palpable bilateral.  Posterior tibialis  palpable bilateral.  Capillary Refill Time:  Immediate return  Hair growth:  Symmetrical and bilateral   Skin:  Not atrophic  Edema: Mild edema bilateral feet. Mild edema bilateral ankles. Neurologic:  Light touch diminished bilateral.  Warm to coolness bilateral distal toes  Decreased epicritic sensation     Musculoskeletal/ Orthopedic examination:    Equinis: present bilateral  Dorsiflexion, plantarflexion, inversion, eversion bilateral 5 out of 5 muscle strength  Wiggling remaining toes    Negative Homans bilateral  No pain right foot. Status post fifth ray amputation right foot. Dermatology examination:    Dorsal lateral right foot wound measuring approximately 3 cm x 0.3 cm x 0.2 cm. Wound is 90% granular and 10% fibrotic. No surrounding erythema. No drainage. Wound does not probe to bone. Assessment and Plan:  Jyothi Johnson was seen today for post-op check. Diagnoses and all orders for this visit:    Diabetic foot ulcer with osteomyelitis ([address]    Type 2 diabetes mellitus with foot ulcer, unspecified whether long term insulin use ([address]    Hereditary sensory neuropathy    Noncompliance        Postop amputation partial right fifth ray with incision and drainage date of surgery 8/29/2021    Stressed importance of Medihoney and bandage every day. Presents today with regular slippers. Importance of once daily bandage change. Avoid barefoot. Does have follow-up with infectious disease team next week appreciate their input. I will follow-up 2 weeks monitor overall progression.   Any increase in redness or drainage go to the emergency room. Return in about 2 weeks (around 11/4/2021). Seen By:  Brooke Pompa DPM      Document was created using voice recognition software. Note was reviewed however may contain grammatical errors.

## 2021-10-29 ENCOUNTER — OFFICE VISIT (OUTPATIENT)
Dept: FAMILY MEDICINE CLINIC | Age: 46
End: 2021-10-29
Payer: COMMERCIAL

## 2021-10-29 VITALS
HEIGHT: 77 IN | OXYGEN SATURATION: 99 % | SYSTOLIC BLOOD PRESSURE: 102 MMHG | RESPIRATION RATE: 20 BRPM | DIASTOLIC BLOOD PRESSURE: 69 MMHG | HEART RATE: 85 BPM | BODY MASS INDEX: 54.07 KG/M2 | TEMPERATURE: 98 F

## 2021-10-29 DIAGNOSIS — F32.A DEPRESSION, UNSPECIFIED DEPRESSION TYPE: ICD-10-CM

## 2021-10-29 DIAGNOSIS — I10 ESSENTIAL HYPERTENSION: ICD-10-CM

## 2021-10-29 DIAGNOSIS — E78.5 HYPERLIPIDEMIA, UNSPECIFIED HYPERLIPIDEMIA TYPE: ICD-10-CM

## 2021-10-29 DIAGNOSIS — L97.524 DIABETIC ULCER OF TOE OF LEFT FOOT ASSOCIATED WITH DIABETES MELLITUS DUE TO UNDERLYING CONDITION, WITH NECROSIS OF BONE (HCC): ICD-10-CM

## 2021-10-29 DIAGNOSIS — E11.65 UNCONTROLLED TYPE 2 DIABETES MELLITUS WITH HYPERGLYCEMIA (HCC): Primary | ICD-10-CM

## 2021-10-29 DIAGNOSIS — E08.621 DIABETIC ULCER OF TOE OF LEFT FOOT ASSOCIATED WITH DIABETES MELLITUS DUE TO UNDERLYING CONDITION, WITH NECROSIS OF BONE (HCC): ICD-10-CM

## 2021-10-29 PROBLEM — L97.509 DIABETIC FOOT ULCER WITH OSTEOMYELITIS (HCC): Status: RESOLVED | Noted: 2021-08-27 | Resolved: 2021-10-29

## 2021-10-29 PROBLEM — E11.621 DIABETIC FOOT ULCER WITH OSTEOMYELITIS (HCC): Status: RESOLVED | Noted: 2021-08-27 | Resolved: 2021-10-29

## 2021-10-29 PROBLEM — E11.69 DIABETIC FOOT ULCER WITH OSTEOMYELITIS (HCC): Status: RESOLVED | Noted: 2021-08-27 | Resolved: 2021-10-29

## 2021-10-29 PROBLEM — G35 MS (MULTIPLE SCLEROSIS) (HCC): Chronic | Status: ACTIVE | Noted: 2021-08-16

## 2021-10-29 PROBLEM — M86.9 DIABETIC FOOT ULCER WITH OSTEOMYELITIS (HCC): Status: RESOLVED | Noted: 2021-08-27 | Resolved: 2021-10-29

## 2021-10-29 PROBLEM — I96 GANGRENE OF TOE OF RIGHT FOOT (HCC): Chronic | Status: RESOLVED | Noted: 2021-08-28 | Resolved: 2021-10-29

## 2021-10-29 PROCEDURE — 99214 OFFICE O/P EST MOD 30 MIN: CPT | Performed by: STUDENT IN AN ORGANIZED HEALTH CARE EDUCATION/TRAINING PROGRAM

## 2021-10-29 RX ORDER — SENNOSIDES 8.6 MG
650 CAPSULE ORAL EVERY 8 HOURS PRN
Qty: 60 TABLET | Refills: 1 | Status: SHIPPED
Start: 2021-10-29 | End: 2022-03-14

## 2021-10-29 RX ORDER — AMLODIPINE BESYLATE 10 MG/1
10 TABLET ORAL DAILY
Qty: 90 TABLET | Refills: 1
Start: 2021-10-29 | End: 2021-11-04 | Stop reason: SDUPTHER

## 2021-10-29 RX ORDER — OMEPRAZOLE 20 MG/1
20 CAPSULE, DELAYED RELEASE ORAL DAILY
Qty: 90 CAPSULE | Refills: 1 | Status: SHIPPED
Start: 2021-10-29 | End: 2022-04-19 | Stop reason: SDUPTHER

## 2021-10-29 RX ORDER — INSULIN ASPART 100 [IU]/ML
INJECTION, SOLUTION INTRAVENOUS; SUBCUTANEOUS
Qty: 5 PEN | Refills: 1 | Status: CANCELLED | OUTPATIENT
Start: 2021-10-29

## 2021-10-29 RX ORDER — SENNOSIDES 8.6 MG
650 CAPSULE ORAL EVERY 8 HOURS PRN
COMMUNITY
End: 2021-10-29 | Stop reason: SDUPTHER

## 2021-10-29 RX ORDER — INSULIN GLARGINE 100 [IU]/ML
40 INJECTION, SOLUTION SUBCUTANEOUS NIGHTLY
Qty: 5 PEN | Refills: 2 | Status: CANCELLED | OUTPATIENT
Start: 2021-10-29

## 2021-10-29 RX ORDER — ARIPIPRAZOLE 5 MG/1
5 TABLET ORAL DAILY
Qty: 90 TABLET | Refills: 1 | Status: SHIPPED
Start: 2021-10-29 | End: 2022-05-25

## 2021-10-29 RX ORDER — HYDRALAZINE HYDROCHLORIDE 100 MG/1
100 TABLET, FILM COATED ORAL 3 TIMES DAILY
Qty: 270 TABLET | Refills: 1 | Status: SHIPPED
Start: 2021-10-29 | End: 2022-04-19

## 2021-10-29 RX ORDER — LISINOPRIL 40 MG/1
40 TABLET ORAL DAILY
Qty: 90 TABLET | Refills: 1 | Status: SHIPPED
Start: 2021-10-29 | End: 2022-04-19

## 2021-10-29 RX ORDER — INSULIN GLARGINE 100 [IU]/ML
40 INJECTION, SOLUTION SUBCUTANEOUS 2 TIMES DAILY
Qty: 72 ML | Refills: 1 | Status: SHIPPED
Start: 2021-10-29 | End: 2022-04-18

## 2021-10-29 RX ORDER — METOPROLOL TARTRATE 100 MG/1
100 TABLET ORAL 2 TIMES DAILY
Qty: 180 TABLET | Refills: 0 | Status: SHIPPED
Start: 2021-10-29 | End: 2022-01-19

## 2021-10-29 RX ORDER — PRAVASTATIN SODIUM 40 MG
40 TABLET ORAL DAILY
Qty: 90 TABLET | Refills: 1 | Status: SHIPPED
Start: 2021-10-29 | End: 2022-04-19

## 2021-10-29 RX ORDER — PANTOPRAZOLE SODIUM 40 MG/1
40 TABLET, DELAYED RELEASE ORAL DAILY
COMMUNITY
End: 2021-12-14

## 2021-10-29 RX ORDER — CLONIDINE HYDROCHLORIDE 0.1 MG/1
0.1 TABLET ORAL 3 TIMES DAILY
Qty: 270 TABLET | Refills: 1
Start: 2021-10-29 | End: 2021-11-04 | Stop reason: SDUPTHER

## 2021-10-29 NOTE — ASSESSMENT & PLAN NOTE
Management: Change NovoLog to 20 units 3 times daily with meals. Lantus increased to 40 units twice daily.

## 2021-10-29 NOTE — PROGRESS NOTES
Patient:  Arcelia Dougherty 51 y.o. male     Date of Service: 10/29/21      Chiefcomplaint:   Chief Complaint   Patient presents with    Diabetes     follow up    Hypertension     History of Present Illness     In [facility] until 3 weeks ago. No abx now. Wound care- every other week   Changes to meds - got copy    DM2  Need a1c next month - 11/28 or after  Lab Results   Component Value Date    LABA1C 11.7 (H) 08/28/2021     Fasting 240 today  Diet improving  Was 100-200 in [facility]    HTN  bp normal -low - changes made    prevention  flushot and covid booster - [facility] in [place]    Pertinent Medical, Family, Surgical, Social History:  Past Medical History:   Diagnosis Date    Cellulitis of left foot 09/03/2020    Cellulitis of right foot     Diabetes mellitus ([address]     Diabetic foot ulcer with osteomyelitis ([address] 8/27/2021    Gangrene of toe of right foot ([address] 08/28/2021    Hyperlipidemia     Hypertension     Kidney stone     Multiple sclerosis ([address]     ЕКАТЕРИНА (obstructive sleep apnea)     dx 15 years ago, not compliant with CPAP     Physical Exam   Vitals: /69 (Site: Right Upper Arm, Position: Sitting, Cuff Size: Large Adult)   Pulse 85   Temp 98 °F (36.7 °C) (Temporal)   Resp 20   Ht 6' 5\" (1.956 m)   SpO2 99%   BMI 54.07 kg/m²   General Appearance: Alert, oriented, no acute distress  HEENT: No scleral icterus. No visible discharge from eyes  Neck: Not rigid. No visible masses  Chest wall/Lung: Clear to auscultation bilaterally,  respirations unlabored. No ronchi/wheezing/rales  Heart: RRR, no murmur  Abdomen: Soft, nontender  Extremities:  +edema b/l  Skin: No rashes. No jaundice  Neuro: Alert and oriented        Psych: Appropriate mood and appropriate affect    Assessment and Plan   1. Uncontrolled type 2 diabetes mellitus with hyperglycemia (HCC)  Assessment & Plan:  Management: Change NovoLog to 20 units 3 times daily with meals. Lantus increased to 40 units twice daily.   2. Essential hypertension  Assessment & Plan:  Med management: Amlodipine added at 10 mg daily. Clonidine 3 diet times daily hydralazine 3 times daily lisinopril 40 daily. Blood pressure low normal without symptoms. Orders:  -     metoprolol tartrate (LOPRESSOR) 100 MG tablet; Take 1 tablet by mouth 2 times daily, Disp-180 tablet, R-0Normal  3. Hyperlipidemia, unspecified hyperlipidemia type  -     pravastatin (PRAVACHOL) 40 MG tablet; Take 1 tablet by mouth daily, Disp-90 tablet, R-1Normal  4. Depression, unspecified depression type  Assessment & Plan:  Continue Abilify and escitalopram.  Mood stable  Orders:  -     ARIPiprazole (ABILIFY) 5 MG tablet; Take 1 tablet by mouth daily, Disp-90 tablet, R-1Normal  5. Diabetic ulcer of toe of left foot associated with diabetes mellitus due to underlying condition, with necrosis of bone ([facility] [address]  Assessment & Plan:  Off antibiotics, following podiatry. No follow-ups on file. Shawn Tsang, DO     This document may have been prepared at least partially through the use of voice recognition software. Although effort is taken to assure the accuracy of this document, it is possible that grammatical, syntax,  or spelling errors may occur.

## 2021-10-29 NOTE — ASSESSMENT & PLAN NOTE
Med management: Amlodipine added at 10 mg daily. Clonidine 3 diet times daily hydralazine 3 times daily lisinopril 40 daily. Blood pressure low normal without symptoms.

## 2021-11-02 ENCOUNTER — OFFICE VISIT (OUTPATIENT)
Dept: PODIATRY | Age: 46
End: 2021-11-02
Payer: COMMERCIAL

## 2021-11-02 DIAGNOSIS — M86.9 DIABETIC FOOT ULCER WITH OSTEOMYELITIS (HCC): Primary | ICD-10-CM

## 2021-11-02 DIAGNOSIS — L97.509 TYPE 2 DIABETES MELLITUS WITH FOOT ULCER, UNSPECIFIED WHETHER LONG TERM INSULIN USE (HCC): ICD-10-CM

## 2021-11-02 DIAGNOSIS — E11.621 TYPE 2 DIABETES MELLITUS WITH FOOT ULCER, UNSPECIFIED WHETHER LONG TERM INSULIN USE (HCC): ICD-10-CM

## 2021-11-02 DIAGNOSIS — G60.8 HEREDITARY SENSORY NEUROPATHY: ICD-10-CM

## 2021-11-02 DIAGNOSIS — E11.621 DIABETIC FOOT ULCER WITH OSTEOMYELITIS (HCC): Primary | ICD-10-CM

## 2021-11-02 DIAGNOSIS — L97.509 DIABETIC FOOT ULCER WITH OSTEOMYELITIS (HCC): Primary | ICD-10-CM

## 2021-11-02 DIAGNOSIS — E11.69 DIABETIC FOOT ULCER WITH OSTEOMYELITIS (HCC): Primary | ICD-10-CM

## 2021-11-02 DIAGNOSIS — Z91.199 NONCOMPLIANCE: ICD-10-CM

## 2021-11-02 PROCEDURE — 99213 OFFICE O/P EST LOW 20 MIN: CPT | Performed by: PODIATRIST

## 2021-11-02 NOTE — PROGRESS NOTES
Patient in office for post op visit. Partial fifth ray amputation with I&D 21. Enriqueta Dillard : 1975 Sex: male  Age: 51 y.o. Patient was referred by Sola Swartz DO    CC:   Postop amputation partial right fifth ray with incision and drainage date of surgery 2021    HPI:   Postop amputation partial right fifth ray with incision and drainage date of surgery 2021  Denies any pain left and right foot. Wearing regular shoes with no bandage on. Denies any redness or drainage right foot. No additional pedal complaints today. Denies nausea vomiting fever chills shortness of breath.           ROS:  Const: Denies constitutional symptoms  Musculo: Denies symptoms other than stated above  Skin: Denies symptoms other than stated above      Current Outpatient Medications:     pantoprazole (PROTONIX) 40 MG tablet, Take 40 mg by mouth daily, Disp: , Rfl:     acetaminophen (TYLENOL) 650 MG extended release tablet, Take 1 tablet by mouth every 8 hours as needed for Pain, Disp: 60 tablet, Rfl: 1    amLODIPine (NORVASC) 10 MG tablet, Take 1 tablet by mouth daily, Disp: 90 tablet, Rfl: 1    cloNIDine (CATAPRES) 0.1 MG tablet, Take 1 tablet by mouth 3 times daily, Disp: 270 tablet, Rfl: 1    hydrALAZINE (APRESOLINE) 100 MG tablet, Take 1 tablet by mouth 3 times daily, Disp: 270 tablet, Rfl: 1    lisinopril (PRINIVIL;ZESTRIL) 40 MG tablet, Take 1 tablet by mouth daily, Disp: 90 tablet, Rfl: 1    insulin lispro (HUMALOG) 100 UNIT/ML injection vial, Inject 20 Units into the skin 3 times daily (before meals), Disp: 20 mL, Rfl: 3    metoprolol tartrate (LOPRESSOR) 100 MG tablet, Take 1 tablet by mouth 2 times daily, Disp: 180 tablet, Rfl: 0    metFORMIN (GLUCOPHAGE) 1000 MG tablet, Take 1 tablet by mouth 2 times daily, Disp: 180 tablet, Rfl: 1    insulin glargine (BASAGLAR KWIKPEN) 100 UNIT/ML injection pen, Inject 40 Units into the skin 2 times daily, Disp: 72 mL, Rfl: 1    omeprazole (PRILOSEC) 20 MG delayed release capsule, Take 1 capsule by mouth daily, Disp: 90 capsule, Rfl: 1    pravastatin (PRAVACHOL) 40 MG tablet, Take 1 tablet by mouth daily, Disp: 90 tablet, Rfl: 1    ARIPiprazole (ABILIFY) 5 MG tablet, Take 1 tablet by mouth daily, Disp: 90 tablet, Rfl: 1    dimethyl fumarate (TECFIDERA) 120 MG delayed release capsule, Take 1 capsule by mouth 2 times daily, Disp: 60 capsule, Rfl: 5    escitalopram (LEXAPRO) 20 MG tablet, Take 1 tablet by mouth daily, Disp: 90 tablet, Rfl: 1    Wound Dressings ([facility] MANUKA HONEY WOUND) GEL, Apply 1 Tube topically 2 times daily, Disp: 1 Tube, Rfl: 2    NOVOFINE PLUS 32G X 4 MM MISC, USE EVERY DAY UNDER THE SKIN, Disp: , Rfl:     aspirin 81 MG EC tablet, Take 81 mg by mouth 2 tablets daily, Disp: , Rfl:     cetirizine (ZYRTEC) 10 MG tablet, Take 10 mg by mouth daily, Disp: , Rfl:   Allergies   Allergen Reactions    Bee Venom Swelling    Milk-Related Compounds      Stomach issues-lactose intolerant       Past Medical History:   Diagnosis Date    Cellulitis of left foot 09/03/2020    Cellulitis of right foot     [facility] mellitus ([facility] [address]     Diabetic foot ulcer with osteomyelitis ([facility] [address] 8/27/2021    Gangrene of toe of right foot ([facility] [address] 08/28/2021    Hyperlipidemia     Hypertension     Kidney stone     Multiple sclerosis (HCC)     ЕКАТЕРИНА (obstructive sleep apnea)     dx 15 years ago, not compliant with CPAP       There were no vitals filed for this visit. Postop incision and drainage left foot with fifth metatarsal resection and bone biopsy date of surgery 9/4/2020    Work History/Social History:     Focused Lower Extremity Physical Exam:      Neurovascular examination:    Dorsalis Pedis palpable bilateral.  Posterior tibialis  palpable bilateral.  Capillary Refill Time:  Immediate return  Hair growth:  Symmetrical and bilateral   Skin:  Not atrophic  Edema: Mild edema bilateral feet. Mild edema bilateral ankles.   Neurologic:  Light touch diminished bilateral.  Warm to coolness bilateral distal toes  Decreased epicritic sensation     Musculoskeletal/ Orthopedic examination:    Equinis: present bilateral  Dorsiflexion, plantarflexion, inversion, eversion bilateral 5 out of 5 muscle strength  Wiggling remaining toes    Status post fifth ray amputation right foot. Negative Homans. No pain bilateral foot or ankle. Dermatology examination:    Dorsal lateral wound present right foot measuring approximately 2.5 cm by 0.2 cm x 0.2 cm. Wound is 100% granular. There is a retained suture at the proximal aspect of the wound. I did remove suture. No drainage noted. No open wounds left foot      Assessment and Plan:  Bishop Matos was seen today for post-op check. Diagnoses and all orders for this visit:    Diabetic foot ulcer with osteomyelitis ([facility] [address]    Type 2 diabetes mellitus with foot ulcer, unspecified whether long term insulin use ([facility] [address]    Noncompliance    Hereditary sensory neuropathy        Postop amputation partial right fifth ray with incision and drainage date of surgery 8/29/2021  There was a proximal suture which I did remove today. No drainage. Mild bleeding after suture removed. Presents with regular shoe on stressed importance of offloading padding with custom diabetic inserts. Still has regular slide on shoes with no bands today. Remains high risk for further lower extremity limb loss if nonhealing. No clinical signs of infection. Any increase in redness or drainage go to emergency room. I will follow-up in 2 weeks to monitor progression. No follow-ups on file. Seen By:  Carey Barraza DPM      Document was created using voice recognition software. Note was reviewed however may contain grammatical errors.

## 2021-11-03 ENCOUNTER — PATIENT MESSAGE (OUTPATIENT)
Dept: FAMILY MEDICINE CLINIC | Age: 46
End: 2021-11-03

## 2021-11-04 RX ORDER — AMLODIPINE BESYLATE 10 MG/1
10 TABLET ORAL DAILY
Qty: 90 TABLET | Refills: 1 | Status: SHIPPED
Start: 2021-11-04 | End: 2021-12-14

## 2021-11-04 RX ORDER — CLONIDINE HYDROCHLORIDE 0.1 MG/1
0.1 TABLET ORAL 3 TIMES DAILY
Qty: 270 TABLET | Refills: 1 | Status: SHIPPED
Start: 2021-11-04 | End: 2022-04-19

## 2021-11-04 NOTE — TELEPHONE ENCOUNTER
From: Carleen Moseley  To: Andrew Vargas DO  Sent: 11/3/2021 4:48 PM EDT  Subject: Prescription Question    I need refills for Clonidine & Amlodapine soon. The pharmacy said they reached out to you and haven't heard back.

## 2021-11-23 ENCOUNTER — OFFICE VISIT (OUTPATIENT)
Dept: PODIATRY | Age: 46
End: 2021-11-23
Payer: COMMERCIAL

## 2021-11-23 VITALS — WEIGHT: 315 LBS | BODY MASS INDEX: 37.19 KG/M2 | HEIGHT: 77 IN

## 2021-11-23 DIAGNOSIS — E11.65 UNCONTROLLED TYPE 2 DIABETES MELLITUS WITH HYPERGLYCEMIA (HCC): Primary | Chronic | ICD-10-CM

## 2021-11-23 DIAGNOSIS — L03.115 CELLULITIS OF RIGHT ANKLE: ICD-10-CM

## 2021-11-23 DIAGNOSIS — Z91.199 NONCOMPLIANCE: ICD-10-CM

## 2021-11-23 DIAGNOSIS — G60.8 HEREDITARY SENSORY NEUROPATHY: ICD-10-CM

## 2021-11-23 PROCEDURE — 99024 POSTOP FOLLOW-UP VISIT: CPT | Performed by: PODIATRIST

## 2021-11-23 RX ORDER — DOXYCYCLINE HYCLATE 100 MG/1
100 CAPSULE ORAL 2 TIMES DAILY
Qty: 14 CAPSULE | Refills: 0 | Status: SHIPPED | OUTPATIENT
Start: 2021-11-23 | End: 2021-12-07

## 2021-11-23 NOTE — PROGRESS NOTES
Patient in office for post op check Partial fifth ray amputation with I&D 2021. C/o increased edema and redness to right LE. Alexander Dawn : 1975 Sex: male  Age: 51 y.o. Patient was referred by Connie Mcdonald DO    CC:   Postop amputation partial right fifth ray with incision and drainage date of surgery 2021  Right swelling and calf pain    HPI:   Postop amputation partial right fifth ray with incision and drainage date of surgery 2021    Presents today right foot redness and swelling with some calf pain. States symptoms over the past several days. Has been wearing regular shoes. Denies any drainage or redness or previous amputation right foot. Denies nausea vomiting fever chills shortness of breath. Not currently on any antibiotics.         ROS:  Const: Denies constitutional symptoms  Musculo: Denies symptoms other than stated above  Skin: Denies symptoms other than stated above      Current Outpatient Medications:     doxycycline hyclate (VIBRAMYCIN) 100 MG capsule, Take 1 capsule by mouth 2 times daily for 14 days, Disp: 14 capsule, Rfl: 0    amLODIPine (NORVASC) 10 MG tablet, Take 1 tablet by mouth daily, Disp: 90 tablet, Rfl: 1    cloNIDine (CATAPRES) 0.1 MG tablet, Take 1 tablet by mouth 3 times daily, Disp: 270 tablet, Rfl: 1    pantoprazole (PROTONIX) 40 MG tablet, Take 40 mg by mouth daily, Disp: , Rfl:     acetaminophen (TYLENOL) 650 MG extended release tablet, Take 1 tablet by mouth every 8 hours as needed for Pain, Disp: 60 tablet, Rfl: 1    hydrALAZINE (APRESOLINE) 100 MG tablet, Take 1 tablet by mouth 3 times daily, Disp: 270 tablet, Rfl: 1    lisinopril (PRINIVIL;ZESTRIL) 40 MG tablet, Take 1 tablet by mouth daily, Disp: 90 tablet, Rfl: 1    insulin lispro (HUMALOG) 100 UNIT/ML injection vial, Inject 20 Units into the skin 3 times daily (before meals), Disp: 20 mL, Rfl: 3    metoprolol tartrate (LOPRESSOR) 100 MG tablet, Take 1 tablet by mouth 2 times daily, Disp: 180 tablet, Rfl: 0    metFORMIN (GLUCOPHAGE) 1000 MG tablet, Take 1 tablet by mouth 2 times daily, Disp: 180 tablet, Rfl: 1    insulin glargine (BASAGLAR KWIKPEN) 100 UNIT/ML injection pen, Inject 40 Units into the skin 2 times daily, Disp: 72 mL, Rfl: 1    omeprazole (PRILOSEC) 20 MG delayed release capsule, Take 1 capsule by mouth daily, Disp: 90 capsule, Rfl: 1    pravastatin (PRAVACHOL) 40 MG tablet, Take 1 tablet by mouth daily, Disp: 90 tablet, Rfl: 1    ARIPiprazole (ABILIFY) 5 MG tablet, Take 1 tablet by mouth daily, Disp: 90 tablet, Rfl: 1    dimethyl fumarate (TECFIDERA) 120 MG delayed release capsule, Take 1 capsule by mouth 2 times daily, Disp: 60 capsule, Rfl: 5    escitalopram (LEXAPRO) 20 MG tablet, Take 1 tablet by mouth daily, Disp: 90 tablet, Rfl: 1    Wound Dressings ([facility] HONEY WOUND) GEL, Apply 1 Tube topically 2 times daily, Disp: 1 Tube, Rfl: 2    NOVOFINE PLUS 32G X 4 MM MISC, USE EVERY DAY UNDER THE SKIN, Disp: , Rfl:     aspirin 81 MG EC tablet, Take 81 mg by mouth 2 tablets daily, Disp: , Rfl:     cetirizine (ZYRTEC) 10 MG tablet, Take 10 mg by mouth daily, Disp: , Rfl:   Allergies   Allergen Reactions    Bee Venom Swelling    Milk-Related Compounds      Stomach issues-lactose intolerant       Past Medical History:   Diagnosis Date    Cellulitis of left foot 09/03/2020    Cellulitis of right foot     Diabetes mellitus [address] [address]     Diabetic foot ulcer with osteomyelitis ([address] 8/27/2021    Gangrene of toe of right foot ([facility] [address] 08/28/2021    Hyperlipidemia     Hypertension     Kidney stone     Multiple sclerosis (HCC)     ЕКАТЕРИНА (obstructive sleep apnea)     dx 15 years ago, not compliant with CPAP       There were no vitals filed for this visit.      Postop incision and drainage left foot with fifth metatarsal resection and bone biopsy date of surgery 9/4/2020    Work History/Social History:     Focused Lower Extremity Physical Exam:      Neurovascular examination:    Dorsalis Pedis palpable bilateral.  Posterior tibialis  palpable bilateral.  Capillary Refill Time:  Immediate return  Hair growth:  Symmetrical and bilateral   Skin:  Not atrophic  Edema: Mild edema bilateral feet. Mild edema bilateral ankles. Neurologic:  Light touch diminished bilateral.  Warm to coolness bilateral distal toes  Decreased epicritic sensation     Musculoskeletal/ Orthopedic examination:    Equinis: present bilateral  Dorsiflexion, plantarflexion, inversion, eversion bilateral 5 out of 5 muscle strength  Wiggling remaining toes    Status post fifth ray amputation right foot. Negative Homans. No pain right foot or right ankle. Mild tenderness posterior right calf. Dermatology examination:    Dorsal lateral right foot wound skin is well coapted. There is mild dorsal edema . No significant erythema. Assessment and Plan:  Debby Snyder was seen today for post-op check. Diagnoses and all orders for this visit:    Uncontrolled type 2 diabetes mellitus with hyperglycemia ([facility] [address]    Hereditary sensory neuropathy    Noncompliance    Cellulitis of right ankle    Other orders  -     doxycycline hyclate (VIBRAMYCIN) 100 MG capsule; Take 1 capsule by mouth 2 times daily for 14 days      Debby Snyder seen today likely cellulitis right ankle. Doxycycline 100 mg twice daily 2 weeks. In light of having calf pain and relatively recent surgery I did recommend ultrasound right lower leg. He declined this today due to schedule. High risk for DVT blood clot and loss of life. He is understanding of this. I did recommend going to the hospital for ultrasound right lower leg. Return in about 1 week (around 11/30/2021). Seen By:  Ruth Huitron DPM      Document was created using voice recognition software. Note was reviewed however may contain grammatical errors.

## 2021-12-02 ENCOUNTER — OFFICE VISIT (OUTPATIENT)
Dept: PODIATRY | Age: 46
End: 2021-12-02
Payer: COMMERCIAL

## 2021-12-02 ENCOUNTER — OFFICE VISIT (OUTPATIENT)
Dept: NEUROLOGY | Age: 46
End: 2021-12-02
Payer: COMMERCIAL

## 2021-12-02 VITALS
HEART RATE: 88 BPM | SYSTOLIC BLOOD PRESSURE: 137 MMHG | OXYGEN SATURATION: 96 % | TEMPERATURE: 98.1 F | DIASTOLIC BLOOD PRESSURE: 84 MMHG | HEIGHT: 77 IN | WEIGHT: 315 LBS | RESPIRATION RATE: 18 BRPM | BODY MASS INDEX: 37.19 KG/M2

## 2021-12-02 VITALS — HEIGHT: 77 IN | BODY MASS INDEX: 37.19 KG/M2 | WEIGHT: 315 LBS

## 2021-12-02 DIAGNOSIS — G35 MS (MULTIPLE SCLEROSIS) (HCC): Primary | ICD-10-CM

## 2021-12-02 DIAGNOSIS — E11.65 UNCONTROLLED TYPE 2 DIABETES MELLITUS WITH HYPERGLYCEMIA (HCC): Primary | ICD-10-CM

## 2021-12-02 DIAGNOSIS — L03.115 CELLULITIS OF RIGHT ANKLE: ICD-10-CM

## 2021-12-02 DIAGNOSIS — S90.811A ABRASION OF RIGHT FOOT, INITIAL ENCOUNTER: ICD-10-CM

## 2021-12-02 DIAGNOSIS — E11.9 TYPE 2 DIABETES MELLITUS WITHOUT COMPLICATION, WITHOUT LONG-TERM CURRENT USE OF INSULIN (HCC): ICD-10-CM

## 2021-12-02 DIAGNOSIS — G60.8 HEREDITARY SENSORY NEUROPATHY: ICD-10-CM

## 2021-12-02 PROCEDURE — 99215 OFFICE O/P EST HI 40 MIN: CPT | Performed by: PSYCHIATRY & NEUROLOGY

## 2021-12-02 PROCEDURE — 99213 OFFICE O/P EST LOW 20 MIN: CPT | Performed by: PODIATRIST

## 2021-12-02 NOTE — PROGRESS NOTES
Patient in office for post op check Partial fifth ray amputation with I&D 2021.     Alex Marks : 1975 Sex: male  Age: 51 y.o. Patient was referred by Gracie Dhaliwal DO    CC:   Postop amputation partial right fifth ray with incision and drainage date of surgery 2021  Follow-up cellulitis right ankle    HPI:   Postop amputation partial right fifth ray with incision and drainage date of surgery 2021  Seen today follow-up cellulitis right ankle  No calf pain no foot pain. Did not get ultrasound after last visit. Wearing regular slide on shoes today. States he thinks he medihoney caught some of the skin when he was taking his shoes off and does have some dried blood at the right foot but no open draining wounds. No calf pain. Pleased with progression. Tolerating antibiotic well.           ROS:  Const: Denies constitutional symptoms  Musculo: Denies symptoms other than stated above  Skin: Denies symptoms other than stated above      Current Outpatient Medications:     doxycycline hyclate (VIBRAMYCIN) 100 MG capsule, Take 1 capsule by mouth 2 times daily for 14 days, Disp: 14 capsule, Rfl: 0    amLODIPine (NORVASC) 10 MG tablet, Take 1 tablet by mouth daily, Disp: 90 tablet, Rfl: 1    cloNIDine (CATAPRES) 0.1 MG tablet, Take 1 tablet by mouth 3 times daily, Disp: 270 tablet, Rfl: 1    pantoprazole (PROTONIX) 40 MG tablet, Take 40 mg by mouth daily, Disp: , Rfl:     acetaminophen (TYLENOL) 650 MG extended release tablet, Take 1 tablet by mouth every 8 hours as needed for Pain, Disp: 60 tablet, Rfl: 1    hydrALAZINE (APRESOLINE) 100 MG tablet, Take 1 tablet by mouth 3 times daily, Disp: 270 tablet, Rfl: 1    lisinopril (PRINIVIL;ZESTRIL) 40 MG tablet, Take 1 tablet by mouth daily, Disp: 90 tablet, Rfl: 1    insulin lispro (HUMALOG) 100 UNIT/ML injection vial, Inject 20 Units into the skin 3 times daily (before meals), Disp: 20 mL, Rfl: 3    metoprolol tartrate (LOPRESSOR) 100 Extremity Physical Exam:      Neurovascular examination:    Dorsalis Pedis palpable bilateral.  Posterior tibialis  palpable bilateral.  Capillary Refill Time:  Immediate return  Hair growth:  Symmetrical and bilateral   Skin:  Not atrophic  Edema: Mild edema bilateral feet. Mild edema bilateral ankles. Neurologic:  Light touch diminished bilateral.  Warm to coolness bilateral distal toes  Decreased epicritic sensation     Musculoskeletal/ Orthopedic examination:    Equinis: present bilateral  Dorsiflexion, plantarflexion, inversion, eversion bilateral 5 out of 5 muscle strength  Wiggling remaining toes    Status post fifth ray amputation right foot. Negative Homans bilateral  No pain right foot or ankle. Dermatology examination:    Dorsal lateral right foot wound skin is well coapted. Dried blood distal right foot. No open wound. Assessment and Plan:  Estephania Vanessa was seen today for post-op check. Diagnoses and all orders for this visit:    Uncontrolled type 2 diabetes mellitus with hyperglycemia ([facility] [address]    Hereditary sensory neuropathy    Abrasion of right foot, initial encounter    Cellulitis of right ankle      Follow-up likely cellulitis right ankle. No ankle or calf pain today. Still does have 1 week remaining of oral doxycycline 10 milligrams twice daily. We did previously discuss going to have ultrasound of the right calf which he declined. Any increasing calf pain stressed importance of going to emergency room to avoid blood clot and possibly death. He is understanding of this. Clinically no calf pain today. Does have dried blood at the end of the right foot. Previous history of amputation right foot remains high risk for lower extremity limb loss if new wounds. I did tell him to come in sooner with any new skin lesions or abrasions. Stressed importance of using a sock. Does have regular shoes without socks today. I will follow-up 2 weeks to monitor progression.     Return in about 2 weeks (around 12/16/2021). Seen By:  Edis Burgos DPM      Document was created using voice recognition software. Note was reviewed however may contain grammatical errors.

## 2021-12-02 NOTE — PROGRESS NOTES
This 50-year-old right-handed man was referred for evaluation and management of relapsing, remitting multiple sclerosis. He remained an excellent historian. His medications were now dimethyl fumarate, aripiprazole, lisinopril, metformin, Bactrim, doxycycline, insulins, escitalopram, pravastatin, bupropion, metoprolol, aspirin, cetirizine and omeprazole. He previously used dimethyl fumarate. His medical history was remarkable for longstanding diabetes mellitus, with improving control. His last hemoglobin A1c was 8.2. He suffered from hypertension, degenerative joint disease of his knees and depression, well controlled with his above regimen. There was also peripheral arterial disease, with a slowly resolving left foot ulceration and infection. He again denied strokes, seizures, heart disease, lung disorders, gastrointestinal disease, kidney abnormalities or skin abnormalities. Multiple sclerosis was diagnosed in 2008. While playing baseball, he suddenly noted unsteadiness and almost falling to the ground when batting. The next morning, he was clumsy and almost fell again. He experienced left hand numbness and weakness. He was initially evaluated for stroke, but investigation revealed multiple sclerosis. CSF analysis confirmed that diagnosis. He was referred to the [facility], where he first received dimethyl fumarate. He was off dimethyl fumarate for almost 3 years due to his inability to afford the medication. He recently returned to that medication. I initially requested ocrelizumab, but his insurance would not pay for that infusion. He improved remarkably from his first insult. Fortunately, he again denied additional deficits. He was tolerating dimethyl fumarate well. He reported no medical issues. He received his COVID-19 vaccinations. Physical examination displayed stable vital signs, except for a blood pressure 137/84.   He was a middle-age man in no acute distress, who was alert, cooperative and oriented. He remained very pleasant. He was obese. His skin was unremarkable. His lungs were clear to auscultation. Cardiac testing proved unremarkable. There remained abdominal obesity but no tenderness or masses. Peripheral pulses were +1 without bruits. His limbs were unremarkable except for a bandaged left foot. Neurological examination produced an intact mental status. Cranial nerve testing was unremarkable. I again found no Wilton pupil or red desaturation. He displayed normal tone and bulk with 5/5 strength throughout. I elicited no reflexes. I found a right Babinski but no other pathological ones. All sensory modalities continued intact. Finger-to-nose and heel shin testings were performed well. He walked well, despite favoring his left foot. Laboratory data included a recent unremarkable CMP, except for elevated blood sugars. CBC with differential displayed an absolute lymphocyte count of 1.49. His recent MRI of his brain revealed old plaque or a possible mass. There was no extensive disease. This individual was diagnosed with relapsing remitting multiple sclerosis years ago. On current examination, I find only a right Babinski. His EDS remains 1.0. Fortunately, he has returned to dimethyl fumarate. He requires an cervical MRI after he obtains new medical insurance; I will personally review the MRI of his brain. He is otherwise stable neurologically. He suffers from multiple medical comorbidities. He is controlling his blood sugars better and losing weight. He will follow closely with his primary care physician. He will return in 4 months. He will continue with dimethyl fumarate for now. On his next visit, MRI of the cervical spine will be ordered, as well as repeat blood work. I spent 40 minutes with the patient with over 50 % spent in counseling and disease management.   All patient issues were addressed and all questions were answered.

## 2021-12-03 ENCOUNTER — APPOINTMENT (OUTPATIENT)
Dept: ULTRASOUND IMAGING | Age: 46
End: 2021-12-03
Payer: COMMERCIAL

## 2021-12-03 ENCOUNTER — APPOINTMENT (OUTPATIENT)
Dept: GENERAL RADIOLOGY | Age: 46
End: 2021-12-03
Payer: COMMERCIAL

## 2021-12-03 ENCOUNTER — HOSPITAL ENCOUNTER (EMERGENCY)
Age: 46
Discharge: HOME | End: 2021-12-03
Attending: EMERGENCY MEDICINE
Payer: COMMERCIAL

## 2021-12-03 VITALS
OXYGEN SATURATION: 97 % | HEART RATE: 77 BPM | RESPIRATION RATE: 16 BRPM | HEIGHT: 77 IN | SYSTOLIC BLOOD PRESSURE: 142 MMHG | TEMPERATURE: 97 F | WEIGHT: 315 LBS | DIASTOLIC BLOOD PRESSURE: 87 MMHG | BODY MASS INDEX: 37.19 KG/M2

## 2021-12-03 DIAGNOSIS — M79.89 LEG SWELLING: Primary | ICD-10-CM

## 2021-12-03 LAB
ALBUMIN SERPL-MCNC: 4.2 G/DL (ref 3.5–5.2)
ALP BLD-CCNC: 74 U/L (ref 40–129)
ALT SERPL-CCNC: 20 U/L (ref 0–40)
ANION GAP SERPL CALCULATED.3IONS-SCNC: 10 MMOL/L (ref 7–16)
AST SERPL-CCNC: 20 U/L (ref 0–39)
BASOPHILS ABSOLUTE: 0.16 E9/L (ref 0–0.2)
BASOPHILS RELATIVE PERCENT: 1.3 % (ref 0–2)
BILIRUB SERPL-MCNC: 0.5 MG/DL (ref 0–1.2)
BUN BLDV-MCNC: 16 MG/DL (ref 6–20)
CALCIUM SERPL-MCNC: 9.3 MG/DL (ref 8.6–10.2)
CHLORIDE BLD-SCNC: 102 MMOL/L (ref 98–107)
CO2: 26 MMOL/L (ref 22–29)
CREAT SERPL-MCNC: 0.7 MG/DL (ref 0.7–1.2)
D DIMER: 226 NG/ML DDU
EKG ATRIAL RATE: 79 BPM
EKG P AXIS: 33 DEGREES
EKG P-R INTERVAL: 172 MS
EKG Q-T INTERVAL: 418 MS
EKG QRS DURATION: 92 MS
EKG QTC CALCULATION (BAZETT): 479 MS
EKG R AXIS: 22 DEGREES
EKG T AXIS: -4 DEGREES
EKG VENTRICULAR RATE: 79 BPM
EOSINOPHILS ABSOLUTE: 0.32 E9/L (ref 0.05–0.5)
EOSINOPHILS RELATIVE PERCENT: 2.5 % (ref 0–6)
GFR AFRICAN AMERICAN: >60
GFR NON-AFRICAN AMERICAN: >60 ML/MIN/1.73
GLUCOSE BLD-MCNC: 145 MG/DL (ref 74–99)
HCT VFR BLD CALC: 43 % (ref 37–54)
HEMOGLOBIN: 14.2 G/DL (ref 12.5–16.5)
IMMATURE GRANULOCYTES #: 0.08 E9/L
IMMATURE GRANULOCYTES %: 0.6 % (ref 0–5)
LACTIC ACID: 1.2 MMOL/L (ref 0.5–2.2)
LYMPHOCYTES ABSOLUTE: 2.59 E9/L (ref 1.5–4)
LYMPHOCYTES RELATIVE PERCENT: 20.5 % (ref 20–42)
MAGNESIUM: 2.3 MG/DL (ref 1.6–2.6)
MCH RBC QN AUTO: 32.1 PG (ref 26–35)
MCHC RBC AUTO-ENTMCNC: 33 % (ref 32–34.5)
MCV RBC AUTO: 97.1 FL (ref 80–99.9)
MONOCYTES ABSOLUTE: 0.95 E9/L (ref 0.1–0.95)
MONOCYTES RELATIVE PERCENT: 7.5 % (ref 2–12)
NEUTROPHILS ABSOLUTE: 8.51 E9/L (ref 1.8–7.3)
NEUTROPHILS RELATIVE PERCENT: 67.6 % (ref 43–80)
PDW BLD-RTO: 14 FL (ref 11.5–15)
PLATELET # BLD: 270 E9/L (ref 130–450)
PMV BLD AUTO: 11.4 FL (ref 7–12)
POTASSIUM SERPL-SCNC: 4.1 MMOL/L (ref 3.5–5)
PRO-BNP: 428 PG/ML (ref 0–125)
RBC # BLD: 4.43 E12/L (ref 3.8–5.8)
SARS-COV-2, NAAT: NOT DETECTED
SODIUM BLD-SCNC: 138 MMOL/L (ref 132–146)
TOTAL PROTEIN: 7.7 G/DL (ref 6.4–8.3)
TROPONIN, HIGH SENSITIVITY: 13 NG/L (ref 0–11)
WBC # BLD: 12.6 E9/L (ref 4.5–11.5)

## 2021-12-03 PROCEDURE — 83880 ASSAY OF NATRIURETIC PEPTIDE: CPT

## 2021-12-03 PROCEDURE — 83735 ASSAY OF MAGNESIUM: CPT

## 2021-12-03 PROCEDURE — 84484 ASSAY OF TROPONIN QUANT: CPT

## 2021-12-03 PROCEDURE — 93010 ELECTROCARDIOGRAM REPORT: CPT | Performed by: INTERNAL MEDICINE

## 2021-12-03 PROCEDURE — 85025 COMPLETE CBC W/AUTO DIFF WBC: CPT

## 2021-12-03 PROCEDURE — 71046 X-RAY EXAM CHEST 2 VIEWS: CPT

## 2021-12-03 PROCEDURE — 85378 FIBRIN DEGRADE SEMIQUANT: CPT

## 2021-12-03 PROCEDURE — 80053 COMPREHEN METABOLIC PANEL: CPT

## 2021-12-03 PROCEDURE — 93970 EXTREMITY STUDY: CPT

## 2021-12-03 PROCEDURE — 99283 EMERGENCY DEPT VISIT LOW MDM: CPT

## 2021-12-03 PROCEDURE — 93005 ELECTROCARDIOGRAM TRACING: CPT | Performed by: PHYSICIAN ASSISTANT

## 2021-12-03 PROCEDURE — 87635 SARS-COV-2 COVID-19 AMP PRB: CPT

## 2021-12-03 PROCEDURE — 83605 ASSAY OF LACTIC ACID: CPT

## 2021-12-03 RX ORDER — FUROSEMIDE 20 MG/1
20 TABLET ORAL 2 TIMES DAILY
Qty: 14 TABLET | Refills: 0 | Status: SHIPPED | OUTPATIENT
Start: 2021-12-03 | End: 2021-12-14

## 2021-12-03 ASSESSMENT — ENCOUNTER SYMPTOMS
ABDOMINAL PAIN: 0
CHEST TIGHTNESS: 0
COUGH: 1
SHORTNESS OF BREATH: 1

## 2021-12-03 NOTE — ED NOTES
FIRST PROVIDER CONTACT ASSESSMENT NOTE           Department of Emergency Medicine                 First Provider Note            12/3/21  10:20 AM EST    Date of Encounter: No admission date for patient encounter. Patient Name: Beryl Guthrie  : 1975  MRN: [id number]    Chief Complaint: Shortness of Breath (x 1 week / 98% RA slight cough, denies fever or covid exposure ) and Leg Swelling (bilateral legs )      History of Present Illness:   Beryl Guthrie is a 51 y.o. male who presents to the ED for bilateral leg swelling with shortness of breath. Patient states that he has chest pain from not being able to [facility]. \"  Patient states is worse with laying down. Patient has no history of congestive heart failure, COPD, asthma, bronchitis or pneumonia. Patient states he was recently immunized again with a booster a few weeks ago. Focused Physical Exam:  VS:    ED Triage Vitals [21 1017]   BP Temp Temp Source Pulse Resp SpO2 Height Weight   (!) 142/87 97 °F (36.1 °C) Temporal 88 16 93 % 6' 5\" (1.956 m) (!) 450 lb (204.1 kg)        Physical Ex: Constitutional: Alert and non-toxic. Medical History:  has a past medical history of Cellulitis of left foot, Cellulitis of right foot, Diabetes mellitus ([address] Diabetic foot ulcer with osteomyelitis ([address] Gangrene of toe of right foot ([address] Hyperlipidemia, Hypertension, Kidney stone, Multiple sclerosis ([address] and ЕКАТЕРИНА (obstructive sleep apnea). Surgical History:  has a past surgical history that includes Foot surgery (Left, 2020); Foot Debridement (Right, 2021); Toe amputation (Right, 2021); and   picc powerpic single (2021). Social History:  reports that he has never smoked. He has never used smokeless tobacco. He reports that he does not drink alcohol and does not use drugs. Family History: family history includes Diabetes in his father; Hypertension in his mother.     Allergies: Bee venom and Milk-related compounds Initial Plan of Care: Initiate Treatment-Testing, Proceed toTreatment Area When Bed Available for ED Attending/MLP to Continue Care      ---END OF FIRST PROVIDER CONTACT ASSESSMENT NOTE---  Electronically signed by Jethro Pacheco PA-C   DD: 12/3/21       Jethro Pacheco PA-C  12/03/21 1021

## 2021-12-03 NOTE — ED PROVIDER NOTES
50 yo male presenting from home with shortness of breath and leg swelling. Leg swelling is chronic in nature as he already has hemosiderin staining and chronic wounds on the leg. Is 98% on room air, denies fever. Does not know his medical problems. Patient awake and alert and oriented currently, not in any distress now, no trouble breathing at the point. New problem, persistent, moderate severity, his legs are chronically swollen this is not new or different. Associated with his obesity and lymphedema. Family History   Problem Relation Age of Onset    Hypertension Mother     Diabetes Father      Past Surgical History:   Procedure Laterality Date    FOOT DEBRIDEMENT Right 8/29/2021    FOOT DEBRIDEMENT INCISION AND DRAINAGE performed by Lisa Montoya DPM at [facility] Left 9/4/2020    INCISION AND DRAINAGE LEFT FOOT  BONE WITH PARTIAL BONE RESECTION FIFTH METATARSAL performed by Lisa Montoya DPM at [address] SINGLE  9/1/2021         TOE AMPUTATION Right 8/29/2021    TOE AMPUTATION fifth ray resection performed by Lisa Montoya DPM at [facility] OR       Review of Systems   Constitutional: Negative for diaphoresis and fever. Respiratory: Positive for cough and shortness of breath. Negative for chest tightness. Cardiovascular: Positive for leg swelling. Gastrointestinal: Negative for abdominal pain. All other systems reviewed and are negative. Physical Exam  Constitutional:       General: He is not in acute distress. Appearance: He is well-developed. He is obese. HENT:      Head: Normocephalic and atraumatic. Eyes:      Pupils: Pupils are equal, round, and reactive to light. Neck:      Thyroid: No thyromegaly. Cardiovascular:      Rate and Rhythm: Normal rate and regular rhythm. Pulmonary:      Effort: Pulmonary effort is normal. No respiratory distress. Breath sounds: Normal breath sounds. No wheezing.    Abdominal: been reviewed.     Allergies: Bee venom and Milk-related compounds    -------------------------------------------------- RESULTS -------------------------------------------------  Labs:  Results for orders placed or performed during the hospital encounter of 12/03/21   COVID-19, Rapid    Specimen: Nasopharyngeal Swab   Result Value Ref Range    SARS-CoV-2, NAAT Not Detected Not Detected   CBC auto differential   Result Value Ref Range    WBC 12.6 (H) 4.5 - 11.5 E9/L    RBC 4.43 3.80 - 5.80 E12/L    Hemoglobin 14.2 12.5 - 16.5 g/dL    Hematocrit 43.0 37.0 - 54.0 %    MCV 97.1 80.0 - 99.9 fL    MCH 32.1 26.0 - 35.0 pg    MCHC 33.0 32.0 - 34.5 %    RDW 14.0 11.5 - 15.0 fL    Platelets 155 542 - 364 E9/L    MPV 11.4 7.0 - 12.0 fL    Neutrophils % 67.6 43.0 - 80.0 %    Immature Granulocytes % 0.6 0.0 - 5.0 %    Lymphocytes % 20.5 20.0 - 42.0 %    Monocytes % 7.5 2.0 - 12.0 %    Eosinophils % 2.5 0.0 - 6.0 %    Basophils % 1.3 0.0 - 2.0 %    Neutrophils Absolute 8.51 (H) 1.80 - 7.30 E9/L    Immature Granulocytes # 0.08 E9/L    Lymphocytes Absolute 2.59 1.50 - 4.00 E9/L    Monocytes Absolute 0.95 0.10 - 0.95 E9/L    Eosinophils Absolute 0.32 0.05 - 0.50 E9/L    Basophils Absolute 0.16 0.00 - 0.20 E9/L   Comprehensive Metabolic Panel   Result Value Ref Range    Sodium 138 132 - 146 mmol/L    Potassium 4.1 3.5 - 5.0 mmol/L    Chloride 102 98 - 107 mmol/L    CO2 26 22 - 29 mmol/L    Anion Gap 10 7 - 16 mmol/L    Glucose 145 (H) 74 - 99 mg/dL    BUN 16 6 - 20 mg/dL    CREATININE 0.7 0.7 - 1.2 mg/dL    GFR Non-African American >60 >=60 mL/min/1.73    GFR African American >60     Calcium 9.3 8.6 - 10.2 mg/dL    Total Protein 7.7 6.4 - 8.3 g/dL    Albumin 4.2 3.5 - 5.2 g/dL    Total Bilirubin 0.5 0.0 - 1.2 mg/dL    Alkaline Phosphatase 74 40 - 129 U/L    ALT 20 0 - 40 U/L    AST 20 0 - 39 U/L   Lactic Acid, Plasma   Result Value Ref Range    Lactic Acid 1.2 0.5 - 2.2 mmol/L   Troponin   Result Value Ref Range    Troponin, High Sensitivity 13 (H) 0 - 11 ng/L   Brain Natriuretic Peptide   Result Value Ref Range    Pro- (H) 0 - 125 pg/mL   Magnesium   Result Value Ref Range    Magnesium 2.3 1.6 - 2.6 mg/dL   D-Dimer, Quantitative   Result Value Ref Range    D-Dimer, Quant 226 ng/mL DDU   EKG 12 Lead   Result Value Ref Range    Ventricular Rate 79 BPM    Atrial Rate 79 BPM    P-R Interval 172 ms    QRS Duration 92 ms    Q-T Interval 418 ms    QTc Calculation (Bazett) 479 ms    P Axis 33 degrees    R Axis 22 degrees    T Axis -4 degrees       Radiology:  US DUP LOWER EXTREMITIES BILATERAL VENOUS   Final Result   No evidence of DVT in either lower extremity. XR CHEST (2 VW)   Final Result   Faint bilateral pulmonary airspace opacities. Correlate for infectious   process or possible edema. ------------------------- NURSING NOTES AND VITALS REVIEWED ---------------------------  Date / Time Roomed:  12/3/2021 10:34 AM  ED Bed Assignment:  23/23    The nursing notes within the ED encounter and vital signs as below have been reviewed. BP (!) 142/87   Pulse 77   Temp 97 °F (36.1 °C) (Temporal)   Resp 16   Ht 6' 5\" (1.956 m)   Wt (!) 450 lb (204.1 kg)   SpO2 97%   BMI 53.36 kg/m²   Oxygen Saturation Interpretation: Normal      ------------------------------------------ PROGRESS NOTES ------------------------------------------  I have spoken with the patient and discussed todays results, in addition to providing specific details for the plan of care and counseling regarding the diagnosis and prognosis. Their questions are answered at this time and they are agreeable with the plan. I discussed at length with them reasons for immediate return here for re evaluation. They will followup with primary care by calling their office tomorrow.       --------------------------------- ADDITIONAL PROVIDER NOTES ---------------------------------  At this time the patient is without objective evidence of an acute process requiring hospitalization or inpatient management. They have remained hemodynamically stable throughout their entire ED visit and are stable for discharge with outpatient follow-up. The plan has been discussed in detail and they are aware of the specific conditions for emergent return, as well as the importance of follow-up. Discharge Medication List as of 12/3/2021  2:39 PM      START taking these medications    Details   furosemide (LASIX) 20 MG tablet Take 1 tablet by mouth 2 times daily, Disp-14 tablet, R-0Print             Diagnosis:  1. Leg swelling        Disposition:  Patient's disposition: Discharge to home  Patient's condition is stable.          David Kingston DO  12/05/21 0657

## 2021-12-09 RX ORDER — INSULIN ASPART 100 [IU]/ML
INJECTION, SOLUTION INTRAVENOUS; SUBCUTANEOUS
COMMUNITY
Start: 2021-11-08 | End: 2021-12-14

## 2021-12-14 ENCOUNTER — OFFICE VISIT (OUTPATIENT)
Dept: FAMILY MEDICINE CLINIC | Age: 46
End: 2021-12-14
Payer: COMMERCIAL

## 2021-12-14 VITALS
DIASTOLIC BLOOD PRESSURE: 76 MMHG | TEMPERATURE: 98.4 F | HEART RATE: 86 BPM | BODY MASS INDEX: 37.19 KG/M2 | OXYGEN SATURATION: 96 % | SYSTOLIC BLOOD PRESSURE: 129 MMHG | HEIGHT: 77 IN | WEIGHT: 315 LBS | RESPIRATION RATE: 20 BRPM

## 2021-12-14 DIAGNOSIS — E11.65 UNCONTROLLED TYPE 2 DIABETES MELLITUS WITH HYPERGLYCEMIA (HCC): ICD-10-CM

## 2021-12-14 DIAGNOSIS — R06.01 ORTHOPNEA: Primary | ICD-10-CM

## 2021-12-14 DIAGNOSIS — R06.09 DYSPNEA ON MINIMAL EXERTION: ICD-10-CM

## 2021-12-14 DIAGNOSIS — M79.89 LEG SWELLING: ICD-10-CM

## 2021-12-14 LAB — HBA1C MFR BLD: 8.5 %

## 2021-12-14 PROCEDURE — 99214 OFFICE O/P EST MOD 30 MIN: CPT | Performed by: STUDENT IN AN ORGANIZED HEALTH CARE EDUCATION/TRAINING PROGRAM

## 2021-12-14 PROCEDURE — 3052F HG A1C>EQUAL 8.0%<EQUAL 9.0%: CPT | Performed by: STUDENT IN AN ORGANIZED HEALTH CARE EDUCATION/TRAINING PROGRAM

## 2021-12-14 PROCEDURE — 83036 HEMOGLOBIN GLYCOSYLATED A1C: CPT | Performed by: STUDENT IN AN ORGANIZED HEALTH CARE EDUCATION/TRAINING PROGRAM

## 2021-12-14 RX ORDER — FUROSEMIDE 40 MG/1
40 TABLET ORAL DAILY
Qty: 60 TABLET | Refills: 3 | Status: SHIPPED
Start: 2021-12-14 | End: 2022-09-27

## 2021-12-14 NOTE — PROGRESS NOTES
Patient:  Alex Jordan 51 y.o. male     Date of Service: 12/14/21      Chiefcomplaint:   Chief Complaint   Patient presents with    Wheezing     x couple weeks    Shortness of Breath    Leg Swelling     both legs swollen    Fatigue     sleeping 16 hours a day     History of Present Illness   Patient with history of hypertension, diabetes, morbid obesity, hyperlipidemia presents with new onset orthopnea/shortness of breath/chest congestion/leg swelling. Patient went to the ED for this and was found to have BNP slightly elevated, infiltrates on chest x-ray, right atrial enlargement and inverted T waves on EKG. Had DVT ultrasound that was unremarkable. Was not admitted for further evaluation. Patient was sent home on Lasix. This helped with his leg swelling and symptoms overall but ran out of the medication. Since then he has had return and worsening of orthopnea, slight shortness of breath, chest congestion. Also with increasing fatigue. Does not want to go to the emergency department. No history of CAD. He is on metoprolol, ACE, statin, aspirin. Pertinent Medical, Family, Surgical, Social History:  Past Medical History:   Diagnosis Date    Cellulitis of left foot 09/03/2020    Cellulitis of right foot     Diabetes mellitus ([address]     Diabetic foot ulcer [facility]omyelitis [facility] 8/27/2021    Gangrene of toe of right foot ([facility] 08/28/2021    Hyperlipidemia     Hypertension     Kidney stone     Multiple sclerosis ([facility]     ЕКАТЕРИНА (obstructive sleep apnea)     dx 15 years ago, not compliant with CPAP     Physical Exam   Vitals: /76 (Site: Right Upper Arm, Position: Sitting, Cuff Size: Large Adult)   Pulse 86   Temp 98.4 °F (36.9 °C) (Temporal)   Resp 20   Ht 6' 5\" (1.956 m)   Wt (!) 450 lb (204.1 kg)   SpO2 96%   BMI 53.36 kg/m²   General Appearance: Alert, oriented, no acute distress  HEENT: No scleral icterus. No visible discharge from eyes.   No JVD  Chest wall/Lung: Decreased breath sounds without obvious rales. Heart: RRR, no murmur. No S3  Abdomen: Soft, nontender  Extremities:  2+ edema in lower extremities b/l  Skin: No rashes. No jaundice  Neuro: Alert and oriented        Psych: Appropriate mood and appropriate affect    Assessment and Plan   1. Orthopnea  Declines emergency department evaluation despite risk. Recommend stat echo to rule out heart failure given orthopnea, leg swelling, shortness of breath, chest congestion, fatigue. Consideration of stress test based on results. Consideration of chest x-ray. Recommend BMP on Friday after restarting Lasix 40 daily. Check daily weights.  -     ECHO COMPLETE; Future  -     BASIC METABOLIC PANEL; Future  2. Leg swelling  -     ECHO COMPLETE; Future  -     BASIC METABOLIC PANEL; Future  3. Dyspnea on minimal exertion  -     ECHO COMPLETE; Future  -     BASIC METABOLIC PANEL; Future  4. Uncontrolled type 2 diabetes mellitus with hyperglycemia (HCC)  -     POCT glycosylated hemoglobin (Hb A1C)  -     furosemide (LASIX) 40 MG tablet; Take 1 tablet by mouth daily, Disp-60 tablet, R-3Normal      Return in about 2 weeks (around 12/28/2021). Robyn Case, DO     This document may have been prepared at least partially through the use of voice recognition software. Although effort is taken to assure the accuracy of this document, it is possible that grammatical, syntax,  or spelling errors may occur.

## 2021-12-15 ENCOUNTER — TELEPHONE (OUTPATIENT)
Dept: FAMILY MEDICINE CLINIC | Age: 46
End: 2021-12-15

## 2021-12-15 DIAGNOSIS — E11.65 UNCONTROLLED TYPE 2 DIABETES MELLITUS WITH HYPERGLYCEMIA (HCC): Primary | ICD-10-CM

## 2021-12-15 NOTE — TELEPHONE ENCOUNTER
Pt needs pen needles right away he injects 6 times a day, please send too [facility], pt is out

## 2021-12-20 ENCOUNTER — TELEPHONE (OUTPATIENT)
Dept: FAMILY MEDICINE CLINIC | Age: 46
End: 2021-12-20

## 2021-12-20 NOTE — TELEPHONE ENCOUNTER
----- Message from Jennifer Fletcher DO sent at 12/20/2021  2:30 PM EST -----  Patient came in with swelling and sob last week.  Please see how he is doing and remind him to get BMP lab work done to make sure he is tolerating lasix.   ----- Message -----  From: Jennifer Fletcher DO  Sent: 12/14/2021  10:11 AM EST  To: Jennifer Fletcher DO    Check on bmp, lasix use, weight, symptoms

## 2021-12-20 NOTE — TELEPHONE ENCOUNTER
Patient called stated that he is doing good with lasix. He stated he is urinating a lot.   Patient stated that he will go and have labs completed

## 2022-01-19 DIAGNOSIS — I10 ESSENTIAL HYPERTENSION: ICD-10-CM

## 2022-01-19 RX ORDER — METOPROLOL TARTRATE 100 MG/1
TABLET ORAL
Qty: 180 TABLET | Refills: 1 | Status: SHIPPED
Start: 2022-01-19 | End: 2022-07-08

## 2022-01-20 DIAGNOSIS — E11.65 UNCONTROLLED TYPE 2 DIABETES MELLITUS WITH HYPERGLYCEMIA (HCC): ICD-10-CM

## 2022-01-20 RX ORDER — INSULIN ASPART 100 [IU]/ML
INJECTION, SOLUTION INTRAVENOUS; SUBCUTANEOUS
Qty: 15 ML | OUTPATIENT
Start: 2022-01-20

## 2022-01-24 NOTE — TELEPHONE ENCOUNTER
Pt called to check status on this, confused about why is the being denied?  Please call pt [phone]

## 2022-01-25 DIAGNOSIS — E11.65 UNCONTROLLED TYPE 2 DIABETES MELLITUS WITH HYPERGLYCEMIA (HCC): Primary | ICD-10-CM

## 2022-01-25 RX ORDER — INSULIN ASPART 100 [IU]/ML
INJECTION, SOLUTION INTRAVENOUS; SUBCUTANEOUS
Qty: 5 PEN | Refills: 3 | Status: SHIPPED
Start: 2022-01-25 | End: 2022-01-25

## 2022-01-25 RX ORDER — INSULIN LISPRO 100 [IU]/ML
INJECTION, SOLUTION INTRAVENOUS; SUBCUTANEOUS
Qty: 5 PEN | Refills: 3 | Status: SHIPPED
Start: 2022-01-25 | End: 2022-06-17 | Stop reason: SDUPTHER

## 2022-01-25 NOTE — TELEPHONE ENCOUNTER
Refill request was refused because it was sent to the [facility] office. That office refused it.   See where he wants this sent, [facility] or [facility]

## 2022-01-31 ENCOUNTER — PATIENT MESSAGE (OUTPATIENT)
Dept: NEUROLOGY | Age: 47
End: 2022-01-31

## 2022-02-01 RX ORDER — DIMETHYL FUMARATE 120 MG/1
120 CAPSULE ORAL 2 TIMES DAILY
Qty: 60 CAPSULE | Refills: 11 | Status: SHIPPED
Start: 2022-02-01 | End: 2022-07-20 | Stop reason: ALTCHOICE

## 2022-02-01 NOTE — TELEPHONE ENCOUNTER
From: Lacho Marie  To: Dr. Heike Lares  Sent: 1/31/2022 3:50 PM EST  Subject: [employer]    Can you call [facility] and set up my meds.  [phone]

## 2022-02-02 ENCOUNTER — TELEPHONE (OUTPATIENT)
Dept: FAMILY MEDICINE CLINIC | Age: 47
End: 2022-02-02

## 2022-02-02 ENCOUNTER — TELEPHONE (OUTPATIENT)
Dept: NEUROLOGY | Age: 47
End: 2022-02-02

## 2022-02-02 NOTE — TELEPHONE ENCOUNTER
Please call the insurance to do a Prior auth for Novalog as it must be done verbally because something in the demographics does not match       [id number] my Meds     Ref [id number] [phone]

## 2022-02-18 DIAGNOSIS — F32.A DEPRESSION, UNSPECIFIED DEPRESSION TYPE: ICD-10-CM

## 2022-02-18 RX ORDER — ESCITALOPRAM OXALATE 20 MG/1
20 TABLET ORAL DAILY
Qty: 90 TABLET | Refills: 1 | Status: SHIPPED
Start: 2022-02-18 | End: 2022-08-01 | Stop reason: SDUPTHER

## 2022-03-01 ENCOUNTER — OFFICE VISIT (OUTPATIENT)
Dept: FAMILY MEDICINE CLINIC | Age: 47
End: 2022-03-01
Payer: COMMERCIAL

## 2022-03-01 ENCOUNTER — OFFICE VISIT (OUTPATIENT)
Dept: PODIATRY | Age: 47
End: 2022-03-01
Payer: COMMERCIAL

## 2022-03-01 VITALS
HEART RATE: 88 BPM | RESPIRATION RATE: 20 BRPM | SYSTOLIC BLOOD PRESSURE: 159 MMHG | BODY MASS INDEX: 53.36 KG/M2 | DIASTOLIC BLOOD PRESSURE: 102 MMHG | TEMPERATURE: 97.9 F | HEIGHT: 77 IN

## 2022-03-01 DIAGNOSIS — R53.83 OTHER FATIGUE: ICD-10-CM

## 2022-03-01 DIAGNOSIS — R60.9 SWELLING: ICD-10-CM

## 2022-03-01 DIAGNOSIS — G60.8 HEREDITARY SENSORY NEUROPATHY: ICD-10-CM

## 2022-03-01 DIAGNOSIS — R06.02 SOB (SHORTNESS OF BREATH): Primary | ICD-10-CM

## 2022-03-01 DIAGNOSIS — E11.65 UNCONTROLLED TYPE 2 DIABETES MELLITUS WITH HYPERGLYCEMIA (HCC): Primary | ICD-10-CM

## 2022-03-01 DIAGNOSIS — G35 MS (MULTIPLE SCLEROSIS) (HCC): Chronic | ICD-10-CM

## 2022-03-01 DIAGNOSIS — I10 ESSENTIAL HYPERTENSION: ICD-10-CM

## 2022-03-01 DIAGNOSIS — R06.02 SOB (SHORTNESS OF BREATH): ICD-10-CM

## 2022-03-01 DIAGNOSIS — E11.65 UNCONTROLLED TYPE 2 DIABETES MELLITUS WITH HYPERGLYCEMIA (HCC): ICD-10-CM

## 2022-03-01 DIAGNOSIS — B35.1 TINEA UNGUIUM: ICD-10-CM

## 2022-03-01 LAB
ALBUMIN SERPL-MCNC: 3.8 G/DL (ref 3.5–5.2)
ALP BLD-CCNC: 80 U/L (ref 40–129)
ALT SERPL-CCNC: 27 U/L (ref 0–40)
ANION GAP SERPL CALCULATED.3IONS-SCNC: 15 MMOL/L (ref 7–16)
AST SERPL-CCNC: 22 U/L (ref 0–39)
BASOPHILS ABSOLUTE: 0.14 E9/L (ref 0–0.2)
BASOPHILS RELATIVE PERCENT: 1.1 % (ref 0–2)
BILIRUB SERPL-MCNC: 0.5 MG/DL (ref 0–1.2)
BUN BLDV-MCNC: 12 MG/DL (ref 6–20)
CALCIUM SERPL-MCNC: 8.9 MG/DL (ref 8.6–10.2)
CHLORIDE BLD-SCNC: 99 MMOL/L (ref 98–107)
CHOLESTEROL, TOTAL: 149 MG/DL (ref 0–199)
CO2: 21 MMOL/L (ref 22–29)
CREAT SERPL-MCNC: 0.7 MG/DL (ref 0.7–1.2)
EOSINOPHILS ABSOLUTE: 0.26 E9/L (ref 0.05–0.5)
EOSINOPHILS RELATIVE PERCENT: 2 % (ref 0–6)
GFR AFRICAN AMERICAN: >60
GFR NON-AFRICAN AMERICAN: >60 ML/MIN/1.73
GLUCOSE BLD-MCNC: 310 MG/DL (ref 74–99)
HCT VFR BLD CALC: 46.8 % (ref 37–54)
HDLC SERPL-MCNC: 37 MG/DL
HEMOGLOBIN: 15.6 G/DL (ref 12.5–16.5)
IMMATURE GRANULOCYTES #: 0.09 E9/L
IMMATURE GRANULOCYTES %: 0.7 % (ref 0–5)
LDL CHOLESTEROL CALCULATED: 85 MG/DL (ref 0–99)
LYMPHOCYTES ABSOLUTE: 1.99 E9/L (ref 1.5–4)
LYMPHOCYTES RELATIVE PERCENT: 15.4 % (ref 20–42)
MCH RBC QN AUTO: 32.1 PG (ref 26–35)
MCHC RBC AUTO-ENTMCNC: 33.3 % (ref 32–34.5)
MCV RBC AUTO: 96.3 FL (ref 80–99.9)
MONOCYTES ABSOLUTE: 1.13 E9/L (ref 0.1–0.95)
MONOCYTES RELATIVE PERCENT: 8.8 % (ref 2–12)
NEUTROPHILS ABSOLUTE: 9.3 E9/L (ref 1.8–7.3)
NEUTROPHILS RELATIVE PERCENT: 72 % (ref 43–80)
PDW BLD-RTO: 13.2 FL (ref 11.5–15)
PLATELET # BLD: 249 E9/L (ref 130–450)
PMV BLD AUTO: 13 FL (ref 7–12)
POTASSIUM SERPL-SCNC: 4.4 MMOL/L (ref 3.5–5)
PRO-BNP: 291 PG/ML (ref 0–125)
RBC # BLD: 4.86 E12/L (ref 3.8–5.8)
SODIUM BLD-SCNC: 135 MMOL/L (ref 132–146)
TOTAL PROTEIN: 7.3 G/DL (ref 6.4–8.3)
TRIGL SERPL-MCNC: 134 MG/DL (ref 0–149)
TSH SERPL DL<=0.05 MIU/L-ACNC: 2.01 UIU/ML (ref 0.27–4.2)
VLDLC SERPL CALC-MCNC: 27 MG/DL
WBC # BLD: 12.9 E9/L (ref 4.5–11.5)

## 2022-03-01 PROCEDURE — 11721 DEBRIDE NAIL 6 OR MORE: CPT | Performed by: PODIATRIST

## 2022-03-01 PROCEDURE — 99212 OFFICE O/P EST SF 10 MIN: CPT | Performed by: PODIATRIST

## 2022-03-01 PROCEDURE — 99214 OFFICE O/P EST MOD 30 MIN: CPT | Performed by: STUDENT IN AN ORGANIZED HEALTH CARE EDUCATION/TRAINING PROGRAM

## 2022-03-01 RX ORDER — AMLODIPINE BESYLATE 10 MG/1
TABLET ORAL
COMMUNITY
Start: 2022-01-19 | End: 2022-03-01

## 2022-03-01 SDOH — SOCIAL STABILITY: SOCIAL NETWORK: HOW OFTEN DO YOU GET TOGETHER WITH FRIENDS OR RELATIVES?: MORE THAN THREE TIMES A WEEK

## 2022-03-01 SDOH — ECONOMIC STABILITY: FOOD INSECURITY: WITHIN THE PAST 12 MONTHS, YOU WORRIED THAT YOUR FOOD WOULD RUN OUT BEFORE YOU GOT MONEY TO BUY MORE.: NEVER TRUE

## 2022-03-01 SDOH — SOCIAL STABILITY: SOCIAL NETWORK: HOW OFTEN DO YOU ATTENT MEETINGS OF THE CLUB OR ORGANIZATION YOU BELONG TO?: NEVER

## 2022-03-01 SDOH — HEALTH STABILITY: PHYSICAL HEALTH: ON AVERAGE, HOW MANY DAYS PER WEEK DO YOU ENGAGE IN MODERATE TO STRENUOUS EXERCISE (LIKE A BRISK WALK)?: 0 DAYS

## 2022-03-01 SDOH — ECONOMIC STABILITY: TRANSPORTATION INSECURITY
IN THE PAST 12 MONTHS, HAS THE LACK OF TRANSPORTATION KEPT YOU FROM MEDICAL APPOINTMENTS OR FROM GETTING MEDICATIONS?: NO

## 2022-03-01 SDOH — ECONOMIC STABILITY: INCOME INSECURITY: HOW HARD IS IT FOR YOU TO PAY FOR THE VERY BASICS LIKE FOOD, HOUSING, MEDICAL CARE, AND HEATING?: NOT HARD AT ALL

## 2022-03-01 SDOH — HEALTH STABILITY: MENTAL HEALTH: HOW OFTEN DO YOU HAVE A DRINK CONTAINING ALCOHOL?: NEVER

## 2022-03-01 SDOH — SOCIAL STABILITY: SOCIAL INSECURITY: WITHIN THE LAST YEAR, HAVE YOU BEEN AFRAID OF YOUR PARTNER OR EX-PARTNER?: NO

## 2022-03-01 SDOH — ECONOMIC STABILITY: HOUSING INSECURITY: IN THE LAST 12 MONTHS, HOW MANY PLACES HAVE YOU LIVED?: 1

## 2022-03-01 SDOH — ECONOMIC STABILITY: INCOME INSECURITY: IN THE LAST 12 MONTHS, WAS THERE A TIME WHEN YOU WERE NOT ABLE TO PAY THE MORTGAGE OR RENT ON TIME?: NO

## 2022-03-01 SDOH — ECONOMIC STABILITY: FOOD INSECURITY: WITHIN THE PAST 12 MONTHS, THE FOOD YOU BOUGHT JUST DIDN'T LAST AND YOU DIDN'T HAVE MONEY TO GET MORE.: NEVER TRUE

## 2022-03-01 SDOH — SOCIAL STABILITY: SOCIAL NETWORK: ARE YOU MARRIED, WIDOWED, DIVORCED, SEPARATED, NEVER MARRIED, OR LIVING WITH A PARTNER?: SEPARATED

## 2022-03-01 SDOH — SOCIAL STABILITY: SOCIAL INSECURITY
WITHIN THE LAST YEAR, HAVE YOU BEEN KICKED, HIT, SLAPPED, OR OTHERWISE PHYSICALLY HURT BY YOUR PARTNER OR EX-PARTNER?: NO

## 2022-03-01 SDOH — HEALTH STABILITY: PHYSICAL HEALTH: ON AVERAGE, HOW MANY MINUTES DO YOU ENGAGE IN EXERCISE AT THIS LEVEL?: 0 MIN

## 2022-03-01 SDOH — ECONOMIC STABILITY: HOUSING INSECURITY
IN THE LAST 12 MONTHS, WAS THERE A TIME WHEN YOU DID NOT HAVE A STEADY PLACE TO SLEEP OR SLEPT IN A SHELTER (INCLUDING NOW)?: NO

## 2022-03-01 SDOH — HEALTH STABILITY: MENTAL HEALTH
STRESS IS WHEN SOMEONE FEELS TENSE, NERVOUS, ANXIOUS, OR CAN'T SLEEP AT NIGHT BECAUSE THEIR MIND IS TROUBLED. HOW STRESSED ARE YOU?: NOT AT ALL

## 2022-03-01 SDOH — SOCIAL STABILITY: SOCIAL NETWORK
IN A TYPICAL WEEK, HOW MANY TIMES DO YOU TALK ON THE PHONE WITH FAMILY, FRIENDS, OR NEIGHBORS?: MORE THAN THREE TIMES A WEEK

## 2022-03-01 SDOH — SOCIAL STABILITY: SOCIAL NETWORK: HOW OFTEN DO YOU ATTEND CHURCH OR RELIGIOUS SERVICES?: NEVER

## 2022-03-01 SDOH — SOCIAL STABILITY: SOCIAL NETWORK
DO YOU BELONG TO ANY CLUBS OR ORGANIZATIONS SUCH AS CHURCH GROUPS UNIONS, FRATERNAL OR ATHLETIC GROUPS, OR SCHOOL GROUPS?: NO

## 2022-03-01 SDOH — ECONOMIC STABILITY: TRANSPORTATION INSECURITY
IN THE PAST 12 MONTHS, HAS LACK OF TRANSPORTATION KEPT YOU FROM MEETINGS, WORK, OR FROM GETTING THINGS NEEDED FOR DAILY LIVING?: NO

## 2022-03-01 SDOH — SOCIAL STABILITY: SOCIAL INSECURITY: WITHIN THE LAST YEAR, HAVE YOU BEEN HUMILIATED OR EMOTIONALLY ABUSED IN OTHER WAYS BY YOUR PARTNER OR EX-PARTNER?: NO

## 2022-03-01 SDOH — SOCIAL STABILITY: SOCIAL INSECURITY
WITHIN THE LAST YEAR, HAVE TO BEEN RAPED OR FORCED TO HAVE ANY KIND OF SEXUAL ACTIVITY BY YOUR PARTNER OR EX-PARTNER?: NO

## 2022-03-01 ASSESSMENT — PATIENT HEALTH QUESTIONNAIRE - PHQ9
7. TROUBLE CONCENTRATING ON THINGS, SUCH AS READING THE NEWSPAPER OR WATCHING TELEVISION: 2
SUM OF ALL RESPONSES TO PHQ9 QUESTIONS 1 & 2: 0
SUM OF ALL RESPONSES TO PHQ QUESTIONS 1-9: 9
SUM OF ALL RESPONSES TO PHQ QUESTIONS 1-9: 9
5. POOR APPETITE OR OVEREATING: 0
2. FEELING DOWN, DEPRESSED OR HOPELESS: 0
SUM OF ALL RESPONSES TO PHQ QUESTIONS 1-9: 9
3. TROUBLE FALLING OR STAYING ASLEEP: 1
6. FEELING BAD ABOUT YOURSELF - OR THAT YOU ARE A FAILURE OR HAVE LET YOURSELF OR YOUR FAMILY DOWN: 0
9. THOUGHTS THAT YOU WOULD BE BETTER OFF DEAD, OR OF HURTING YOURSELF: 0
10. IF YOU CHECKED OFF ANY PROBLEMS, HOW DIFFICULT HAVE THESE PROBLEMS MADE IT FOR YOU TO DO YOUR WORK, TAKE CARE OF THINGS AT HOME, OR GET ALONG WITH OTHER PEOPLE: 2
1. LITTLE INTEREST OR PLEASURE IN DOING THINGS: 0
8. MOVING OR SPEAKING SO SLOWLY THAT OTHER PEOPLE COULD HAVE NOTICED. OR THE OPPOSITE, BEING SO FIGETY OR RESTLESS THAT YOU HAVE BEEN MOVING AROUND A LOT MORE THAN USUAL: 3
SUM OF ALL RESPONSES TO PHQ QUESTIONS 1-9: 9
4. FEELING TIRED OR HAVING LITTLE ENERGY: 3

## 2022-03-01 ASSESSMENT — SOCIAL DETERMINANTS OF HEALTH (SDOH): HOW HARD IS IT FOR YOU TO PAY FOR THE VERY BASICS LIKE FOOD, HOUSING, MEDICAL CARE, AND HEATING?: NOT HARD AT ALL

## 2022-03-01 NOTE — PROGRESS NOTES
Patient in office with c/o open areas to bilat feet. Hit right foot 1 week ago on door and scratched top of foot. Left foot cracked from dry skin. CC:   Postop amputation partial right fifth ray with incision and drainage date of surgery 8/29/2021  Follow-up diabetic exam    HPI:   Postop amputation partial right fifth ray with incision and drainage date of surgery 8/29/2021    Does have dry skin. States he did scratch his right foot when he hit it on the door last week. Denies any open or draining wounds today. Does have difficulty reaching his feet and does have dry skin both feet. Does have lotion at home but states he has a difficult time putting on his feet. Denies any foot or ankle pain. Denies nausea vomiting fever chills shortness of breath. Did see his primary care physician today. No additional pedal complaints.         ROS:  Const: Denies constitutional symptoms  Musculo: Denies symptoms other than stated above  Skin: Denies symptoms other than stated above      Current Outpatient Medications:     escitalopram (LEXAPRO) 20 MG tablet, Take 1 tablet by mouth daily, Disp: 90 tablet, Rfl: 1    dimethyl fumarate (TECFIDERA) 120 MG delayed release capsule, Take 1 capsule by mouth 2 times daily, Disp: 60 capsule, Rfl: 11    insulin lispro, 1 Unit Dial, (HUMALOG KWIKPEN) 100 UNIT/ML SOPN, 5 units with breakfast and lunch and 10 units with dinner, Disp: 5 pen, Rfl: 3    metoprolol (LOPRESSOR) 100 MG tablet, TAKE 1 TABLET BY MOUTH TWICE DAILY, Disp: 180 tablet, Rfl: 1    Insulin Pen Needle 31G X 8 MM MISC, 1 each by Does not apply route daily, Disp: 500 each, Rfl: 1    furosemide (LASIX) 40 MG tablet, Take 1 tablet by mouth daily, Disp: 60 tablet, Rfl: 3    cloNIDine (CATAPRES) 0.1 MG tablet, Take 1 tablet by mouth 3 times daily, Disp: 270 tablet, Rfl: 1    acetaminophen (TYLENOL) 650 MG extended release tablet, Take 1 tablet by mouth every 8 hours as needed for Pain, Disp: 60 tablet, Rfl: 1   hydrALAZINE (APRESOLINE) 100 MG tablet, Take 1 tablet by mouth 3 times daily, Disp: 270 tablet, Rfl: 1    lisinopril (PRINIVIL;ZESTRIL) 40 MG tablet, Take 1 tablet by mouth daily, Disp: 90 tablet, Rfl: 1    metFORMIN (GLUCOPHAGE) 1000 MG tablet, Take 1 tablet by mouth 2 times daily, Disp: 180 tablet, Rfl: 1    insulin glargine (BASAGLAR KWIKPEN) 100 UNIT/ML injection pen, Inject 40 Units into the skin 2 times daily, Disp: 72 mL, Rfl: 1    omeprazole (PRILOSEC) 20 MG delayed release capsule, Take 1 capsule by mouth daily, Disp: 90 capsule, Rfl: 1    pravastatin (PRAVACHOL) 40 MG tablet, Take 1 tablet by mouth daily, Disp: 90 tablet, Rfl: 1    ARIPiprazole (ABILIFY) 5 MG tablet, Take 1 tablet by mouth daily, Disp: 90 tablet, Rfl: 1    Wound Dressings ([facility] HONEY WOUND) GEL, Apply 1 Tube topically 2 times daily, Disp: 1 Tube, Rfl: 2    NOVOFINE PLUS 32G X 4 MM MISC, USE EVERY DAY UNDER THE SKIN, Disp: , Rfl:     aspirin 81 MG EC tablet, Take 81 mg by mouth 2 tablets daily, Disp: , Rfl:     cetirizine (ZYRTEC) 10 MG tablet, Take 10 mg by mouth daily, Disp: , Rfl:   Allergies   Allergen Reactions    Bee Venom Swelling    Milk-Related Compounds      Stomach issues-lactose intolerant       Past Medical History:   Diagnosis Date    Cellulitis of left foot 09/03/2020    Cellulitis of right foot     [facility]     Diabetic foot ulcer with osteomyelitis ([facility] 8/27/2021    Gangrene of toe of right foot ([facility] 08/28/2021    Hyperlipidemia     Hypertension     Kidney stone     Multiple sclerosis (HCC)     ЕКАТЕРИНА (obstructive sleep apnea)     dx 15 years ago, not compliant with CPAP       There were no vitals filed for this visit.      Postop incision and drainage left foot with fifth metatarsal resection and bone biopsy date of surgery 9/4/2020    Work History/Social History:     Focused Lower Extremity Physical Exam:      Neurovascular examination:    Dorsalis Pedis palpable bilateral. Posterior tibialis  palpable bilateral.  Capillary Refill Time:  Immediate return  Hair growth:  Symmetrical and bilateral   Skin:  Not atrophic  Edema: Mild edema bilateral feet. Mild edema bilateral ankles. Neurologic:  Light touch diminished bilateral.  Warm to coolness bilateral distal toes  Decreased epicritic sensation     Musculoskeletal/ Orthopedic examination:    Equinis: present bilateral  Dorsiflexion, plantarflexion, inversion, eversion bilateral 5 out of 5 muscle strength  Wiggling remaining toes    Status post fifth ray amputation right foot. No pain to palpation. [facility]      Dermatology examination:    Toenails 1-5 eft and 1 through 4 right thickened, elongated, dystrophic, mycotic with subungual debris. Webspaces 1-4 bilateral clean, dry and intact. Dry fissuring skin plantar feet bilateral.  No open wounds plantar foot. Assessment and Plan:  Isom Ormond was seen today for wound check. Diagnoses and all orders for this visit:    Uncontrolled type 2 diabetes mellitus with hyperglycemia ([facility]    Hereditary sensory neuropathy    Tinea unguium    MS (multiple sclerosis) ([facility]      Follow-up diabetic exam  Did see his primary care physician this morning. Does have dried fissuring with no open or draining wounds today. Does have difficulty reaching his feet I still did recommend using ammonium lactate lotion once or twice daily both feet. If any new skin lesions or abrasions come in sooner. Manual debridement with standard technique toenails 1 through 5  left and 1 through 4 right in thickness and length. Patient tolerated procedure well. I will follow-up 2 months for regular diabetic exam              Return in about 2 months (around 5/1/2022). Seen By:  Whitney Parada DPM      Document was created using voice recognition software. Note was reviewed however may contain grammatical errors.

## 2022-03-01 NOTE — PROGRESS NOTES
Patient:  Zayda North 51 y.o. male     03/01/22      Chiefcomplaint:   Chief Complaint   Patient presents with    Diabetes     History of Present Illness   Patient had 6-week course of hospitalization due to toe gangrene resulting in amputation small toe. He has history of uncontrolled diabetes hypertension and MS. Since hospitalization patient describes ongoing fatigue, muscle ache and spasm, increased swelling with period of difficulty breathing laying flat and decrease in functional capacity. He describes difficulty with simple things such as getting his socks on and getting dressed. He thought this would improve with time however it is not improved. Echo was ordered previously due to shortness of breath, swelling, recent significant infection where blood cultures were not done. He states he was never called about this echocardiogram.  He sees his MS doctor in 2 weeks. States his foot doctor later today. Sugars remain uncontrolled    Pertinent Medical, Family, Surgical, Social History:  Past Medical History:   Diagnosis Date    Cellulitis of left foot 09/03/2020    Cellulitis of right foot     Diabetes mellitus ([address]     Diabetic foot ulcer with osteomyelitis ([address] 8/27/2021    Gangrene of toe of right foot ([address] 08/28/2021    Hyperlipidemia     Hypertension     Kidney stone     Multiple sclerosis ([address]     ЕКАТЕРИНА (obstructive sleep apnea)     dx 15 years ago, not compliant with CPAP     Physical Exam   Vitals: BP (!) 159/102   Pulse 88   Temp 97.9 °F (36.6 °C)   Resp 20   Ht 6' 5\" (1.956 m)   BMI 53.36 kg/m²   General Appearance: Alert, oriented, no acute distress  HEENT: No scleral icterus. No visible discharge from eyes  Neck: Not rigid. No visible masses  Chest wall/Lung: Decreased breath sounds bilateral bases without definitive rales. Heart: RRR, no murmur  Abdomen: Soft, nontender. Protuberant with possible fluid   Extremities: Edema bilateral lower extremities.   Old wound left shin.  Skin: No rashes. No jaundice  Neuro: Alert and oriented        Psych: Appropriate mood and appropriate affect    Assessment and Plan   Patient with decline in functional capacity along with increase in swelling, orthopnea, fatigue, shortness of breath, difficulty with ADLs. Not improved with conservative measures. Previous echo not done. Will reorder echo and check lab work. Does note that he hit his leg on something recently which may have opened up new wound. However he does not feel that it is infected. Unfortunately I forgot to look at this wound but he will see his foot doctor later today. If no etiology identified from lab work and echo and evaluation by foot doctor recommend further evaluation by neurology for any change in MS or necessitating change in therapy. Would recommend structured physical therapy if MS not significant contributor. 1. SOB (shortness of breath)  -     CBC with Auto Differential; Future  -     Lipid Panel; Future  -     Comprehensive Metabolic Panel; Future  -     TSH; Future  -     Brain Natriuretic Peptide; Future  2. Essential hypertension  -     Lipid Panel; Future  3. Other fatigue  -     CBC with Auto Differential; Future  -     Lipid Panel; Future  -     Comprehensive Metabolic Panel; Future  -     TSH; Future  -     Brain Natriuretic Peptide; Future  4. Swelling  -     CBC with Auto Differential; Future  -     Comprehensive Metabolic Panel; Future  -     Brain Natriuretic Peptide; Future  5. Uncontrolled type 2 diabetes mellitus with hyperglycemia (HCC)  -     POCT Microalbumin  -     Lipid Panel; Future      No follow-ups on file. Lulú Gray, DO     This document may have been prepared at least partially through the use of voice recognition software. Although effort is taken to assure the accuracy of this document, it is possible that grammatical, syntax,  or spelling errors may occur.

## 2022-03-04 DIAGNOSIS — E11.65 UNCONTROLLED TYPE 2 DIABETES MELLITUS WITH HYPERGLYCEMIA (HCC): Primary | ICD-10-CM

## 2022-03-08 ENCOUNTER — TELEPHONE (OUTPATIENT)
Dept: CARDIOLOGY | Age: 47
End: 2022-03-08

## 2022-03-08 NOTE — TELEPHONE ENCOUNTER
PATIENT CALL SCHEDULE ECHO FOR 03-10-22. REVIEWED COVID CHECKLIST WITH PATIENT.     Electronically signed by Rose Myrick on 3/8/2022 at 8:45 AM

## 2022-03-10 ENCOUNTER — HOSPITAL ENCOUNTER (OUTPATIENT)
Dept: CARDIOLOGY | Age: 47
Discharge: HOME | End: 2022-03-10
Payer: COMMERCIAL

## 2022-03-10 DIAGNOSIS — R06.01 ORTHOPNEA: ICD-10-CM

## 2022-03-10 DIAGNOSIS — M79.89 LEG SWELLING: ICD-10-CM

## 2022-03-10 DIAGNOSIS — R06.09 DYSPNEA ON MINIMAL EXERTION: ICD-10-CM

## 2022-03-10 LAB
LV EF: 58 %
LVEF MODALITY: NORMAL

## 2022-03-10 PROCEDURE — 2580000003 HC RX 258: Performed by: STUDENT IN AN ORGANIZED HEALTH CARE EDUCATION/TRAINING PROGRAM

## 2022-03-10 PROCEDURE — 6360000004 HC RX CONTRAST MEDICATION: Performed by: STUDENT IN AN ORGANIZED HEALTH CARE EDUCATION/TRAINING PROGRAM

## 2022-03-10 PROCEDURE — C8929 TTE W OR WO FOL WCON,DOPPLER: HCPCS

## 2022-03-10 RX ORDER — SODIUM CHLORIDE 0.9 % (FLUSH) 0.9 %
10 SYRINGE (ML) INJECTION PRN
Status: DISCONTINUED | OUTPATIENT
Start: 2022-03-10 | End: 2022-03-11 | Stop reason: HOSPADM

## 2022-03-10 RX ADMIN — PERFLUTREN 1.1 MG: 6.52 INJECTION, SUSPENSION INTRAVENOUS at 08:08

## 2022-03-10 RX ADMIN — SODIUM CHLORIDE, PRESERVATIVE FREE 10 ML: 5 INJECTION INTRAVENOUS at 08:14

## 2022-03-10 RX ADMIN — SODIUM CHLORIDE, PRESERVATIVE FREE 10 ML: 5 INJECTION INTRAVENOUS at 08:08

## 2022-03-14 RX ORDER — ACETAMINOPHEN 650 MG/1
TABLET, FILM COATED, EXTENDED RELEASE ORAL
Qty: 60 TABLET | Refills: 1 | Status: SHIPPED | OUTPATIENT
Start: 2022-03-14

## 2022-03-15 DIAGNOSIS — M62.81 MUSCLE WEAKNESS (GENERALIZED): Primary | ICD-10-CM

## 2022-03-15 DIAGNOSIS — M79.10 MUSCLE PAIN: Primary | ICD-10-CM

## 2022-03-15 NOTE — PROGRESS NOTES
Patient with deconditioning following hospitalization in past. Difficulty with some of his basic adls as a result. Fatiguing easily. Poor range of motion. Recommend PT.

## 2022-03-16 ENCOUNTER — OFFICE VISIT (OUTPATIENT)
Dept: NEUROLOGY | Age: 47
End: 2022-03-16
Payer: COMMERCIAL

## 2022-03-16 ENCOUNTER — TELEPHONE (OUTPATIENT)
Dept: FAMILY MEDICINE CLINIC | Age: 47
End: 2022-03-16

## 2022-03-16 VITALS
OXYGEN SATURATION: 95 % | TEMPERATURE: 97.6 F | BODY MASS INDEX: 37.19 KG/M2 | WEIGHT: 315 LBS | DIASTOLIC BLOOD PRESSURE: 77 MMHG | HEART RATE: 88 BPM | RESPIRATION RATE: 18 BRPM | SYSTOLIC BLOOD PRESSURE: 108 MMHG | HEIGHT: 77 IN

## 2022-03-16 DIAGNOSIS — G35 MULTIPLE SCLEROSIS (HCC): Primary | ICD-10-CM

## 2022-03-16 DIAGNOSIS — M62.81 MUSCLE WEAKNESS (GENERALIZED): Primary | ICD-10-CM

## 2022-03-16 PROCEDURE — 99215 OFFICE O/P EST HI 40 MIN: CPT | Performed by: PSYCHIATRY & NEUROLOGY

## 2022-03-16 NOTE — TELEPHONE ENCOUNTER
Pt contacted them and would prefer for his Referral for PT be sent to them as it its closer to his home     Fax to: [phone]

## 2022-03-16 NOTE — PROGRESS NOTES
This 78-year-old right-handed man was referred for evaluation and management of relapsing, remitting multiple sclerosis. He remained an excellent historian. He was alone for this interview. His medications were now dimethyl fumarate, aripiprazole, lisinopril, metformin, doxycycline, insulins, escitalopram, pravastatin, metoprolol, aspirin, cetirizine and omeprazole. His medical history was remarkable for longstanding diabetes mellitus, with improving control. However, his last hemoglobin A1c was 8.5. He suffered from hypertension, degenerative joint disease of his knees and depression, well controlled with his above regimen. There was also peripheral arterial disease, with a slowly resolving left foot ulceration and infection. He again denied strokes, seizures, heart disease, lung disorders, gastrointestinal disease, kidney abnormalities or skin abnormalities. Multiple sclerosis was diagnosed in 2008. While playing baseball, he suddenly noted unsteadiness and almost falling to the ground when batting. The next morning, he was clumsy and almost fell again. He then experienced left hand numbness and weakness. He was initially evaluated for stroke, but investigation revealed multiple sclerosis. Spinal fluid analysis confirmed that diagnosis. He was referred to the [facility], where he received dimethyl fumarate. He was off dimethyl fumarate for almost 3 years due to his inability to afford that medication. He recently returned to that drug. I initially requested ocrelizumab, but his insurance would not pay for that infusion. Fortunately, he obtained new insurances in which proceed with ocrelizumab. He improved remarkably from his first insult. Fortunately, he again denied additional deficits. He reported no medical issues. He received his COVID-19 vaccinations and booster    Review of systems was otherwise unremarkable. Jeffery Ha     Physical examination displayed stable vital signs, if problems arise. I spent 40 minutes with the patient with over 50 % spent in counseling and disease management. All patient issues were addressed and all questions were answered.

## 2022-03-16 NOTE — TELEPHONE ENCOUNTER
Referral replaced.  Please fax to [facility]. as request.

## 2022-03-18 ENCOUNTER — TELEPHONE (OUTPATIENT)
Dept: NEUROLOGY | Age: 47
End: 2022-03-18

## 2022-03-18 DIAGNOSIS — G35 MULTIPLE SCLEROSIS (HCC): ICD-10-CM

## 2022-03-18 RX ORDER — HEPARIN SODIUM (PORCINE) LOCK FLUSH IV SOLN 100 UNIT/ML 100 UNIT/ML
500 SOLUTION INTRAVENOUS PRN
Status: CANCELLED | OUTPATIENT
Start: 2022-03-24

## 2022-03-18 RX ORDER — DIPHENHYDRAMINE HYDROCHLORIDE 50 MG/ML
25 INJECTION INTRAMUSCULAR; INTRAVENOUS ONCE
Status: CANCELLED | OUTPATIENT
Start: 2022-03-24

## 2022-03-18 RX ORDER — SODIUM CHLORIDE 9 MG/ML
25 INJECTION, SOLUTION INTRAVENOUS PRN
Status: CANCELLED | OUTPATIENT
Start: 2022-03-24

## 2022-03-18 RX ORDER — SODIUM CHLORIDE 9 MG/ML
INJECTION, SOLUTION INTRAVENOUS CONTINUOUS
Status: CANCELLED | OUTPATIENT
Start: 2022-03-24

## 2022-03-18 RX ORDER — ACETAMINOPHEN 325 MG/1
650 TABLET ORAL
Status: CANCELLED | OUTPATIENT
Start: 2022-03-24

## 2022-03-18 RX ORDER — ACETAMINOPHEN 325 MG/1
650 TABLET ORAL ONCE
Status: CANCELLED | OUTPATIENT
Start: 2022-03-24

## 2022-03-18 RX ORDER — METHYLPREDNISOLONE SODIUM SUCCINATE 125 MG/2ML
100 INJECTION, POWDER, LYOPHILIZED, FOR SOLUTION INTRAMUSCULAR; INTRAVENOUS ONCE
Status: CANCELLED | OUTPATIENT
Start: 2022-03-24

## 2022-03-18 RX ORDER — EPINEPHRINE 1 MG/ML
0.3 INJECTION, SOLUTION, CONCENTRATE INTRAVENOUS PRN
Status: CANCELLED | OUTPATIENT
Start: 2022-03-24

## 2022-03-18 RX ORDER — ALBUTEROL SULFATE 90 UG/1
4 AEROSOL, METERED RESPIRATORY (INHALATION) PRN
Status: CANCELLED | OUTPATIENT
Start: 2022-03-24

## 2022-03-18 RX ORDER — ONDANSETRON 2 MG/ML
8 INJECTION INTRAMUSCULAR; INTRAVENOUS
Status: CANCELLED | OUTPATIENT
Start: 2022-03-24

## 2022-03-18 RX ORDER — SODIUM CHLORIDE 0.9 % (FLUSH) 0.9 %
5-40 SYRINGE (ML) INJECTION PRN
Status: CANCELLED | OUTPATIENT
Start: 2022-03-24

## 2022-03-18 RX ORDER — DIPHENHYDRAMINE HYDROCHLORIDE 50 MG/ML
50 INJECTION INTRAMUSCULAR; INTRAVENOUS
Status: CANCELLED | OUTPATIENT
Start: 2022-03-24

## 2022-03-24 ENCOUNTER — TELEPHONE (OUTPATIENT)
Dept: NEUROLOGY | Age: 47
End: 2022-03-24

## 2022-03-24 NOTE — TELEPHONE ENCOUNTER
Communication received from [employer] that [facility] is not in his network. Faxed information to [facility] and notified them. They will check and notify patient. Patient is scheduled for April 13.

## 2022-03-31 ENCOUNTER — TELEPHONE (OUTPATIENT)
Dept: NEUROLOGY | Age: 47
End: 2022-03-31

## 2022-03-31 NOTE — TELEPHONE ENCOUNTER
Spoke with [facility] who states patient is authorize for [employer] and is scheduled for April 13. Marquis Hill  Notified patient of above and to check with [employer]urnace regarding cost.

## 2022-04-07 DIAGNOSIS — M79.89 LEG SWELLING: ICD-10-CM

## 2022-04-07 DIAGNOSIS — R06.01 ORTHOPNEA: ICD-10-CM

## 2022-04-07 DIAGNOSIS — M79.10 MUSCLE PAIN: ICD-10-CM

## 2022-04-07 DIAGNOSIS — R06.09 DYSPNEA ON MINIMAL EXERTION: ICD-10-CM

## 2022-04-07 DIAGNOSIS — G35 MULTIPLE SCLEROSIS (HCC): ICD-10-CM

## 2022-04-07 LAB
ANION GAP SERPL CALCULATED.3IONS-SCNC: 12 MMOL/L (ref 7–16)
BUN BLDV-MCNC: 13 MG/DL (ref 6–20)
CALCIUM SERPL-MCNC: 8.8 MG/DL (ref 8.6–10.2)
CHLORIDE BLD-SCNC: 100 MMOL/L (ref 98–107)
CO2: 23 MMOL/L (ref 22–29)
CREAT SERPL-MCNC: 0.6 MG/DL (ref 0.7–1.2)
GFR AFRICAN AMERICAN: >60
GFR NON-AFRICAN AMERICAN: >60 ML/MIN/1.73
GLUCOSE BLD-MCNC: 381 MG/DL (ref 74–99)
IGG: 1184 MG/DL (ref 700–1600)
POTASSIUM SERPL-SCNC: 4.3 MMOL/L (ref 3.5–5)
SODIUM BLD-SCNC: 135 MMOL/L (ref 132–146)
TOTAL CK: 103 U/L (ref 20–200)

## 2022-04-07 RX ORDER — DIPHENHYDRAMINE HCL 25 MG
25 TABLET ORAL ONCE
Status: CANCELLED
Start: 2022-04-07 | End: 2022-04-07

## 2022-04-08 LAB
HAV IGM SER IA-ACNC: NORMAL
HEPATITIS B CORE IGM ANTIBODY: NORMAL
HEPATITIS B SURFACE ANTIGEN INTERPRETATION: NORMAL
HEPATITIS C ANTIBODY INTERPRETATION: NORMAL
RPR: NORMAL

## 2022-04-13 ENCOUNTER — HOSPITAL ENCOUNTER (OUTPATIENT)
Dept: INFUSION THERAPY | Age: 47
Setting detail: INFUSION SERIES
Discharge: HOME | End: 2022-04-13
Payer: COMMERCIAL

## 2022-04-13 VITALS
RESPIRATION RATE: 16 BRPM | DIASTOLIC BLOOD PRESSURE: 99 MMHG | BODY MASS INDEX: 37.19 KG/M2 | WEIGHT: 315 LBS | HEART RATE: 100 BPM | SYSTOLIC BLOOD PRESSURE: 157 MMHG | TEMPERATURE: 98.3 F | HEIGHT: 77 IN | OXYGEN SATURATION: 92 %

## 2022-04-13 DIAGNOSIS — G35 MULTIPLE SCLEROSIS (HCC): Primary | ICD-10-CM

## 2022-04-13 PROCEDURE — 6360000002 HC RX W HCPCS: Performed by: PSYCHIATRY & NEUROLOGY

## 2022-04-13 PROCEDURE — 96374 THER/PROPH/DIAG INJ IV PUSH: CPT

## 2022-04-13 PROCEDURE — 96366 THER/PROPH/DIAG IV INF ADDON: CPT

## 2022-04-13 PROCEDURE — 96365 THER/PROPH/DIAG IV INF INIT: CPT

## 2022-04-13 PROCEDURE — 6370000000 HC RX 637 (ALT 250 FOR IP): Performed by: PSYCHIATRY & NEUROLOGY

## 2022-04-13 PROCEDURE — 2580000003 HC RX 258: Performed by: PSYCHIATRY & NEUROLOGY

## 2022-04-13 RX ORDER — SODIUM CHLORIDE 9 MG/ML
INJECTION, SOLUTION INTRAVENOUS CONTINUOUS
Status: CANCELLED | OUTPATIENT
Start: 2022-04-27

## 2022-04-13 RX ORDER — DIPHENHYDRAMINE HCL 25 MG
25 TABLET ORAL ONCE
Status: CANCELLED
Start: 2022-04-27 | End: 2022-04-27

## 2022-04-13 RX ORDER — SODIUM CHLORIDE 0.9 % (FLUSH) 0.9 %
5-40 SYRINGE (ML) INJECTION PRN
Status: CANCELLED | OUTPATIENT
Start: 2022-04-27

## 2022-04-13 RX ORDER — EPINEPHRINE 1 MG/ML
0.3 INJECTION, SOLUTION, CONCENTRATE INTRAVENOUS PRN
Status: CANCELLED | OUTPATIENT
Start: 2022-04-27

## 2022-04-13 RX ORDER — DIPHENHYDRAMINE HYDROCHLORIDE 50 MG/ML
50 INJECTION INTRAMUSCULAR; INTRAVENOUS
Status: CANCELLED | OUTPATIENT
Start: 2022-04-27

## 2022-04-13 RX ORDER — ACETAMINOPHEN 325 MG/1
650 TABLET ORAL ONCE
Status: CANCELLED | OUTPATIENT
Start: 2022-04-27

## 2022-04-13 RX ORDER — DIPHENHYDRAMINE HCL 25 MG
25 TABLET ORAL ONCE
Status: COMPLETED | OUTPATIENT
Start: 2022-04-13 | End: 2022-04-13

## 2022-04-13 RX ORDER — METHYLPREDNISOLONE SODIUM SUCCINATE 125 MG/2ML
100 INJECTION, POWDER, LYOPHILIZED, FOR SOLUTION INTRAMUSCULAR; INTRAVENOUS ONCE
Status: COMPLETED | OUTPATIENT
Start: 2022-04-13 | End: 2022-04-13

## 2022-04-13 RX ORDER — HEPARIN SODIUM (PORCINE) LOCK FLUSH IV SOLN 100 UNIT/ML 100 UNIT/ML
500 SOLUTION INTRAVENOUS PRN
Status: CANCELLED | OUTPATIENT
Start: 2022-04-27

## 2022-04-13 RX ORDER — ACETAMINOPHEN 325 MG/1
650 TABLET ORAL
Status: CANCELLED | OUTPATIENT
Start: 2022-04-27

## 2022-04-13 RX ORDER — METHYLPREDNISOLONE SODIUM SUCCINATE 125 MG/2ML
100 INJECTION, POWDER, LYOPHILIZED, FOR SOLUTION INTRAMUSCULAR; INTRAVENOUS ONCE
Status: CANCELLED | OUTPATIENT
Start: 2022-04-27

## 2022-04-13 RX ORDER — ONDANSETRON 2 MG/ML
8 INJECTION INTRAMUSCULAR; INTRAVENOUS
Status: CANCELLED | OUTPATIENT
Start: 2022-04-27

## 2022-04-13 RX ORDER — SODIUM CHLORIDE 9 MG/ML
INJECTION, SOLUTION INTRAVENOUS CONTINUOUS
Status: ACTIVE | OUTPATIENT
Start: 2022-04-13 | End: 2022-04-13

## 2022-04-13 RX ORDER — SODIUM CHLORIDE 0.9 % (FLUSH) 0.9 %
5-40 SYRINGE (ML) INJECTION PRN
Status: DISCONTINUED | OUTPATIENT
Start: 2022-04-13 | End: 2022-04-14 | Stop reason: HOSPADM

## 2022-04-13 RX ORDER — ACETAMINOPHEN 325 MG/1
650 TABLET ORAL ONCE
Status: COMPLETED | OUTPATIENT
Start: 2022-04-13 | End: 2022-04-13

## 2022-04-13 RX ORDER — ALBUTEROL SULFATE 90 UG/1
4 AEROSOL, METERED RESPIRATORY (INHALATION) PRN
Status: CANCELLED | OUTPATIENT
Start: 2022-04-27

## 2022-04-13 RX ORDER — SODIUM CHLORIDE 9 MG/ML
25 INJECTION, SOLUTION INTRAVENOUS PRN
Status: CANCELLED | OUTPATIENT
Start: 2022-04-27

## 2022-04-13 RX ADMIN — ACETAMINOPHEN 650 MG: 325 TABLET ORAL at 07:18

## 2022-04-13 RX ADMIN — DIPHENHYDRAMINE HYDROCHLORIDE 25 MG: 25 TABLET ORAL at 07:19

## 2022-04-13 RX ADMIN — OCRELIZUMAB 300 MG: 300 INJECTION INTRAVENOUS at 08:00

## 2022-04-13 RX ADMIN — SODIUM CHLORIDE, PRESERVATIVE FREE 10 ML: 5 INJECTION INTRAVENOUS at 07:17

## 2022-04-13 RX ADMIN — SODIUM CHLORIDE, PRESERVATIVE FREE 10 ML: 5 INJECTION INTRAVENOUS at 10:49

## 2022-04-13 RX ADMIN — METHYLPREDNISOLONE SODIUM SUCCINATE 100 MG: 125 INJECTION, POWDER, FOR SOLUTION INTRAMUSCULAR; INTRAVENOUS at 07:22

## 2022-04-13 RX ADMIN — SODIUM CHLORIDE: 9 INJECTION, SOLUTION INTRAVENOUS at 07:21

## 2022-04-13 ASSESSMENT — PAIN SCALES - GENERAL: PAINLEVEL_OUTOF10: 0

## 2022-04-13 NOTE — PROGRESS NOTES
Before infusion patient declined any infections, open wounds, or change in medical condition. Tolerated ocrevus infusion well. Reviewed therapy plan, offered education material and/or discharge material, reviewed medication information and signs and symptoms  and educated on possible side effects, verbalizes good knowledge of current plan patient verbalizes understanding, and has no signs or symptoms to report at this time. Patient discharged. Patient alert and oriented x3. No distress noted. Vital signs stable. Patient denies any new or worsening pain. Patient denies any needs. All questions answered. Next appointment scheduled. Declines copy of AVS. Instructed on importance, patient stayed 60 min observation after infusion completed.

## 2022-04-18 RX ORDER — INSULIN GLARGINE 100 [IU]/ML
INJECTION, SOLUTION SUBCUTANEOUS
Qty: 72 ML | Refills: 1 | Status: SHIPPED | OUTPATIENT
Start: 2022-04-18

## 2022-04-18 RX ORDER — INSULIN GLARGINE 100 [IU]/ML
INJECTION, SOLUTION SUBCUTANEOUS
Qty: 72 ML | Refills: 1 | OUTPATIENT
Start: 2022-04-18

## 2022-04-19 DIAGNOSIS — E78.5 HYPERLIPIDEMIA, UNSPECIFIED HYPERLIPIDEMIA TYPE: ICD-10-CM

## 2022-04-19 RX ORDER — CLONIDINE HYDROCHLORIDE 0.1 MG/1
TABLET ORAL
Qty: 270 TABLET | Refills: 1 | Status: SHIPPED
Start: 2022-04-19 | End: 2022-10-28

## 2022-04-19 RX ORDER — LISINOPRIL 40 MG/1
40 TABLET ORAL DAILY
Qty: 90 TABLET | Refills: 1 | Status: SHIPPED
Start: 2022-04-19 | End: 2022-11-01 | Stop reason: SDUPTHER

## 2022-04-19 RX ORDER — OMEPRAZOLE 20 MG/1
20 CAPSULE, DELAYED RELEASE ORAL DAILY
Qty: 90 CAPSULE | Refills: 1 | Status: SHIPPED
Start: 2022-04-19 | End: 2022-10-28

## 2022-04-19 RX ORDER — HYDRALAZINE HYDROCHLORIDE 100 MG/1
TABLET, FILM COATED ORAL
Qty: 270 TABLET | Refills: 1 | Status: SHIPPED
Start: 2022-04-19 | End: 2022-10-28

## 2022-04-19 RX ORDER — AMLODIPINE BESYLATE 10 MG/1
10 TABLET ORAL DAILY
Qty: 90 TABLET | Refills: 1 | OUTPATIENT
Start: 2022-04-19

## 2022-04-19 RX ORDER — PRAVASTATIN SODIUM 40 MG
40 TABLET ORAL DAILY
Qty: 90 TABLET | Refills: 1 | Status: SHIPPED
Start: 2022-04-19 | End: 2022-10-28

## 2022-04-21 ENCOUNTER — HOSPITAL ENCOUNTER (OUTPATIENT)
Dept: MRI IMAGING | Age: 47
Discharge: HOME | End: 2022-04-23

## 2022-04-21 DIAGNOSIS — G35 MULTIPLE SCLEROSIS (HCC): ICD-10-CM

## 2022-04-27 DIAGNOSIS — H61.22 IMPACTED CERUMEN OF LEFT EAR: Primary | ICD-10-CM

## 2022-04-28 ENCOUNTER — HOSPITAL ENCOUNTER (OUTPATIENT)
Dept: INFUSION THERAPY | Age: 47
Setting detail: INFUSION SERIES
Discharge: HOME | End: 2022-04-28
Payer: COMMERCIAL

## 2022-04-28 VITALS
DIASTOLIC BLOOD PRESSURE: 76 MMHG | HEART RATE: 85 BPM | RESPIRATION RATE: 16 BRPM | TEMPERATURE: 98.1 F | HEIGHT: 77 IN | SYSTOLIC BLOOD PRESSURE: 137 MMHG | WEIGHT: 315 LBS | BODY MASS INDEX: 37.19 KG/M2 | OXYGEN SATURATION: 94 %

## 2022-04-28 DIAGNOSIS — G35 MULTIPLE SCLEROSIS (HCC): Primary | ICD-10-CM

## 2022-04-28 PROCEDURE — 96366 THER/PROPH/DIAG IV INF ADDON: CPT

## 2022-04-28 PROCEDURE — 6370000000 HC RX 637 (ALT 250 FOR IP): Performed by: PSYCHIATRY & NEUROLOGY

## 2022-04-28 PROCEDURE — 96365 THER/PROPH/DIAG IV INF INIT: CPT

## 2022-04-28 PROCEDURE — 6360000002 HC RX W HCPCS: Performed by: PSYCHIATRY & NEUROLOGY

## 2022-04-28 PROCEDURE — 2580000003 HC RX 258: Performed by: PSYCHIATRY & NEUROLOGY

## 2022-04-28 PROCEDURE — 96375 TX/PRO/DX INJ NEW DRUG ADDON: CPT

## 2022-04-28 RX ORDER — ACETAMINOPHEN 325 MG/1
650 TABLET ORAL
OUTPATIENT
Start: 2022-10-12

## 2022-04-28 RX ORDER — SODIUM CHLORIDE 0.9 % (FLUSH) 0.9 %
5-40 SYRINGE (ML) INJECTION PRN
Status: DISCONTINUED | OUTPATIENT
Start: 2022-04-28 | End: 2022-04-29 | Stop reason: HOSPADM

## 2022-04-28 RX ORDER — SODIUM CHLORIDE 9 MG/ML
INJECTION, SOLUTION INTRAVENOUS CONTINUOUS
OUTPATIENT
Start: 2022-10-12

## 2022-04-28 RX ORDER — METHYLPREDNISOLONE SODIUM SUCCINATE 125 MG/2ML
100 INJECTION, POWDER, LYOPHILIZED, FOR SOLUTION INTRAMUSCULAR; INTRAVENOUS ONCE
Status: CANCELLED | OUTPATIENT
Start: 2022-10-12

## 2022-04-28 RX ORDER — DIPHENHYDRAMINE HYDROCHLORIDE 50 MG/ML
50 INJECTION INTRAMUSCULAR; INTRAVENOUS
OUTPATIENT
Start: 2022-10-12

## 2022-04-28 RX ORDER — DIPHENHYDRAMINE HCL 25 MG
25 TABLET ORAL ONCE
Start: 2022-10-12 | End: 2022-10-12

## 2022-04-28 RX ORDER — ACETAMINOPHEN 325 MG/1
650 TABLET ORAL ONCE
OUTPATIENT
Start: 2022-10-12

## 2022-04-28 RX ORDER — DIPHENHYDRAMINE HCL 25 MG
25 TABLET ORAL ONCE
Status: COMPLETED | OUTPATIENT
Start: 2022-04-28 | End: 2022-04-28

## 2022-04-28 RX ORDER — SODIUM CHLORIDE 0.9 % (FLUSH) 0.9 %
5-40 SYRINGE (ML) INJECTION PRN
OUTPATIENT
Start: 2022-10-12

## 2022-04-28 RX ORDER — EPINEPHRINE 1 MG/ML
0.3 INJECTION, SOLUTION, CONCENTRATE INTRAVENOUS PRN
OUTPATIENT
Start: 2022-10-12

## 2022-04-28 RX ORDER — ACETAMINOPHEN 325 MG/1
650 TABLET ORAL ONCE
Status: CANCELLED | OUTPATIENT
Start: 2022-10-12

## 2022-04-28 RX ORDER — ACETAMINOPHEN 325 MG/1
650 TABLET ORAL ONCE
Status: DISCONTINUED | OUTPATIENT
Start: 2022-04-28 | End: 2022-04-29 | Stop reason: HOSPADM

## 2022-04-28 RX ORDER — METHYLPREDNISOLONE SODIUM SUCCINATE 125 MG/2ML
100 INJECTION, POWDER, LYOPHILIZED, FOR SOLUTION INTRAMUSCULAR; INTRAVENOUS ONCE
OUTPATIENT
Start: 2022-10-12

## 2022-04-28 RX ORDER — SODIUM CHLORIDE 9 MG/ML
25 INJECTION, SOLUTION INTRAVENOUS PRN
OUTPATIENT
Start: 2022-10-12

## 2022-04-28 RX ORDER — SODIUM CHLORIDE 9 MG/ML
INJECTION, SOLUTION INTRAVENOUS CONTINUOUS
Status: CANCELLED | OUTPATIENT
Start: 2022-10-12

## 2022-04-28 RX ORDER — DIPHENHYDRAMINE HCL 25 MG
25 TABLET ORAL ONCE
Status: CANCELLED
Start: 2022-10-12 | End: 2022-10-12

## 2022-04-28 RX ORDER — METHYLPREDNISOLONE SODIUM SUCCINATE 125 MG/2ML
100 INJECTION, POWDER, LYOPHILIZED, FOR SOLUTION INTRAMUSCULAR; INTRAVENOUS ONCE
Status: COMPLETED | OUTPATIENT
Start: 2022-04-28 | End: 2022-04-28

## 2022-04-28 RX ORDER — SODIUM CHLORIDE 9 MG/ML
INJECTION, SOLUTION INTRAVENOUS CONTINUOUS
Status: ACTIVE | OUTPATIENT
Start: 2022-04-28 | End: 2022-04-28

## 2022-04-28 RX ORDER — HEPARIN SODIUM (PORCINE) LOCK FLUSH IV SOLN 100 UNIT/ML 100 UNIT/ML
500 SOLUTION INTRAVENOUS PRN
OUTPATIENT
Start: 2022-10-12

## 2022-04-28 RX ORDER — ONDANSETRON 2 MG/ML
8 INJECTION INTRAMUSCULAR; INTRAVENOUS
OUTPATIENT
Start: 2022-10-12

## 2022-04-28 RX ORDER — ALBUTEROL SULFATE 90 UG/1
4 AEROSOL, METERED RESPIRATORY (INHALATION) PRN
OUTPATIENT
Start: 2022-10-12

## 2022-04-28 RX ADMIN — OCRELIZUMAB 300 MG: 300 INJECTION INTRAVENOUS at 07:56

## 2022-04-28 RX ADMIN — SODIUM CHLORIDE, PRESERVATIVE FREE 10 ML: 5 INJECTION INTRAVENOUS at 07:10

## 2022-04-28 RX ADMIN — METHYLPREDNISOLONE SODIUM SUCCINATE 100 MG: 125 INJECTION, POWDER, FOR SOLUTION INTRAMUSCULAR; INTRAVENOUS at 07:18

## 2022-04-28 RX ADMIN — DIPHENHYDRAMINE HCL 25 MG: 25 TABLET ORAL at 07:17

## 2022-04-28 RX ADMIN — SODIUM CHLORIDE, PRESERVATIVE FREE 10 ML: 5 INJECTION INTRAVENOUS at 10:46

## 2022-04-28 RX ADMIN — SODIUM CHLORIDE: 9 INJECTION, SOLUTION INTRAVENOUS at 07:16

## 2022-05-19 ENCOUNTER — OFFICE VISIT (OUTPATIENT)
Dept: ENDOCRINOLOGY | Age: 47
End: 2022-05-19
Payer: COMMERCIAL

## 2022-05-19 VITALS
OXYGEN SATURATION: 96 % | HEIGHT: 77 IN | SYSTOLIC BLOOD PRESSURE: 155 MMHG | DIASTOLIC BLOOD PRESSURE: 101 MMHG | HEART RATE: 98 BPM | WEIGHT: 315 LBS | BODY MASS INDEX: 37.19 KG/M2

## 2022-05-19 DIAGNOSIS — E78.2 MIXED HYPERLIPIDEMIA: ICD-10-CM

## 2022-05-19 DIAGNOSIS — E11.65 UNCONTROLLED TYPE 2 DIABETES MELLITUS WITH HYPERGLYCEMIA (HCC): Primary | ICD-10-CM

## 2022-05-19 DIAGNOSIS — E55.9 VITAMIN D DEFICIENCY: ICD-10-CM

## 2022-05-19 DIAGNOSIS — Z91.119 DIETARY NONCOMPLIANCE: ICD-10-CM

## 2022-05-19 DIAGNOSIS — E66.01 CLASS 3 SEVERE OBESITY DUE TO EXCESS CALORIES WITHOUT SERIOUS COMORBIDITY WITH BODY MASS INDEX (BMI) OF 50.0 TO 59.9 IN ADULT (HCC): ICD-10-CM

## 2022-05-19 LAB — HBA1C MFR BLD: 10.7 %

## 2022-05-19 PROCEDURE — 83036 HEMOGLOBIN GLYCOSYLATED A1C: CPT | Performed by: CLINICAL NURSE SPECIALIST

## 2022-05-19 PROCEDURE — 99205 OFFICE O/P NEW HI 60 MIN: CPT | Performed by: CLINICAL NURSE SPECIALIST

## 2022-05-19 PROCEDURE — 3046F HEMOGLOBIN A1C LEVEL >9.0%: CPT | Performed by: CLINICAL NURSE SPECIALIST

## 2022-05-19 RX ORDER — FLASH GLUCOSE SCANNING READER
EACH MISCELLANEOUS
Qty: 1 EACH | Refills: 0 | Status: SHIPPED
Start: 2022-05-19 | End: 2022-10-29 | Stop reason: SDUPTHER

## 2022-05-19 RX ORDER — FLASH GLUCOSE SENSOR
KIT MISCELLANEOUS
Qty: 3 EACH | Refills: 5 | Status: SHIPPED
Start: 2022-05-19 | End: 2022-10-29 | Stop reason: SDUPTHER

## 2022-05-19 RX ORDER — DULAGLUTIDE 1.5 MG/.5ML
1.5 INJECTION, SOLUTION SUBCUTANEOUS WEEKLY
Qty: 12 PEN | Refills: 3 | Status: SHIPPED
Start: 2022-05-19 | End: 2022-08-24

## 2022-05-19 NOTE — PROGRESS NOTES
[address] Department of Endocrinology Diabetes and Metabolism   [address] [facility] [address]   Phone: [phone]  Fax: [phone]    Date of Service: 5/19/2022    Primary Care Physician: Fermin Cortes DO  Referring physician: Vivien James DO  Provider: AVEL Jimenez     Reason for the visit:  Type 2 DM       History of Present Illness: The history is provided by the patient. No  was used. Accuracy of the patient data is excellent. Danna Morales is a very pleasant 49 y.o. male seen today for diabetes management     Danna Morales was diagnosed with diabetes at age 44  and currently on Metformin 1000 mg BID and Basaglar 40 units BID and Humalog 20 units TID with meals. Misses doses of Humalog at lunch daily   The patient has been checking blood sugar  None recently  . Most recent A1c results summarized below  Lab Results   Component Value Date    LABA1C 10.7 05/19/2022    LABA1C 8.5 12/14/2021    LABA1C 11.7 08/28/2021       Patient has had no hypoglycemic episodes   The patient hasn't been mindful of what has been eating and wasn't following diabetes diet    I reviewed current medications and the patient has no issues with diabetes medications  The patient is due for an eye exam. Last eye exam was > 1 year ago   , no h/o diabetic retinopathy  The patient seeing podiatrist every 3 months   Microvascular complications:  No Retinopathy, Nephropathy, + Neuropathy. Hx of right foot amputation d/t osteomyelitis.     Macrovascular complications: no CAD, PVD, or Stroke  The patient receives Flushot every year   No HX of pancreatitis  No Hx of MTC  No HX of gastroparesis   No HX of UTI/Mycotic infection       PAST MEDICAL HISTORY   Past Medical History:   Diagnosis Date    Cellulitis of left foot 09/03/2020    Cellulitis of right foot     Diabetes mellitus ([address]     Diabetic foot ulcer with osteomyelitis ([address] 8/27/2021    Gangrene of toe of right foot ([facility] 08/28/2021    Hyperlipidemia     Hypertension     Kidney stone     Multiple sclerosis ([facility]     ЕКАТЕРИНА (obstructive sleep apnea)     dx 15 years ago, not compliant with CPAP       PAST SURGICAL HISTORY   Past Surgical History:   Procedure Laterality Date    FOOT DEBRIDEMENT Right 8/29/2021    FOOT DEBRIDEMENT INCISION AND DRAINAGE performed by Michael Champagne DPM at [address] Left 9/4/2020    INCISION AND DRAINAGE LEFT FOOT  BONE WITH PARTIAL BONE RESECTION FIFTH METATARSAL performed by Michael Champagne DPM at [facility] [address] [facility] SINGLE  9/1/2021         TOE AMPUTATION Right 8/29/2021    TOE AMPUTATION fifth ray resection performed by Michael Champagne DPM at [address]   Tobacco:   reports that he has never smoked. He has never used smokeless tobacco.  Alcohol:   reports no history of alcohol use. Drugs:   reports no history of drug use.     FAMILY HISTORY   Family History   Problem Relation Age of Onset    Hypertension Mother     Diabetes Father        ALLERGIES AND DRUG REACTIONS   Allergies   Allergen Reactions    Bee Venom Swelling    Milk-Related Compounds      Stomach issues-lactose intolerant       CURRENT MEDICATIONS   Current Outpatient Medications   Medication Sig Dispense Refill    Dulaglutide (TRULICITY) 1.5 GY/0.0RP SOPN Inject 1.5 mg into the skin once a week 12 pen 3    Continuous Blood Gluc  (FREESTYLE ALANNAH 2 READER) BRAULIO 1 device 1 each 0    Continuous Blood Gluc Sensor (FREESTYLE ALANNAH 2 SENSOR) MISC Every 14 days 3 each 5    metFORMIN (GLUCOPHAGE) 1000 MG tablet TAKE 1 TABLET BY MOUTH TWICE DAILY 180 tablet 1    BASAGLAR KWIKPEN 100 UNIT/ML injection pen ADMINISTER 40 UNITS UNDER THE SKIN TWICE DAILY 72 mL 1    insulin lispro, 1 Unit Dial, (HUMALOG KWIKPEN) 100 UNIT/ML SOPN 5 units with breakfast and lunch and 10 units with dinner (Patient taking differently: 20 units with breakfast and dinner) 5 pen 3    lisinopril (PRINIVIL;ZESTRIL) 40 MG tablet TAKE 1 TABLET BY MOUTH DAILY 90 tablet 1    cloNIDine (CATAPRES) 0.1 MG tablet TAKE 1 TABLET BY MOUTH THREE TIMES DAILY 270 tablet 1    hydrALAZINE (APRESOLINE) 100 MG tablet TAKE 1 TABLET BY MOUTH THREE TIMES DAILY 270 tablet 1    pravastatin (PRAVACHOL) 40 MG tablet TAKE 1 TABLET BY MOUTH DAILY 90 tablet 1    omeprazole (PRILOSEC) 20 MG delayed release capsule Take 1 capsule by mouth daily 90 capsule 1    MAPAP ARTHRITIS PAIN 650 MG extended release tablet TAKE 1 TABLET BY MOUTH EVERY 8 HOURS AS NEEDED FOR PAIN 60 tablet 1    escitalopram (LEXAPRO) 20 MG tablet Take 1 tablet by mouth daily 90 tablet 1    dimethyl fumarate (TECFIDERA) 120 MG delayed release capsule Take 1 capsule by mouth 2 times daily 60 capsule 11    metoprolol (LOPRESSOR) 100 MG tablet TAKE 1 TABLET BY MOUTH TWICE DAILY 180 tablet 1    Insulin Pen Needle 31G X 8 MM MISC 1 each by Does not apply route daily 500 each 1    furosemide (LASIX) 40 MG tablet Take 1 tablet by mouth daily 60 tablet 3    ARIPiprazole (ABILIFY) 5 MG tablet Take 1 tablet by mouth daily 90 tablet 1    Wound Dressings ([employer] MANUKA HONEY WOUND) GEL Apply 1 Tube topically 2 times daily 1 Tube 2    NOVOFINE PLUS 32G X 4 MM MISC USE EVERY DAY UNDER THE SKIN      aspirin 81 MG EC tablet Take 81 mg by mouth 2 tablets daily      cetirizine (ZYRTEC) 10 MG tablet Take 10 mg by mouth daily       No current facility-administered medications for this visit. Review of Systems  Constitutional: No fever, no chills, no diaphoresis, no generalized weakness. HEENT: No blurred vision, No sore throat, no ear pain, no hair loss  Neck: denied any neck swelling, difficulty swallowing,   Cardio-pulmonary: No CP, SOB or palpitation, No orthopnea or PND. No cough or wheezing.   GI: No N/V/D, no constipation, No abdominal pain, no melena or hematochezia   : Denied any dysuria, hematuria, flank pain, discharge, or incontinence. Skin: denied any rash, ulcer, Hirsute, or hyperpigmentation. MSK: denied any joint deformity, joint pain/swelling, muscle pain, or back pain. Neuro: no numbness, no tingling, no weakness, _    OBJECTIVE    BP (!) 155/101   Pulse 98   Ht 6' 5\" (1.956 m)   Wt (!) 489 lb (221.8 kg)   SpO2 96%   BMI 57.99 kg/m²   BP Readings from Last 4 Encounters:   05/19/22 (!) 155/101   04/28/22 137/76   04/13/22 (!) 157/99   03/16/22 108/77     Wt Readings from Last 6 Encounters:   05/19/22 (!) 489 lb (221.8 kg)   04/28/22 (!) 481 lb (218.2 kg)   04/13/22 (!) 481 lb (218.2 kg)   03/16/22 (!) 481 lb (218.2 kg)   12/14/21 (!) 450 lb (204.1 kg)   12/03/21 (!) 450 lb (204.1 kg)       Physical examination:  General: awake alert, oriented x3, no abnormal position or movements. HEENT: normocephalic non-traumatic, no exophthalmos   Neck: supple, no LN enlargement, no thyromegaly, no thyroid tenderness, no JVD. Pulm: Clear equal air entry no added sounds, no wheezing or rhonchi    CVS: S1 + S2, no murmur, no heave. Dorsalis pedis pulse palpable   Abd: soft lax, no tenderness, no organomegaly, audible bowel sounds. Skin: warm, no lesions, no rash.  No callus, no Ulcers, No acanthosis nigricans  Musculoskeletal: No back tenderness, no kyphosis/scoliosis    Neuro: CN intact, Monofilament sensation decreased bilateral , muscle power normal  Psych: normal mood, and affect      Review of Laboratory Data:  I personally reviewed the following lab:  Lab Results   Component Value Date/Time    WBC 12.9 (H) 03/01/2022 09:07 AM    RBC 4.86 03/01/2022 09:07 AM    HGB 15.6 03/01/2022 09:07 AM    HCT 46.8 03/01/2022 09:07 AM    MCV 96.3 03/01/2022 09:07 AM    MCH 32.1 03/01/2022 09:07 AM    MCHC 33.3 03/01/2022 09:07 AM    RDW 13.2 03/01/2022 09:07 AM     03/01/2022 09:07 AM    MPV 13.0 (H) 03/01/2022 09:07 AM      Lab Results   Component Value Date/Time     04/07/2022 09:31 AM    K 4.3 04/07/2022 09:31 AM    K 5.1 (H) 08/27/2021 12:19 PM    CO2 23 04/07/2022 09:31 AM    BUN 13 04/07/2022 09:31 AM    CREATININE 0.6 (L) 04/07/2022 09:31 AM    CALCIUM 8.8 04/07/2022 09:31 AM    LABGLOM >60 04/07/2022 09:31 AM    GFRAA >60 04/07/2022 09:31 AM      Lab Results   Component Value Date/Time    TSH 2.010 03/01/2022 09:07 AM     Lab Results   Component Value Date    LABA1C 10.7 05/19/2022    GLUCOSE 381 04/07/2022    MALBCR 75.8 10/30/2020    LABMICR 27.3 10/30/2020    LABCREA 36 10/30/2020     Lab Results   Component Value Date    LABA1C 10.7 05/19/2022    LABA1C 8.5 12/14/2021    LABA1C 11.7 08/28/2021     Lab Results   Component Value Date    TRIG 134 03/01/2022    HDL 37 03/01/2022    LDLCALC 85 03/01/2022    CHOL 149 03/01/2022     No results found for: [address] [place] [address], a 49 y.o.-old male seen in for the following issues       Assessment:      Diagnosis Orders   1. Uncontrolled type 2 diabetes mellitus with hyperglycemia (HCC)  POCT glycosylated hemoglobin (Hb A1C)    Microalbumin / Creatinine Urine Ratio    Mellisa Metcalf MD, Bariat[facility], [facility]   2. Class 3 severe obesity due to excess calories without serious comorbidity with body mass index (BMI) of 50.0 to 59.9 in adult ([facility]     3. Mixed hyperlipidemia     4. Dietary noncompliance     5. Vitamin D deficiency  Vitamin D 25 Hydroxy   6. Uncontrolled type 2 diabetes mellitus with polyneuropathy ([facility]         Plan:     1. Uncontrolled type 2 diabetes mellitus with hyperglycemia ([address]   · Patient's diabetes is uncontrolled. · We will continue Basaglar 40 units twice daily  · Take Humalog 20 units 3 times daily with each meal plus moderate dose insulin sliding scale  · Continue metformin 1000 mg twice daily  · Start Trulicity 3.38 mg once weekly for 2 weeks then increase to 1.5 mg thereafter  · No contraindications. Counseled on side effects  · Patient interested in [facility].   We will send prescription  · The patient was advised to check blood sugars 4 times a day before meals and at bedtime and send BS readings to our office in a week. · Discussed with patient A1c and blood sugar goals   · The patient counseled about the complications of uncontrolled diabetes   · Discussed lifestyle changes including diet and exercise with patient; recommended 150 minutes of moderate intensity exercise per week. · Diabetes labs before next visit    2. Class 3 severe obesity due to excess calories without serious comorbidity with body mass index (BMI) of 50.0 to 59.9 in adult [facility])   · Discussed lifestyle changes including diet and exercise with patient in depth. Also discussed with patient cardiovascular risk associated with obesity  · Will start GLP-1 agonist  · Will refer to surgical weight loss   3. Mixed hyperlipidemia   · Continue statin therapy  · Counseled on the importance of statin therapy and diabetes   4. Dietary noncompliance   · Discussed with patient the importance of eating consistent carbohydrate meals, avoiding high glycemic index food. Also, discussed with patient the risk and negative consequences of dietary noncompliance on blood glucose control, blood pressure and weight   5. Vitamin D deficiency   · Patient at risk for vitamin D deficiency  · Will assess vitamin D   6. Uncontrolled type 2 diabetes mellitus with polyneuropathy ([address]   · Counseled on optimal foot care  · Following with podiatry         I personally spent > 60 minutes reviewing  external notes from PCP and other patient's care team providers, and personally interpreted labs associated with the above diagnosis. I also ordered labs to further assess and manage the above addressed medical conditions. No follow-ups on file. The above issues were reviewed with the patient who understood and agreed with the plan. Thank you for allowing us to participate in the care of this patient.  Please do not hesitate to contact us with any additional questions. AVEL Gutierrez [facility] - BE[facility]IOR[facility] Diabetes Care and Endocrinology   [address] [facility] [address]   Phone: [phone]  Fax: [phone]  --------------------------------------------  An electronic signature was used to authenticate this note.  AVEL Gutierrez on 5/19/2022 at 4:12 PM

## 2022-05-25 DIAGNOSIS — F32.A DEPRESSION, UNSPECIFIED DEPRESSION TYPE: ICD-10-CM

## 2022-05-25 RX ORDER — ARIPIPRAZOLE 5 MG/1
5 TABLET ORAL DAILY
Qty: 90 TABLET | Refills: 1 | Status: SHIPPED | OUTPATIENT
Start: 2022-05-25 | End: 2022-08-23

## 2022-06-03 ENCOUNTER — TELEPHONE (OUTPATIENT)
Dept: BARIATRICS/WEIGHT MGMT | Age: 47
End: 2022-06-03

## 2022-06-03 DIAGNOSIS — E55.9 VITAMIN D DEFICIENCY: ICD-10-CM

## 2022-06-03 DIAGNOSIS — E11.65 UNCONTROLLED TYPE 2 DIABETES MELLITUS WITH HYPERGLYCEMIA (HCC): ICD-10-CM

## 2022-06-03 LAB — VITAMIN D 25-HYDROXY: 24 NG/ML (ref 30–100)

## 2022-06-06 ENCOUNTER — TELEPHONE (OUTPATIENT)
Dept: ENDOCRINOLOGY | Age: 47
End: 2022-06-06

## 2022-06-06 ENCOUNTER — TELEPHONE (OUTPATIENT)
Dept: ENT CLINIC | Age: 47
End: 2022-06-06

## 2022-06-06 ENCOUNTER — TELEPHONE (OUTPATIENT)
Dept: FAMILY MEDICINE CLINIC | Age: 47
End: 2022-06-06

## 2022-06-06 ENCOUNTER — OFFICE VISIT (OUTPATIENT)
Dept: ENT CLINIC | Age: 47
End: 2022-06-06
Payer: COMMERCIAL

## 2022-06-06 VITALS
HEART RATE: 100 BPM | WEIGHT: 315 LBS | DIASTOLIC BLOOD PRESSURE: 95 MMHG | SYSTOLIC BLOOD PRESSURE: 160 MMHG | TEMPERATURE: 97.1 F | BODY MASS INDEX: 37.19 KG/M2 | HEIGHT: 77 IN | OXYGEN SATURATION: 95 %

## 2022-06-06 DIAGNOSIS — H61.22 HEARING LOSS OF LEFT EAR DUE TO CERUMEN IMPACTION: Primary | ICD-10-CM

## 2022-06-06 DIAGNOSIS — H61.22 IMPACTED CERUMEN OF LEFT EAR: ICD-10-CM

## 2022-06-06 PROCEDURE — 99203 OFFICE O/P NEW LOW 30 MIN: CPT | Performed by: NURSE PRACTITIONER

## 2022-06-06 PROCEDURE — 69210 REMOVE IMPACTED EAR WAX UNI: CPT | Performed by: NURSE PRACTITIONER

## 2022-06-06 ASSESSMENT — ENCOUNTER SYMPTOMS
RHINORRHEA: 0
DIARRHEA: 0
SHORTNESS OF BREATH: 0
SORE THROAT: 0
EYE PAIN: 0
VOMITING: 0
BACK PAIN: 0
SINUS PRESSURE: 0
ALLERGIC/IMMUNOLOGIC NEGATIVE: 1
COUGH: 0
EYE DISCHARGE: 0

## 2022-06-06 NOTE — PROGRESS NOTES
Subjective:      Patient ID:  Myriam Hercules is a 49 y.o. male. HPI:    Cerumen Impaction  Patient presents with diminished hearing left ear for the past 8 months. There is not a prior history of cerumen impaction. The patient was not using ear drops to loosen wax immediately prior to this visit. Patient states that he has noticed decrease hearing in the left ear.          Past Medical History:   Diagnosis Date    Cellulitis of left foot 09/03/2020    Cellulitis of right foot     Diabetes mellitus ([address]     Diabetic foot ulcer with osteomyelitis ([address] 8/27/2021    Gangrene of toe of right foot ([address] 08/28/2021    Hyperlipidemia     Hypertension     Kidney stone     Multiple sclerosis (HCC)     ЕКАТЕРИНА (obstructive sleep apnea)     dx 15 years ago, not compliant with CPAP     Past Surgical History:   Procedure Laterality Date    FOOT DEBRIDEMENT Right 8/29/2021    FOOT DEBRIDEMENT INCISION AND DRAINAGE performed by Joel Bosch DPM at [address] [facility] Left 9/4/2020    INCISION AND DRAINAGE LEFT FOOT  BONE WITH PARTIAL BONE RESECTION FIFTH METATARSAL performed by Joel Bosch DPM at [address] [facility]  9/1/2021         TOE AMPUTATION Right 8/29/2021    TOE AMPUTATION fifth ray resection performed by Joel [facility]ch DPM at [facility] OR     Family History   Problem Relation Age of Onset    Hypertension Mother     Diabetes Father      Social History     Socioeconomic History    Marital status: Single     Spouse name: None    Number of children: None    Years of education: None    Highest education level: None   Occupational History    None   Tobacco Use    Smoking status: Never Smoker    Smokeless tobacco: Never Used   Vaping Use    Vaping Use: Never used   Substance and Sexual Activity    Alcohol use: Never    Drug use: Never    Sexual activity: Yes     Partners: Female   Other Topics Concern    None   Social History Narrative    None     Social Determinants of Health     Financial Resource Strain: Low Risk     Difficulty of Paying Living Expenses: Not hard at all   Food Insecurity: No Food Insecurity    Worried About Running Out of Food in the Last Year: Never true    Sri of Food in the Last Year: Never true   Transportation Needs: No Transportation Needs    Lack of Transportation (Medical): No    Lack of Transportation (Non-Medical): No   Physical Activity: Inactive    Days of Exercise per Week: 0 days    Minutes of Exercise per Session: 0 min   Stress: No Stress Concern Present    Feeling of Stress : Not at all   Social Connections: Socially Isolated    Frequency of Communication with Friends and Family: More than three times a week    Frequency of Social Gatherings with Friends and Family: More than three times a week    Attends Confucianism Services: Never    Active Member of Clubs or Organizations: No    Attends Club or Organization Meetings: Never    Marital Status:    Intimate Partner Violence: Not At Risk    Fear of Current or Ex-Partner: No    Emotionally Abused: No    Physically Abused: No    Sexually Abused: No   Housing Stability: [address] Unable to Pay for Housing in the Last Year: No    Number of [place] in the Last Year: 1    Unstable Housing in the Last Year: No     Allergies   Allergen Reactions    Bee Venom Swelling    Milk-Related Compounds      Stomach issues-lactose intolerant         Review of Systems   Constitutional: Negative for chills and fever. HENT: Positive for hearing loss. Negative for congestion, ear pain, postnasal drip, rhinorrhea, sinus pressure, sneezing and sore throat. Eyes: Negative for pain and discharge. Respiratory: Negative for cough and shortness of breath. Cardiovascular: Negative for chest pain. Gastrointestinal: Negative for diarrhea and vomiting. Genitourinary: Negative for flank pain. Musculoskeletal: Negative for back pain and neck pain.    Skin: suction. Tympanic membrane of the left ear continues to be not visible after the procedure. Auditory canals appear normal.        Assessment:       Diagnosis Orders   1. Hearing loss of left ear due to cerumen impaction     2. Impacted cerumen of left ear                Plan:      Cerumen impaction   Moderate amount of cerumen removed from the left ear canal however the left TM remains covered in cerumen following cleaning. Discussed Debrox and irrigation 5 drops to left ear once daily and follow up in 2 weeks. He was also instructed to use debrox bi-weekly for maintenance. The patient was instructed to call for any new or worsening symptoms prior to his next appointment. Follow up in 2 weeks.          Jeremias Bran, MICHAEL, FNP-C  [facility], Nose and Throat    The information contained in this note has been dictated using drug and medical speech recognition software and may contain errors

## 2022-06-06 NOTE — TELEPHONE ENCOUNTER
----- Message from AVEL Gutierrez sent at 6/6/2022  7:59 AM EDT -----  Please call patient and inform him vitamin D is low  Is have him start vitamin D 1000 IU daily over-the-counter

## 2022-06-06 NOTE — TELEPHONE ENCOUNTER
[facility] called because pt presented with a High BP reading while seeing their doctor today. The readings were 166/92 & 160/95,  They advised that if his PCP felt that he needed to be seen that we will reach out to him.   ENT called for a heads up

## 2022-06-06 NOTE — TELEPHONE ENCOUNTER
MA spoke with PCP office regarding both high BP readings. Staff will let PCP know.      Electronically signed by Sami Alcazar MA on 6/6/22 at 10:25 AM EDT

## 2022-06-08 NOTE — TELEPHONE ENCOUNTER
I called and spoke with the pt, pt states his BP was up because he did not take his med prior to that appt, pt states he is fine and does not want to schedule an appt at this time.

## 2022-06-17 DIAGNOSIS — E11.65 UNCONTROLLED TYPE 2 DIABETES MELLITUS WITH HYPERGLYCEMIA (HCC): ICD-10-CM

## 2022-06-17 RX ORDER — INSULIN LISPRO 100 [IU]/ML
INJECTION, SOLUTION INTRAVENOUS; SUBCUTANEOUS
Qty: 5 PEN | Refills: 3 | Status: SHIPPED
Start: 2022-06-17 | End: 2022-07-25 | Stop reason: SDUPTHER

## 2022-06-17 NOTE — TELEPHONE ENCOUNTER
Spoke to patient needed a refill on his Humalog and he had a increase in diabetic medication humalog from 20 units with meals plus SS to 20/22/20 plus SS  basaglar 40 units BID to 42 units BID and trulicity . 75 to 1.5 a wk

## 2022-06-24 ENCOUNTER — OFFICE VISIT (OUTPATIENT)
Dept: PODIATRY | Age: 47
End: 2022-06-24
Payer: COMMERCIAL

## 2022-06-24 VITALS — HEIGHT: 77 IN | BODY MASS INDEX: 37.19 KG/M2 | WEIGHT: 315 LBS

## 2022-06-24 DIAGNOSIS — E11.65 UNCONTROLLED TYPE 2 DIABETES MELLITUS WITH HYPERGLYCEMIA (HCC): ICD-10-CM

## 2022-06-24 DIAGNOSIS — M79.672 LEFT FOOT PAIN: Primary | ICD-10-CM

## 2022-06-24 DIAGNOSIS — L84 CORNS AND CALLOSITIES: ICD-10-CM

## 2022-06-24 PROCEDURE — 3046F HEMOGLOBIN A1C LEVEL >9.0%: CPT | Performed by: PODIATRIST

## 2022-06-24 PROCEDURE — 99213 OFFICE O/P EST LOW 20 MIN: CPT | Performed by: PODIATRIST

## 2022-06-24 RX ORDER — AMMONIUM LACTATE 12 G/100G
LOTION TOPICAL
Qty: 400 G | Refills: 2 | Status: SHIPPED | OUTPATIENT
Start: 2022-06-24

## 2022-06-24 NOTE — PROGRESS NOTES
TAKE 1 TABLET BY MOUTH TWICE DAILY, Disp: 180 tablet, Rfl: 1    omeprazole (PRILOSEC) 20 MG delayed release capsule, Take 1 capsule by mouth daily, Disp: 90 capsule, Rfl: 1    BASAGLAR KWIKPEN 100 UNIT/ML injection pen, ADMINISTER 40 UNITS UNDER THE SKIN TWICE DAILY, Disp: 72 mL, Rfl: 1    MAPAP ARTHRITIS PAIN 650 MG extended release tablet, TAKE 1 TABLET BY MOUTH EVERY 8 HOURS AS NEEDED FOR PAIN, Disp: 60 tablet, Rfl: 1    escitalopram (LEXAPRO) 20 MG tablet, Take 1 tablet by mouth daily, Disp: 90 tablet, Rfl: 1    dimethyl fumarate (TECFIDERA) 120 MG delayed release capsule, Take 1 capsule by mouth 2 times daily, Disp: 60 capsule, Rfl: 11    metoprolol (LOPRESSOR) 100 MG tablet, TAKE 1 TABLET BY MOUTH TWICE DAILY, Disp: 180 tablet, Rfl: 1    Insulin Pen Needle 31G X 8 MM MISC, 1 each by Does not apply route daily, Disp: 500 each, Rfl: 1    furosemide (LASIX) 40 MG tablet, Take 1 tablet by mouth daily, Disp: 60 tablet, Rfl: 3    Wound Dressings ([facility] MANUKA HONEY WOUND) GEL, Apply 1 Tube topically 2 times daily, Disp: 1 Tube, Rfl: 2    NOVOFINE PLUS 32G X 4 MM MISC, USE EVERY DAY UNDER THE SKIN, Disp: , Rfl:     aspirin 81 MG EC tablet, Take 81 mg by mouth 2 tablets daily, Disp: , Rfl:     cetirizine (ZYRTEC) 10 MG tablet, Take 10 mg by mouth daily, Disp: , Rfl:   Allergies   Allergen Reactions    Bee Venom Swelling    Milk-Related Compounds      Stomach issues-lactose intolerant       Past Medical History:   Diagnosis Date    Cellulitis of left foot 09/03/2020    Cellulitis of right foot     Diabetes [facility]     Diabetic foot ulcer with osteomyelitis ([address] 8/27/2021    Gangrene of toe of right foot ([facility] 08/28/2021    Hyperlipidemia     Hypertension     Kidney stone     Multiple sclerosis (HCC)     ЕКАТЕРИНА (obstructive sleep apnea)     dx 15 years ago, not compliant with CPAP       There were no vitals filed for this visit.      Postop incision and drainage left foot with fifth metatarsal resection and bone biopsy date of surgery 9/4/2020    Work History/Social History:     Focused Lower Extremity Physical Exam:      Neurovascular examination:    Dorsalis Pedis palpable bilateral.  Posterior tibialis  palpable bilateral.  Capillary Refill Time:  Immediate return  Hair growth:  Symmetrical and bilateral   Skin:  Not atrophic  Edema: Mild edema bilateral feet. Mild edema bilateral ankles. Neurologic:  Light touch diminished bilateral.  Warm to coolness bilateral distal toes  Decreased epicritic sensation     Musculoskeletal/ Orthopedic examination:    Equinis: present bilateral  Dorsiflexion, plantarflexion, inversion, eversion bilateral 5 out of 5 muscle strength  Wiggling remaining toes    Status post fifth ray amputation right foot. Mild tenderness to palpation left foot where there is callus tissue. No open or underlying wound. Dermatology examination:    Hyperkeratotic tissue with dried skin plantar feet bilateral.  No open wounds. No erythema foot or ankle. Assessment and Plan:  Bishop Matos was seen today for foot pain and wound check. Diagnoses and all orders for this visit:    Left foot pain  -     XR FOOT LEFT (MIN 3 VIEWS); Future    Uncontrolled type 2 diabetes mellitus with hyperglycemia (HCC)    Corns and callosities    Other orders  -     ammonium lactate (LAC-HYDRIN) 12 % lotion; Apply topically twice daily      Presents diabetic foot and ankle exam   [id number] from 5/19/2022.  10.7  Stressed importance of wearing a well supported diabetic inserts and diabetic shoe. Importance of wearing socks daily. Does have dry skin left foot with no open wound. I did pare hyperkeratotic tissue and no underlying wounds. Did recommend ammonium lactate 12% twice daily both feet. I did order left foot radiographs  Any new skin lesions or abrasions I be happy to see him sooner. Follow-up in office 3 weeks        Return in about 3 weeks (around 7/15/2022).      Seen By:  Kylah Uriarte Siddhartha Leaks      Document was created using voice recognition software. Note was reviewed however may contain grammatical errors.

## 2022-07-08 DIAGNOSIS — I10 ESSENTIAL HYPERTENSION: ICD-10-CM

## 2022-07-08 RX ORDER — METOPROLOL TARTRATE 100 MG/1
TABLET ORAL
Qty: 180 TABLET | Refills: 1 | Status: SHIPPED | OUTPATIENT
Start: 2022-07-08

## 2022-07-15 ENCOUNTER — OFFICE VISIT (OUTPATIENT)
Dept: PODIATRY | Age: 47
End: 2022-07-15
Payer: COMMERCIAL

## 2022-07-15 VITALS — BODY MASS INDEX: 37.19 KG/M2 | WEIGHT: 315 LBS | HEIGHT: 77 IN

## 2022-07-15 DIAGNOSIS — E11.65 UNCONTROLLED TYPE 2 DIABETES MELLITUS WITH HYPERGLYCEMIA (HCC): ICD-10-CM

## 2022-07-15 DIAGNOSIS — M79.672 LEFT FOOT PAIN: Primary | ICD-10-CM

## 2022-07-15 DIAGNOSIS — L84 CORNS AND CALLOSITIES: ICD-10-CM

## 2022-07-15 DIAGNOSIS — G60.8 HEREDITARY SENSORY NEUROPATHY: ICD-10-CM

## 2022-07-15 DIAGNOSIS — S91.302A OPEN WOUND OF LEFT FOOT, INITIAL ENCOUNTER: ICD-10-CM

## 2022-07-15 PROCEDURE — 3046F HEMOGLOBIN A1C LEVEL >9.0%: CPT | Performed by: PODIATRIST

## 2022-07-15 PROCEDURE — 99213 OFFICE O/P EST LOW 20 MIN: CPT | Performed by: PODIATRIST

## 2022-07-15 NOTE — PROGRESS NOTES
Patient in office for wound check left foot. CC:   Fissure skin left foot      HPI:   Jyothi Smoker presents today for fissure skin on the bottom the left foot. There is a small wound which she has been just putting Neosporin and a Band-Aid on. Presents wearing regular slippers. Denies any pain today left foot. Denies any redness or drainage. Would like to hold off on radiographs. No additional pedal complaints.       ROS:  Const: Denies constitutional symptoms  Musculo: Denies symptoms other than stated above  Skin: Denies symptoms other than stated above      Current Outpatient Medications:     metoprolol (LOPRESSOR) 100 MG tablet, TAKE 1 TABLET BY MOUTH TWICE DAILY, Disp: 180 tablet, Rfl: 1    ammonium lactate (LAC-HYDRIN) 12 % lotion, Apply topically twice daily, Disp: 400 g, Rfl: 2    insulin lispro, 1 Unit Dial, (HUMALOG KWIKPEN) 100 UNIT/ML SOPN, 20/22/20  units with breakfast lunch and dinner max dose of a 100, Disp: 5 pen, Rfl: 3    ARIPiprazole (ABILIFY) 5 MG tablet, TAKE 1 TABLET BY MOUTH DAILY, Disp: 90 tablet, Rfl: 1    Dulaglutide (TRULICITY) 1.5 WES/9.0UI SOPN, Inject 1.5 mg into the skin once a week, Disp: 12 pen, Rfl: 3    Continuous Blood Gluc  (FREESTYLE ALANNAH 2 READER) [facility], 1 device, Disp: 1 each, Rfl: 0    Continuous Blood Gluc Sensor (FREESTYLE ALANNAH 2 SENSOR) [facility], Every 14 days, Disp: 3 each, Rfl: 5    lisinopril (PRINIVIL;ZESTRIL) 40 MG tablet, TAKE 1 TABLET BY MOUTH DAILY, Disp: 90 tablet, Rfl: 1    cloNIDine (CATAPRES) 0.1 MG tablet, TAKE 1 TABLET BY MOUTH THREE TIMES DAILY, Disp: 270 tablet, Rfl: 1    hydrALAZINE (APRESOLINE) 100 MG tablet, TAKE 1 TABLET BY MOUTH THREE TIMES DAILY, Disp: 270 tablet, Rfl: 1    pravastatin (PRAVACHOL) 40 MG tablet, TAKE 1 TABLET BY MOUTH DAILY, Disp: 90 tablet, Rfl: 1    metFORMIN (GLUCOPHAGE) 1000 MG tablet, TAKE 1 TABLET BY MOUTH TWICE DAILY, Disp: 180 tablet, Rfl: 1    omeprazole (PRILOSEC) 20 MG delayed release capsule, Take 1 capsule by mouth daily, Disp: 90 capsule, Rfl: 1    BASAGLAR KWIKPEN 100 UNIT/ML injection pen, ADMINISTER 40 UNITS UNDER THE SKIN TWICE DAILY, Disp: 72 mL, Rfl: 1    MAPAP ARTHRITIS PAIN 650 MG extended release tablet, TAKE 1 TABLET BY MOUTH EVERY 8 HOURS AS NEEDED FOR PAIN, Disp: 60 tablet, Rfl: 1    escitalopram (LEXAPRO) 20 MG tablet, Take 1 tablet by mouth daily, Disp: 90 tablet, Rfl: 1    dimethyl fumarate (TECFIDERA) 120 MG delayed release capsule, Take 1 capsule by mouth 2 times daily, Disp: 60 capsule, Rfl: 11    Insulin Pen Needle 31G X 8 MM MISC, 1 each by Does not apply route daily, Disp: 500 each, Rfl: 1    furosemide (LASIX) 40 MG tablet, Take 1 tablet by mouth daily, Disp: 60 tablet, Rfl: 3    Wound Dressings ([employer] MANUKA HONEY WOUND) GEL, Apply 1 Tube topically 2 times daily, Disp: 1 Tube, Rfl: 2    NOVOFINE PLUS 32G X 4 MM MISC, USE EVERY DAY UNDER THE SKIN, Disp: , Rfl:     aspirin 81 MG EC tablet, Take 81 mg by mouth 2 tablets daily, Disp: , Rfl:     cetirizine (ZYRTEC) 10 MG tablet, Take 10 mg by mouth daily, Disp: , Rfl:   Allergies   Allergen Reactions    Bee Venom Swelling    Milk-Related Compounds      Stomach issues-lactose intolerant       Past Medical History:   Diagnosis Date    Cellulitis of left foot 09/03/2020    Cellulitis of right foot     Diabetes mellitus (HCC)     Diabetic foot ulcer with osteomyelitis ([facility] [address] 8/27/2021    Gangrene of toe of right foot ([facility] [address] 08/28/2021    Hyperlipidemia     Hypertension     Kidney stone     Multiple sclerosis (HCC)     ЕКАТЕРИНА (obstructive sleep apnea)     dx 15 years ago, not compliant with CPAP       There were no vitals filed for this visit.      Postop incision and drainage left foot with fifth metatarsal resection and bone biopsy date of surgery 9/4/2020  Postop amputation partial right fifth ray with incision and drainage date of surgery 8/29/2021  Work History/Social History:     Focused Lower Extremity Physical Exam:      Neurovascular examination:    Dorsalis Pedis palpable bilateral.  Posterior tibialis  palpable bilateral.  Capillary Refill Time:  Immediate return  Hair growth:  Symmetrical and bilateral   Skin:  Not atrophic  Edema: Mild edema bilateral feet. Mild edema bilateral ankles. Neurologic:  Light touch diminished bilateral.  Warm to coolness bilateral distal toes  Decreased epicritic sensation     Musculoskeletal/ Orthopedic examination:    Equinis: present bilateral  Dorsiflexion, plantarflexion, inversion, eversion bilateral 5 out of 5 muscle strength  Wiggling remaining toes    Status post fifth ray amputation right foot. No pain left foot today      Dermatology examination:    Hyperkeratotic tissue with dry skin plantar feet bilateral.  There is a fissure on the bottom of the left foot with superficial abrasion wound fifth metatarsal base measuring approximately 2 cm x 0.2 cm x 0.1 cm.  100% granular. Hyperkeratotic tissue noted. Wound does not drain or probe to bone. Assessment and Plan:  Jyothi Johnson was seen today for follow-up and foot pain. Diagnoses and all orders for this visit:    Left foot pain    Uncontrolled type 2 diabetes mellitus with hyperglycemia ([address]    Corns and callosities    Hereditary sensory neuropathy      [id number] from 5/19/2022.  10.7    Seen today fissures left foot. Does have superficial wound left foot. We did discuss radiographs left foot he like to hold off at this time. No clinical signs of infection. Does have a fissure with a small abrasion on bottom left foot. Previous history of foot infection and surgery. High risk for infection and nonhealing. If any increase in redness or drainage go to emergency room. Will follow-up in office 3 weeks. Did recommend medihoney and offloading padding daily. Avoid barefoot. I did recommend a wide well supported walking shoe. Return in about 2 months (around 9/15/2022).      Seen By:  Laenna Thomas DPM      Document was created using voice recognition software. Note was reviewed however may contain grammatical errors.

## 2022-07-20 ENCOUNTER — OFFICE VISIT (OUTPATIENT)
Dept: NEUROLOGY | Age: 47
End: 2022-07-20
Payer: COMMERCIAL

## 2022-07-20 VITALS
WEIGHT: 315 LBS | RESPIRATION RATE: 18 BRPM | BODY MASS INDEX: 57.39 KG/M2 | DIASTOLIC BLOOD PRESSURE: 90 MMHG | HEART RATE: 94 BPM | OXYGEN SATURATION: 95 % | SYSTOLIC BLOOD PRESSURE: 127 MMHG | TEMPERATURE: 97.2 F

## 2022-07-20 DIAGNOSIS — G35 MULTIPLE SCLEROSIS (HCC): Primary | ICD-10-CM

## 2022-07-20 PROCEDURE — 99215 OFFICE O/P EST HI 40 MIN: CPT | Performed by: PSYCHIATRY & NEUROLOGY

## 2022-07-20 RX ORDER — BACLOFEN 10 MG/1
10 TABLET ORAL 2 TIMES DAILY
Qty: 60 TABLET | Refills: 5 | Status: SHIPPED | OUTPATIENT
Start: 2022-07-20 | End: 2023-01-16

## 2022-07-20 NOTE — PROGRESS NOTES
This 48-year-old right-handed man was referred for evaluation and management of relapsing, remitting multiple sclerosis. He remained an excellent historian. He was again alone for this interview. His medications were now ocrelizumab, dulaglutide, hydralazine, metformin, clonidine, hydralazine, furosemide, insulins, escitalopram, pravastatin, metoprolol, aspirin, cetirizine and omeprazole. His medical history was remarkable for longstanding diabetes mellitus, with improving control. However, his last glucose level was 381 and a hemoglobin A1c of 10.7. He suffered from hypertension, degenerative joint disease of his knees and depression, well controlled with his above regimen. There remain peripheral arterial disease, with a slowly resolving left foot ulceration and infection. He again denied strokes, seizures, heart disease, lung disorders, gastrointestinal disease, kidney abnormalities or skin abnormalities. Multiple sclerosis was diagnosed in 2008. While playing baseball, he suddenly noted unsteadiness, almost falling to the ground when batting. The next morning, he was clumsy and almost fell again. He then experienced left hand numbness and weakness. He was initially evaluated for stroke, but investigation revealed multiple sclerosis. Spinal fluid analysis confirmed that diagnosis. He was referred to the [facility], where he received dimethyl fumarate. He was off dimethyl fumarate for 3 years due to his inability to afford that medication. He recently returned to that drug. I initially requested ocrelizumab, but his insurance would not pay for that infusion. Fortunately, he obtained new insurances and started those infusions. He was tolerating those well. He improved remarkably from his first insult. He felt better overall, except for probable spasms at night and difficulties walking. He requested muscle relaxants. He reported no medical issues.   He received his USRAJ-97 vaccinations and booster. Review of systems was otherwise unremarkable. Physical examination displayed stable vital signs, except for a blood pressure 127/90. He was a middle-age man in no acute distress, who was alert, cooperative and oriented. He remained pleasant. He was obese. His skin was unremarkable. His lungs were clear to auscultation. Cardiac testing proved unremarkable. There remained abdominal obesity, but no tenderness or masses. Peripheral pulses were +1 without bruits. His limbs displayed bilateral pes planus deformities, with a healing left lesion. Neurological examination produced an intact mental status. Cranial nerve testing was unremarkable. I found no Wilton pupil or red desaturation. He displayed normal tone and bulk with 5/5 strength throughout. I elicited no reflexes. His right Babinski, but no other pathological ones, remained. All sensory modalities continued intact. Finger-to-nose and heel shin testings were performed well. He staggered due to his weight, with questionable slight spasticity. Laboratory data included a recent unremarkable CMP, except for elevated blood sugars. CBC with differential displayed an absolute lymphocyte count of 1.99. Hepatitis panels were negative. IgG was normal, with an unremarkable RPR. His past MRI of his brain revealed old plaque or a possible mass. There was no extensive disease. The patient was too large to perform MRI of the cervical spine. This individual was diagnosed with relapsing remitting multiple sclerosis years ago. On current examination, I again find only a right Babinski. His EDSS remains 1.0. He still requires an cervical MRI to assess total disease load. He was successfully switched to ocrelizumab. I will add low-dose baclofen for presumed spasticity. He is otherwise stable neurologically. He suffers from multiple medical comorbidities. His blood sugars and weight remain poorly controlled.   He must follow closely with his primary care physician. He will return in 4 months, to see me. Ocrelizumab was continued. MRIs of the cervical spine will be scheduled. Baclofen was started 10 mg twice daily. He will call in 1 month with his progress. He will call at any time if problems arise. I spent 40 minutes with the patient with over 50 % spent in counseling and disease management. All patient issues were addressed and all questions were answered.

## 2022-07-25 DIAGNOSIS — E11.65 UNCONTROLLED TYPE 2 DIABETES MELLITUS WITH HYPERGLYCEMIA (HCC): ICD-10-CM

## 2022-07-25 RX ORDER — INSULIN LISPRO 100 [IU]/ML
INJECTION, SOLUTION INTRAVENOUS; SUBCUTANEOUS
Qty: 10 PEN | Refills: 3 | Status: SHIPPED | OUTPATIENT
Start: 2022-07-25

## 2022-08-01 DIAGNOSIS — F32.A DEPRESSION, UNSPECIFIED DEPRESSION TYPE: ICD-10-CM

## 2022-08-02 RX ORDER — ESCITALOPRAM OXALATE 20 MG/1
20 TABLET ORAL DAILY
Qty: 90 TABLET | Refills: 1 | Status: SHIPPED | OUTPATIENT
Start: 2022-08-02

## 2022-08-08 DIAGNOSIS — E11.65 UNCONTROLLED TYPE 2 DIABETES MELLITUS WITH HYPERGLYCEMIA (HCC): Primary | ICD-10-CM

## 2022-08-08 RX ORDER — INSULIN GLARGINE 100 [IU]/ML
INJECTION, SOLUTION SUBCUTANEOUS
Qty: 10 PEN | Refills: 3 | Status: SHIPPED | OUTPATIENT
Start: 2022-08-08

## 2022-08-08 NOTE — PROGRESS NOTES
Occupational Therapy  OCCUPATIONAL THERAPY INITIAL EVALUATION      Date:2021  Patient Name: April Pederson  MRN: [id number]  : 1975  Room: [address] [place], [place] [address]    OCCUPATIONAL THERAPY INITIAL EVALUATION    [facility] [address]                                                  Patient Name: April Pederson    MRN: [id number]    : 1975    Room: [address] [place], [place] [address]      Evaluating OT: Stephy Perez OTR/L   [id number]      Referring Rene Proctor DPM    Specific Provider Orders/Date:OT eval and treat 2021      Diagnosis: Diabetic foot ulcer with osteomyelitis      Surgery:  s/pincision and drainage of the right foot with fifth digit amputation 2021    Pertinent Medical History: DM, HTN    Precautions:  Fall Risk, strict NWB R LE     Assessment of current deficits    [x] Functional mobility  [x]ADLs  [x] Strength               []Cognition    [x] Functional transfers   [x] IADLs         [x] Safety Awareness   [x]Endurance    [] Fine Coordination              [x] Balance      [] Vision/perception   [x]Sensation     []Gross Motor Coordination  [] ROM  [] Delirium                   [] Motor Control     OT PLAN OF CARE   OT POC based on physician orders, patient diagnosis and results of clinical assessment    Frequency/Duration  1-3 days/wk for 2 weeks PRN   Specific OT Treatment Interventions to include:   ADL retraining/adapted techniques and AE recommendations to increase functional independence within precautions                    Energy conservation techniques to improve tolerance for selfcare routine   Functional transfer/mobility training/DME recommendations for increased independence, safety and fall prevention         Patient/family education to increase safety and functional independence             Environmental modifications for safe mobility and completion of ADLs Therapeutic activity to improve functional performance during ADLs. Therapeutic exercise to improve tolerance and functional strength for ADLs    Balance retraining/tolerance tasks for facilitation of postural control with dynamic challenges during ADLs . Positioning to improve functional independence    Recommended Adaptive Equipment: bariatric walker, wheelchair    Home Living: Pt lives with mother,sister and niece. 2 story. Ramp to enter.  Bed/bath on 1st. Full bath on 1st     Bathroom setup: walk in shower with seat      Prior Level of Function: Independent  with ADLs , Independent  with IADLs; ambulated with no device     Pain Level: no pain this session ;   Cognition: A&O: 4/4;    Memory:  Good    Sequencing:  Good    Problem solving:  Good    Judgement/safety:  Fair      Functional Assessment:  AM-PAC Daily Activity Raw Score: 16/24   Initial Eval Status  Date: 8/30/21 Treatment Status  Date: STGs = LTGs  Time frame: 10-14 days   Feeding Independent     Grooming Set-up ,seated   Patient non compliant with strict NWB precaution   Independent    UB Dressing Set-up   Independent    LB Dressing Mod A  Mod I    Bathing Mod A  Mod I   Toileting Assist with thorough hygiene   -Able to manage urinal   Independent    Bed Mobility  Independent   Supine <> sit      Functional Transfers SBA  Sit-stand from bed   Patient reminded and educated on strict NWB precaution   Mod I    Functional Mobility Returned to bed  Unable to follow thru with NWB precaution   - patient states he has OA in his both knees and he was not able  to maintain NWB prior   Mod I  with good tolerance    Balance Sitting:     Static:  Independent     Dynamic:SBA   Standing: SBA - with w/walker  Not maintaining NWB   Mod I    Activity Tolerance Good with light activity      Visual/  Perceptual Glasses: none by bedside                 Hand Dominance right    AROM (PROM) Strength Additional Info:    [facility] Detail Level: Detailed Quality 431: Preventive Care And Screening: Unhealthy Alcohol Use - Screening: Patient not identified as an unhealthy alcohol user when screened for unhealthy alcohol use using a systematic screening method Quality 111:Pneumonia Vaccination Status For Older Adults: Pneumococcal vaccine administered on or after patient’s 60th birthday and before the end of the measurement period Quality 110: Preventive Care And Screening: Influenza Immunization: Influenza Immunization previously received during influenza season Quality 226: Preventive Care And Screening: Tobacco Use: Screening And Cessation Intervention: Patient screened for tobacco use and is an ex/non-smoker

## 2022-08-18 ENCOUNTER — OFFICE VISIT (OUTPATIENT)
Dept: PODIATRY | Age: 47
End: 2022-08-18
Payer: COMMERCIAL

## 2022-08-18 VITALS — WEIGHT: 315 LBS | HEIGHT: 77 IN | BODY MASS INDEX: 37.19 KG/M2

## 2022-08-18 DIAGNOSIS — G60.8 HEREDITARY SENSORY NEUROPATHY: ICD-10-CM

## 2022-08-18 DIAGNOSIS — L97.509 TYPE 2 DIABETES MELLITUS WITH FOOT ULCER, UNSPECIFIED WHETHER LONG TERM INSULIN USE (HCC): ICD-10-CM

## 2022-08-18 DIAGNOSIS — L84 CORNS AND CALLOSITIES: ICD-10-CM

## 2022-08-18 DIAGNOSIS — E11.65 UNCONTROLLED TYPE 2 DIABETES MELLITUS WITH HYPERGLYCEMIA (HCC): Primary | ICD-10-CM

## 2022-08-18 DIAGNOSIS — E11.621 TYPE 2 DIABETES MELLITUS WITH FOOT ULCER, UNSPECIFIED WHETHER LONG TERM INSULIN USE (HCC): ICD-10-CM

## 2022-08-18 DIAGNOSIS — B35.1 TINEA UNGUIUM: ICD-10-CM

## 2022-08-18 PROCEDURE — 3046F HEMOGLOBIN A1C LEVEL >9.0%: CPT | Performed by: PODIATRIST

## 2022-08-18 PROCEDURE — 11721 DEBRIDE NAIL 6 OR MORE: CPT | Performed by: PODIATRIST

## 2022-08-18 PROCEDURE — 99213 OFFICE O/P EST LOW 20 MIN: CPT | Performed by: PODIATRIST

## 2022-08-18 NOTE — PROGRESS NOTES
ALANNAH 2 SENSOR) MISC, Every 14 days, Disp: 3 each, Rfl: 5    lisinopril (PRINIVIL;ZESTRIL) 40 MG tablet, TAKE 1 TABLET BY MOUTH DAILY, Disp: 90 tablet, Rfl: 1    cloNIDine (CATAPRES) 0.1 MG tablet, TAKE 1 TABLET BY MOUTH THREE TIMES DAILY, Disp: 270 tablet, Rfl: 1    hydrALAZINE (APRESOLINE) 100 MG tablet, TAKE 1 TABLET BY MOUTH THREE TIMES DAILY, Disp: 270 tablet, Rfl: 1    pravastatin (PRAVACHOL) 40 MG tablet, TAKE 1 TABLET BY MOUTH DAILY, Disp: 90 tablet, Rfl: 1    metFORMIN (GLUCOPHAGE) 1000 MG tablet, TAKE 1 TABLET BY MOUTH TWICE DAILY, Disp: 180 tablet, Rfl: 1    omeprazole (PRILOSEC) 20 MG delayed release capsule, Take 1 capsule by mouth daily, Disp: 90 capsule, Rfl: 1    BASAGLAR KWIKPEN 100 UNIT/ML injection pen, ADMINISTER 40 UNITS UNDER THE SKIN TWICE DAILY, Disp: 72 mL, Rfl: 1    MAPAP ARTHRITIS PAIN 650 MG extended release tablet, TAKE 1 TABLET BY MOUTH EVERY 8 HOURS AS NEEDED FOR PAIN, Disp: 60 tablet, Rfl: 1    Insulin Pen Needle 31G X 8 MM MISC, 1 each by Does not apply route daily, Disp: 500 each, Rfl: 1    furosemide (LASIX) 40 MG tablet, Take 1 tablet by mouth daily, Disp: 60 tablet, Rfl: 3    Wound Dressings ([facility] MANUKA HONEY WOUND) GEL, Apply 1 Tube topically 2 times daily, Disp: 1 Tube, Rfl: 2    NOVOFINE PLUS 32G X 4 MM MISC, USE EVERY DAY UNDER THE SKIN, Disp: , Rfl:     aspirin 81 MG EC tablet, Take 81 mg by mouth 2 tablets daily, Disp: , Rfl:     cetirizine (ZYRTEC) 10 MG tablet, Take 10 mg by mouth daily, Disp: , Rfl:   Allergies   Allergen Reactions    Bee Venom Swelling    Milk-Related Compounds      Stomach issues-lactose intolerant       Past Medical History:   Diagnosis Date    Cellulitis of left foot 09/03/2020    Cellulitis of right foot     Diabetes mellitus (HCC)     Diabetic foot ulcer with osteomyelitis ([facility] [address] 8/27/2021    Gangrene of toe of right foot ([facility] [address] 08/28/2021    Hyperlipidemia     Hypertension     Kidney stone     Multiple sclerosis (HCC)     ЕКАТЕРИНА (obstructive sleep apnea)     dx 15 years ago, not compliant with CPAP       There were no vitals filed for this visit. Postop incision and drainage left foot with fifth metatarsal resection and bone biopsy date of surgery 9/4/2020  Postop amputation partial right fifth ray with incision and drainage date of surgery 8/29/2021  Work History/Social History:     Focused Lower Extremity Physical Exam:      Neurovascular examination:    Dorsalis Pedis palpable bilateral.  Posterior tibialis  palpable bilateral.  Capillary Refill Time:  Immediate return  Hair growth:  Symmetrical and bilateral   Skin:  Not atrophic  Edema: Mild edema bilateral feet. Mild edema bilateral ankles. Neurologic:  Light touch diminished bilateral.  Warm to coolness bilateral distal toes  Decreased epicritic sensation     Musculoskeletal/ Orthopedic examination:    Equinis: present bilateral  Dorsiflexion, plantarflexion, inversion, eversion bilateral 5 out of 5 muscle strength  Wiggling remaining toes    Status post fifth ray amputation right foot. No pain left foot    Dermatology examination:    Hyperkeratotic tissue with dry skin plantar feet bilateral.  Open wound lateral plantar foot left foot measuring 1 cm x 1 cm x 0.2 cm. Hyperkeratotic tissue noted. 100% granular wound. No drainage. Wound does not track or probe to bone. No erythema. Toenails 1 through 5 left foot and 1 through 4 right foot thickened, mycotic dystrophic subungual debris noted. No open wounds right foot. Assessment and Plan:  Araseli Lopes was seen today for follow-up, wound check and diabetes.     Diagnoses and all orders for this visit:    Uncontrolled type 2 diabetes mellitus with hyperglycemia ([address]    Corns and callosities    Type 2 diabetes mellitus with foot ulcer, unspecified whether long term insulin use (HCC)    Tinea unguium    Hereditary sensory neuropathy      Follow-up wound left foot  I did debride toenails in length and thickness 1 through 5 left foot and 1 through 4 right foot. I did pare hyperkeratotic tissue surrounding plantar lateral left foot wound. Wound is 100% granular. Previous history of multiple amputations and is high risk for lower extremity limb loss with noncompliance, elevated hemoglobin A1c. He is understanding this. Any increase in redness or drainage go to emergency room. We did previously write a DME order for custom inserts which she is in a regular slipper today. I did print out a new order for [employer] for custom inserts with offloading padding. Medihoney and bandage once daily. Follow-up in office 2 weeks. Return in about 2 weeks (around 9/1/2022). Seen By:  Leobardo Warner DPM      Document was created using voice recognition software. Note was reviewed however may contain grammatical errors.

## 2022-08-24 ENCOUNTER — OFFICE VISIT (OUTPATIENT)
Dept: ENDOCRINOLOGY | Age: 47
End: 2022-08-24
Payer: COMMERCIAL

## 2022-08-24 VITALS
HEART RATE: 94 BPM | BODY MASS INDEX: 37.19 KG/M2 | DIASTOLIC BLOOD PRESSURE: 100 MMHG | SYSTOLIC BLOOD PRESSURE: 163 MMHG | HEIGHT: 77 IN | OXYGEN SATURATION: 93 % | WEIGHT: 315 LBS

## 2022-08-24 DIAGNOSIS — E78.2 MIXED HYPERLIPIDEMIA: ICD-10-CM

## 2022-08-24 DIAGNOSIS — E11.65 UNCONTROLLED TYPE 2 DIABETES MELLITUS WITH HYPERGLYCEMIA (HCC): Primary | ICD-10-CM

## 2022-08-24 DIAGNOSIS — E66.01 CLASS 3 SEVERE OBESITY DUE TO EXCESS CALORIES WITHOUT SERIOUS COMORBIDITY WITH BODY MASS INDEX (BMI) OF 50.0 TO 59.9 IN ADULT (HCC): ICD-10-CM

## 2022-08-24 DIAGNOSIS — E55.9 VITAMIN D DEFICIENCY: ICD-10-CM

## 2022-08-24 DIAGNOSIS — Z91.119 DIETARY NONCOMPLIANCE: ICD-10-CM

## 2022-08-24 LAB — HBA1C MFR BLD: 7.9 %

## 2022-08-24 PROCEDURE — 3051F HG A1C>EQUAL 7.0%<8.0%: CPT | Performed by: CLINICAL NURSE SPECIALIST

## 2022-08-24 PROCEDURE — 99214 OFFICE O/P EST MOD 30 MIN: CPT | Performed by: CLINICAL NURSE SPECIALIST

## 2022-08-24 PROCEDURE — 83036 HEMOGLOBIN GLYCOSYLATED A1C: CPT | Performed by: CLINICAL NURSE SPECIALIST

## 2022-08-24 RX ORDER — DULAGLUTIDE 3 MG/.5ML
3 INJECTION, SOLUTION SUBCUTANEOUS WEEKLY
Qty: 4 PEN | Refills: 5 | Status: SHIPPED | OUTPATIENT
Start: 2022-08-24

## 2022-08-24 NOTE — PROGRESS NOTES
[address] [facility] Department of Endocrinology Diabetes and Metabolism   [address] [facility] [address]   Phone: [phone]  Fax: [phone]    Date of Service: 8/24/2022    Primary Care Physician: Hoda Maria DO  Referring physician: No ref. provider found  Provider: AVEL Merritt     Reason for the visit:  Type 2 DM       History of Present Illness: The history is provided by the patient. No  was used. Accuracy of the patient data is excellent. Buster Mitchell is a very pleasant 49 y.o. male seen today for diabetes management     Buster Mitchell was diagnosed with diabetes at age 44  and currently on Metformin 1000 mg BID and Basaglar 42 units BID and Humalog 20 units TID with meals plus ISS, Trulicity 1.5 mg once weekly     The patient has been checking blood sugar  None recently. Most recent A1c results summarized below  Lab Results   Component Value Date/Time    LABA1C 7.9 08/24/2022 09:30 AM    LABA1C 10.7 05/19/2022 03:53 PM    LABA1C 8.5 12/14/2021 09:53 AM       Patient has had no hypoglycemic episodes   The patient hasn't been mindful of what has been eating and wasn't following diabetes diet    I reviewed current medications and the patient has no issues with diabetes medications  The patient is due for an eye exam. Last eye exam was > 1 year ago   , no h/o diabetic retinopathy  The patient seeing podiatrist every 3 months   Microvascular complications:  No Retinopathy, Nephropathy, + Neuropathy. Hx of right foot amputation d/t osteomyelitis.     Macrovascular complications: no CAD, PVD, or Stroke  The patient receives Flushot every year   No HX of pancreatitis  No Hx of MTC  No HX of gastroparesis   No HX of UTI/Mycotic infection       PAST MEDICAL HISTORY   Past Medical History:   Diagnosis Date    Cellulitis of left foot 09/03/2020    Cellulitis of right foot     Diabetes mellitus ([address]     Diabetic foot ulcer with osteomyelitis ([facility] 8/27/2021    Gangrene of toe of right foot ([facility] 08/28/2021    Hyperlipidemia     Hypertension     Kidney stone     Multiple sclerosis (HCC)     ЕКАТЕРИНА (obstructive sleep apnea)     dx 15 years ago, not compliant with CPAP       PAST SURGICAL HISTORY   Past Surgical History:   Procedure Laterality Date    FOOT DEBRIDEMENT Right 8/29/2021    FOOT DEBRIDEMENT INCISION AND DRAINAGE performed by Haven Gaitan DPM at [facility] 11 Left 9/4/2020    INCISION AND DRAINAGE LEFT FOOT  BONE WITH PARTIAL BONE RESECTION FIFTH METATARSAL performed by Haven Gaitan DPM at [address] SINGLE  9/1/2021         TOE AMPUTATION Right 8/29/2021    TOE AMPUTATION fifth ray resection performed by Haven Gaitan DPM at [address]   Tobacco:   reports that he has never smoked. He has never used smokeless tobacco.  Alcohol:   reports no history of alcohol use. Drugs:   reports no history of drug use. FAMILY HISTORY   Family History   Problem Relation Age of Onset    Hypertension Mother     Diabetes Father        ALLERGIES AND DRUG REACTIONS   Allergies   Allergen Reactions    Bee Venom Swelling    Milk-Related Compounds      Stomach issues-lactose intolerant       CURRENT MEDICATIONS   Current Outpatient Medications   Medication Sig Dispense Refill    Dulaglutide (TRULICITY) 3 ZC/5.4LB SOPN Inject 3 mg into the skin once a week 4 pen 5    insulin glargine (LANTUS SOLOSTAR) 100 UNIT/ML injection pen Inject 42 units BID 10 pen 3    insulin lispro, 1 Unit Dial, (HUMALOG KWIKPEN) 100 UNIT/ML SOPN 20/22/20  units with breakfast lunch and dinner max dose of a 100 10 pen 3    metFORMIN (GLUCOPHAGE) 1000 MG tablet TAKE 1 TABLET BY MOUTH TWICE DAILY 180 tablet 1    escitalopram (LEXAPRO) 20 MG tablet Take 1 tablet by mouth in the morning.  90 tablet 1    sodium chloride 0.9 % SOLN 500 mL with ocrelizumab 300 MG/10ML SOLN 600 mg Infuse 600 mg intravenously every 6 months baclofen (LIORESAL) 10 MG tablet Take 1 tablet by mouth in the morning and 1 tablet before bedtime. 60 tablet 5    metoprolol (LOPRESSOR) 100 MG tablet TAKE 1 TABLET BY MOUTH TWICE DAILY 180 tablet 1    ammonium lactate (LAC-HYDRIN) 12 % lotion Apply topically twice daily 400 g 2    ARIPiprazole (ABILIFY) 5 MG tablet TAKE 1 TABLET BY MOUTH DAILY 90 tablet 1    Continuous Blood Gluc  (FREESTYLE ALANNAH 2 READER) BRAULIO 1 device 1 each 0    Continuous Blood Gluc Sensor (FREESTYLE ALANNAH 2 SENSOR) MISC Every 14 days 3 each 5    lisinopril (PRINIVIL;ZESTRIL) 40 MG tablet TAKE 1 TABLET BY MOUTH DAILY 90 tablet 1    cloNIDine (CATAPRES) 0.1 MG tablet TAKE 1 TABLET BY MOUTH THREE TIMES DAILY 270 tablet 1    hydrALAZINE (APRESOLINE) 100 MG tablet TAKE 1 TABLET BY MOUTH THREE TIMES DAILY 270 tablet 1    pravastatin (PRAVACHOL) 40 MG tablet TAKE 1 TABLET BY MOUTH DAILY 90 tablet 1    omeprazole (PRILOSEC) 20 MG delayed release capsule Take 1 capsule by mouth daily 90 capsule 1    BASAGLAR KWIKPEN 100 UNIT/ML injection pen ADMINISTER 40 UNITS UNDER THE SKIN TWICE DAILY 72 mL 1    MAPAP ARTHRITIS PAIN 650 MG extended release tablet TAKE 1 TABLET BY MOUTH EVERY 8 HOURS AS NEEDED FOR PAIN 60 tablet 1    Insulin Pen Needle 31G X 8 MM MISC 1 each by Does not apply route daily 500 each 1    furosemide (LASIX) 40 MG tablet Take 1 tablet by mouth daily 60 tablet 3    Wound Dressings ([facility] MANUKA HONEY WOUND) GEL Apply 1 Tube topically 2 times daily 1 Tube 2    NOVOFINE PLUS 32G X 4 MM MISC USE EVERY DAY UNDER THE SKIN      aspirin 81 MG EC tablet Take 81 mg by mouth 2 tablets daily      cetirizine (ZYRTEC) 10 MG tablet Take 10 mg by mouth daily       No current facility-administered medications for this visit. Review of Systems  Constitutional: No fever, no chills, no diaphoresis, no generalized weakness.   HEENT: No blurred vision, No sore throat, no ear pain, no hair loss  Neck: denied any neck swelling, difficulty swallowing,   Cardio-pulmonary: No CP, SOB or palpitation, No orthopnea or PND. No cough or wheezing. GI: No N/V/D, no constipation, No abdominal pain, no melena or hematochezia   : Denied any dysuria, hematuria, flank pain, discharge, or incontinence. Skin: denied any rash, ulcer, Hirsute, or hyperpigmentation. MSK: denied any joint deformity, joint pain/swelling, muscle pain, or back pain. Neuro: no numbness, no tingling, no weakness, _    OBJECTIVE    BP (!) 163/100   Pulse 94   Ht 6' 5\" (1.956 m)   Wt (!) 492 lb (223.2 kg)   SpO2 93%   BMI 58.34 kg/m²   BP Readings from Last 4 Encounters:   08/24/22 (!) 163/100   07/20/22 (!) 127/90   06/06/22 (!) 160/95   05/19/22 (!) 155/101     Wt Readings from Last 6 Encounters:   08/24/22 (!) 492 lb (223.2 kg)   08/18/22 (!) 484 lb (219.5 kg)   07/20/22 (!) 484 lb (219.5 kg)   07/15/22 (!) 450 lb (204.1 kg)   06/24/22 (!) 450 lb (204.1 kg)   06/06/22 (!) 450 lb (204.1 kg)       Physical examination:  General: awake alert, oriented x3, no abnormal position or movements. HEENT: normocephalic non-traumatic, no exophthalmos   Neck: supple, no LN enlargement, no thyromegaly, no thyroid tenderness, no JVD. Pulm: Clear equal air entry no added sounds, no wheezing or rhonchi    CVS: S1 + S2, no murmur, no heave. Dorsalis pedis pulse palpable   Abd: soft lax, no tenderness, no organomegaly, audible bowel sounds. Skin: warm, no lesions, no rash.  No callus, no Ulcers, No acanthosis nigricans  Musculoskeletal: No back tenderness, no kyphosis/scoliosis    Neuro: CN intact, Monofilament sensation decreased bilateral , muscle power normal  Psych: normal mood, and affect      Review of Laboratory Data:  I personally reviewed the following lab:  Lab Results   Component Value Date/Time    WBC 12.9 (H) 03/01/2022 09:07 AM    RBC 4.86 03/01/2022 09:07 AM    HGB 15.6 03/01/2022 09:07 AM    HCT 46.8 03/01/2022 09:07 AM    MCV 96.3 03/01/2022 09:07 AM    MCH 32.1 03/01/2022 09:07 AM    MCHC 33.3 03/01/2022 09:07 AM    RDW 13.2 03/01/2022 09:07 AM     03/01/2022 09:07 AM    MPV 13.0 (H) 03/01/2022 09:07 AM      Lab Results   Component Value Date/Time     04/07/2022 09:31 AM    K 4.3 04/07/2022 09:31 AM    K 5.1 (H) 08/27/2021 12:19 PM    CO2 23 04/07/2022 09:31 AM    BUN 13 04/07/2022 09:31 AM    CREATININE 0.6 (L) 04/07/2022 09:31 AM    CALCIUM 8.8 04/07/2022 09:31 AM    LABGLOM >60 04/07/2022 09:31 AM    GFRAA >60 04/07/2022 09:31 AM      Lab Results   Component Value Date/Time    TSH 2.010 03/01/2022 09:07 AM     Lab Results   Component Value Date/Time    LABA1C 7.9 08/24/2022 09:30 AM    GLUCOSE 381 04/07/2022 09:31 AM    MALBCR 75.8 10/30/2020 03:20 PM    LABMICR 27.3 10/30/2020 03:20 PM    LABCREA 36 10/30/2020 03:20 PM     Lab Results   Component Value Date/Time    LABA1C 7.9 08/24/2022 09:30 AM    LABA1C 10.7 05/19/2022 03:53 PM    LABA1C 8.5 12/14/2021 09:53 AM     Lab Results   Component Value Date/Time    TRIG 134 03/01/2022 09:07 AM    HDL 37 03/01/2022 09:07 AM    LDLCALC 85 03/01/2022 09:07 AM    CHOL 149 03/01/2022 09:07 AM     Lab Results   Component Value Date/Time    VITD25 24 06/03/2022 09:22 AM       ASSESSMENT & RECOMMENDATIONS   Buster Mitchell, a 49 y.o.-old male seen in for the following issues       Assessment:      Diagnosis Orders   1. Uncontrolled type 2 diabetes mellitus with hyperglycemia (HCC)  POCT glycosylated hemoglobin (Hb A1C)    External Referral To Ophthalmology      2. Class 3 severe obesity due to excess calories without serious comorbidity with body mass index (BMI) of 50.0 to 59.9 in adult ([address]        3. Mixed hyperlipidemia        4. Dietary noncompliance        5. Vitamin D deficiency        6. Uncontrolled type 2 diabetes mellitus with polyneuropathy ([address]            Plan:     1. Uncontrolled type 2 diabetes mellitus with hyperglycemia ([facility]   Patient's diabetes much improved from previous.   Hemoglobin A1c 7.9%  We will continue Basaglar 42 units twice daily  Continue Humalog 20 units 3 times daily with each meal plus moderate dose insulin sliding scale  Continue metformin 1000 mg twice daily  Increase Trulicity to 3 mg once weekly  Trotter was not approved. Staff will look into resubmitting for makenzie  No recent hypoglycemia  I encouraged diet and exercise  Will refer to ophthalmology   2. Class 3 severe obesity due to excess calories without serious comorbidity with body mass index (BMI) of 50.0 to 59.9 in adult [facility])   Discussed lifestyle changes including diet and exercise with patient in depth. Also discussed with patient cardiovascular risk associated with obesity  On GLP-1 agonist     3. Mixed hyperlipidemia   Continue statin therapy  Counseled on the importance of statin therapy and diabetes   4. Dietary noncompliance   Discussed with patient the importance of eating consistent carbohydrate meals, avoiding high glycemic index food. Also, discussed with patient the risk and negative consequences of dietary noncompliance on blood glucose control, blood pressure and weight   5. Vitamin D deficiency   Continue vitamin D supplementation   6. Uncontrolled type 2 diabetes mellitus with polyneuropathy ([facility]   Counseled on optimal foot care  Following with podiatry         I personally spent > 30 minutes reviewing  external notes from PCP and other patient's care team providers, and personally interpreted labs associated with the above diagnosis. I also ordered labs to further assess and manage the above addressed medical conditions. Return in about 3 months (around 11/24/2022). The above issues were reviewed with the patient who understood and agreed with the plan. Thank you for allowing us to participate in the care of this patient. Please do not hesitate to contact us with any additional questions.      Jamaal Buckley, [address] and Endocrinology   [address] [facility] [address]   Phone: [phone]  Fax: [phone]  --------------------------------------------  An electronic signature was used to authenticate this note.  AVEL Gonzalez on 8/24/2022 at 9:41 AM

## 2022-09-02 ENCOUNTER — OFFICE VISIT (OUTPATIENT)
Dept: PODIATRY | Age: 47
End: 2022-09-02
Payer: COMMERCIAL

## 2022-09-02 VITALS — BODY MASS INDEX: 37.19 KG/M2 | WEIGHT: 315 LBS | HEIGHT: 77 IN

## 2022-09-02 DIAGNOSIS — E11.65 UNCONTROLLED TYPE 2 DIABETES MELLITUS WITH HYPERGLYCEMIA (HCC): Primary | ICD-10-CM

## 2022-09-02 DIAGNOSIS — L84 CORNS AND CALLOSITIES: ICD-10-CM

## 2022-09-02 DIAGNOSIS — L97.509 TYPE 2 DIABETES MELLITUS WITH FOOT ULCER, UNSPECIFIED WHETHER LONG TERM INSULIN USE (HCC): ICD-10-CM

## 2022-09-02 DIAGNOSIS — Z91.199 NONCOMPLIANCE: ICD-10-CM

## 2022-09-02 DIAGNOSIS — E11.621 TYPE 2 DIABETES MELLITUS WITH FOOT ULCER, UNSPECIFIED WHETHER LONG TERM INSULIN USE (HCC): ICD-10-CM

## 2022-09-02 PROCEDURE — 3051F HG A1C>EQUAL 7.0%<8.0%: CPT | Performed by: PODIATRIST

## 2022-09-02 PROCEDURE — 99213 OFFICE O/P EST LOW 20 MIN: CPT | Performed by: PODIATRIST

## 2022-09-02 NOTE — PROGRESS NOTES
Patient in office for wound check left foot. CC:   Follow-up diabetic wound left foot    HPI:   Follow-up diabetic wound left foot. Has been just applying a Band-Aid on his foot. Does not have a new inserts. Presents with regular slippers. Denies any pain. No drainage or redness left foot. Denies nausea vomiting fever chills shortness of breath. No calf pain. ROS:  Const: Denies constitutional symptoms  Musculo: Denies symptoms other than stated above  Skin: Denies symptoms other than stated above      Current Outpatient Medications:     Dulaglutide (TRULICITY) 3 DV/6.2BJ SOPN, Inject 3 mg into the skin once a week, Disp: 4 pen, Rfl: 5    insulin glargine (LANTUS SOLOSTAR) 100 UNIT/ML injection pen, Inject 42 units BID, Disp: 10 pen, Rfl: 3    escitalopram (LEXAPRO) 20 MG tablet, Take 1 tablet by mouth in the morning., Disp: 90 tablet, Rfl: 1    insulin lispro, 1 Unit Dial, (HUMALOG KWIKPEN) 100 UNIT/ML SOPN, 20/22/20  units with breakfast lunch and dinner max dose of a 100, Disp: 10 pen, Rfl: 3    sodium chloride 0.9 % SOLN 500 mL with ocrelizumab 300 MG/10ML SOLN 600 mg, Infuse 600 mg intravenously every 6 months, Disp: , Rfl:     baclofen (LIORESAL) 10 MG tablet, Take 1 tablet by mouth in the morning and 1 tablet before bedtime. , Disp: 60 tablet, Rfl: 5    metoprolol (LOPRESSOR) 100 MG tablet, TAKE 1 TABLET BY MOUTH TWICE DAILY, Disp: 180 tablet, Rfl: 1    ammonium lactate (LAC-HYDRIN) 12 % lotion, Apply topically twice daily, Disp: 400 g, Rfl: 2    ARIPiprazole (ABILIFY) 5 MG tablet, TAKE 1 TABLET BY MOUTH DAILY, Disp: 90 tablet, Rfl: 1    Continuous Blood Gluc  (FREESTYLE ALANNAH 2 READER) BRAULIO, 1 device, Disp: 1 each, Rfl: 0    Continuous Blood Gluc Sensor (FREESTYLE ALANNAH 2 SENSOR) [facility], Every 14 days, Disp: 3 each, Rfl: 5    lisinopril (PRINIVIL;ZESTRIL) 40 MG tablet, TAKE 1 TABLET BY MOUTH DAILY, Disp: 90 tablet, Rfl: 1    cloNIDine (CATAPRES) 0.1 MG tablet, TAKE 1 TABLET BY MOUTH THREE TIMES DAILY, Disp: 270 tablet, Rfl: 1    hydrALAZINE (APRESOLINE) 100 MG tablet, TAKE 1 TABLET BY MOUTH THREE TIMES DAILY, Disp: 270 tablet, Rfl: 1    pravastatin (PRAVACHOL) 40 MG tablet, TAKE 1 TABLET BY MOUTH DAILY, Disp: 90 tablet, Rfl: 1    metFORMIN (GLUCOPHAGE) 1000 MG tablet, TAKE 1 TABLET BY MOUTH TWICE DAILY, Disp: 180 tablet, Rfl: 1    omeprazole (PRILOSEC) 20 MG delayed release capsule, Take 1 capsule by mouth daily, Disp: 90 capsule, Rfl: 1    BASAGLAR KWIKPEN 100 UNIT/ML injection pen, ADMINISTER 40 UNITS UNDER THE SKIN TWICE DAILY, Disp: 72 mL, Rfl: 1    MAPAP ARTHRITIS PAIN 650 MG extended release tablet, TAKE 1 TABLET BY MOUTH EVERY 8 HOURS AS NEEDED FOR PAIN, Disp: 60 tablet, Rfl: 1    Insulin Pen Needle 31G X 8 MM MISC, 1 each by Does not apply route daily, Disp: 500 each, Rfl: 1    furosemide (LASIX) 40 MG tablet, Take 1 tablet by mouth daily, Disp: 60 tablet, Rfl: 3    Wound Dressings ([facility] MANUKA HONEY WOUND) GEL, Apply 1 Tube topically 2 times daily, Disp: 1 Tube, Rfl: 2    NOVOFINE PLUS 32G X 4 MM MISC, USE EVERY DAY UNDER THE SKIN, Disp: , Rfl:     aspirin 81 MG EC tablet, Take 81 mg by mouth 2 tablets daily, Disp: , Rfl:     cetirizine (ZYRTEC) 10 MG tablet, Take 10 mg by mouth daily, Disp: , Rfl:   Allergies   Allergen Reactions    Bee Venom Swelling    Milk-Related Compounds      Stomach issues-lactose intolerant       Past Medical History:   Diagnosis Date    Cellulitis of left foot 09/03/2020    Cellulitis of right foot     Diabetes mellitus (HCC)     Diabetic foot ulcer with osteomyelitis (HCC) 8/27/2021    Gangrene of toe of right foot ([facility] [address] 08/28/2021    Hyperlipidemia     Hypertension     Kidney stone     Multiple sclerosis (HCC)     ЕКАТЕРИНА (obstructive sleep apnea)     dx 15 years ago, not compliant with CPAP       There were no vitals filed for this visit.      Postop incision and drainage left foot with fifth metatarsal resection and bone biopsy date of surgery 9/4/2020  Postop amputation partial right fifth ray with incision and drainage date of surgery 2021  Work History/Social History:     Focused Lower Extremity Physical Exam:      Neurovascular examination:    Dorsalis Pedis palpable bilateral.  Posterior tibialis  palpable bilateral.  Capillary Refill Time:  Immediate return  Hair growth:  Symmetrical and bilateral   Skin:  Not atrophic  Edema: Mild edema bilateral feet. Mild edema bilateral ankles. Neurologic:  Light touch diminished bilateral.  Warm to coolness bilateral distal toes  Decreased epicritic sensation     Musculoskeletal/ Orthopedic examination:    Equinis: present bilateral  Dorsiflexion, plantarflexion, inversion, eversion bilateral 5 out of 5 muscle strength  Wiggling remaining toes    Status post fifth ray amputation right foot. No pain foot or ankle. Dermatology examination:    Wound plantar lateral left foot measuring approximately 0.8 cm x 0.8 cm x 0.2 cm. Continued hyperkeratotic tissue. No erythema. Wound does not track or probe to bone. No drainage. Assessment and Plan:  Nara Contreras was seen today for follow-up and wound check. Diagnoses and all orders for this visit:    Uncontrolled type 2 diabetes mellitus with hyperglycemia ([address]    Corns and callosities    Type 2 diabetes mellitus with foot ulcer, unspecified whether long term insulin use ([address]    Noncompliance      Follow-up wound left foot. Did write several DME orders for custom inserts and diabetic shoes. Still high risk for lower extremity limb loss due to body habitus and continued pressure on the left foot. No clinical signs of infection. Did pare hyperkeratotic tissue left foot. I did still recommend medihoney offloading padding and bandage. Stressed importance of custom inserts. Any increase in redness or drainage go directly to emergency room. We will follow-up 2 weeks. Return in about 2 weeks (around 2022).      Seen By:  Belva Apgar, DPM      Document was created using voice recognition software. Note was reviewed however may contain grammatical errors.

## 2022-09-26 DIAGNOSIS — E11.65 UNCONTROLLED TYPE 2 DIABETES MELLITUS WITH HYPERGLYCEMIA (HCC): ICD-10-CM

## 2022-09-27 DIAGNOSIS — E11.65 UNCONTROLLED TYPE 2 DIABETES MELLITUS WITH HYPERGLYCEMIA (HCC): ICD-10-CM

## 2022-09-27 RX ORDER — FUROSEMIDE 40 MG/1
40 TABLET ORAL DAILY
Qty: 60 TABLET | Refills: 3 | Status: SHIPPED | OUTPATIENT
Start: 2022-09-27

## 2022-09-27 RX ORDER — FUROSEMIDE 40 MG/1
40 TABLET ORAL DAILY
Qty: 60 TABLET | Refills: 3 | OUTPATIENT
Start: 2022-09-27

## 2022-10-04 ENCOUNTER — OFFICE VISIT (OUTPATIENT)
Dept: PODIATRY | Age: 47
End: 2022-10-04
Payer: COMMERCIAL

## 2022-10-04 VITALS — BODY MASS INDEX: 37.19 KG/M2 | HEIGHT: 77 IN | WEIGHT: 315 LBS

## 2022-10-04 DIAGNOSIS — G35 MS (MULTIPLE SCLEROSIS) (HCC): ICD-10-CM

## 2022-10-04 DIAGNOSIS — E11.65 UNCONTROLLED TYPE 2 DIABETES MELLITUS WITH HYPERGLYCEMIA (HCC): Primary | ICD-10-CM

## 2022-10-04 DIAGNOSIS — G60.8 HEREDITARY SENSORY NEUROPATHY: ICD-10-CM

## 2022-10-04 DIAGNOSIS — M79.672 LEFT FOOT PAIN: ICD-10-CM

## 2022-10-04 DIAGNOSIS — L84 CORNS AND CALLOSITIES: ICD-10-CM

## 2022-10-04 PROCEDURE — 99213 OFFICE O/P EST LOW 20 MIN: CPT | Performed by: PODIATRIST

## 2022-10-04 PROCEDURE — 3051F HG A1C>EQUAL 7.0%<8.0%: CPT | Performed by: PODIATRIST

## 2022-10-04 RX ORDER — PERPHENAZINE 16 MG
1 TABLET ORAL DAILY
Qty: 60 CAPSULE | Refills: 2 | Status: SHIPPED | OUTPATIENT
Start: 2022-10-04

## 2022-10-04 NOTE — PROGRESS NOTES
Patient in office for wound check left foot. CC:   Follow-up wound left foot  No open wounds today    HPI:   Follow-up wound left foot. Presents today with no wounds today. Does have regular shoes on. Does not have diabetic inserts or diabetic shoes with him. Does have lotion at home states he does not use it every day. Denies any open wounds left to right foot. Does have history of numbness both feet with no previous treatment with gabapentin. ROS:  Const: Denies constitutional symptoms  Musculo: Denies symptoms other than stated above  Skin: Denies symptoms other than stated above      Current Outpatient Medications:     Alpha-Lipoic Acid 600 MG CAPS, Take 1 capsule by mouth daily, Disp: 60 capsule, Rfl: 2    Handicap Placard MISC, by Does not apply route Duration:5 years, Disp: 1 each, Rfl: 0    furosemide (LASIX) 40 MG tablet, TAKE 1 TABLET BY MOUTH DAILY, Disp: 60 tablet, Rfl: 3    Dulaglutide (TRULICITY) 3 AV/5.3JG SOPN, Inject 3 mg into the skin once a week, Disp: 4 pen, Rfl: 5    insulin glargine (LANTUS SOLOSTAR) 100 UNIT/ML injection pen, Inject 42 units BID, Disp: 10 pen, Rfl: 3    escitalopram (LEXAPRO) 20 MG tablet, Take 1 tablet by mouth in the morning., Disp: 90 tablet, Rfl: 1    insulin lispro, 1 Unit Dial, (HUMALOG KWIKPEN) 100 UNIT/ML SOPN, 20/22/20  units with breakfast lunch and dinner max dose of a 100, Disp: 10 pen, Rfl: 3    sodium chloride 0.9 % SOLN 500 mL with ocrelizumab 300 MG/10ML SOLN 600 mg, Infuse 600 mg intravenously every 6 months, Disp: , Rfl:     baclofen (LIORESAL) 10 MG tablet, Take 1 tablet by mouth in the morning and 1 tablet before bedtime. , Disp: 60 tablet, Rfl: 5    metoprolol (LOPRESSOR) 100 MG tablet, TAKE 1 TABLET BY MOUTH TWICE DAILY, Disp: 180 tablet, Rfl: 1    ammonium lactate (LAC-HYDRIN) 12 % lotion, Apply topically twice daily, Disp: 400 g, Rfl: 2    ARIPiprazole (ABILIFY) 5 MG tablet, TAKE 1 TABLET BY MOUTH DAILY, Disp: 90 tablet, Rfl: 1    Continuous Blood Gluc  (FREESTYLE ALANANH 2 READER) BRAULIO, 1 device, Disp: 1 each, Rfl: 0    Continuous Blood Gluc Sensor (FREESTYLE ALANNAH 2 SENSOR) [facility], Every 14 days, Disp: 3 each, Rfl: 5    lisinopril (PRINIVIL;ZESTRIL) 40 MG tablet, TAKE 1 TABLET BY MOUTH DAILY, Disp: 90 tablet, Rfl: 1    cloNIDine (CATAPRES) 0.1 MG tablet, TAKE 1 TABLET BY MOUTH THREE TIMES DAILY, Disp: 270 tablet, Rfl: 1    hydrALAZINE (APRESOLINE) 100 MG tablet, TAKE 1 TABLET BY MOUTH THREE TIMES DAILY, Disp: 270 tablet, Rfl: 1    pravastatin (PRAVACHOL) 40 MG tablet, TAKE 1 TABLET BY MOUTH DAILY, Disp: 90 tablet, Rfl: 1    metFORMIN (GLUCOPHAGE) 1000 MG tablet, TAKE 1 TABLET BY MOUTH TWICE DAILY, Disp: 180 tablet, Rfl: 1    omeprazole (PRILOSEC) 20 MG delayed release capsule, Take 1 capsule by mouth daily, Disp: 90 capsule, Rfl: 1    BASAGLAR KWIKPEN 100 UNIT/ML injection pen, ADMINISTER 40 UNITS UNDER THE SKIN TWICE DAILY, Disp: 72 mL, Rfl: 1    MAPAP ARTHRITIS PAIN 650 MG extended release tablet, TAKE 1 TABLET BY MOUTH EVERY 8 HOURS AS NEEDED FOR PAIN, Disp: 60 tablet, Rfl: 1    Insulin Pen Needle 31G X 8 MM MISC, 1 each by Does not apply route daily, Disp: 500 each, Rfl: 1    Wound Dressings ([facility] MANUKA HONEY WOUND) GEL, Apply 1 Tube topically 2 times daily, Disp: 1 Tube, Rfl: 2    NOVOFINE PLUS 32G X 4 MM MISC, USE EVERY DAY UNDER THE SKIN, Disp: , Rfl:     aspirin 81 MG EC tablet, Take 81 mg by mouth 2 tablets daily, Disp: , Rfl:     cetirizine (ZYRTEC) 10 MG tablet, Take 10 mg by mouth daily, Disp: , Rfl:   Allergies   Allergen Reactions    Bee Venom Swelling    Milk-Related Compounds      Stomach issues-lactose intolerant       Past Medical History:   Diagnosis Date    Cellulitis of left foot 09/03/2020    Cellulitis of right foot     Diabetes mellitus ([facility] [address]     Diabetic foot ulcer with osteomyelitis ([facility] [address] 8/27/2021    Gangrene of toe of right foot (H[facility]) 08/28/2021    Hyperlipidemia     Hypertension     Kidney stone     Multiple sclerosis ([facility] [address]     ЕКАТЕРИНА (obstructive sleep apnea)     dx 15 years ago, not compliant with CPAP       There were no vitals filed for this visit. Postop incision and drainage left foot with fifth metatarsal resection and bone biopsy date of surgery 9/4/2020  Postop amputation partial right fifth ray with incision and drainage date of surgery 8/29/2021  Work History/Social History:     Focused Lower Extremity Physical Exam:      Neurovascular examination:    Dorsalis Pedis palpable bilateral.  Posterior tibialis  palpable bilateral.  Capillary Refill Time:  Immediate return  Hair growth:  Symmetrical and bilateral   Skin:  Not atrophic  Edema: Mild edema bilateral feet. Mild edema bilateral ankles. Neurologic:  Light touch diminished bilateral.  Warm to coolness bilateral distal toes  Decreased epicritic sensation     Musculoskeletal/ Orthopedic examination:    Equinis: present bilateral  Dorsiflexion, plantarflexion, inversion, eversion bilateral 5 out of 5 muscle strength  Wiggling remaining toes  Status post fifth ray amputation right foot. No pain foot or ankle  Dermatology examination:    Hyperkeratotic tissue plantar left foot with no open wounds. No erythema. Assessment and Plan:  Gala Pardo was seen today for wound check and diabetes. Diagnoses and all orders for this visit:    Uncontrolled type 2 diabetes mellitus with hyperglycemia ([facility] [address]  -     Handicap Placard MISC; by Does not apply route Duration:5 years    Corns and callosities    Left foot pain  -     Handicap Placard MISC; by Does not apply route Duration:5 years    MS (multiple sclerosis) ([facility] [address]  -     Handicap Placard MISC; by Does not apply route Duration:5 years    Hereditary sensory neuropathy    Other orders  -     Alpha-Lipoic Acid 600 MG CAPS; Take 1 capsule by mouth daily      Follow-up wound left foot  No open wounds today. Still did stress importance of getting diabetic inserts and diabetic shoes. Does not have them with him again today. Hopefully he is able to get fit and does have diabetic shoes and inserts. Stressed importance of avoiding barefoot. History of neuropathy. I did recommend alpha lipoic acid 600 mg once daily. Risk and benefits discussed. Stress importance once again ammonium lactate 12% lotion twice daily both feet. Avoid barefoot. Follow-up 2 months        Return in about 1 month (around 11/4/2022). Seen By:  Tiajuana Bernheim, DPM      Document was created using voice recognition software. Note was reviewed however may contain grammatical errors.

## 2022-10-13 ENCOUNTER — TELEPHONE (OUTPATIENT)
Dept: NEUROLOGY | Age: 47
End: 2022-10-13

## 2022-10-13 NOTE — TELEPHONE ENCOUNTER
Henry Londono has been denied due to site of Care. Pt is not able to receive Ocrevus at [facility] due to site , by insurance.  Pt will need to choose another infusion site

## 2022-10-14 ENCOUNTER — HOSPITAL ENCOUNTER (OUTPATIENT)
Dept: INFUSION THERAPY | Age: 47
Setting detail: INFUSION SERIES
Discharge: HOME | End: 2022-10-14

## 2022-10-20 ENCOUNTER — TELEPHONE (OUTPATIENT)
Dept: ENDOCRINOLOGY | Age: 47
End: 2022-10-20

## 2022-10-20 ENCOUNTER — TELEPHONE (OUTPATIENT)
Dept: NEUROLOGY | Age: 47
End: 2022-10-20

## 2022-10-20 NOTE — TELEPHONE ENCOUNTER
Ceferino Erwin, and RN case manager from [facility] called and left a message to report that his CGM was denied. I called her back and left a message asking how we can submit an appeal/.

## 2022-10-20 NOTE — TELEPHONE ENCOUNTER
Patient was notified of Pathology Report results, patient is aware of follow up Colonoscopy in 4 years.    Start forms began for Ocrevus sent to [facility] special[facility]

## 2022-10-25 ENCOUNTER — TELEPHONE (OUTPATIENT)
Dept: NEUROLOGY | Age: 47
End: 2022-10-25

## 2022-10-25 NOTE — TELEPHONE ENCOUNTER
Spoke with Leonard Sultana from [address] @ [phone] who states that [facility] infusion will \"white bag it\" and allow the patient to get his Ocrevus at [facility]. [facility] will suppy the [place]. They will notify the patient tomorrow.

## 2022-10-28 DIAGNOSIS — E78.5 HYPERLIPIDEMIA, UNSPECIFIED HYPERLIPIDEMIA TYPE: ICD-10-CM

## 2022-10-28 DIAGNOSIS — E11.65 UNCONTROLLED TYPE 2 DIABETES MELLITUS WITH HYPERGLYCEMIA (HCC): Primary | ICD-10-CM

## 2022-10-28 RX ORDER — OMEPRAZOLE 20 MG/1
20 CAPSULE, DELAYED RELEASE ORAL DAILY
Qty: 90 CAPSULE | Refills: 1 | Status: SHIPPED | OUTPATIENT
Start: 2022-10-28 | End: 2023-01-26

## 2022-10-28 RX ORDER — PRAVASTATIN SODIUM 40 MG
40 TABLET ORAL DAILY
Qty: 90 TABLET | Refills: 1 | Status: SHIPPED | OUTPATIENT
Start: 2022-10-28 | End: 2023-01-26

## 2022-10-28 RX ORDER — CLONIDINE HYDROCHLORIDE 0.1 MG/1
TABLET ORAL
Qty: 270 TABLET | Refills: 1 | Status: SHIPPED | OUTPATIENT
Start: 2022-10-28

## 2022-10-28 RX ORDER — HYDRALAZINE HYDROCHLORIDE 100 MG/1
TABLET, FILM COATED ORAL
Qty: 270 TABLET | Refills: 1 | Status: SHIPPED | OUTPATIENT
Start: 2022-10-28

## 2022-10-28 NOTE — TELEPHONE ENCOUNTER
[facility]'s [facility], but not necessary. Drug is covered by current benefit plan.  No further PA activity needed

## 2022-10-29 RX ORDER — FLASH GLUCOSE SENSOR
KIT MISCELLANEOUS
Qty: 3 EACH | Refills: 5 | Status: SHIPPED | OUTPATIENT
Start: 2022-10-29

## 2022-10-29 RX ORDER — FLASH GLUCOSE SCANNING READER
EACH MISCELLANEOUS
Qty: 1 EACH | Refills: 0 | Status: SHIPPED | OUTPATIENT
Start: 2022-10-29

## 2022-11-01 RX ORDER — LISINOPRIL 40 MG/1
40 TABLET ORAL DAILY
Qty: 90 TABLET | Refills: 1 | Status: SHIPPED | OUTPATIENT
Start: 2022-11-01 | End: 2023-01-30

## 2022-11-02 ENCOUNTER — TELEPHONE (OUTPATIENT)
Dept: INFUSION THERAPY | Age: 47
End: 2022-11-02

## 2022-11-02 NOTE — TELEPHONE ENCOUNTER
I NOTICED THAT THE PT HAS CX HIS APPTS FOR OCREVUS BUT THERE ARE AUTHS IN SYSTEM AND THAT THE PTS OCREVUS WOULD BE WHITE BAG. I EMAILED THE SITE TO SEE IF THEY KNEW WHY THE PT HAD CX? AS THERE ARE NO NOTES IN SYSTEM. IF IT WAS DUE TO FINANCIAL REASONS ?  IF SO THEN I COULD REACH OUT TO THE PT. WAITING FOR A RESPONSE/

## 2022-11-11 ENCOUNTER — HOSPITAL ENCOUNTER (OUTPATIENT)
Dept: INFUSION THERAPY | Age: 47
Setting detail: INFUSION SERIES
Discharge: HOME | End: 2022-11-11
Payer: COMMERCIAL

## 2022-11-11 VITALS
TEMPERATURE: 97.7 F | DIASTOLIC BLOOD PRESSURE: 85 MMHG | RESPIRATION RATE: 16 BRPM | OXYGEN SATURATION: 95 % | HEART RATE: 97 BPM | SYSTOLIC BLOOD PRESSURE: 167 MMHG

## 2022-11-11 DIAGNOSIS — G35 MULTIPLE SCLEROSIS (HCC): Primary | ICD-10-CM

## 2022-11-11 PROCEDURE — 96366 THER/PROPH/DIAG IV INF ADDON: CPT

## 2022-11-11 PROCEDURE — 6360000002 HC RX W HCPCS: Performed by: PSYCHIATRY & NEUROLOGY

## 2022-11-11 PROCEDURE — 6370000000 HC RX 637 (ALT 250 FOR IP): Performed by: PSYCHIATRY & NEUROLOGY

## 2022-11-11 PROCEDURE — 96365 THER/PROPH/DIAG IV INF INIT: CPT

## 2022-11-11 PROCEDURE — 96375 TX/PRO/DX INJ NEW DRUG ADDON: CPT

## 2022-11-11 PROCEDURE — 2580000003 HC RX 258: Performed by: PSYCHIATRY & NEUROLOGY

## 2022-11-11 RX ORDER — HEPARIN SODIUM (PORCINE) LOCK FLUSH IV SOLN 100 UNIT/ML 100 UNIT/ML
500 SOLUTION INTRAVENOUS PRN
OUTPATIENT
Start: 2023-03-31

## 2022-11-11 RX ORDER — METHYLPREDNISOLONE SODIUM SUCCINATE 125 MG/2ML
100 INJECTION, POWDER, LYOPHILIZED, FOR SOLUTION INTRAMUSCULAR; INTRAVENOUS ONCE
Status: COMPLETED | OUTPATIENT
Start: 2022-11-11 | End: 2022-11-11

## 2022-11-11 RX ORDER — ACETAMINOPHEN 325 MG/1
650 TABLET ORAL
OUTPATIENT
Start: 2023-03-31

## 2022-11-11 RX ORDER — ACETAMINOPHEN 325 MG/1
650 TABLET ORAL ONCE
Status: DISCONTINUED | OUTPATIENT
Start: 2022-11-11 | End: 2022-11-12 | Stop reason: HOSPADM

## 2022-11-11 RX ORDER — METHYLPREDNISOLONE SODIUM SUCCINATE 125 MG/2ML
100 INJECTION, POWDER, LYOPHILIZED, FOR SOLUTION INTRAMUSCULAR; INTRAVENOUS ONCE
Status: CANCELLED | OUTPATIENT
Start: 2023-03-31

## 2022-11-11 RX ORDER — SODIUM CHLORIDE 9 MG/ML
INJECTION, SOLUTION INTRAVENOUS CONTINUOUS
Status: ACTIVE | OUTPATIENT
Start: 2022-11-11 | End: 2022-11-11

## 2022-11-11 RX ORDER — SODIUM CHLORIDE 0.9 % (FLUSH) 0.9 %
5-40 SYRINGE (ML) INJECTION PRN
OUTPATIENT
Start: 2023-03-31

## 2022-11-11 RX ORDER — SODIUM CHLORIDE 9 MG/ML
INJECTION, SOLUTION INTRAVENOUS CONTINUOUS
OUTPATIENT
Start: 2023-03-31

## 2022-11-11 RX ORDER — DIPHENHYDRAMINE HCL 25 MG
25 TABLET ORAL ONCE
Status: CANCELLED
Start: 2023-03-31 | End: 2023-03-31

## 2022-11-11 RX ORDER — SODIUM CHLORIDE 0.9 % (FLUSH) 0.9 %
5-40 SYRINGE (ML) INJECTION PRN
Status: DISCONTINUED | OUTPATIENT
Start: 2022-11-11 | End: 2022-11-12 | Stop reason: HOSPADM

## 2022-11-11 RX ORDER — ACETAMINOPHEN 325 MG/1
650 TABLET ORAL ONCE
Status: CANCELLED | OUTPATIENT
Start: 2023-03-31

## 2022-11-11 RX ORDER — ONDANSETRON 2 MG/ML
8 INJECTION INTRAMUSCULAR; INTRAVENOUS
OUTPATIENT
Start: 2023-03-31

## 2022-11-11 RX ORDER — DIPHENHYDRAMINE HYDROCHLORIDE 50 MG/ML
50 INJECTION INTRAMUSCULAR; INTRAVENOUS
OUTPATIENT
Start: 2023-03-31

## 2022-11-11 RX ORDER — ALBUTEROL SULFATE 90 UG/1
4 AEROSOL, METERED RESPIRATORY (INHALATION) PRN
OUTPATIENT
Start: 2023-03-31

## 2022-11-11 RX ORDER — SODIUM CHLORIDE 9 MG/ML
INJECTION, SOLUTION INTRAVENOUS CONTINUOUS
Status: CANCELLED | OUTPATIENT
Start: 2023-03-31

## 2022-11-11 RX ORDER — SODIUM CHLORIDE 9 MG/ML
25 INJECTION, SOLUTION INTRAVENOUS PRN
OUTPATIENT
Start: 2023-03-31

## 2022-11-11 RX ORDER — DIPHENHYDRAMINE HCL 25 MG
25 TABLET ORAL ONCE
Status: COMPLETED | OUTPATIENT
Start: 2022-11-11 | End: 2022-11-11

## 2022-11-11 RX ORDER — EPINEPHRINE 1 MG/ML
0.3 INJECTION, SOLUTION, CONCENTRATE INTRAVENOUS PRN
OUTPATIENT
Start: 2023-03-31

## 2022-11-11 RX ADMIN — SODIUM CHLORIDE, PRESERVATIVE FREE 10 ML: 5 INJECTION INTRAVENOUS at 12:07

## 2022-11-11 RX ADMIN — SODIUM CHLORIDE, PRESERVATIVE FREE 10 ML: 5 INJECTION INTRAVENOUS at 07:45

## 2022-11-11 RX ADMIN — METHYLPREDNISOLONE SODIUM SUCCINATE 100 MG: 125 INJECTION, POWDER, FOR SOLUTION INTRAMUSCULAR; INTRAVENOUS at 07:47

## 2022-11-11 RX ADMIN — SODIUM CHLORIDE: 9 INJECTION, SOLUTION INTRAVENOUS at 07:59

## 2022-11-11 RX ADMIN — DIPHENHYDRAMINE HCL 25 MG: 25 TABLET ORAL at 07:47

## 2022-11-11 RX ADMIN — SODIUM CHLORIDE, PRESERVATIVE FREE 10 ML: 5 INJECTION INTRAVENOUS at 07:50

## 2022-11-11 ASSESSMENT — PAIN DESCRIPTION - LOCATION: LOCATION: BACK;LEG;KNEE

## 2022-11-11 ASSESSMENT — PAIN DESCRIPTION - DESCRIPTORS: DESCRIPTORS: ACHING;DISCOMFORT;SPASM

## 2022-11-11 ASSESSMENT — PAIN DESCRIPTION - FREQUENCY: FREQUENCY: CONTINUOUS

## 2022-11-11 ASSESSMENT — PAIN SCALES - GENERAL: PAINLEVEL_OUTOF10: 4

## 2022-11-11 ASSESSMENT — PAIN DESCRIPTION - ORIENTATION: ORIENTATION: LEFT;RIGHT

## 2022-11-11 ASSESSMENT — PAIN DESCRIPTION - ONSET: ONSET: ON-GOING

## 2022-11-11 ASSESSMENT — PAIN DESCRIPTION - PAIN TYPE: TYPE: CHRONIC PAIN

## 2022-11-11 ASSESSMENT — PAIN - FUNCTIONAL ASSESSMENT: PAIN_FUNCTIONAL_ASSESSMENT: PREVENTS OR INTERFERES SOME ACTIVE ACTIVITIES AND ADLS

## 2022-11-11 NOTE — PROGRESS NOTES
Before infusion patient declined any infections, open wounds, or change in medical condition. Tolerated infusion well. Reviewed therapy plan, offered education material and/or discharge material, reviewed medication information and signs and symptoms  and educated on possible side effects, verbalizes good knowledge of current plan patient verbalizes understanding, and has no signs or symptoms to report at this time. Patient discharged. Patient alert and oriented x3. No distress noted. Vital signs stable. Patient denies any new or worsening pain. Patient denies any needs. All questions answered. Next appointment scheduled. DECLINES copy of AVS. Instructed on importance, patient  STAYED 60 min observation after infusion completed.

## 2022-11-17 ENCOUNTER — TELEPHONE (OUTPATIENT)
Dept: NEUROLOGY | Age: 47
End: 2022-11-17

## 2022-11-17 NOTE — TELEPHONE ENCOUNTER
Georgiahuey Srini approved from 9/28/22 to 9/27/23.      Pt receives Ocrevus at [address]

## 2022-11-27 DIAGNOSIS — F32.A DEPRESSION, UNSPECIFIED DEPRESSION TYPE: ICD-10-CM

## 2022-11-28 RX ORDER — ARIPIPRAZOLE 5 MG/1
5 TABLET ORAL DAILY
Qty: 90 TABLET | Refills: 1 | Status: SHIPPED | OUTPATIENT
Start: 2022-11-28 | End: 2023-02-26

## 2022-12-07 DIAGNOSIS — E11.65 UNCONTROLLED TYPE 2 DIABETES MELLITUS WITH HYPERGLYCEMIA (HCC): ICD-10-CM

## 2022-12-08 RX ORDER — INSULIN LISPRO 100 [IU]/ML
INJECTION, SOLUTION INTRAVENOUS; SUBCUTANEOUS
Qty: 30 ML | Refills: 6 | Status: SHIPPED | OUTPATIENT
Start: 2022-12-08

## 2022-12-12 RX ORDER — INSULIN GLARGINE-YFGN 100 [IU]/ML
INJECTION, SOLUTION SUBCUTANEOUS
Qty: 30 ML | Refills: 3 | Status: SHIPPED | OUTPATIENT
Start: 2022-12-12

## 2022-12-21 DIAGNOSIS — E11.65 UNCONTROLLED TYPE 2 DIABETES MELLITUS WITH HYPERGLYCEMIA (HCC): Primary | ICD-10-CM

## 2022-12-21 RX ORDER — DULAGLUTIDE 1.5 MG/.5ML
INJECTION, SOLUTION SUBCUTANEOUS
Qty: 4 ADJUSTABLE DOSE PRE-FILLED PEN SYRINGE | Refills: 1 | Status: SHIPPED | OUTPATIENT
Start: 2022-12-21

## 2022-12-26 DIAGNOSIS — I10 ESSENTIAL HYPERTENSION: ICD-10-CM

## 2022-12-28 RX ORDER — METOPROLOL TARTRATE 100 MG/1
TABLET ORAL
Qty: 180 TABLET | Refills: 1 | Status: SHIPPED | OUTPATIENT
Start: 2022-12-28

## 2022-12-30 NOTE — PROGRESS NOTES
Ht 6' 5\" (1.956 m)   Wt (!) 432 lb 9.6 oz (196.2 kg)   SpO2 98%   BMI 51.30 kg/m²     LABS:    Recent Labs     09/04/20  0405 09/05/20  0311   WBC 16.4* 16.5*   HGB 15.9 16.7*   HCT 46.7 49.4    222        Recent Labs     09/06/20  0340      K 4.2      CO2 18*   BUN 21*   CREATININE 0.8        Recent Labs     09/03/20  1719 09/04/20  0405   PROT 7.7 7.0         ASSESSMENT:  Principal Problem:    Cellulitis of left foot      PLAN:  - Patient was examined and evaluated. - Reviewed patient's recent lab results and pertinent diagnostic imaging.  - Reviewed ancillary service notes. - Pain Control: IV and PO  - ABX per ID   - Vascular:  - Cultures :   - X-rays:   - Dressing:  - I did change bandage today, repacked wound, dressed with 4x4, kerlix,  Ace bandage with Kerlix applied today. - Can bear weight just heel contact only with surgical shoe. - Pending intraoperative cultures. - We will continue to follow while in-house. Chart reviewed. Patient seen seated in chair in PT/OT preoperative assessment room with no apparent distress. Patient underwent pre-operative consultation to determine current functional mobility limitations to determine appropriate need for assistive devices.

## 2023-01-11 DIAGNOSIS — E11.65 UNCONTROLLED TYPE 2 DIABETES MELLITUS WITH HYPERGLYCEMIA (HCC): ICD-10-CM

## 2023-01-11 RX ORDER — BACLOFEN 10 MG/1
TABLET ORAL
Qty: 60 TABLET | Refills: 5 | OUTPATIENT
Start: 2023-01-11

## 2023-01-11 RX ORDER — PEN NEEDLE, DIABETIC 31 GX5/16"
NEEDLE, DISPOSABLE MISCELLANEOUS
Qty: 500 EACH | Refills: 1 | Status: SHIPPED | OUTPATIENT
Start: 2023-01-11

## 2023-01-18 ENCOUNTER — COMMUNITY OUTREACH (OUTPATIENT)
Dept: FAMILY MEDICINE CLINIC | Age: 48
End: 2023-01-18

## 2023-01-26 DIAGNOSIS — F32.A DEPRESSION, UNSPECIFIED DEPRESSION TYPE: ICD-10-CM

## 2023-01-27 RX ORDER — ESCITALOPRAM OXALATE 20 MG/1
20 TABLET ORAL DAILY
Qty: 90 TABLET | Refills: 1 | Status: SHIPPED | OUTPATIENT
Start: 2023-01-27

## 2023-02-24 DIAGNOSIS — E11.65 UNCONTROLLED TYPE 2 DIABETES MELLITUS WITH HYPERGLYCEMIA (HCC): ICD-10-CM

## 2023-02-26 RX ORDER — DULAGLUTIDE 1.5 MG/.5ML
INJECTION, SOLUTION SUBCUTANEOUS
Qty: 2 ML | Refills: 2 | Status: SHIPPED | OUTPATIENT
Start: 2023-02-26

## 2023-03-29 DIAGNOSIS — E11.65 UNCONTROLLED TYPE 2 DIABETES MELLITUS WITH HYPERGLYCEMIA (HCC): ICD-10-CM

## 2023-03-30 RX ORDER — DULAGLUTIDE 1.5 MG/.5ML
INJECTION, SOLUTION SUBCUTANEOUS
Qty: 2 ML | Refills: 2 | OUTPATIENT
Start: 2023-03-30

## 2023-04-04 ENCOUNTER — TELEPHONE (OUTPATIENT)
Dept: NEUROLOGY | Age: 48
End: 2023-04-04

## 2023-04-04 NOTE — TELEPHONE ENCOUNTER
LM for pt to update office regarding his Ocrevus and if he is seeing another provider . Forms would need completed for Ocrevus and a follow up appt with one of our providers.

## 2023-04-25 DIAGNOSIS — E78.5 HYPERLIPIDEMIA, UNSPECIFIED HYPERLIPIDEMIA TYPE: ICD-10-CM

## 2023-04-25 RX ORDER — PRAVASTATIN SODIUM 40 MG
40 TABLET ORAL DAILY
Qty: 90 TABLET | Refills: 1 | Status: SHIPPED | OUTPATIENT
Start: 2023-04-25 | End: 2023-07-24

## 2023-04-25 RX ORDER — OMEPRAZOLE 20 MG/1
20 CAPSULE, DELAYED RELEASE ORAL DAILY
Qty: 90 CAPSULE | Refills: 1 | Status: SHIPPED | OUTPATIENT
Start: 2023-04-25 | End: 2023-07-24

## 2023-04-25 RX ORDER — HYDRALAZINE HYDROCHLORIDE 100 MG/1
TABLET, FILM COATED ORAL
Qty: 270 TABLET | Refills: 1 | Status: SHIPPED | OUTPATIENT
Start: 2023-04-25

## 2023-04-25 RX ORDER — LISINOPRIL 40 MG/1
40 TABLET ORAL DAILY
Qty: 90 TABLET | Refills: 1 | Status: SHIPPED | OUTPATIENT
Start: 2023-04-25 | End: 2023-07-24

## 2023-06-17 NOTE — CONSULTS
[address] [place] [address] [facility]  Consultation Note     Admit Date: 8/27/2021 12:21 PM    Reason for Consult:   Diabetic foot ulcer    Attending Physician:  Vik Bond MD    HISTORY OF PRESENT ILLNESS:             The history is obtained from extensive review of available past medical records. The patient is a 51 y.o. male who is previously known to the ID service. The patient presents to the ED on 8/27/2021 with a 2-month history of right diabetic foot ulcer on the bottom of his right foot, associated with chills. He had an elevated white count. Superficial wound was taken. He was treated with Vancomycin and Ceftriaxone at my recommendation. He was seen by podiatry and is scheduled to have an amputation tomorrow. Vascular surgery has also been asked to see him. Past Medical History:        Diagnosis Date    Cellulitis of left foot 09/03/2020    Diabetes mellitus ([facility] [address]     Hyperlipidemia     Hypertension     Kidney stone     Multiple sclerosis ([facility] [address]      September 2020. Admitted to [facility] with a wound on the plantar surface of the left foot associated to redness and concern for soft tissue gas. Seen by Dr. Zeina Patricia and treated with Cefepime and Vancomycin for diabetic foot infection possible osteomyelitis. Culture grew Streptococcus agalactiae, Raoultella planticola, Candida albicans, Citrobacter and Enterococcus faecalis. Antibiotics were consolidated to Ceftriaxone and Ampicillin and later on to Zosyn. He was seen in the office on 10/9/2020. Completed course of antibiotics and the PICC was pulled. He was placed on suppressive Amoxicillin and Fluconazole.     Past Surgical History:        Procedure Laterality Date    FOOT SURGERY Left 9/4/2020    INCISION AND DRAINAGE LEFT FOOT  BONE WITH PARTIAL BONE RESECTION FIFTH METATARSAL performed by Susan Ferrer DPM at [facility] OR     Current Medications:   Scheduled Meds:   pravastatin  40 mg Oral Daily    pantoprazole  40 mg Oral QAM AC    metoprolol tartrate  25 mg Oral BID    lisinopril  20 mg Oral Daily    ARIPiprazole  5 mg Oral Daily    aspirin  81 mg Oral Daily    buPROPion  300 mg Oral QAM    escitalopram  20 mg Oral Daily    sodium chloride flush  5-40 mL IntraVENous 2 times per day    insulin glargine  32 Units Subcutaneous Nightly    insulin lispro  5 Units Subcutaneous TID WC    insulin lispro  0-12 Units Subcutaneous TID WC    insulin lispro  0-6 Units Subcutaneous Nightly     Continuous Infusions:   dextrose      sodium chloride      lactated ringers Stopped (08/28/21 0229)     PRN Meds:glucose, dextrose, glucagon (rDNA), dextrose, sodium chloride flush, sodium chloride, ondansetron **OR** ondansetron, polyethylene glycol, acetaminophen **OR** acetaminophen    Allergies:  Bee venom and Milk-related compounds    Social History:   Social History     Socioeconomic History    Marital status: Single     Spouse name: None    Number of children: None    Years of education: None    Highest education level: None   Occupational History    None   Tobacco Use    Smoking status: Never Smoker    Smokeless tobacco: Never Used   Vaping Use    Vaping Use: Never used   Substance and Sexual Activity    Alcohol use: Never    Drug use: Never    Sexual activity: Yes     Partners: Female   Other Topics Concern    None   Social History Narrative    None     Social Determinants of Health     Financial Resource Strain:     Difficulty of Paying Living Expenses:    Food Insecurity:     Worried About Running Out of Food in the Last Year:     Ran Out of Food in the Last Year:    Transportation Needs:     Lack of Transportation (Medical):      Lack of Transportation (Non-Medical):    Physical Activity:     Days of Exercise per Week:     Minutes of Exercise per Session:    Stress:     Feeling of Stress :    Social Connections:     Frequency of Communication with Friends and Family:     Frequency of Social Gatherings with Friends and Family:     Attends Sabianist Services:     Active Member of Clubs or Organizations:     Attends Club or Organization Meetings:     Marital Status:    Intimate Partner Violence:     Fear of Current or Ex-Partner:     Emotionally Abused:     Physically Abused:     Sexually Abused:       Pets: Dog  Travel: None  The patient lives at home with his mother, sister and niece    Family History:       Problem Relation Age of Onset    Hypertension Mother     Diabetes Father    . Otherwise non-pertinent to the chief complaint. REVIEW OF SYSTEMS:    Constitutional: Positive for fevers, chills, diaphoresis  Neurologic: Negative   Psychiatric: Negative  Rheumatologic: Negative   Endocrine: Negative  Hematologic: Negative  Immunologic: SARS-CoV-2 vaccine today  ENT: Negative  Respiratory: Negative   Cardiovascular: Negative  GI: Negative  : Negative  Musculoskeletal: As in the HPI  Skin: No rashes. PHYSICAL EXAM:    Vitals:   BP (!) 163/67   Pulse 103   Temp 98.7 °F (37.1 °C) (Oral)   Resp 18   Ht 6' 5\" (1.956 m)   Wt (!) 450 lb (204.1 kg)   SpO2 97%   BMI 53.36 kg/m²   Constitutional: The patient is awake, alert, and oriented. Lying in bed. No distress. Morbidly obese. Skin: Warm and slightly diaphoretic. No rashes were noted. HEENT: Eyes show round, and reactive pupils. No jaundice. Moist mucous membranes, no ulcerations, no thrush. Neck: Supple to movements. No lymphadenopathy. Chest: No use of accessory muscles to breathe. Symmetrical expansion. Auscultation reveals no wheezing, crackles, or rhonchi. Cardiovascular: S1 and S2 are rhythmic and regular. No murmurs appreciated. Abdomen: Positive bowel sounds to auscultation. Benign to palpation. No masses felt. No hepatosplenomegaly. Extremities: Necrotic right fifth toe. Ulcer bottom of the right foot. There is another smaller but clean ulcer on the medial aspect of the left forefoot.   Lines: peripheral              CBC+dif:  Recent Labs     08/27/21  1300 08/27/21  1300 08/28/21  0500   WBC 22.5*  --  24.8*   HGB 16.2   < > 15.6   HCT 47.4   < > 46.5   MCV 95.6   < > 96.7      < > 257   NEUTROABS 19.08*  --   --     < > = values in this interval not displayed. Lab Results   Component Value Date    CRP 5.5 (H) 08/27/2021    CRP 0.7 (H) 07/01/2021    CRP 0.6 (H) 12/29/2020      No results found for: CRPHS  Lab Results   Component Value Date    SEDRATE 30 (H) 08/27/2021    SEDRATE 8 07/01/2021    SEDRATE 7 12/29/2020     Lab Results   Component Value Date    ALT 23 08/27/2021    AST 28 08/27/2021    ALKPHOS 105 08/27/2021    BILITOT 1.2 08/27/2021     Lab Results   Component Value Date     08/28/2021    K 4.0 08/28/2021    K 5.1 08/27/2021    CL 96 08/28/2021    CO2 21 08/28/2021    BUN 13 08/28/2021    CREATININE 0.6 08/28/2021    GFRAA >60 08/28/2021    LABGLOM >60 08/28/2021    GLUCOSE 374 08/28/2021    PROT 8.2 08/27/2021    LABALBU 3.6 08/27/2021    CALCIUM 8.5 08/28/2021    BILITOT 1.2 08/27/2021    ALKPHOS 105 08/27/2021    AST 28 08/27/2021    ALT 23 08/27/2021       No results found for: PROTIME, INR    No results found for: TSH    Lab Results   Component Value Date    COLORU Yellow 08/27/2021    PHUR 5.5 08/27/2021    WBCUA 1-3 08/27/2021    RBCUA 1-3 08/27/2021    BACTERIA NONE SEEN 08/27/2021    CLARITYU Clear 08/27/2021    SPECGRAV 1.020 08/27/2021    LEUKOCYTESUR Negative 08/27/2021    UROBILINOGEN 0.2 08/27/2021    BILIRUBINUR Negative 08/27/2021    BLOODU SMALL 08/27/2021    GLUCOSEU >=1000 08/27/2021       No results found for: HCO3, BE, O2SAT, PH, THGB, PCO2, PO2, TCO2  Radiology:  Noted    Microbiology:  Pending  No results for input(s): BC in the last 72 hours. No results for input(s): ORG in the last 72 hours. No results for input(s): Ailyn Reading in the last 72 hours. No results for input(s): STREPNEUMAGU in the last 72 hours.   No results for input(s): LP1UAG in the last 72 hours. No results for input(s): ASO in the last 72 hours. No results for input(s): CULTRESP in the last 72 hours. No results for input(s): PROCAL in the last 72 hours. Assessment:  · Limb threatening diabetic foot infection  · Gangrene right fifth toe  · Fever with leukocytosis associated to the above    Plan:    · Start Ceftaroline and Clindamycin  · Check cultures  · Amputation tomorrow  · Will follow with you    Thank you for having us see this patient in consultation. I will be discussing this case with the treating physicians.     Marques Zuñiga MD  8:54 AM  8/28/2021 Unknown

## 2023-07-09 DIAGNOSIS — E11.65 UNCONTROLLED TYPE 2 DIABETES MELLITUS WITH HYPERGLYCEMIA (HCC): ICD-10-CM

## 2023-07-11 RX ORDER — PEN NEEDLE, DIABETIC 31 GX5/16"
NEEDLE, DISPOSABLE MISCELLANEOUS
Qty: 500 EACH | Refills: 1 | Status: SHIPPED | OUTPATIENT
Start: 2023-07-11

## 2023-10-11 DIAGNOSIS — F32.A DEPRESSION, UNSPECIFIED DEPRESSION TYPE: ICD-10-CM

## 2023-10-11 RX ORDER — ESCITALOPRAM OXALATE 20 MG/1
20 TABLET ORAL DAILY
Qty: 90 TABLET | Refills: 1 | Status: SHIPPED | OUTPATIENT
Start: 2023-10-11

## 2023-11-12 DIAGNOSIS — E11.65 UNCONTROLLED TYPE 2 DIABETES MELLITUS WITH HYPERGLYCEMIA (HCC): ICD-10-CM

## 2023-11-16 RX ORDER — INSULIN LISPRO 100 [IU]/ML
INJECTION, SOLUTION INTRAVENOUS; SUBCUTANEOUS
Qty: 54 ML | OUTPATIENT
Start: 2023-11-16

## (undated) DEVICE — SPONGE LAP W18XL18IN WHT COT 4 PLY FLD STRUNG RADPQ DISP ST

## (undated) DEVICE — 3M™ COBAN™ NL STERILE NON-LATEX SELF-ADHERENT WRAP, 2084S, 4 IN X 5 YD (10 CM X 4,5 M), 18 ROLLS/CASE: Brand: 3M™ COBAN™

## (undated) DEVICE — DRESSING,GAUZE,XEROFORM,CURAD,1"X8",ST: Brand: CURAD

## (undated) DEVICE — 4-PORT MANIFOLD: Brand: NEPTUNE 2

## (undated) DEVICE — PACK PROCEDURE SURG GEN CUST

## (undated) DEVICE — GOWN,SIRUS,FABRNF,XL,20/CS: Brand: MEDLINE

## (undated) DEVICE — SET PSI

## (undated) DEVICE — SET ORTHO STD STORTSTD1

## (undated) DEVICE — TOWEL,OR,DSP,ST,BLUE,STD,6/PK,12PK/CS: Brand: MEDLINE

## (undated) DEVICE — SOLUTION IV IRRIG POUR BRL 0.9% SODIUM CHL 2F7124

## (undated) DEVICE — MARKER,SKIN,WI/RULER AND LABELS: Brand: MEDLINE

## (undated) DEVICE — INTENDED FOR TISSUE SEPARATION, AND OTHER PROCEDURES THAT REQUIRE A SHARP SURGICAL BLADE TO PUNCTURE OR CUT.: Brand: BARD-PARKER ® STAINLESS STEEL BLADES

## (undated) DEVICE — NEEDLE HYPO 25GA L1.5IN BLU POLYPR HUB S STL REG BVL STR

## (undated) DEVICE — CONVERTORS STOCKINETTE: Brand: CONVERTORS

## (undated) DEVICE — BANDAGE,GAUZE,BULKEE II,4.5"X4.1YD,STRL: Brand: MEDLINE

## (undated) DEVICE — GLOVE ORANGE PI 7 1/2   MSG9075

## (undated) DEVICE — BNDG,ELSTC,MATRIX,STRL,4"X5YD,LF,HOOK&LP: Brand: MEDLINE

## (undated) DEVICE — DRAPE,EXTREMITY,89X128,STERILE: Brand: MEDLINE

## (undated) DEVICE — GAUZE,PACKING STRIP,IODOFORM,1/2"X5YD,ST: Brand: CURAD

## (undated) DEVICE — COVER HNDL LT DISP

## (undated) DEVICE — TUBING SUCT 12FR MAL ALUM SHFT FN CAP VENT UNIV CONN W/ OBT

## (undated) DEVICE — ELECTRODE PT RET AD L9FT HI MOIST COND ADH HYDRGEL CORDED

## (undated) DEVICE — PAD,ABDOMINAL,5"X9",ST,LF,25/BX: Brand: MEDLINE INDUSTRIES, INC.

## (undated) DEVICE — DRESSING PETRO W3XL8IN OIL EMUL N ADH GZ KNIT IMPREG CELOS

## (undated) DEVICE — INSTRUMENT SYSTEM 4 BATTERY REUSABLE

## (undated) DEVICE — SOLUTION IRRIG 1000ML 09% SOD CHL USP PIC PLAS CONTAINER

## (undated) DEVICE — GAUZE,SPONGE,4"X4",8PLY,STRL,LF,10/TRAY: Brand: MEDLINE

## (undated) DEVICE — DOUBLE BASIN SET: Brand: MEDLINE INDUSTRIES, INC.

## (undated) DEVICE — SYRINGE MED 30ML STD CLR PLAS LUERLOCK TIP N CTRL DISP

## (undated) DEVICE — NEEDLE HYPO 22GA L1.5IN BLK POLYPR HUB S STL REG BVL STR

## (undated) DEVICE — TRAY DRILL SYSTEM 4 REUSABLE

## (undated) DEVICE — BLADE SAW SAG OSCIL LNG MED 9X31MM

## (undated) DEVICE — CHLORAPREP 26ML ORANGE

## (undated) DEVICE — SYRINGE IRRIG 60ML SFT PLIABLE BLB EZ TO GRP 1 HND USE W/

## (undated) DEVICE — RACK TUBE CURETTE

## (undated) DEVICE — GAUZE,PACKING STRIP,PLAIN,1/2"X5YD,STRL: Brand: CURAD

## (undated) DEVICE — C-ARM: Brand: UNBRANDED

## (undated) DEVICE — STERILE PVP: Brand: MEDLINE INDUSTRIES, INC.

## (undated) DEVICE — DRAPE CARM MINI FOR IMAG SYS INSIGHT FLROSCN

## (undated) DEVICE — GAUZE,PACKING STRIP,PLAIN,1/4"X5YD,STRL: Brand: CURAD

## (undated) DEVICE — SYSTEM TPS ORTHO

## (undated) DEVICE — GLOVE ORANGE PI 7   MSG9070